# Patient Record
Sex: FEMALE | Race: WHITE | NOT HISPANIC OR LATINO | Employment: UNEMPLOYED | URBAN - NONMETROPOLITAN AREA
[De-identification: names, ages, dates, MRNs, and addresses within clinical notes are randomized per-mention and may not be internally consistent; named-entity substitution may affect disease eponyms.]

---

## 2017-07-20 ENCOUNTER — TRANSCRIBE ORDERS (OUTPATIENT)
Dept: ADMINISTRATIVE | Facility: HOSPITAL | Age: 47
End: 2017-07-20

## 2017-07-20 DIAGNOSIS — Z12.31 VISIT FOR SCREENING MAMMOGRAM: Primary | ICD-10-CM

## 2017-12-21 ENCOUNTER — TRANSCRIBE ORDERS (OUTPATIENT)
Dept: ADMINISTRATIVE | Facility: HOSPITAL | Age: 47
End: 2017-12-21

## 2017-12-21 DIAGNOSIS — S04.51XA INJURY OF RIGHT FACIAL NERVE, INITIAL ENCOUNTER: Primary | ICD-10-CM

## 2017-12-27 ENCOUNTER — APPOINTMENT (OUTPATIENT)
Dept: CT IMAGING | Facility: HOSPITAL | Age: 47
End: 2017-12-27

## 2017-12-29 ENCOUNTER — HOSPITAL ENCOUNTER (OUTPATIENT)
Dept: CT IMAGING | Facility: HOSPITAL | Age: 47
Discharge: HOME | End: 2017-12-29
Admitting: NURSE PRACTITIONER

## 2017-12-29 DIAGNOSIS — S04.51XA INJURY OF RIGHT FACIAL NERVE, INITIAL ENCOUNTER: ICD-10-CM

## 2017-12-29 PROCEDURE — 70450 CT HEAD/BRAIN W/O DYE: CPT

## 2017-12-29 PROCEDURE — 70450 CT HEAD/BRAIN W/O DYE: CPT | Performed by: RADIOLOGY

## 2018-02-13 ENCOUNTER — TRANSCRIBE ORDERS (OUTPATIENT)
Dept: ADMINISTRATIVE | Facility: HOSPITAL | Age: 48
End: 2018-02-13

## 2018-02-13 DIAGNOSIS — G44.319 ACUTE POST-TRAUMATIC HEADACHE, NOT INTRACTABLE: Primary | ICD-10-CM

## 2018-02-26 ENCOUNTER — APPOINTMENT (OUTPATIENT)
Dept: MRI IMAGING | Facility: HOSPITAL | Age: 48
End: 2018-02-26
Attending: PSYCHIATRY & NEUROLOGY

## 2018-02-28 ENCOUNTER — HOSPITAL ENCOUNTER (OUTPATIENT)
Dept: MRI IMAGING | Facility: HOSPITAL | Age: 48
Discharge: HOME | End: 2018-02-28
Attending: PSYCHIATRY & NEUROLOGY | Admitting: PSYCHIATRY & NEUROLOGY

## 2018-02-28 DIAGNOSIS — G44.319 ACUTE POST-TRAUMATIC HEADACHE, NOT INTRACTABLE: ICD-10-CM

## 2018-02-28 PROCEDURE — 70551 MRI BRAIN STEM W/O DYE: CPT | Performed by: RADIOLOGY

## 2018-02-28 PROCEDURE — 70551 MRI BRAIN STEM W/O DYE: CPT

## 2019-03-21 ENCOUNTER — APPOINTMENT (OUTPATIENT)
Dept: CT IMAGING | Facility: HOSPITAL | Age: 49
End: 2019-03-21

## 2019-03-21 ENCOUNTER — APPOINTMENT (OUTPATIENT)
Dept: ULTRASOUND IMAGING | Facility: HOSPITAL | Age: 49
End: 2019-03-21

## 2019-03-21 ENCOUNTER — HOSPITAL ENCOUNTER (EMERGENCY)
Facility: HOSPITAL | Age: 49
Discharge: ANOTHER HOSPITAL | End: 2019-03-21
Attending: EMERGENCY MEDICINE | Admitting: EMERGENCY MEDICINE

## 2019-03-21 VITALS
BODY MASS INDEX: 27.38 KG/M2 | OXYGEN SATURATION: 98 % | WEIGHT: 145 LBS | RESPIRATION RATE: 18 BRPM | HEIGHT: 61 IN | SYSTOLIC BLOOD PRESSURE: 122 MMHG | DIASTOLIC BLOOD PRESSURE: 62 MMHG | TEMPERATURE: 98.7 F | HEART RATE: 110 BPM

## 2019-03-21 DIAGNOSIS — K80.43 CALCULUS OF BILE DUCT WITH ACUTE CHOLECYSTITIS AND OBSTRUCTION: ICD-10-CM

## 2019-03-21 DIAGNOSIS — B16.9 ACUTE HEPATITIS B: ICD-10-CM

## 2019-03-21 DIAGNOSIS — K85.10 ACUTE BILIARY PANCREATITIS, UNSPECIFIED COMPLICATION STATUS: Primary | ICD-10-CM

## 2019-03-21 LAB
6-ACETYL MORPHINE: NEGATIVE
ALBUMIN SERPL-MCNC: 3.95 G/DL (ref 3.5–5.2)
ALBUMIN/GLOB SERPL: 0.9 G/DL
ALP SERPL-CCNC: 347 U/L (ref 39–117)
ALT SERPL W P-5'-P-CCNC: 89 U/L (ref 1–33)
AMPHET+METHAMPHET UR QL: POSITIVE
ANION GAP SERPL CALCULATED.3IONS-SCNC: 14.6 MMOL/L
AST SERPL-CCNC: 45 U/L (ref 1–32)
BACTERIA UR QL AUTO: ABNORMAL /HPF
BARBITURATES UR QL SCN: NEGATIVE
BASOPHILS # BLD AUTO: 0.02 10*3/MM3 (ref 0–0.2)
BASOPHILS NFR BLD AUTO: 0.1 % (ref 0–1.5)
BENZODIAZ UR QL SCN: NEGATIVE
BILIRUB SERPL-MCNC: 4.8 MG/DL (ref 0.2–1.2)
BILIRUB UR QL STRIP: ABNORMAL
BUN BLD-MCNC: 8 MG/DL (ref 6–20)
BUN/CREAT SERPL: 11.6 (ref 7–25)
BUPRENORPHINE SERPL-MCNC: POSITIVE NG/ML
CALCIUM SPEC-SCNC: 9.4 MG/DL (ref 8.6–10.5)
CANNABINOIDS SERPL QL: POSITIVE
CHLORIDE SERPL-SCNC: 94 MMOL/L (ref 98–107)
CLARITY UR: ABNORMAL
CO2 SERPL-SCNC: 23.4 MMOL/L (ref 22–29)
COCAINE UR QL: NEGATIVE
COLOR UR: ABNORMAL
CREAT BLD-MCNC: 0.69 MG/DL (ref 0.57–1)
D-LACTATE SERPL-SCNC: 1 MMOL/L (ref 0.5–2)
DEPRECATED RDW RBC AUTO: 43.8 FL (ref 37–54)
EOSINOPHIL # BLD AUTO: 0.2 10*3/MM3 (ref 0–0.4)
EOSINOPHIL NFR BLD AUTO: 1.4 % (ref 0.3–6.2)
ERYTHROCYTE [DISTWIDTH] IN BLOOD BY AUTOMATED COUNT: 14 % (ref 12.3–15.4)
GFR SERPL CREATININE-BSD FRML MDRD: 90 ML/MIN/1.73
GLOBULIN UR ELPH-MCNC: 4.4 GM/DL
GLUCOSE BLD-MCNC: 124 MG/DL (ref 65–99)
GLUCOSE UR STRIP-MCNC: NEGATIVE MG/DL
HAV IGM SERPL QL IA: ABNORMAL
HBV CORE IGM SERPL QL IA: REACTIVE
HBV SURFACE AG SERPL QL IA: ABNORMAL
HCG SERPL QL: NEGATIVE
HCT VFR BLD AUTO: 41 % (ref 34–46.6)
HCV AB SER DONR QL: ABNORMAL
HGB BLD-MCNC: 13.2 G/DL (ref 12–15.9)
HGB UR QL STRIP.AUTO: ABNORMAL
HOLD SPECIMEN: NORMAL
HOLD SPECIMEN: NORMAL
HYALINE CASTS UR QL AUTO: ABNORMAL /LPF
IMM GRANULOCYTES # BLD AUTO: 0.04 10*3/MM3 (ref 0–0.05)
IMM GRANULOCYTES NFR BLD AUTO: 0.3 % (ref 0–0.5)
KETONES UR QL STRIP: ABNORMAL
LEUKOCYTE ESTERASE UR QL STRIP.AUTO: ABNORMAL
LIPASE SERPL-CCNC: >600 U/L (ref 13–60)
LYMPHOCYTES # BLD AUTO: 1.38 10*3/MM3 (ref 0.7–3.1)
LYMPHOCYTES NFR BLD AUTO: 9.9 % (ref 19.6–45.3)
MCH RBC QN AUTO: 27.8 PG (ref 26.6–33)
MCHC RBC AUTO-ENTMCNC: 32.2 G/DL (ref 31.5–35.7)
MCV RBC AUTO: 86.3 FL (ref 79–97)
METHADONE UR QL SCN: NEGATIVE
MONOCYTES # BLD AUTO: 1.37 10*3/MM3 (ref 0.1–0.9)
MONOCYTES NFR BLD AUTO: 9.8 % (ref 5–12)
MUCOUS THREADS URNS QL MICRO: ABNORMAL /HPF
NEUTROPHILS # BLD AUTO: 10.95 10*3/MM3 (ref 1.4–7)
NEUTROPHILS NFR BLD AUTO: 78.5 % (ref 42.7–76)
NITRITE UR QL STRIP: POSITIVE
OPIATES UR QL: NEGATIVE
OXYCODONE UR QL SCN: NEGATIVE
PCP UR QL SCN: NEGATIVE
PH UR STRIP.AUTO: 5.5 [PH] (ref 5–8)
PLATELET # BLD AUTO: 308 10*3/MM3 (ref 140–450)
PMV BLD AUTO: 10.3 FL (ref 6–12)
POTASSIUM BLD-SCNC: 3.5 MMOL/L (ref 3.5–5.2)
PROT SERPL-MCNC: 8.3 G/DL (ref 6–8.5)
PROT UR QL STRIP: ABNORMAL
RBC # BLD AUTO: 4.75 10*6/MM3 (ref 3.77–5.28)
RBC # UR: ABNORMAL /HPF
REF LAB TEST METHOD: ABNORMAL
SODIUM BLD-SCNC: 132 MMOL/L (ref 136–145)
SP GR UR STRIP: >=1.03 (ref 1–1.03)
SQUAMOUS #/AREA URNS HPF: ABNORMAL /HPF
UROBILINOGEN UR QL STRIP: ABNORMAL
WBC NRBC COR # BLD: 13.96 10*3/MM3 (ref 3.4–10.8)
WBC UR QL AUTO: ABNORMAL /HPF
WHOLE BLOOD HOLD SPECIMEN: NORMAL
WHOLE BLOOD HOLD SPECIMEN: NORMAL

## 2019-03-21 PROCEDURE — 80307 DRUG TEST PRSMV CHEM ANLYZR: CPT | Performed by: EMERGENCY MEDICINE

## 2019-03-21 PROCEDURE — 80053 COMPREHEN METABOLIC PANEL: CPT | Performed by: EMERGENCY MEDICINE

## 2019-03-21 PROCEDURE — 84703 CHORIONIC GONADOTROPIN ASSAY: CPT | Performed by: EMERGENCY MEDICINE

## 2019-03-21 PROCEDURE — 76705 ECHO EXAM OF ABDOMEN: CPT | Performed by: RADIOLOGY

## 2019-03-21 PROCEDURE — 25010000002 PIPERACILLIN-TAZOBACTAM: Performed by: EMERGENCY MEDICINE

## 2019-03-21 PROCEDURE — 96375 TX/PRO/DX INJ NEW DRUG ADDON: CPT

## 2019-03-21 PROCEDURE — 99284 EMERGENCY DEPT VISIT MOD MDM: CPT

## 2019-03-21 PROCEDURE — 87040 BLOOD CULTURE FOR BACTERIA: CPT | Performed by: EMERGENCY MEDICINE

## 2019-03-21 PROCEDURE — 25010000002 MORPHINE PER 10 MG: Performed by: EMERGENCY MEDICINE

## 2019-03-21 PROCEDURE — 80074 ACUTE HEPATITIS PANEL: CPT | Performed by: PHYSICIAN ASSISTANT

## 2019-03-21 PROCEDURE — 74176 CT ABD & PELVIS W/O CONTRAST: CPT

## 2019-03-21 PROCEDURE — 83690 ASSAY OF LIPASE: CPT | Performed by: EMERGENCY MEDICINE

## 2019-03-21 PROCEDURE — 74176 CT ABD & PELVIS W/O CONTRAST: CPT | Performed by: RADIOLOGY

## 2019-03-21 PROCEDURE — 96361 HYDRATE IV INFUSION ADD-ON: CPT

## 2019-03-21 PROCEDURE — 83605 ASSAY OF LACTIC ACID: CPT | Performed by: EMERGENCY MEDICINE

## 2019-03-21 PROCEDURE — 96365 THER/PROPH/DIAG IV INF INIT: CPT

## 2019-03-21 PROCEDURE — 85025 COMPLETE CBC W/AUTO DIFF WBC: CPT | Performed by: EMERGENCY MEDICINE

## 2019-03-21 PROCEDURE — 81001 URINALYSIS AUTO W/SCOPE: CPT | Performed by: EMERGENCY MEDICINE

## 2019-03-21 PROCEDURE — 25010000002 ONDANSETRON PER 1 MG: Performed by: EMERGENCY MEDICINE

## 2019-03-21 PROCEDURE — 76705 ECHO EXAM OF ABDOMEN: CPT

## 2019-03-21 RX ORDER — IBUPROFEN 600 MG/1
600 TABLET ORAL ONCE
Status: COMPLETED | OUTPATIENT
Start: 2019-03-21 | End: 2019-03-21

## 2019-03-21 RX ORDER — ONDANSETRON 2 MG/ML
4 INJECTION INTRAMUSCULAR; INTRAVENOUS ONCE
Status: COMPLETED | OUTPATIENT
Start: 2019-03-21 | End: 2019-03-21

## 2019-03-21 RX ORDER — SODIUM CHLORIDE 9 MG/ML
125 INJECTION, SOLUTION INTRAVENOUS CONTINUOUS
Status: DISCONTINUED | OUTPATIENT
Start: 2019-03-21 | End: 2019-03-21 | Stop reason: HOSPADM

## 2019-03-21 RX ORDER — SODIUM CHLORIDE 0.9 % (FLUSH) 0.9 %
10 SYRINGE (ML) INJECTION AS NEEDED
Status: DISCONTINUED | OUTPATIENT
Start: 2019-03-21 | End: 2019-03-21 | Stop reason: HOSPADM

## 2019-03-21 RX ADMIN — MORPHINE SULFATE 4 MG: 4 INJECTION INTRAVENOUS at 17:39

## 2019-03-21 RX ADMIN — SODIUM CHLORIDE 125 ML/HR: 9 INJECTION, SOLUTION INTRAVENOUS at 17:36

## 2019-03-21 RX ADMIN — IBUPROFEN 600 MG: 600 TABLET ORAL at 15:57

## 2019-03-21 RX ADMIN — ONDANSETRON 4 MG: 2 INJECTION, SOLUTION INTRAMUSCULAR; INTRAVENOUS at 17:36

## 2019-03-21 RX ADMIN — PIPERACILLIN SODIUM,TAZOBACTAM SODIUM 3.38 G: 3; .375 INJECTION, POWDER, FOR SOLUTION INTRAVENOUS at 19:28

## 2019-03-21 RX ADMIN — SODIUM CHLORIDE 1000 ML: 9 INJECTION, SOLUTION INTRAVENOUS at 17:39

## 2019-03-21 NOTE — ED NOTES
Called [facility] [place] and spoke to Kacey. She is paging on call GI for Dr Aguillon and will call back.     Ofelia Robertson  03/21/19 1941

## 2019-03-21 NOTE — ED NOTES
Pt alert and oriented, skin pwd, no respiratory distress. Pt reports abd pain is 6/10, no vomiting at this time. md made aware of pt temp, will await new orders. Pt sent back to lobby to wait on available room.     Lupis Vizcaino, VIDYA  03/21/19 [id number]

## 2019-03-21 NOTE — ED PROVIDER NOTES
Subjective   48-year-old white female complains of abdominal pain and vomiting.  Patient states that for the past several days she has had abdominal pain worse when she eats and drinks.  She had some vomiting with this.  She denied any diarrhea or constipation.  She also had subjective fever at home and noticed that her eyes were yellow.  She has a history of Suboxone and methamphetamine use, but denies any IV drug use and says that she only takes this orally or intranasally.  She denies any alcohol use.  No previous abdominal surgeries.  She went to [facility] who did a urinalysis and referred her to the ER.            Review of Systems   All other systems reviewed and are negative.      No past medical history on file.    No Known Allergies    No past surgical history on file.    No family history on file.    Social History     Socioeconomic History   • Marital status: Single     Spouse name: Not on file   • Number of children: Not on file   • Years of education: Not on file   • Highest education level: Not on file           Objective   Physical Exam   Constitutional: She is oriented to person, place, and time. She appears well-developed and well-nourished.   HENT:   Head: Normocephalic and atraumatic.   Mouth/Throat: Oropharynx is clear and moist.   Eyes: Scleral icterus is present.   Cardiovascular: Normal rate, regular rhythm and normal heart sounds. Exam reveals no gallop and no friction rub.   No murmur heard.  Pulmonary/Chest: Effort normal and breath sounds normal. She has no wheezes. She has no rhonchi. She has no rales.   Abdominal: Normal appearance and bowel sounds are normal. There is tenderness in the right upper quadrant. There is no rigidity, no rebound and no guarding.   Neurological: She is alert and oriented to person, place, and time.   Skin: Skin is warm and dry.   Psychiatric: She has a normal mood and affect.   Nursing note and vitals reviewed.      Procedures  Results for orders  placed or performed during the hospital encounter of 03/21/19   Comprehensive Metabolic Panel   Result Value Ref Range    Glucose 124 (H) 65 - 99 mg/dL    BUN 8 6 - 20 mg/dL    Creatinine 0.69 0.57 - 1.00 mg/dL    Sodium 132 (L) 136 - 145 mmol/L    Potassium 3.5 3.5 - 5.2 mmol/L    Chloride 94 (L) 98 - 107 mmol/L    CO2 23.4 22.0 - 29.0 mmol/L    Calcium 9.4 8.6 - 10.5 mg/dL    Total Protein 8.3 6.0 - 8.5 g/dL    Albumin 3.95 3.50 - 5.20 g/dL    ALT (SGPT) 89 (H) 1 - 33 U/L    AST (SGOT) 45 (H) 1 - 32 U/L    Alkaline Phosphatase 347 (H) 39 - 117 U/L    Total Bilirubin 4.8 (H) 0.2 - 1.2 mg/dL    eGFR Non African Amer 90 >60 mL/min/1.73    Globulin 4.4 gm/dL    A/G Ratio 0.9 g/dL    BUN/Creatinine Ratio 11.6 7.0 - 25.0    Anion Gap 14.6 mmol/L   Lipase   Result Value Ref Range    Lipase >600 (H) 13 - 60 U/L   Urinalysis With Microscopic If Indicated (No Culture) - Urine, Clean Catch   Result Value Ref Range    Color, UA Orange (A) Yellow, Straw    Appearance, UA Turbid (A) Clear    pH, UA 5.5 5.0 - 8.0    Specific Gravity, UA >=1.030 1.005 - 1.030    Glucose, UA Negative Negative    Ketones, UA Trace (A) Negative    Bilirubin, UA Large (3+) (A) Negative    Blood, UA Moderate (2+) (A) Negative    Protein, UA >=300 mg/dL (3+) (A) Negative    Leuk Esterase, UA Small (1+) (A) Negative    Nitrite, UA Positive (A) Negative    Urobilinogen, UA 1.0 E.U./dL 0.2 - 1.0 E.U./dL   hCG, Serum, Qualitative   Result Value Ref Range    HCG Qualitative Negative Negative   CBC Auto Differential   Result Value Ref Range    WBC 13.96 (H) 3.40 - 10.80 10*3/mm3    RBC 4.75 3.77 - 5.28 10*6/mm3    Hemoglobin 13.2 12.0 - 15.9 g/dL    Hematocrit 41.0 34.0 - 46.6 %    MCV 86.3 79.0 - 97.0 fL    MCH 27.8 26.6 - 33.0 pg    MCHC 32.2 31.5 - 35.7 g/dL    RDW 14.0 12.3 - 15.4 %    RDW-SD 43.8 37.0 - 54.0 fl    MPV 10.3 6.0 - 12.0 fL    Platelets 308 140 - 450 10*3/mm3    Neutrophil % 78.5 (H) 42.7 - 76.0 %    Lymphocyte % 9.9 (L) 19.6 - 45.3 %     Monocyte % 9.8 5.0 - 12.0 %    Eosinophil % 1.4 0.3 - 6.2 %    Basophil % 0.1 0.0 - 1.5 %    Immature Grans % 0.3 0.0 - 0.5 %    Neutrophils, Absolute 10.95 (H) 1.40 - 7.00 10*3/mm3    Lymphocytes, Absolute 1.38 0.70 - 3.10 10*3/mm3    Monocytes, Absolute 1.37 (H) 0.10 - 0.90 10*3/mm3    Eosinophils, Absolute 0.20 0.00 - 0.40 10*3/mm3    Basophils, Absolute 0.02 0.00 - 0.20 10*3/mm3    Immature Grans, Absolute 0.04 0.00 - 0.05 10*3/mm3   Hepatitis Panel, Acute   Result Value Ref Range    Hepatitis B Surface Ag Non-Reactive Non-Reactive    Hep A IgM Non-Reactive Non-Reactive    Hep B C IgM Reactive (A) Non-Reactive    Hepatitis C Ab Non-Reactive Non-Reactive   Lactic Acid, Plasma   Result Value Ref Range    Lactate 1.0 0.5 - 2.0 mmol/L   Urine Drug Screen - Urine, Clean Catch   Result Value Ref Range    Amphetamine Screen, Urine Positive (A) Negative    Barbiturates Screen, Urine Negative Negative    Benzodiazepine Screen, Urine Negative Negative    Cocaine Screen, Urine Negative Negative    Methadone Screen, Urine Negative Negative    Opiate Screen Negative Negative    Phencyclidine (PCP), Urine Negative Negative    THC, Screen, Urine Positive (A) Negative    6-ACETYL MORPHINE Negative Negative    Buprenorphine, Screen, Urine Positive (A) Negative    Oxycodone Screen, Urine Negative Negative   Urinalysis, Microscopic Only - Urine, Clean Catch   Result Value Ref Range    RBC, UA 0-2 None Seen, 0-2 /HPF    WBC, UA 3-5 (A) None Seen, 0-2 /HPF    Bacteria, UA 1+ (A) None Seen /HPF    Squamous Epithelial Cells, UA 0-2 None Seen, 0-2 /HPF    Hyaline Casts, UA None Seen None Seen /LPF    Mucus, UA Small/1+ (A) None Seen, Trace /HPF    Methodology Manual Light Microscopy    Light Blue Top   Result Value Ref Range    Extra Tube hold for add-on    Green Top (Gel)   Result Value Ref Range    Extra Tube Hold for add-ons.    Lavender Top   Result Value Ref Range    Extra Tube hold for add-on    Gold Top - SST   Result Value Ref  Range    Extra Tube Hold for add-ons.              ED Course  ED Course as of Mar 21 1956   Thu Mar 21, 2019   1947 Have spoken to Dr. Pressley and Dr. Harris - they recommend transfer to facility with available ERCP.  Currently awaiting return call from [facility].  [BC]      ED Course User Index  [BC] Renato Aguillon MD                  MDM  Number of Diagnoses or Management Options  Acute biliary pancreatitis, unspecified complication status:   Acute hepatitis B:   Calculus of bile duct with acute cholecystitis and obstruction:      Amount and/or Complexity of Data Reviewed  Clinical lab tests: reviewed  Tests in the radiology section of CPT®: reviewed    Risk of Complications, Morbidity, and/or Mortality  Presenting problems: high  Diagnostic procedures: high  Management options: high          Final diagnoses:   Acute biliary pancreatitis, unspecified complication status   Calculus of bile duct with acute cholecystitis and obstruction   Acute hepatitis B            Renato Aguillon MD  03/21/19 1956

## 2019-03-22 NOTE — ED NOTES
Called radiology and spoke to Yoni and requested disc for transfer.     Ofelia Robertson  03/21/19 2022

## 2019-03-22 NOTE — ED NOTES
Called [facility] and spoke to [place]. He accepted BLS transfer to [facility]. No ETA given.     Ofelia Robertson  03/21/19 2029

## 2019-03-26 LAB
BACTERIA SPEC AEROBE CULT: NORMAL
BACTERIA SPEC AEROBE CULT: NORMAL

## 2021-05-27 ENCOUNTER — LAB (OUTPATIENT)
Dept: LAB | Facility: HOSPITAL | Age: 51
End: 2021-05-27

## 2021-05-27 ENCOUNTER — TRANSCRIBE ORDERS (OUTPATIENT)
Dept: ADMINISTRATIVE | Facility: HOSPITAL | Age: 51
End: 2021-05-27

## 2021-05-27 DIAGNOSIS — F19.10 DRUG ABUSE (HCC): ICD-10-CM

## 2021-05-27 DIAGNOSIS — F19.10 DRUG ABUSE (HCC): Primary | ICD-10-CM

## 2021-05-27 LAB
6-ACETYL MORPHINE: NEGATIVE
AMPHET+METHAMPHET UR QL: POSITIVE
BARBITURATES UR QL SCN: NEGATIVE
BENZODIAZ UR QL SCN: NEGATIVE
BUPRENORPHINE SERPL-MCNC: POSITIVE NG/ML
CANNABINOIDS SERPL QL: POSITIVE
COCAINE UR QL: NEGATIVE
METHADONE UR QL SCN: NEGATIVE
OPIATES UR QL: NEGATIVE
OXYCODONE UR QL SCN: NEGATIVE
PCP UR QL SCN: NEGATIVE

## 2021-05-27 PROCEDURE — 80307 DRUG TEST PRSMV CHEM ANLYZR: CPT

## 2021-06-09 ENCOUNTER — TRANSCRIBE ORDERS (OUTPATIENT)
Dept: ADMINISTRATIVE | Facility: HOSPITAL | Age: 51
End: 2021-06-09

## 2021-06-09 ENCOUNTER — LAB (OUTPATIENT)
Dept: LAB | Facility: HOSPITAL | Age: 51
End: 2021-06-09

## 2021-06-09 DIAGNOSIS — F19.10 SUBSTANCE ABUSE (HCC): Primary | ICD-10-CM

## 2021-06-09 DIAGNOSIS — F19.10 SUBSTANCE ABUSE (HCC): ICD-10-CM

## 2021-06-09 PROCEDURE — 80307 DRUG TEST PRSMV CHEM ANLYZR: CPT

## 2021-07-09 ENCOUNTER — LAB (OUTPATIENT)
Dept: LAB | Facility: HOSPITAL | Age: 51
End: 2021-07-09

## 2021-07-09 ENCOUNTER — TRANSCRIBE ORDERS (OUTPATIENT)
Dept: ADMINISTRATIVE | Facility: HOSPITAL | Age: 51
End: 2021-07-09

## 2021-07-09 DIAGNOSIS — F11.20 OPIOID TYPE DEPENDENCE, CONTINUOUS (HCC): Primary | ICD-10-CM

## 2021-07-09 DIAGNOSIS — F11.20 OPIOID TYPE DEPENDENCE, CONTINUOUS (HCC): ICD-10-CM

## 2021-07-09 LAB
6-ACETYL MORPHINE: NEGATIVE
AMPHET+METHAMPHET UR QL: NEGATIVE
BARBITURATES UR QL SCN: NEGATIVE
BENZODIAZ UR QL SCN: NEGATIVE
BUPRENORPHINE SERPL-MCNC: POSITIVE NG/ML
CANNABINOIDS SERPL QL: NEGATIVE
COCAINE UR QL: NEGATIVE
METHADONE UR QL SCN: NEGATIVE
OPIATES UR QL: NEGATIVE
OXYCODONE UR QL SCN: NEGATIVE
PCP UR QL SCN: NEGATIVE

## 2021-07-09 PROCEDURE — 80307 DRUG TEST PRSMV CHEM ANLYZR: CPT

## 2021-07-19 ENCOUNTER — LAB (OUTPATIENT)
Dept: LAB | Facility: HOSPITAL | Age: 51
End: 2021-07-19

## 2021-07-19 DIAGNOSIS — F11.20 OPIOID TYPE DEPENDENCE, CONTINUOUS (HCC): ICD-10-CM

## 2021-07-21 ENCOUNTER — LAB (OUTPATIENT)
Dept: LAB | Facility: HOSPITAL | Age: 51
End: 2021-07-21

## 2021-07-21 PROCEDURE — 80307 DRUG TEST PRSMV CHEM ANLYZR: CPT

## 2021-08-03 ENCOUNTER — LAB (OUTPATIENT)
Dept: LAB | Facility: HOSPITAL | Age: 51
End: 2021-08-03

## 2021-08-03 DIAGNOSIS — F11.20 OPIOID TYPE DEPENDENCE, CONTINUOUS (HCC): ICD-10-CM

## 2021-08-03 PROCEDURE — 80307 DRUG TEST PRSMV CHEM ANLYZR: CPT

## 2021-09-07 ENCOUNTER — LAB (OUTPATIENT)
Dept: LAB | Facility: HOSPITAL | Age: 51
End: 2021-09-07

## 2021-09-07 DIAGNOSIS — F11.20 OPIOID TYPE DEPENDENCE, CONTINUOUS (HCC): ICD-10-CM

## 2021-09-07 LAB
AMPHET+METHAMPHET UR QL: NEGATIVE
AMPHETAMINES UR QL: NEGATIVE
BARBITURATES UR QL SCN: NEGATIVE
BENZODIAZ UR QL SCN: POSITIVE
BUPRENORPHINE SERPL-MCNC: POSITIVE NG/ML
CANNABINOIDS SERPL QL: NEGATIVE
COCAINE UR QL: NEGATIVE
METHADONE UR QL SCN: NEGATIVE
OPIATES UR QL: NEGATIVE
OXYCODONE UR QL SCN: NEGATIVE
PCP UR QL SCN: NEGATIVE
PROPOXYPH UR QL: NEGATIVE
TRICYCLICS UR QL SCN: NEGATIVE

## 2021-09-07 PROCEDURE — 80306 DRUG TEST PRSMV INSTRMNT: CPT

## 2021-09-20 ENCOUNTER — LAB (OUTPATIENT)
Dept: LAB | Facility: HOSPITAL | Age: 51
End: 2021-09-20

## 2021-09-20 DIAGNOSIS — F11.20 OPIOID TYPE DEPENDENCE, CONTINUOUS (HCC): ICD-10-CM

## 2021-09-20 LAB
AMPHET+METHAMPHET UR QL: NEGATIVE
AMPHETAMINES UR QL: NEGATIVE
BARBITURATES UR QL SCN: NEGATIVE
BENZODIAZ UR QL SCN: NEGATIVE
BUPRENORPHINE SERPL-MCNC: NEGATIVE NG/ML
CANNABINOIDS SERPL QL: NEGATIVE
COCAINE UR QL: NEGATIVE
METHADONE UR QL SCN: NEGATIVE
OPIATES UR QL: NEGATIVE
OXYCODONE UR QL SCN: NEGATIVE
PCP UR QL SCN: NEGATIVE
PROPOXYPH UR QL: NEGATIVE
TRICYCLICS UR QL SCN: NEGATIVE

## 2021-09-20 PROCEDURE — 80306 DRUG TEST PRSMV INSTRMNT: CPT

## 2021-10-05 ENCOUNTER — LAB (OUTPATIENT)
Dept: LAB | Facility: HOSPITAL | Age: 51
End: 2021-10-05

## 2021-10-05 DIAGNOSIS — F11.20 OPIOID TYPE DEPENDENCE, CONTINUOUS (HCC): ICD-10-CM

## 2021-10-05 PROCEDURE — 80306 DRUG TEST PRSMV INSTRMNT: CPT

## 2021-10-22 ENCOUNTER — LAB (OUTPATIENT)
Dept: LAB | Facility: HOSPITAL | Age: 51
End: 2021-10-22

## 2021-10-22 DIAGNOSIS — F11.20 OPIOID TYPE DEPENDENCE, CONTINUOUS (HCC): ICD-10-CM

## 2021-10-22 PROCEDURE — 80306 DRUG TEST PRSMV INSTRMNT: CPT

## 2021-11-01 ENCOUNTER — LAB (OUTPATIENT)
Dept: LAB | Facility: HOSPITAL | Age: 51
End: 2021-11-01

## 2021-11-01 DIAGNOSIS — F11.20 OPIOID TYPE DEPENDENCE, CONTINUOUS (HCC): ICD-10-CM

## 2021-11-01 PROCEDURE — 80306 DRUG TEST PRSMV INSTRMNT: CPT

## 2021-11-16 ENCOUNTER — LAB (OUTPATIENT)
Dept: LAB | Facility: HOSPITAL | Age: 51
End: 2021-11-16

## 2021-11-16 DIAGNOSIS — F11.20 OPIOID TYPE DEPENDENCE, CONTINUOUS (HCC): ICD-10-CM

## 2021-11-16 LAB
AMPHET+METHAMPHET UR QL: NEGATIVE
AMPHETAMINES UR QL: NEGATIVE
BARBITURATES UR QL SCN: NEGATIVE
BENZODIAZ UR QL SCN: NEGATIVE
BUPRENORPHINE SERPL-MCNC: POSITIVE NG/ML
CANNABINOIDS SERPL QL: NEGATIVE
COCAINE UR QL: NEGATIVE
METHADONE UR QL SCN: NEGATIVE
OPIATES UR QL: NEGATIVE
OXYCODONE UR QL SCN: NEGATIVE
PCP UR QL SCN: NEGATIVE
PROPOXYPH UR QL: NEGATIVE
TRICYCLICS UR QL SCN: NEGATIVE

## 2021-11-16 PROCEDURE — 80306 DRUG TEST PRSMV INSTRMNT: CPT

## 2021-11-30 ENCOUNTER — LAB (OUTPATIENT)
Dept: LAB | Facility: HOSPITAL | Age: 51
End: 2021-11-30

## 2021-11-30 DIAGNOSIS — F11.20 OPIOID TYPE DEPENDENCE, CONTINUOUS (HCC): ICD-10-CM

## 2021-12-13 ENCOUNTER — LAB (OUTPATIENT)
Dept: LAB | Facility: HOSPITAL | Age: 51
End: 2021-12-13

## 2021-12-13 DIAGNOSIS — F11.20 OPIOID TYPE DEPENDENCE, CONTINUOUS (HCC): ICD-10-CM

## 2021-12-13 PROCEDURE — 80306 DRUG TEST PRSMV INSTRMNT: CPT

## 2021-12-31 ENCOUNTER — LAB (OUTPATIENT)
Dept: LAB | Facility: HOSPITAL | Age: 51
End: 2021-12-31

## 2021-12-31 DIAGNOSIS — F11.20 OPIOID TYPE DEPENDENCE, CONTINUOUS: ICD-10-CM

## 2021-12-31 PROCEDURE — 80306 DRUG TEST PRSMV INSTRMNT: CPT

## 2022-01-25 ENCOUNTER — LAB (OUTPATIENT)
Dept: LAB | Facility: HOSPITAL | Age: 52
End: 2022-01-25

## 2022-01-25 DIAGNOSIS — F11.20 OPIOID TYPE DEPENDENCE, CONTINUOUS: ICD-10-CM

## 2022-01-25 PROCEDURE — 80306 DRUG TEST PRSMV INSTRMNT: CPT

## 2022-02-08 ENCOUNTER — LAB (OUTPATIENT)
Dept: LAB | Facility: HOSPITAL | Age: 52
End: 2022-02-08

## 2022-02-08 DIAGNOSIS — F11.20 OPIOID TYPE DEPENDENCE, CONTINUOUS: ICD-10-CM

## 2022-02-08 PROCEDURE — 80306 DRUG TEST PRSMV INSTRMNT: CPT

## 2022-02-22 ENCOUNTER — LAB (OUTPATIENT)
Dept: LAB | Facility: HOSPITAL | Age: 52
End: 2022-02-22

## 2022-02-22 DIAGNOSIS — F11.20 OPIOID TYPE DEPENDENCE, CONTINUOUS: ICD-10-CM

## 2022-02-22 PROCEDURE — 80306 DRUG TEST PRSMV INSTRMNT: CPT

## 2022-03-08 ENCOUNTER — LAB (OUTPATIENT)
Dept: LAB | Facility: HOSPITAL | Age: 52
End: 2022-03-08

## 2022-03-08 DIAGNOSIS — F11.20 OPIOID TYPE DEPENDENCE, CONTINUOUS: ICD-10-CM

## 2022-03-08 PROCEDURE — 80306 DRUG TEST PRSMV INSTRMNT: CPT

## 2022-03-19 ENCOUNTER — LAB (OUTPATIENT)
Dept: LAB | Facility: HOSPITAL | Age: 52
End: 2022-03-19

## 2022-03-19 ENCOUNTER — TRANSCRIBE ORDERS (OUTPATIENT)
Dept: LAB | Facility: HOSPITAL | Age: 52
End: 2022-03-19

## 2022-03-19 DIAGNOSIS — K21.9 GASTRIC REFLUX: ICD-10-CM

## 2022-03-19 DIAGNOSIS — F11.20 OPIOID TYPE DEPENDENCE, CONTINUOUS: Primary | ICD-10-CM

## 2022-03-19 DIAGNOSIS — F19.10 DRUG ABUSE: ICD-10-CM

## 2022-03-19 DIAGNOSIS — F12.20 CANNABIS DEPENDENCE, CONTINUOUS: ICD-10-CM

## 2022-03-19 DIAGNOSIS — F11.20 OPIOID TYPE DEPENDENCE, CONTINUOUS: ICD-10-CM

## 2022-03-19 DIAGNOSIS — F51.5 NIGHTMARE DISORDER: ICD-10-CM

## 2022-03-19 DIAGNOSIS — F19.10 SUBSTANCE ABUSE: ICD-10-CM

## 2022-03-19 DIAGNOSIS — F51.5 NIGHTMARE DISORDER: Primary | ICD-10-CM

## 2022-03-19 LAB
ALBUMIN SERPL-MCNC: 4.1 G/DL (ref 3.5–5.2)
ALBUMIN/GLOB SERPL: 1.5 G/DL
ALP SERPL-CCNC: 84 U/L (ref 39–117)
ALT SERPL W P-5'-P-CCNC: 17 U/L (ref 1–33)
AMPHET+METHAMPHET UR QL: POSITIVE
AMPHETAMINES UR QL: POSITIVE
ANION GAP SERPL CALCULATED.3IONS-SCNC: 10.3 MMOL/L (ref 5–15)
AST SERPL-CCNC: 16 U/L (ref 1–32)
BARBITURATES UR QL SCN: NEGATIVE
BASOPHILS # BLD AUTO: 0.05 10*3/MM3 (ref 0–0.2)
BASOPHILS NFR BLD AUTO: 0.7 % (ref 0–1.5)
BENZODIAZ UR QL SCN: NEGATIVE
BILIRUB SERPL-MCNC: 0.3 MG/DL (ref 0–1.2)
BUN SERPL-MCNC: 10 MG/DL (ref 6–20)
BUN/CREAT SERPL: 11.4 (ref 7–25)
BUPRENORPHINE SERPL-MCNC: POSITIVE NG/ML
C TRACH RRNA CVX QL NAA+PROBE: NOT DETECTED
CALCIUM SPEC-SCNC: 9.3 MG/DL (ref 8.6–10.5)
CANNABINOIDS SERPL QL: POSITIVE
CHLORIDE SERPL-SCNC: 107 MMOL/L (ref 98–107)
CO2 SERPL-SCNC: 23.7 MMOL/L (ref 22–29)
COCAINE UR QL: NEGATIVE
CREAT SERPL-MCNC: 0.88 MG/DL (ref 0.57–1)
DEPRECATED RDW RBC AUTO: 39.8 FL (ref 37–54)
EGFRCR SERPLBLD CKD-EPI 2021: 78.7 ML/MIN/1.73
EOSINOPHIL # BLD AUTO: 0.24 10*3/MM3 (ref 0–0.4)
EOSINOPHIL NFR BLD AUTO: 3.3 % (ref 0.3–6.2)
ERYTHROCYTE [DISTWIDTH] IN BLOOD BY AUTOMATED COUNT: 12.5 % (ref 12.3–15.4)
GLOBULIN UR ELPH-MCNC: 2.7 GM/DL
GLUCOSE SERPL-MCNC: 116 MG/DL (ref 65–99)
HBV SURFACE AG SERPL QL IA: NORMAL
HCT VFR BLD AUTO: 39.4 % (ref 34–46.6)
HCV AB SER DONR QL: NORMAL
HGB BLD-MCNC: 12.5 G/DL (ref 12–15.9)
HIV1+2 AB SER QL: NORMAL
IMM GRANULOCYTES # BLD AUTO: 0.02 10*3/MM3 (ref 0–0.05)
IMM GRANULOCYTES NFR BLD AUTO: 0.3 % (ref 0–0.5)
LYMPHOCYTES # BLD AUTO: 3.27 10*3/MM3 (ref 0.7–3.1)
LYMPHOCYTES NFR BLD AUTO: 44.9 % (ref 19.6–45.3)
MCH RBC QN AUTO: 27.8 PG (ref 26.6–33)
MCHC RBC AUTO-ENTMCNC: 31.7 G/DL (ref 31.5–35.7)
MCV RBC AUTO: 87.6 FL (ref 79–97)
METHADONE UR QL SCN: NEGATIVE
MONOCYTES # BLD AUTO: 0.57 10*3/MM3 (ref 0.1–0.9)
MONOCYTES NFR BLD AUTO: 7.8 % (ref 5–12)
N GONORRHOEA RRNA SPEC QL NAA+PROBE: NOT DETECTED
NEUTROPHILS NFR BLD AUTO: 3.13 10*3/MM3 (ref 1.7–7)
NEUTROPHILS NFR BLD AUTO: 43 % (ref 42.7–76)
NRBC BLD AUTO-RTO: 0 /100 WBC (ref 0–0.2)
OPIATES UR QL: NEGATIVE
OXYCODONE UR QL SCN: NEGATIVE
PCP UR QL SCN: NEGATIVE
PLATELET # BLD AUTO: 243 10*3/MM3 (ref 140–450)
PMV BLD AUTO: 10.6 FL (ref 6–12)
POTASSIUM SERPL-SCNC: 4 MMOL/L (ref 3.5–5.2)
PROPOXYPH UR QL: NEGATIVE
PROT SERPL-MCNC: 6.8 G/DL (ref 6–8.5)
RBC # BLD AUTO: 4.5 10*6/MM3 (ref 3.77–5.28)
SODIUM SERPL-SCNC: 141 MMOL/L (ref 136–145)
TRICHOMONAS VAGINALIS PCR: NOT DETECTED
TRICYCLICS UR QL SCN: NEGATIVE
TSH SERPL DL<=0.05 MIU/L-ACNC: 2.05 UIU/ML (ref 0.27–4.2)
WBC NRBC COR # BLD: 7.28 10*3/MM3 (ref 3.4–10.8)

## 2022-03-19 PROCEDURE — G0432 EIA HIV-1/HIV-2 SCREEN: HCPCS

## 2022-03-19 PROCEDURE — 87591 N.GONORRHOEAE DNA AMP PROB: CPT

## 2022-03-19 PROCEDURE — 80306 DRUG TEST PRSMV INSTRMNT: CPT

## 2022-03-19 PROCEDURE — 85025 COMPLETE CBC W/AUTO DIFF WBC: CPT

## 2022-03-19 PROCEDURE — 86592 SYPHILIS TEST NON-TREP QUAL: CPT

## 2022-03-19 PROCEDURE — 80053 COMPREHEN METABOLIC PANEL: CPT

## 2022-03-19 PROCEDURE — 87491 CHLMYD TRACH DNA AMP PROBE: CPT

## 2022-03-19 PROCEDURE — 84443 ASSAY THYROID STIM HORMONE: CPT

## 2022-03-19 PROCEDURE — 36415 COLL VENOUS BLD VENIPUNCTURE: CPT

## 2022-03-19 PROCEDURE — 87661 TRICHOMONAS VAGINALIS AMPLIF: CPT

## 2022-03-19 PROCEDURE — 80074 ACUTE HEPATITIS PANEL: CPT

## 2022-03-20 LAB
HBV CORE IGM SERPL QL IA: NORMAL
RPR SER QL: NORMAL

## 2022-03-28 ENCOUNTER — LAB (OUTPATIENT)
Dept: LAB | Facility: HOSPITAL | Age: 52
End: 2022-03-28

## 2022-03-28 ENCOUNTER — TRANSCRIBE ORDERS (OUTPATIENT)
Dept: ADMINISTRATIVE | Facility: HOSPITAL | Age: 52
End: 2022-03-28

## 2022-03-28 DIAGNOSIS — F11.20 OPIOID TYPE DEPENDENCE, CONTINUOUS: ICD-10-CM

## 2022-03-28 DIAGNOSIS — F11.20 OPIOID TYPE DEPENDENCE, CONTINUOUS: Primary | ICD-10-CM

## 2022-03-28 PROCEDURE — 80306 DRUG TEST PRSMV INSTRMNT: CPT

## 2022-04-04 ENCOUNTER — LAB (OUTPATIENT)
Dept: LAB | Facility: HOSPITAL | Age: 52
End: 2022-04-04

## 2022-04-04 DIAGNOSIS — F11.20 OPIOID TYPE DEPENDENCE, CONTINUOUS: ICD-10-CM

## 2022-04-04 PROCEDURE — 80306 DRUG TEST PRSMV INSTRMNT: CPT

## 2022-04-12 ENCOUNTER — LAB (OUTPATIENT)
Dept: LAB | Facility: HOSPITAL | Age: 52
End: 2022-04-12

## 2022-04-12 DIAGNOSIS — F11.20 OPIOID TYPE DEPENDENCE, CONTINUOUS: ICD-10-CM

## 2022-04-12 PROCEDURE — 80306 DRUG TEST PRSMV INSTRMNT: CPT

## 2022-04-18 ENCOUNTER — LAB (OUTPATIENT)
Dept: LAB | Facility: HOSPITAL | Age: 52
End: 2022-04-18

## 2022-04-18 DIAGNOSIS — F11.20 OPIOID TYPE DEPENDENCE, CONTINUOUS: ICD-10-CM

## 2022-04-18 PROCEDURE — 80306 DRUG TEST PRSMV INSTRMNT: CPT

## 2022-05-02 ENCOUNTER — LAB (OUTPATIENT)
Dept: LAB | Facility: HOSPITAL | Age: 52
End: 2022-05-02

## 2022-05-02 DIAGNOSIS — F11.20 OPIOID TYPE DEPENDENCE, CONTINUOUS: ICD-10-CM

## 2022-05-02 PROCEDURE — 80306 DRUG TEST PRSMV INSTRMNT: CPT

## 2022-05-16 ENCOUNTER — LAB (OUTPATIENT)
Dept: LAB | Facility: HOSPITAL | Age: 52
End: 2022-05-16

## 2022-05-16 DIAGNOSIS — F11.20 OPIOID TYPE DEPENDENCE, CONTINUOUS: ICD-10-CM

## 2022-05-16 PROCEDURE — 80306 DRUG TEST PRSMV INSTRMNT: CPT

## 2022-06-13 ENCOUNTER — LAB (OUTPATIENT)
Dept: LAB | Facility: HOSPITAL | Age: 52
End: 2022-06-13

## 2022-06-13 DIAGNOSIS — F11.20 OPIOID TYPE DEPENDENCE, CONTINUOUS: ICD-10-CM

## 2022-06-13 PROCEDURE — 80306 DRUG TEST PRSMV INSTRMNT: CPT

## 2022-12-29 ENCOUNTER — TRANSCRIBE ORDERS (OUTPATIENT)
Dept: ADMINISTRATIVE | Facility: HOSPITAL | Age: 52
End: 2022-12-29

## 2022-12-29 ENCOUNTER — LAB (OUTPATIENT)
Dept: LAB | Facility: HOSPITAL | Age: 52
End: 2022-12-29
Payer: MEDICAID

## 2022-12-29 DIAGNOSIS — F11.90 OPIOID USE DISORDER: ICD-10-CM

## 2022-12-29 DIAGNOSIS — F11.90 OPIOID USE DISORDER: Primary | ICD-10-CM

## 2022-12-29 LAB
AMPHET+METHAMPHET UR QL: POSITIVE
AMPHETAMINES UR QL: POSITIVE
BARBITURATES UR QL SCN: NEGATIVE
BENZODIAZ UR QL SCN: NEGATIVE
BUPRENORPHINE SERPL-MCNC: POSITIVE NG/ML
CANNABINOIDS SERPL QL: POSITIVE
COCAINE UR QL: NEGATIVE
METHADONE UR QL SCN: NEGATIVE
OPIATES UR QL: NEGATIVE
OXYCODONE UR QL SCN: NEGATIVE
PCP UR QL SCN: NEGATIVE
PROPOXYPH UR QL: NEGATIVE
TRICYCLICS UR QL SCN: NEGATIVE

## 2022-12-29 PROCEDURE — 80306 DRUG TEST PRSMV INSTRMNT: CPT

## 2023-01-12 ENCOUNTER — LAB (OUTPATIENT)
Dept: LAB | Facility: HOSPITAL | Age: 53
End: 2023-01-12
Payer: MEDICAID

## 2023-01-12 DIAGNOSIS — F11.90 OPIOID USE DISORDER: ICD-10-CM

## 2023-01-12 PROCEDURE — 80306 DRUG TEST PRSMV INSTRMNT: CPT

## 2023-01-18 ENCOUNTER — LAB (OUTPATIENT)
Dept: LAB | Facility: HOSPITAL | Age: 53
End: 2023-01-18
Payer: MEDICAID

## 2023-01-18 DIAGNOSIS — F11.90 OPIOID USE DISORDER: ICD-10-CM

## 2023-04-11 ENCOUNTER — LAB (OUTPATIENT)
Dept: LAB | Facility: HOSPITAL | Age: 53
End: 2023-04-11
Payer: MEDICAID

## 2023-04-11 ENCOUNTER — TELEMEDICINE (OUTPATIENT)
Dept: PSYCHIATRY | Facility: CLINIC | Age: 53
End: 2023-04-11
Payer: MEDICAID

## 2023-04-11 VITALS
BODY MASS INDEX: 32.24 KG/M2 | SYSTOLIC BLOOD PRESSURE: 129 MMHG | HEART RATE: 86 BPM | WEIGHT: 175.2 LBS | HEIGHT: 62 IN | DIASTOLIC BLOOD PRESSURE: 74 MMHG

## 2023-04-11 DIAGNOSIS — F15.10 METHAMPHETAMINE ABUSE: ICD-10-CM

## 2023-04-11 DIAGNOSIS — Z79.899 MEDICATION MANAGEMENT: ICD-10-CM

## 2023-04-11 DIAGNOSIS — F11.20 OPIOID TYPE DEPENDENCE, CONTINUOUS: Primary | ICD-10-CM

## 2023-04-11 LAB
EXTERNAL AMPHETAMINE SCREEN URINE: POSITIVE
EXTERNAL BENZODIAZEPINE SCREEN URINE: NEGATIVE
EXTERNAL BUPRENORPHINE SCREEN URINE: POSITIVE
EXTERNAL COCAINE SCREEN URINE: NEGATIVE
EXTERNAL MDMA: POSITIVE
EXTERNAL METHADONE SCREEN URINE: NEGATIVE
EXTERNAL METHAMPHETAMINE SCREEN URINE: POSITIVE
EXTERNAL OPIATES SCREEN URINE: NEGATIVE
EXTERNAL OXYCODONE SCREEN URINE: NEGATIVE
EXTERNAL THC SCREEN URINE: POSITIVE

## 2023-04-11 PROCEDURE — G0432 EIA HIV-1/HIV-2 SCREEN: HCPCS | Performed by: NURSE PRACTITIONER

## 2023-04-11 PROCEDURE — 1159F MED LIST DOCD IN RCRD: CPT | Performed by: NURSE PRACTITIONER

## 2023-04-11 PROCEDURE — 90792 PSYCH DIAG EVAL W/MED SRVCS: CPT | Performed by: NURSE PRACTITIONER

## 2023-04-11 PROCEDURE — 1160F RVW MEDS BY RX/DR IN RCRD: CPT | Performed by: NURSE PRACTITIONER

## 2023-04-11 PROCEDURE — 36415 COLL VENOUS BLD VENIPUNCTURE: CPT

## 2023-04-11 PROCEDURE — 85025 COMPLETE CBC W/AUTO DIFF WBC: CPT

## 2023-04-11 PROCEDURE — 87522 HEPATITIS C REVRS TRNSCRPJ: CPT

## 2023-04-11 PROCEDURE — 80053 COMPREHEN METABOLIC PANEL: CPT

## 2023-04-11 RX ORDER — BUPROPION HYDROCHLORIDE 150 MG/1
150 TABLET ORAL EVERY MORNING
Qty: 30 TABLET | Refills: 0 | Status: SHIPPED | OUTPATIENT
Start: 2023-04-11 | End: 2024-04-10

## 2023-04-11 RX ORDER — BUPRENORPHINE HYDROCHLORIDE AND NALOXONE HYDROCHLORIDE DIHYDRATE 8; 2 MG/1; MG/1
2 TABLET SUBLINGUAL DAILY
Qty: 16 TABLET | Refills: 0 | Status: SHIPPED | OUTPATIENT
Start: 2023-04-11 | End: 2023-04-18 | Stop reason: SDUPTHER

## 2023-04-11 RX ORDER — ACETAMINOPHEN 500 MG
TABLET ORAL
COMMUNITY
Start: 2022-12-13

## 2023-04-11 RX ORDER — FAMOTIDINE 20 MG/1
TABLET, FILM COATED ORAL
COMMUNITY
Start: 2022-12-13

## 2023-04-11 RX ORDER — NALOXONE HYDROCHLORIDE 4 MG/.1ML
1 SPRAY NASAL AS NEEDED
Qty: 2 EACH | Refills: 2 | Status: SHIPPED | OUTPATIENT
Start: 2023-04-11

## 2023-04-11 NOTE — PROGRESS NOTES
This provider is located at [facility]. The Patient is seen remotely located at the [facility] ([facility]) using Video. Patient is being seen via telehealth and confirm that they are in a secure environment for this session. The patient's condition being diagnosed/treated is appropriate for telemedicine. Provider identified as Williams Brody as well as credentials APRN MSN FNP-C SRINIVASAN-YOUNG.   The client/patient gave consent to be seen remotely, and when consent is given they understand that the consent allows for patient identifiable information to be sent to a third party as needed.   They may refuse to be seen remotely at any time. The electronic data is encrypted and password protected, and the patient has been advised of the potential risks to privacy not withstanding such measures.    Initial Evaluation: Chief Complaint/History of Present Illness: Patient presents for initial evaluation requesting buprenorphine therapy for opiate use relapse prevention  -Opiate use initiated while in high school as prescribed from a provider, opiate use continued and escalated and developed into a chronic dependency.  Buprenorphine therapy initiated approximately 3 years ago and patient reports when she consistently doses is able to maintain abstinence from illicit opiates also mitigating opiate cravings and opiate withdrawal symptoms, patient has received buprenorphine from other clinics as substantiated by Kalen report and patient desires to transition care to the [facility], last dose of buprenorphine 2 days ago  -As patient can substantiate tolerance to buprenorphine via today's presumptive urinalysis and also Kalen review of patient's history will continue for patient at 16 mg daily.  This is an increase from last noted maintenance dose as patient reports she has self escalated the dose as she feels an extra quarter tablet as needed as the day progresses  -Patient continues THC and advised this is  acceptable as long as she is acquiring from approximately source and discussed the risk of cross-contamination with other substances  -Patient reports struggle with methamphetamine which is used daily, patient interested in trying Wellbutrin to mitigate cravings for stimulants, discussed with patient need for increased support as it relates to methamphetamine use and patient agrees to consider my recommendation for chemical dependency intensive outpatient this week and will revisit this next evaluation  -Patient denies concerns with mood no concern for anxiety or depression and no suicidal or homicidal thoughts    Urine Drug Screen (today's visit) discussed: Positive amphetamine, methamphetamine THC and buprenorphine    ROSY (PDMP) Reviewed for Current/Active Medications: Buprenorphine/naloxone as reviewed today    Past Surgical History:  History reviewed. No pertinent surgical history.    Problem List:  There is no problem list on file for this patient.      Allergy:   No Known Allergies     Current Medications:   Current Outpatient Medications   Medication Sig Dispense Refill   • famotidine (PEPCID) 20 MG tablet TAKE ONE TABLET BY MOUTH EVERY MORNING FOR 30 DAYS     • GNP PAIN RELIEF EX-STRENGTH 500 MG tablet TAKE ONE TABLET BY MOUTH EVERY 6 HOURS AS NEEDED FOR 30 DAYS **DO NOT EXCEED 6 TABLETS IN 24 HOURS**     • buprenorphine-naloxone (SUBOXONE) 8-2 MG per SL tablet Place 2 tablets under the tongue Daily. 16 tablet 0   • buPROPion XL (Wellbutrin XL) 150 MG 24 hr tablet Take 1 tablet by mouth Every Morning. 30 tablet 0   • naloxone (NARCAN) 4 MG/0.1ML nasal spray 1 spray into the nostril(s) as directed by provider As Needed (opiate over sedation). 1 spray in 1 nostril every 2 to 3 minutes, call 911 2 each 2     No current facility-administered medications for this visit.       Past Medical History:  Past Medical History:   Diagnosis Date   • History of hepatitis B    • Pancreatitis          Social History      Socioeconomic History   • Marital status: Single   Tobacco Use   • Smoking status: Every Day     Packs/day: 1.00     Years: 15.00     Pack years: 15.00     Types: Cigarettes   • Smokeless tobacco: Never   Vaping Use   • Vaping Use: Never used   Substance and Sexual Activity   • Alcohol use: Not Currently   • Drug use: Yes     Types: Marijuana, Hydrocodone, Oxycodone, Methamphetamines   • Sexual activity: Defer       Family History:   Family History of Substance/Alcohol use:      Family History   Problem Relation Age of Onset   • No Known Problems Mother    • No Known Problems Father    • No Known Problems Sister    • No Known Problems Brother    • No Known Problems Maternal Aunt    • No Known Problems Paternal Aunt    • No Known Problems Maternal Uncle    • No Known Problems Paternal Uncle    • No Known Problems Maternal Grandfather    • No Known Problems Maternal Grandmother    • No Known Problems Paternal Grandfather    • No Known Problems Paternal Grandmother    • No Known Problems Cousin    • No Known Problems Other    • ADD / ADHD Neg Hx    • Alcohol abuse Neg Hx    • Anxiety disorder Neg Hx    • Bipolar disorder Neg Hx    • Dementia Neg Hx    • Depression Neg Hx    • Drug abuse Neg Hx    • OCD Neg Hx    • Paranoid behavior Neg Hx    • Schizophrenia Neg Hx    • Seizures Neg Hx    • Self-Injurious Behavior  Neg Hx    • Suicide Attempts Neg Hx          Mental Status Exam:   Hygiene:   good  Cooperation:  Cooperative  Eye Contact:  Good  Psychomotor Behavior:  Appropriate  Affect:  Appropriate  Mood: anxious  Hopelessness: Optimistic  Speech:  Normal  Thought Process:  Goal directed  Thought Content:  Normal  Suicidal:  None  Homicidal:  None  Hallucinations:  None  Delusion:  None  Memory:  Intact  Orientation:  Grossly intact  Reliability:  good  Insight:  Good  Judgement:  Fair  Impulse Control:  Poor  Physical/Medical Issues:  No      Clinical Opiate Withdrawal Scale (COWS)    = Reflects Patient's Score      Heart Rate  80 or <  0   +1  101-120 +2  > 120  +4   =    Sweating  No chills or sweating +0  Subjective Report of chills/flushing +1  Observable flushed/moist face +2  Beads of sweat on brow/face +3  Sweat streaming off face +4  =    Restlessness  Able to sit still +0  Reports difficulty sitting still but is able to do so +1  Frequent shifting/extraneous movements of arms legs +3  Unable to sit still +5  =    Pupil Size  Pupils pinned or normal +0  Larger than normal +1  Moderately dilated +2  Only rim of iris visible +5  =    Bone/joint aches  Not presents +0  Mild diffuse discomfort +1  Severe diffuse aching +2  Pt rubbing joints/muscles and unable to sit still +4  =    Runny nose/tearing   Not present +0  Nasal stuffiness/moist eyes +1  Nose running or tearing +2  Nose constantly running or tears streaming down cheeks +4  =    GI Upset  No GI symptoms +0  Stomach Cramps +1  Nausea or loose stool +2  Vomiting or diarrhea +3  Multiple episodes of vomiting or diarrhea +5  =    Tremors  No tremors +0  Tremor felt (not observed) +1  Slight observable tremor +2  Gross tremor or muscle twitching +4  =    Yawning (Observed)  No yawning +0  Yawning once or twice +1  Yawning 3 or more times +2  Yawning several times/minute +4  =    Anxiety  None +0  Reports anxiety/irritability +1  Pt obviously irritable/anxious +2  Difficult assessment due to anxiety/irritability   =    Gooseflesh Skin  Skin smooth +0  Piloerection can be felt +3  Prominent piloerection +5  =    COWS Score  <5 No withdrawal   5-12 Mild Withdrawal  13-24 Moderate Withdrawal  25-36 Moderate/Severe Withdrawal  >36 Severe Withdrawal     Patient Score   =    Diagnostic Criteria for Substance Use Disorder (KIP)    Impaired Control over Substance Use  -Use of substance in increasing amounts and/or increasing periods of time  -Desire to cut down or quite but unable  -Significant amount of time procuring/using/recovering from use of  substance  -Cravings, particularly around triggers    Social Impairment  -Obligations at home/school/work failed/neglected  -Social/occupational/recreational activities abandoned  -Continues use despite adverse interpersonal/social consequences    Risky Use of Substance  -Use of substance in situations that are dangerous (ex IV use, driving intoxicated)  -Continued use despite knowledge of a psychological and/or physical problem which will develop or worsen with use    Pharmacological Criteria  -Tolerance  -Withdrawal (dependence)     Level of KIP  Mild KIP: 2-3 Criteria  Moderate KIP: 4-5 Criteria  Severe KIP: 6 or more Criteria            Review of Systems:  Review of Systems   Constitutional: Negative for activity change, chills, diaphoresis and fatigue.   Respiratory: Negative for apnea, cough and shortness of breath.    Cardiovascular: Negative for chest pain, palpitations and leg swelling.   Gastrointestinal: Negative for abdominal pain, constipation, diarrhea, nausea and vomiting.   Genitourinary: Negative for difficulty urinating.   Musculoskeletal: Negative for arthralgias.   Skin: Negative for rash.   Neurological: Negative for dizziness, weakness and headaches.   Psychiatric/Behavioral: Negative for agitation, self-injury, sleep disturbance and suicidal ideas. The patient is not nervous/anxious.          Physical Exam:  Physical Exam  Vitals reviewed.   Constitutional:       General: She is not in acute distress.     Appearance: Normal appearance. She is not ill-appearing or toxic-appearing.   Pulmonary:      Effort: Pulmonary effort is normal.   Musculoskeletal:         General: Normal range of motion.   Neurological:      General: No focal deficit present.      Mental Status: She is alert and oriented to person, place, and time.   Psychiatric:         Attention and Perception: Attention and perception normal.         Mood and Affect: Mood normal. Mood is not anxious or depressed.         Speech: Speech  "normal.         Behavior: Behavior normal. Behavior is cooperative.         Thought Content: Thought content normal.         Cognition and Memory: Cognition and memory normal.         Judgment: Judgment normal.         Vital Signs:   /74   Pulse 86   Ht 156.2 cm (61.5\")   Wt 79.5 kg (175 lb 3.2 oz)   BMI 32.57 kg/m²      Lab Results:   Telemedicine on 04/11/2023   Component Date Value Ref Range Status   • External Amphetamine Screen Urine 04/11/2023 Positive (A)   Final   • External Benzodiazepine Screen Uri* 04/11/2023 Negative   Final   • External Cocaine Screen Urine 04/11/2023 Negative   Final   • External THC Screen Urine 04/11/2023 Positive (A)   Final   • External Methadone Screen Urine 04/11/2023 Negative   Final   • External Methamphetamine Screen Ur* 04/11/2023 Positive (A)   Final   • External Oxycodone Screen Urine 04/11/2023 Negative   Final   • External Buprenorphine Screen Urine 04/11/2023 Positive (A)   Final   • External MDMA 04/11/2023 Positive (A)   Final   • External Opiates Screen Urine 04/11/2023 Negative   Final   Lab on 01/12/2023   Component Date Value Ref Range Status   • THC, Screen, Urine 01/12/2023 Negative  Negative Final   • Phencyclidine (PCP), Urine 01/12/2023 Negative  Negative Final   • Cocaine Screen, Urine 01/12/2023 Negative  Negative Final   • Methamphetamine, Ur 01/12/2023 Negative  Negative Final   • Opiate Screen 01/12/2023 Negative  Negative Final   • Amphetamine Screen, Urine 01/12/2023 Negative  Negative Final   • Benzodiazepine Screen, Urine 01/12/2023 Negative  Negative Final   • Tricyclic Antidepressants Screen 01/12/2023 Negative  Negative Final   • Methadone Screen, Urine 01/12/2023 Negative  Negative Final   • Barbiturates Screen, Urine 01/12/2023 Negative  Negative Final   • Oxycodone Screen, Urine 01/12/2023 Negative  Negative Final   • Propoxyphene Screen 01/12/2023 Negative  Negative Final   • Buprenorphine, Screen, Urine 01/12/2023 Positive (A)  " Negative Final   Lab on 12/29/2022   Component Date Value Ref Range Status   • THC, Screen, Urine 12/29/2022 Positive (A)  Negative Final   • Phencyclidine (PCP), Urine 12/29/2022 Negative  Negative Final   • Cocaine Screen, Urine 12/29/2022 Negative  Negative Final   • Methamphetamine, Ur 12/29/2022 Positive (A)  Negative Final   • Opiate Screen 12/29/2022 Negative  Negative Final   • Amphetamine Screen, Urine 12/29/2022 Positive (A)  Negative Final   • Benzodiazepine Screen, Urine 12/29/2022 Negative  Negative Final   • Tricyclic Antidepressants Screen 12/29/2022 Negative  Negative Final   • Methadone Screen, Urine 12/29/2022 Negative  Negative Final   • Barbiturates Screen, Urine 12/29/2022 Negative  Negative Final   • Oxycodone Screen, Urine 12/29/2022 Negative  Negative Final   • Propoxyphene Screen 12/29/2022 Negative  Negative Final   • Buprenorphine, Screen, Urine 12/29/2022 Positive (A)  Negative Final           Assessment & Plan   Diagnoses and all orders for this visit:    1. Opioid type dependence, continuous (Primary)  -     buprenorphine-naloxone (SUBOXONE) 8-2 MG per SL tablet; Place 2 tablets under the tongue Daily.  Dispense: 16 tablet; Refill: 0  -     naloxone (NARCAN) 4 MG/0.1ML nasal spray; 1 spray into the nostril(s) as directed by provider As Needed (opiate over sedation). 1 spray in 1 nostril every 2 to 3 minutes, call 911  Dispense: 2 each; Refill: 2    2. Medication management  -     [facility] Drug Screen  -     CBC & Differential; Future  -     Comprehensive Metabolic Panel; Future  -     HIV-1 / O / 2 Ag / Antibody 4th Generation  -     HCV RNA By PCR, Qn Rfx [place]; Future    3. Methamphetamine abuse  -     buPROPion XL (Wellbutrin XL) 150 MG 24 hr tablet; Take 1 tablet by mouth Every Morning.  Dispense: 30 tablet; Refill: 0        Visit Diagnoses:    ICD-10-CM ICD-9-CM   1. Opioid type dependence, continuous  F11.20 304.01   2. Medication management  Z79.899 V58.69   3. Methamphetamine  abuse  F15.10 305.70       PLAN:  Review Expectations for care in Medicated Assisted Treatment     Treatment Plan:   Medication management is only one component of treatment. To maximize the potential for treatment success it is necessary to take part in 1:1 counselling and or 12 Step Facilitation in which you maintain an active relationship with a sponsor or . Verification/Proof of active involvement with 1:1 counselling or 12 Step Facilitation may be required as a component of the treatment plan.    Clinic expectations:     Visit Frequency: New client Progression- Weekly x 2 months (8 weeks/visits), Biweekly x 2 months (4 visits), monthly thereafter.  Visit frequency is dependent upon drug screen analysis and treatment plan compliance; Required interval between clinic visits may be shortened or increased.    Drug Screen: You will be required to provide a drug screen at each clinic evaluation; if unable/unwilling you may be asked to reschedule and will not see the provider. Supervised urine collections are required.     Appointments: You will be required to set an appointment for your clinic evaluations. These evaluation appointments must be maintained as scheduled; If you are more than 15 minutes late for a scheduled appointment you may be asked to reschedule and will not see a provider that day.     Random Evaluations: Random evaluations are essential for accountability in Medicated Assisted Treatment as well as a [place] law requirement. Thus, a working phone number must be maintained. If called for a Random evaluation, you have 24 hours to report to the clinic (during normal operating business hours) with your medication and pill bottle for assessment as well as a drug screen.  Clinic staff must have means to contact you.     Medication Management:    Medication will be prescribed to last until the next clinic follow up evaluation; no controlled substance refills (ex. Suboxone/buprenorphine)  will be prescribed without completing a clinic evaluation. If you need to reschedule an appointment every effort will be made to reschedule as soon as possible for evaluation and further medication management. Contact the clinic if an extenuating circumstance presents.      Lost/Stolen Medication: Controlled substances (ex. Suboxone/buprenorphine) will not automatically be refilled in the event of lost/stolen medication. If a prescribed controlled substance is stolen, you must notify law enforcement and proof of said notification may be required. Notify the clinic for further guidance.     Involuntary Discharge:  You will be involuntary discharged if you exhibit violent/threatening/abusive behavior or if compelling evidence shows diversion of medication.       TREATMENT PLAN/GOALS: Continue supportive psychotherapy efforts and medications as indicated. Treatment and medication options discussed during today's visit. Patient acknowledged and verbally consented to continue with current treatment plan and was educated on the importance of compliance with treatment and follow-up appointments.    MEDICATION ISSUES:  ROSY reviewed  Discussed medication options and treatment plan of prescribed medication as well as the risks, benefits, and side effects including potential falls, possible impaired driving and metabolic adversities among others. Patient is agreeable to call the office with any worsening of symptoms or onset of side effects. Patient is agreeable to call 911 or go to the nearest ER should he/she begin having SI/HI. No medication side effects or related complaints today.     MEDS ORDERED DURING VISIT:  New Medications Ordered This Visit   Medications   • buprenorphine-naloxone (SUBOXONE) 8-2 MG per SL tablet     Sig: Place 2 tablets under the tongue Daily.     Dispense:  16 tablet     Refill:  0     NADEAN:[id number]   • naloxone (NARCAN) 4 MG/0.1ML nasal spray     Si spray into the nostril(s) as directed  by provider As Needed (opiate over sedation). 1 spray in 1 nostril every 2 to 3 minutes, call 911     Dispense:  2 each     Refill:  2   • buPROPion XL (Wellbutrin XL) 150 MG 24 hr tablet     Sig: Take 1 tablet by mouth Every Morning.     Dispense:  30 tablet     Refill:  0       No follow-ups on file.           This document has been electronically signed by WALKER Romero  April 11, 2023 14:13 EDT      Part of this note may be an electronic transcription/translation of spoken language to printed text using the Dragon Dictation System.

## 2023-04-12 LAB
ALBUMIN SERPL-MCNC: 4.1 G/DL (ref 3.5–5.2)
ALBUMIN/GLOB SERPL: 1.5 G/DL
ALP SERPL-CCNC: 93 U/L (ref 39–117)
ALT SERPL W P-5'-P-CCNC: 20 U/L (ref 1–33)
ANION GAP SERPL CALCULATED.3IONS-SCNC: 7.9 MMOL/L (ref 5–15)
AST SERPL-CCNC: 18 U/L (ref 1–32)
BASOPHILS # BLD AUTO: 0.06 10*3/MM3 (ref 0–0.2)
BASOPHILS NFR BLD AUTO: 0.7 % (ref 0–1.5)
BILIRUB SERPL-MCNC: <0.2 MG/DL (ref 0–1.2)
BUN SERPL-MCNC: 12 MG/DL (ref 6–20)
BUN/CREAT SERPL: 15.4 (ref 7–25)
CALCIUM SPEC-SCNC: 9.3 MG/DL (ref 8.6–10.5)
CHLORIDE SERPL-SCNC: 109 MMOL/L (ref 98–107)
CO2 SERPL-SCNC: 23.1 MMOL/L (ref 22–29)
CREAT SERPL-MCNC: 0.78 MG/DL (ref 0.57–1)
DEPRECATED RDW RBC AUTO: 38.1 FL (ref 37–54)
EGFRCR SERPLBLD CKD-EPI 2021: 90.4 ML/MIN/1.73
EOSINOPHIL # BLD AUTO: 0.31 10*3/MM3 (ref 0–0.4)
EOSINOPHIL NFR BLD AUTO: 3.5 % (ref 0.3–6.2)
ERYTHROCYTE [DISTWIDTH] IN BLOOD BY AUTOMATED COUNT: 12.6 % (ref 12.3–15.4)
GLOBULIN UR ELPH-MCNC: 2.7 GM/DL
GLUCOSE SERPL-MCNC: 91 MG/DL (ref 65–99)
HCT VFR BLD AUTO: 37 % (ref 34–46.6)
HCV GENTYP SERPL NAA+PROBE: NORMAL
HCV RNA SERPL NAA+PROBE-ACNC: NORMAL IU/ML
HCV RNA SERPL NAA+PROBE-LOG IU: NORMAL LOG10 IU/ML
HGB BLD-MCNC: 12.3 G/DL (ref 12–15.9)
HIV1+2 AB SER QL: NORMAL
IMM GRANULOCYTES # BLD AUTO: 0.02 10*3/MM3 (ref 0–0.05)
IMM GRANULOCYTES NFR BLD AUTO: 0.2 % (ref 0–0.5)
LABORATORY COMMENT REPORT: NORMAL
LYMPHOCYTES # BLD AUTO: 3.7 10*3/MM3 (ref 0.7–3.1)
LYMPHOCYTES NFR BLD AUTO: 41.6 % (ref 19.6–45.3)
MCH RBC QN AUTO: 27.6 PG (ref 26.6–33)
MCHC RBC AUTO-ENTMCNC: 33.2 G/DL (ref 31.5–35.7)
MCV RBC AUTO: 83.1 FL (ref 79–97)
MONOCYTES # BLD AUTO: 0.64 10*3/MM3 (ref 0.1–0.9)
MONOCYTES NFR BLD AUTO: 7.2 % (ref 5–12)
NEUTROPHILS NFR BLD AUTO: 4.17 10*3/MM3 (ref 1.7–7)
NEUTROPHILS NFR BLD AUTO: 46.8 % (ref 42.7–76)
NRBC BLD AUTO-RTO: 0 /100 WBC (ref 0–0.2)
PLATELET # BLD AUTO: 290 10*3/MM3 (ref 140–450)
PMV BLD AUTO: 10.9 FL (ref 6–12)
POTASSIUM SERPL-SCNC: 4.3 MMOL/L (ref 3.5–5.2)
PROT SERPL-MCNC: 6.8 G/DL (ref 6–8.5)
RBC # BLD AUTO: 4.45 10*6/MM3 (ref 3.77–5.28)
SODIUM SERPL-SCNC: 140 MMOL/L (ref 136–145)
WBC NRBC COR # BLD: 8.9 10*3/MM3 (ref 3.4–10.8)

## 2023-04-18 ENCOUNTER — TELEMEDICINE (OUTPATIENT)
Dept: PSYCHIATRY | Facility: CLINIC | Age: 53
End: 2023-04-18
Payer: MEDICAID

## 2023-04-18 VITALS
BODY MASS INDEX: 33.53 KG/M2 | HEART RATE: 94 BPM | DIASTOLIC BLOOD PRESSURE: 73 MMHG | HEIGHT: 61 IN | WEIGHT: 177.6 LBS | SYSTOLIC BLOOD PRESSURE: 137 MMHG

## 2023-04-18 DIAGNOSIS — F15.10 METHAMPHETAMINE ABUSE: ICD-10-CM

## 2023-04-18 DIAGNOSIS — F11.20 OPIOID TYPE DEPENDENCE, CONTINUOUS: Primary | ICD-10-CM

## 2023-04-18 DIAGNOSIS — Z79.899 MEDICATION MANAGEMENT: ICD-10-CM

## 2023-04-18 LAB
EXTERNAL AMPHETAMINE SCREEN URINE: POSITIVE
EXTERNAL BENZODIAZEPINE SCREEN URINE: NEGATIVE
EXTERNAL BUPRENORPHINE SCREEN URINE: POSITIVE
EXTERNAL COCAINE SCREEN URINE: NEGATIVE
EXTERNAL MDMA: NEGATIVE
EXTERNAL METHADONE SCREEN URINE: NEGATIVE
EXTERNAL METHAMPHETAMINE SCREEN URINE: POSITIVE
EXTERNAL OPIATES SCREEN URINE: NEGATIVE
EXTERNAL OXYCODONE SCREEN URINE: NEGATIVE
EXTERNAL THC SCREEN URINE: POSITIVE

## 2023-04-18 PROCEDURE — 99213 OFFICE O/P EST LOW 20 MIN: CPT | Performed by: NURSE PRACTITIONER

## 2023-04-18 PROCEDURE — 1160F RVW MEDS BY RX/DR IN RCRD: CPT | Performed by: NURSE PRACTITIONER

## 2023-04-18 PROCEDURE — 1159F MED LIST DOCD IN RCRD: CPT | Performed by: NURSE PRACTITIONER

## 2023-04-18 RX ORDER — BUPRENORPHINE HYDROCHLORIDE AND NALOXONE HYDROCHLORIDE DIHYDRATE 8; 2 MG/1; MG/1
2 TABLET SUBLINGUAL DAILY
Qty: 14 TABLET | Refills: 0 | Status: SHIPPED | OUTPATIENT
Start: 2023-04-18

## 2023-04-18 NOTE — PROGRESS NOTES
This provider is located at [facility]. The Patient is seen remotely located at the [facility] ([facility]) using Video. Patient is being seen via telehealth and confirm that they are in a secure environment for this session. The patient's condition being diagnosed/treated is appropriate for telemedicine. Provider identified as Williams Brody as well as credentials APRN MSN FNP-C SRINIVASAN-YOUNG.   The client/patient gave consent to be seen remotely, and when consent is given they understand that the consent allows for patient identifiable information to be sent to a third party as needed.   They may refuse to be seen remotely at any time. The electronic data is encrypted and password protected, and the patient has been advised of the potential risks to privacy not withstanding such measures.    Chief Complaint/History of Present Illness: Follow Up buprenorphine/naloxone Medicated Assisted Treatment for Opiate Use Disorder     Patient/Client Concerns/Updates: Continuing Wellbutrin for methamphetamine cravings  -Patient reports she is doing well and states she has had a very good week  -Reports initiation of Wellbutrin for methamphetamine cravings has been therapeutically beneficial and has decreased overall methamphetamine use by 50%, I celebrate patient's progress and success and encouraged to continue to work  -Continue to recommend chemical dependency IOP and patient continues to express interest however she reports she is caring for her elderly mother at this time and will be interested in the future  -Wellbutrin has had a positive effect on patient's mood, no suicidal or homicidal thoughts and feeling happy    Triggers (Persons/Places/Things/Events/Thought/Emotions): Life stress    Cravings: Reduce cravings for methamphetamine    Relapse Prevention: Counseling    Urine Drug Screen (today's visit) discussed: Positive buprenorphine positive amphetamines methamphetamine and THC    UDS Confirmation  (Most recent/Resulted): Pending from initial evaluation last week    Most recent pertinent laboratory studies reviewed: 4/11/23-hepatic function within normal limits, Cr WNL, HIV negative, hepatitis C not detected    ROSY (PDMP) Reviewed for Current/Active Medications: buprenorphine/naloxone as reviewed today    Past Surgical History:  No past surgical history on file.    Problem List:  There is no problem list on file for this patient.      Allergy:   No Known Allergies     Current Medications:   Current Outpatient Medications   Medication Sig Dispense Refill   • buprenorphine-naloxone (SUBOXONE) 8-2 MG per SL tablet Place 2 tablets under the tongue Daily. 14 tablet 0   • buPROPion XL (Wellbutrin XL) 150 MG 24 hr tablet Take 1 tablet by mouth Every Morning. 30 tablet 0   • famotidine (PEPCID) 20 MG tablet TAKE ONE TABLET BY MOUTH EVERY MORNING FOR 30 DAYS     • GNP PAIN RELIEF EX-STRENGTH 500 MG tablet TAKE ONE TABLET BY MOUTH EVERY 6 HOURS AS NEEDED FOR 30 DAYS **DO NOT EXCEED 6 TABLETS IN 24 HOURS**     • naloxone (NARCAN) 4 MG/0.1ML nasal spray 1 spray into the nostril(s) as directed by provider As Needed (opiate over sedation). 1 spray in 1 nostril every 2 to 3 minutes, call 911 2 each 2     No current facility-administered medications for this visit.       Past Medical History:  Past Medical History:   Diagnosis Date   • History of hepatitis B    • Pancreatitis          Social History     Socioeconomic History   • Marital status: Single   Tobacco Use   • Smoking status: Every Day     Packs/day: 1.00     Years: 15.00     Pack years: 15.00     Types: Cigarettes   • Smokeless tobacco: Never   Vaping Use   • Vaping Use: Never used   Substance and Sexual Activity   • Alcohol use: Not Currently   • Drug use: Yes     Types: Marijuana, Hydrocodone, Oxycodone, Methamphetamines   • Sexual activity: Defer         Family History   Problem Relation Age of Onset   • No Known Problems Mother    • No Known Problems Father     • No Known Problems Sister    • No Known Problems Brother    • No Known Problems Maternal Aunt    • No Known Problems Paternal Aunt    • No Known Problems Maternal Uncle    • No Known Problems Paternal Uncle    • No Known Problems Maternal Grandfather    • No Known Problems Maternal Grandmother    • No Known Problems Paternal Grandfather    • No Known Problems Paternal Grandmother    • No Known Problems Cousin    • No Known Problems Other    • ADD / ADHD Neg Hx    • Alcohol abuse Neg Hx    • Anxiety disorder Neg Hx    • Bipolar disorder Neg Hx    • Dementia Neg Hx    • Depression Neg Hx    • Drug abuse Neg Hx    • OCD Neg Hx    • Paranoid behavior Neg Hx    • Schizophrenia Neg Hx    • Seizures Neg Hx    • Self-Injurious Behavior  Neg Hx    • Suicide Attempts Neg Hx          Mental Status Exam:   Hygiene:   good  Cooperation:  Cooperative  Eye Contact:  Good  Psychomotor Behavior:  Appropriate  Affect:  Appropriate  Mood: normal  Speech:  Normal  Thought Process:  Goal directed  Thought Content:  Normal  Suicidal:  None  Homicidal:  None  Hallucinations:  None  Delusion:  None  Memory:  Intact  Orientation:  Grossly intact  Reliability:  good  Insight:  Good  Judgement:  Good  Impulse Control:  Good         Review of Systems:  Review of Systems   Constitutional: Negative for activity change, chills, diaphoresis and fatigue.   Respiratory: Negative for apnea, cough and shortness of breath.    Cardiovascular: Negative for chest pain, palpitations and leg swelling.   Gastrointestinal: Negative for abdominal pain, constipation, diarrhea, nausea and vomiting.   Genitourinary: Negative for difficulty urinating.   Musculoskeletal: Negative for arthralgias.   Skin: Negative for rash.   Neurological: Negative for dizziness, weakness and headaches.   Psychiatric/Behavioral: Negative for agitation, self-injury, sleep disturbance and suicidal ideas. The patient is not nervous/anxious.          Physical Exam:  Physical  "Exam  Vitals reviewed.   Constitutional:       General: She is not in acute distress.     Appearance: Normal appearance. She is not ill-appearing or toxic-appearing.   Pulmonary:      Effort: Pulmonary effort is normal.   Musculoskeletal:         General: Normal range of motion.   Neurological:      General: No focal deficit present.      Mental Status: She is alert and oriented to person, place, and time.   Psychiatric:         Attention and Perception: Attention and perception normal.         Mood and Affect: Mood normal. Mood is not anxious or depressed.         Speech: Speech normal.         Behavior: Behavior normal. Behavior is cooperative.         Thought Content: Thought content normal.         Cognition and Memory: Cognition and memory normal.         Judgment: Judgment normal.         Vital Signs:   /73   Pulse 94   Ht 156.2 cm (61.5\")   Wt 80.6 kg (177 lb 9.6 oz)   BMI 33.02 kg/m²      Lab Results:   Telemedicine on 04/18/2023   Component Date Value Ref Range Status   • External Amphetamine Screen Urine 04/18/2023 Positive (A)   Final   • External Benzodiazepine Screen Uri* 04/18/2023 Negative   Final   • External Cocaine Screen Urine 04/18/2023 Negative   Final   • External THC Screen Urine 04/18/2023 Positive (A)   Final   • External Methadone Screen Urine 04/18/2023 Negative   Final   • External Methamphetamine Screen Ur* 04/18/2023 Positive (A)   Final   • External Oxycodone Screen Urine 04/18/2023 Negative   Final   • External Buprenorphine Screen Urine 04/18/2023 Positive (A)   Final   • External MDMA 04/18/2023 Negative   Final   • External Opiates Screen Urine 04/18/2023 Negative   Final   Lab on 04/11/2023   Component Date Value Ref Range Status   • Glucose 04/11/2023 91  65 - 99 mg/dL Final   • BUN 04/11/2023 12  6 - 20 mg/dL Final   • Creatinine 04/11/2023 0.78  0.57 - 1.00 mg/dL Final   • Sodium 04/11/2023 140  136 - 145 mmol/L Final   • Potassium 04/11/2023 4.3  3.5 - 5.2 mmol/L " Final   • Chloride 04/11/2023 109 (H)  98 - 107 mmol/L Final   • CO2 04/11/2023 23.1  22.0 - 29.0 mmol/L Final   • Calcium 04/11/2023 9.3  8.6 - 10.5 mg/dL Final   • Total Protein 04/11/2023 6.8  6.0 - 8.5 g/dL Final   • Albumin 04/11/2023 4.1  3.5 - 5.2 g/dL Final   • ALT (SGPT) 04/11/2023 20  1 - 33 U/L Final   • AST (SGOT) 04/11/2023 18  1 - 32 U/L Final   • Alkaline Phosphatase 04/11/2023 93  39 - 117 U/L Final   • Total Bilirubin 04/11/2023 <0.2  0.0 - 1.2 mg/dL Final   • Globulin 04/11/2023 2.7  gm/dL Final   • A/G Ratio 04/11/2023 1.5  g/dL Final   • BUN/Creatinine Ratio 04/11/2023 15.4  7.0 - 25.0 Final   • Anion Gap 04/11/2023 7.9  5.0 - 15.0 mmol/L Final   • eGFR 04/11/2023 90.4  >60.0 mL/min/1.73 Final   • Hepatitis C Quantitation 04/11/2023 HCV Not Detected  IU/mL Final   • HCV log10 04/11/2023 TNP  log10 IU/mL Final    Unable to calculate result since non-numeric result obtained for  component test.   • HCV Test Information 04/11/2023 Comment   Final    The quantitative range of this assay is 15 IU/mL to 100 million IU/mL.   • HCV Genotype 04/11/2023 TNP   Final    Not indicated   • WBC 04/11/2023 8.90  3.40 - 10.80 10*3/mm3 Final   • RBC 04/11/2023 4.45  3.77 - 5.28 10*6/mm3 Final   • Hemoglobin 04/11/2023 12.3  12.0 - 15.9 g/dL Final   • Hematocrit 04/11/2023 37.0  34.0 - 46.6 % Final   • MCV 04/11/2023 83.1  79.0 - 97.0 fL Final   • MCH 04/11/2023 27.6  26.6 - 33.0 pg Final   • MCHC 04/11/2023 33.2  31.5 - 35.7 g/dL Final   • RDW 04/11/2023 12.6  12.3 - 15.4 % Final   • RDW-SD 04/11/2023 38.1  37.0 - 54.0 fl Final   • MPV 04/11/2023 10.9  6.0 - 12.0 fL Final   • Platelets 04/11/2023 290  140 - 450 10*3/mm3 Final   • Neutrophil % 04/11/2023 46.8  42.7 - 76.0 % Final   • Lymphocyte % 04/11/2023 41.6  19.6 - 45.3 % Final   • Monocyte % 04/11/2023 7.2  5.0 - 12.0 % Final   • Eosinophil % 04/11/2023 3.5  0.3 - 6.2 % Final   • Basophil % 04/11/2023 0.7  0.0 - 1.5 % Final   • Immature Grans % 04/11/2023 0.2   0.0 - 0.5 % Final   • Neutrophils, Absolute 04/11/2023 4.17  1.70 - 7.00 10*3/mm3 Final   • Lymphocytes, Absolute 04/11/2023 3.70 (H)  0.70 - 3.10 10*3/mm3 Final   • Monocytes, Absolute 04/11/2023 0.64  0.10 - 0.90 10*3/mm3 Final   • Eosinophils, Absolute 04/11/2023 0.31  0.00 - 0.40 10*3/mm3 Final   • Basophils, Absolute 04/11/2023 0.06  0.00 - 0.20 10*3/mm3 Final   • Immature Grans, Absolute 04/11/2023 0.02  0.00 - 0.05 10*3/mm3 Final   • nRBC 04/11/2023 0.0  0.0 - 0.2 /100 WBC Final   Telemedicine on 04/11/2023   Component Date Value Ref Range Status   • External Amphetamine Screen Urine 04/11/2023 Positive (A)   Final   • External Benzodiazepine Screen Uri* 04/11/2023 Negative   Final   • External Cocaine Screen Urine 04/11/2023 Negative   Final   • External THC Screen Urine 04/11/2023 Positive (A)   Final   • External Methadone Screen Urine 04/11/2023 Negative   Final   • External Methamphetamine Screen Ur* 04/11/2023 Positive (A)   Final   • External Oxycodone Screen Urine 04/11/2023 Negative   Final   • External Buprenorphine Screen Urine 04/11/2023 Positive (A)   Final   • External MDMA 04/11/2023 Positive (A)   Final   • External Opiates Screen Urine 04/11/2023 Negative   Final   • HIV-1/ HIV-2 04/11/2023 Non-Reactive  Non-Reactive Final    A non-reactive test result does not preclude the possibility of exposure to HIV or infection with HIV. An antibody response to recent exposure may take several months to reach detectable levels.   Lab on 01/12/2023   Component Date Value Ref Range Status   • THC, Screen, Urine 01/12/2023 Negative  Negative Final   • Phencyclidine (PCP), Urine 01/12/2023 Negative  Negative Final   • Cocaine Screen, Urine 01/12/2023 Negative  Negative Final   • Methamphetamine, Ur 01/12/2023 Negative  Negative Final   • Opiate Screen 01/12/2023 Negative  Negative Final   • Amphetamine Screen, Urine 01/12/2023 Negative  Negative Final   • Benzodiazepine Screen, Urine 01/12/2023 Negative   Negative Final   • Tricyclic Antidepressants Screen 01/12/2023 Negative  Negative Final   • Methadone Screen, Urine 01/12/2023 Negative  Negative Final   • Barbiturates Screen, Urine 01/12/2023 Negative  Negative Final   • Oxycodone Screen, Urine 01/12/2023 Negative  Negative Final   • Propoxyphene Screen 01/12/2023 Negative  Negative Final   • Buprenorphine, Screen, Urine 01/12/2023 Positive (A)  Negative Final   Lab on 12/29/2022   Component Date Value Ref Range Status   • THC, Screen, Urine 12/29/2022 Positive (A)  Negative Final   • Phencyclidine (PCP), Urine 12/29/2022 Negative  Negative Final   • Cocaine Screen, Urine 12/29/2022 Negative  Negative Final   • Methamphetamine, Ur 12/29/2022 Positive (A)  Negative Final   • Opiate Screen 12/29/2022 Negative  Negative Final   • Amphetamine Screen, Urine 12/29/2022 Positive (A)  Negative Final   • Benzodiazepine Screen, Urine 12/29/2022 Negative  Negative Final   • Tricyclic Antidepressants Screen 12/29/2022 Negative  Negative Final   • Methadone Screen, Urine 12/29/2022 Negative  Negative Final   • Barbiturates Screen, Urine 12/29/2022 Negative  Negative Final   • Oxycodone Screen, Urine 12/29/2022 Negative  Negative Final   • Propoxyphene Screen 12/29/2022 Negative  Negative Final   • Buprenorphine, Screen, Urine 12/29/2022 Positive (A)  Negative Final         Assessment & Plan   Diagnoses and all orders for this visit:    1. Opioid type dependence, continuous (Primary)  -     buprenorphine-naloxone (SUBOXONE) 8-2 MG per SL tablet; Place 2 tablets under the tongue Daily.  Dispense: 14 tablet; Refill: 0    2. Medication management  -     [facility] Screen    3. Methamphetamine abuse        Visit Diagnoses:    ICD-10-CM ICD-9-CM   1. Opioid type dependence, continuous  F11.20 304.01   2. Medication management  Z79.899 V58.69   3. Methamphetamine abuse  F15.10 305.70       PLAN:  1. Safety: No acute safety concerns  2. Risk Assessment: Risk of self-harm acutely is  low. Risk of self-harm chronically is also low, but could be further elevated in the event of treatment noncompliance and/or AODA.    TREATMENT PLAN/GOALS: Continue supportive psychotherapy efforts and medications as indicated. Treatment and medication options discussed during today's visit. Patient acknowledged and verbally consented to continue with current treatment plan and was educated on the importance of compliance with treatment and follow-up appointments.    MEDICATION ISSUES:  ROSY reviewed as expected.  Discussed medication options and treatment plan of prescribed medication as well as the risks, benefits, and side effects including potential falls, possible impaired driving and metabolic adversities among others. Patient is agreeable to call the office with any worsening of symptoms or onset of side effects. Patient is agreeable to call 911 or go to the nearest ER should he/she begin having SI/HI. No medication side effects or related complaints today.     MEDS ORDERED DURING VISIT:  New Medications Ordered This Visit   Medications   • buprenorphine-naloxone (SUBOXONE) 8-2 MG per SL tablet     Sig: Place 2 tablets under the tongue Daily.     Dispense:  14 tablet     Refill:  0     NADEAN:[id number]       No follow-ups on file.           This document has been electronically signed by WALKER Romero  April 18, 2023 12:36 EDT      Part of this note may be an electronic transcription/translation of spoken language to printed text using the Dragon Dictation System.

## 2023-04-25 ENCOUNTER — TELEMEDICINE (OUTPATIENT)
Dept: PSYCHIATRY | Facility: CLINIC | Age: 53
End: 2023-04-25
Payer: MEDICAID

## 2023-04-25 VITALS
DIASTOLIC BLOOD PRESSURE: 82 MMHG | HEART RATE: 75 BPM | BODY MASS INDEX: 33.61 KG/M2 | WEIGHT: 178 LBS | SYSTOLIC BLOOD PRESSURE: 128 MMHG | HEIGHT: 61 IN

## 2023-04-25 DIAGNOSIS — F11.20 OPIOID TYPE DEPENDENCE, CONTINUOUS: Primary | ICD-10-CM

## 2023-04-25 DIAGNOSIS — Z79.899 MEDICATION MANAGEMENT: ICD-10-CM

## 2023-04-25 DIAGNOSIS — R11.14 BILIOUS VOMITING WITH NAUSEA: ICD-10-CM

## 2023-04-25 DIAGNOSIS — F15.10 METHAMPHETAMINE ABUSE: ICD-10-CM

## 2023-04-25 RX ORDER — BUPROPION HYDROCHLORIDE 150 MG/1
150 TABLET ORAL 2 TIMES DAILY
Qty: 60 TABLET | Refills: 0 | Status: SHIPPED | OUTPATIENT
Start: 2023-04-25 | End: 2024-04-24

## 2023-04-25 RX ORDER — BUPRENORPHINE HYDROCHLORIDE AND NALOXONE HYDROCHLORIDE DIHYDRATE 8; 2 MG/1; MG/1
2 TABLET SUBLINGUAL DAILY
Qty: 14 TABLET | Refills: 0 | Status: SHIPPED | OUTPATIENT
Start: 2023-04-25

## 2023-04-25 RX ORDER — ONDANSETRON 8 MG/1
8 TABLET, ORALLY DISINTEGRATING ORAL EVERY 8 HOURS PRN
Qty: 45 TABLET | Refills: 0 | Status: SHIPPED | OUTPATIENT
Start: 2023-04-25

## 2023-04-25 NOTE — PROGRESS NOTES
This provider is located at [facility]. The Patient is seen remotely located at the [facility] ([facility]) using Video. Patient is being seen via telehealth and confirm that they are in a secure environment for this session. The patient's condition being diagnosed/treated is appropriate for telemedicine. Provider identified as Williams Brody as well as credentials APRN MSN FNP-C SRINIVASAN-YOUNG.   The client/patient gave consent to be seen remotely, and when consent is given they understand that the consent allows for patient identifiable information to be sent to a third party as needed.   They may refuse to be seen remotely at any time. The electronic data is encrypted and password protected, and the patient has been advised of the potential risks to privacy not withstanding such measures.    Chief Complaint/History of Present Illness: Follow Up buprenorphine/naloxone Medicated Assisted Treatment for Opiate Use Disorder     Patient/Client Concerns/Updates: Continuing Wellbutrin for methamphetamine cravings  -Patient reports she is doing well and is feeling increasing optimism as it relates to decreased methamphetamine use, will increase Wellbutrin to twice daily dosing to coincide with when stimulant cravings present throughout the day  -Patient reports she is overcoming a stomach virus, will prescribe Zofran to help with nausea  -Overall mood is stable and patient reports she is feeling well and happy    Triggers (Persons/Places/Things/Events/Thought/Emotions): Life stress    Cravings: Decreasing cravings for methamphetamine    Relapse Prevention: Counseling    Urine Drug Screen (today's visit) discussed: Positive buprenorphine positive amphetamines and methamphetamine positive THC    UDS Confirmation (Most recent/Resulted): Positive buprenorphine/nor-buprenorphine, no concerns for urine tampering otherwise positive THC and methamphetamine    Most recent pertinent laboratory studies reviewed:  4/11/23-hepatic function within normal limits, Cr WNL, HIV negative, hepatitis C not detected    ROSY (PDMP) Reviewed for Current/Active Medications: buprenorphine/naloxone as reviewed today    Past Surgical History:  No past surgical history on file.    Problem List:  There is no problem list on file for this patient.      Allergy:   No Known Allergies     Current Medications:   Current Outpatient Medications   Medication Sig Dispense Refill   • famotidine (PEPCID) 20 MG tablet TAKE ONE TABLET BY MOUTH EVERY MORNING FOR 30 DAYS     • buprenorphine-naloxone (SUBOXONE) 8-2 MG per SL tablet Place 2 tablets under the tongue Daily. 14 tablet 0   • buPROPion XL (Wellbutrin XL) 150 MG 24 hr tablet Take 1 tablet by mouth 2 (Two) Times a Day. 60 tablet 0   • GNP PAIN RELIEF EX-STRENGTH 500 MG tablet TAKE ONE TABLET BY MOUTH EVERY 6 HOURS AS NEEDED FOR 30 DAYS **DO NOT EXCEED 6 TABLETS IN 24 HOURS**     • naloxone (NARCAN) 4 MG/0.1ML nasal spray 1 spray into the nostril(s) as directed by provider As Needed (opiate over sedation). 1 spray in 1 nostril every 2 to 3 minutes, call 911 (Patient not taking: Reported on 4/25/2023) 2 each 2   • ondansetron ODT (ZOFRAN-ODT) 8 MG disintegrating tablet Place 1 tablet on the tongue Every 8 (Eight) Hours As Needed for Nausea or Vomiting. 45 tablet 0     No current facility-administered medications for this visit.       Past Medical History:  Past Medical History:   Diagnosis Date   • History of hepatitis B    • Pancreatitis          Social History     Socioeconomic History   • Marital status: Single   Tobacco Use   • Smoking status: Every Day     Packs/day: 1.00     Years: 15.00     Pack years: 15.00     Types: Cigarettes   • Smokeless tobacco: Never   Vaping Use   • Vaping Use: Never used   Substance and Sexual Activity   • Alcohol use: Not Currently   • Drug use: Yes     Types: Marijuana, Hydrocodone, Oxycodone, Methamphetamines   • Sexual activity: Defer         Family History    Problem Relation Age of Onset   • No Known Problems Mother    • No Known Problems Father    • No Known Problems Sister    • No Known Problems Brother    • No Known Problems Maternal Aunt    • No Known Problems Paternal Aunt    • No Known Problems Maternal Uncle    • No Known Problems Paternal Uncle    • No Known Problems Maternal Grandfather    • No Known Problems Maternal Grandmother    • No Known Problems Paternal Grandfather    • No Known Problems Paternal Grandmother    • No Known Problems Cousin    • No Known Problems Other    • ADD / ADHD Neg Hx    • Alcohol abuse Neg Hx    • Anxiety disorder Neg Hx    • Bipolar disorder Neg Hx    • Dementia Neg Hx    • Depression Neg Hx    • Drug abuse Neg Hx    • OCD Neg Hx    • Paranoid behavior Neg Hx    • Schizophrenia Neg Hx    • Seizures Neg Hx    • Self-Injurious Behavior  Neg Hx    • Suicide Attempts Neg Hx          Mental Status Exam:   Hygiene:   good  Cooperation:  Cooperative  Eye Contact:  Good  Psychomotor Behavior:  Appropriate  Affect:  Appropriate  Mood: normal  Speech:  Normal  Thought Process:  Goal directed  Thought Content:  Normal  Suicidal:  None  Homicidal:  None  Hallucinations:  None  Delusion:  None  Memory:  Intact  Orientation:  Grossly intact  Reliability:  good  Insight:  Good  Judgement:  Good  Impulse Control:  Good         Review of Systems:  Review of Systems   Constitutional: Negative for activity change, chills, diaphoresis and fatigue.   Respiratory: Negative for apnea, cough and shortness of breath.    Cardiovascular: Negative for chest pain, palpitations and leg swelling.   Gastrointestinal: Negative for abdominal pain, constipation, diarrhea, nausea and vomiting.   Genitourinary: Negative for difficulty urinating.   Musculoskeletal: Negative for arthralgias.   Skin: Negative for rash.   Neurological: Negative for dizziness, weakness and headaches.   Psychiatric/Behavioral: Negative for agitation, self-injury, sleep disturbance and  "suicidal ideas. The patient is not nervous/anxious.          Physical Exam:  Physical Exam  Vitals reviewed.   Constitutional:       General: She is not in acute distress.     Appearance: Normal appearance. She is not ill-appearing or toxic-appearing.   Pulmonary:      Effort: Pulmonary effort is normal.   Musculoskeletal:         General: Normal range of motion.   Neurological:      General: No focal deficit present.      Mental Status: She is alert and oriented to person, place, and time.   Psychiatric:         Attention and Perception: Attention and perception normal.         Mood and Affect: Mood normal. Mood is not anxious or depressed.         Speech: Speech normal.         Behavior: Behavior normal. Behavior is cooperative.         Thought Content: Thought content normal.         Cognition and Memory: Cognition and memory normal.         Judgment: Judgment normal.         Vital Signs:   /82   Pulse 75   Ht 156.2 cm (61.5\")   Wt 80.7 kg (178 lb)   BMI 33.09 kg/m²      Lab Results:   Telemedicine on 04/25/2023   Component Date Value Ref Range Status   • External Amphetamine Screen Urine 04/25/2023 Positive (A)   Final   • External Benzodiazepine Screen Uri* 04/25/2023 Negative   Final   • External Cocaine Screen Urine 04/25/2023 Negative   Final   • External THC Screen Urine 04/25/2023 Positive (A)   Final   • External Methadone Screen Urine 04/25/2023 Negative   Final   • External Methamphetamine Screen Ur* 04/25/2023 Positive (A)   Final   • External Oxycodone Screen Urine 04/25/2023 Negative   Final   • External Buprenorphine Screen Urine 04/25/2023 Positive (A)   Final   • External MDMA 04/25/2023 Negative   Final   • External Opiates Screen Urine 04/25/2023 Negative   Final   Telemedicine on 04/18/2023   Component Date Value Ref Range Status   • External Amphetamine Screen Urine 04/18/2023 Positive (A)   Final   • External Benzodiazepine Screen Uri* 04/18/2023 Negative   Final   • External " Cocaine Screen Urine 04/18/2023 Negative   Final   • External THC Screen Urine 04/18/2023 Positive (A)   Final   • External Methadone Screen Urine 04/18/2023 Negative   Final   • External Methamphetamine Screen Ur* 04/18/2023 Positive (A)   Final   • External Oxycodone Screen Urine 04/18/2023 Negative   Final   • External Buprenorphine Screen Urine 04/18/2023 Positive (A)   Final   • External MDMA 04/18/2023 Negative   Final   • External Opiates Screen Urine 04/18/2023 Negative   Final   Lab on 04/11/2023   Component Date Value Ref Range Status   • Glucose 04/11/2023 91  65 - 99 mg/dL Final   • BUN 04/11/2023 12  6 - 20 mg/dL Final   • Creatinine 04/11/2023 0.78  0.57 - 1.00 mg/dL Final   • Sodium 04/11/2023 140  136 - 145 mmol/L Final   • Potassium 04/11/2023 4.3  3.5 - 5.2 mmol/L Final   • Chloride 04/11/2023 109 (H)  98 - 107 mmol/L Final   • CO2 04/11/2023 23.1  22.0 - 29.0 mmol/L Final   • Calcium 04/11/2023 9.3  8.6 - 10.5 mg/dL Final   • Total Protein 04/11/2023 6.8  6.0 - 8.5 g/dL Final   • Albumin 04/11/2023 4.1  3.5 - 5.2 g/dL Final   • ALT (SGPT) 04/11/2023 20  1 - 33 U/L Final   • AST (SGOT) 04/11/2023 18  1 - 32 U/L Final   • Alkaline Phosphatase 04/11/2023 93  39 - 117 U/L Final   • Total Bilirubin 04/11/2023 <0.2  0.0 - 1.2 mg/dL Final   • Globulin 04/11/2023 2.7  gm/dL Final   • A/G Ratio 04/11/2023 1.5  g/dL Final   • BUN/Creatinine Ratio 04/11/2023 15.4  7.0 - 25.0 Final   • Anion Gap 04/11/2023 7.9  5.0 - 15.0 mmol/L Final   • eGFR 04/11/2023 90.4  >60.0 mL/min/1.73 Final   • Hepatitis C Quantitation 04/11/2023 HCV Not Detected  IU/mL Final   • HCV log10 04/11/2023 TNP  log10 IU/mL Final    Unable to calculate result since non-numeric result obtained for  component test.   • HCV Test Information 04/11/2023 Comment   Final    The quantitative range of this assay is 15 IU/mL to 100 million IU/mL.   • HCV Genotype 04/11/2023 TNP   Final    Not indicated   • WBC 04/11/2023 8.90  3.40 - 10.80 10*3/mm3  Final   • RBC 04/11/2023 4.45  3.77 - 5.28 10*6/mm3 Final   • Hemoglobin 04/11/2023 12.3  12.0 - 15.9 g/dL Final   • Hematocrit 04/11/2023 37.0  34.0 - 46.6 % Final   • MCV 04/11/2023 83.1  79.0 - 97.0 fL Final   • MCH 04/11/2023 27.6  26.6 - 33.0 pg Final   • MCHC 04/11/2023 33.2  31.5 - 35.7 g/dL Final   • RDW 04/11/2023 12.6  12.3 - 15.4 % Final   • RDW-SD 04/11/2023 38.1  37.0 - 54.0 fl Final   • MPV 04/11/2023 10.9  6.0 - 12.0 fL Final   • Platelets 04/11/2023 290  140 - 450 10*3/mm3 Final   • Neutrophil % 04/11/2023 46.8  42.7 - 76.0 % Final   • Lymphocyte % 04/11/2023 41.6  19.6 - 45.3 % Final   • Monocyte % 04/11/2023 7.2  5.0 - 12.0 % Final   • Eosinophil % 04/11/2023 3.5  0.3 - 6.2 % Final   • Basophil % 04/11/2023 0.7  0.0 - 1.5 % Final   • Immature Grans % 04/11/2023 0.2  0.0 - 0.5 % Final   • Neutrophils, Absolute 04/11/2023 4.17  1.70 - 7.00 10*3/mm3 Final   • Lymphocytes, Absolute 04/11/2023 3.70 (H)  0.70 - 3.10 10*3/mm3 Final   • Monocytes, Absolute 04/11/2023 0.64  0.10 - 0.90 10*3/mm3 Final   • Eosinophils, Absolute 04/11/2023 0.31  0.00 - 0.40 10*3/mm3 Final   • Basophils, Absolute 04/11/2023 0.06  0.00 - 0.20 10*3/mm3 Final   • Immature Grans, Absolute 04/11/2023 0.02  0.00 - 0.05 10*3/mm3 Final   • nRBC 04/11/2023 0.0  0.0 - 0.2 /100 WBC Final   Telemedicine on 04/11/2023   Component Date Value Ref Range Status   • External Amphetamine Screen Urine 04/11/2023 Positive (A)   Final   • External Benzodiazepine Screen Uri* 04/11/2023 Negative   Final   • External Cocaine Screen Urine 04/11/2023 Negative   Final   • External THC Screen Urine 04/11/2023 Positive (A)   Final   • External Methadone Screen Urine 04/11/2023 Negative   Final   • External Methamphetamine Screen Ur* 04/11/2023 Positive (A)   Final   • External Oxycodone Screen Urine 04/11/2023 Negative   Final   • External Buprenorphine Screen Urine 04/11/2023 Positive (A)   Final   • External MDMA 04/11/2023 Positive (A)   Final   •  External Opiates Screen Urine 04/11/2023 Negative   Final   • HIV-1/ HIV-2 04/11/2023 Non-Reactive  Non-Reactive Final    A non-reactive test result does not preclude the possibility of exposure to HIV or infection with HIV. An antibody response to recent exposure may take several months to reach detectable levels.   Lab on 01/12/2023   Component Date Value Ref Range Status   • THC, Screen, Urine 01/12/2023 Negative  Negative Final   • Phencyclidine (PCP), Urine 01/12/2023 Negative  Negative Final   • Cocaine Screen, Urine 01/12/2023 Negative  Negative Final   • Methamphetamine, Ur 01/12/2023 Negative  Negative Final   • Opiate Screen 01/12/2023 Negative  Negative Final   • Amphetamine Screen, Urine 01/12/2023 Negative  Negative Final   • Benzodiazepine Screen, Urine 01/12/2023 Negative  Negative Final   • Tricyclic Antidepressants Screen 01/12/2023 Negative  Negative Final   • Methadone Screen, Urine 01/12/2023 Negative  Negative Final   • Barbiturates Screen, Urine 01/12/2023 Negative  Negative Final   • Oxycodone Screen, Urine 01/12/2023 Negative  Negative Final   • Propoxyphene Screen 01/12/2023 Negative  Negative Final   • Buprenorphine, Screen, Urine 01/12/2023 Positive (A)  Negative Final   Lab on 12/29/2022   Component Date Value Ref Range Status   • THC, Screen, Urine 12/29/2022 Positive (A)  Negative Final   • Phencyclidine (PCP), Urine 12/29/2022 Negative  Negative Final   • Cocaine Screen, Urine 12/29/2022 Negative  Negative Final   • Methamphetamine, Ur 12/29/2022 Positive (A)  Negative Final   • Opiate Screen 12/29/2022 Negative  Negative Final   • Amphetamine Screen, Urine 12/29/2022 Positive (A)  Negative Final   • Benzodiazepine Screen, Urine 12/29/2022 Negative  Negative Final   • Tricyclic Antidepressants Screen 12/29/2022 Negative  Negative Final   • Methadone Screen, Urine 12/29/2022 Negative  Negative Final   • Barbiturates Screen, Urine 12/29/2022 Negative  Negative Final   • Oxycodone Screen,  Urine 12/29/2022 Negative  Negative Final   • Propoxyphene Screen 12/29/2022 Negative  Negative Final   • Buprenorphine, Screen, Urine 12/29/2022 Positive (A)  Negative Final         Assessment & Plan   Diagnoses and all orders for this visit:    1. Opioid type dependence, continuous (Primary)  -     buprenorphine-naloxone (SUBOXONE) 8-2 MG per SL tablet; Place 2 tablets under the tongue Daily.  Dispense: 14 tablet; Refill: 0    2. Medication management  -     [facility] Drug Screen    3. Methamphetamine abuse  -     buPROPion XL (Wellbutrin XL) 150 MG 24 hr tablet; Take 1 tablet by mouth 2 (Two) Times a Day.  Dispense: 60 tablet; Refill: 0    4. Bilious vomiting with nausea  -     ondansetron ODT (ZOFRAN-ODT) 8 MG disintegrating tablet; Place 1 tablet on the tongue Every 8 (Eight) Hours As Needed for Nausea or Vomiting.  Dispense: 45 tablet; Refill: 0        Visit Diagnoses:    ICD-10-CM ICD-9-CM   1. Opioid type dependence, continuous  F11.20 304.01   2. Medication management  Z79.899 V58.69   3. Methamphetamine abuse  F15.10 305.70   4. Bilious vomiting with nausea  R11.14 787.04       PLAN:  1. Safety: No acute safety concerns  2. Risk Assessment: Risk of self-harm acutely is low. Risk of self-harm chronically is also low, but could be further elevated in the event of treatment noncompliance and/or AODA.    TREATMENT PLAN/GOALS: Continue supportive psychotherapy efforts and medications as indicated. Treatment and medication options discussed during today's visit. Patient acknowledged and verbally consented to continue with current treatment plan and was educated on the importance of compliance with treatment and follow-up appointments.    MEDICATION ISSUES:  ROSY reviewed as expected.  Discussed medication options and treatment plan of prescribed medication as well as the risks, benefits, and side effects including potential falls, possible impaired driving and metabolic adversities among others. Patient is  agreeable to call the office with any worsening of symptoms or onset of side effects. Patient is agreeable to call 911 or go to the nearest ER should he/she begin having SI/HI. No medication side effects or related complaints today.     MEDS ORDERED DURING VISIT:  New Medications Ordered This Visit   Medications   • buprenorphine-naloxone (SUBOXONE) 8-2 MG per SL tablet     Sig: Place 2 tablets under the tongue Daily.     Dispense:  14 tablet     Refill:  0     NADEAN:[id number]   • buPROPion XL (Wellbutrin XL) 150 MG 24 hr tablet     Sig: Take 1 tablet by mouth 2 (Two) Times a Day.     Dispense:  60 tablet     Refill:  0   • ondansetron ODT (ZOFRAN-ODT) 8 MG disintegrating tablet     Sig: Place 1 tablet on the tongue Every 8 (Eight) Hours As Needed for Nausea or Vomiting.     Dispense:  45 tablet     Refill:  0       No follow-ups on file.           This document has been electronically signed by WALKER Romero  April 25, 2023 10:58 EDT      Part of this note may be an electronic transcription/translation of spoken language to printed text using the Dragon Dictation System.

## 2023-05-02 ENCOUNTER — TELEMEDICINE (OUTPATIENT)
Dept: PSYCHIATRY | Facility: CLINIC | Age: 53
End: 2023-05-02
Payer: MEDICAID

## 2023-05-02 VITALS
BODY MASS INDEX: 33.49 KG/M2 | HEIGHT: 61 IN | WEIGHT: 177.4 LBS | DIASTOLIC BLOOD PRESSURE: 70 MMHG | SYSTOLIC BLOOD PRESSURE: 131 MMHG | HEART RATE: 87 BPM

## 2023-05-02 DIAGNOSIS — Z79.899 MEDICATION MANAGEMENT: ICD-10-CM

## 2023-05-02 DIAGNOSIS — F11.20 OPIOID TYPE DEPENDENCE, CONTINUOUS: Primary | ICD-10-CM

## 2023-05-02 DIAGNOSIS — F15.10 METHAMPHETAMINE ABUSE: ICD-10-CM

## 2023-05-02 RX ORDER — BUPRENORPHINE HYDROCHLORIDE AND NALOXONE HYDROCHLORIDE DIHYDRATE 8; 2 MG/1; MG/1
2 TABLET SUBLINGUAL DAILY
Qty: 28 TABLET | Refills: 0 | Status: SHIPPED | OUTPATIENT
Start: 2023-05-02

## 2023-05-02 NOTE — PROGRESS NOTES
This provider is located at [facility]. The Patient is seen remotely located at the [facility] ([facility]) using Video. Patient is being seen via telehealth and confirm that they are in a secure environment for this session. The patient's condition being diagnosed/treated is appropriate for telemedicine. Provider identified as Williams Brody as well as credentials APRN MSN FNP-C SRINIVASAN-YOUNG.   The client/patient gave consent to be seen remotely, and when consent is given they understand that the consent allows for patient identifiable information to be sent to a third party as needed.   They may refuse to be seen remotely at any time. The electronic data is encrypted and password protected, and the patient has been advised of the potential risks to privacy not withstanding such measures.    Chief Complaint/History of Present Illness: Follow Up buprenorphine/naloxone Medicated Assisted Treatment for Opiate Use Disorder     Patient/Client Concerns/Updates: Continuing Wellbutrin for methamphetamine cravings  -Patient reports she is doing well and has continued decreasing overall use of methamphetamine and use 1 time last week  -Patient is attending celebrate recovery meetings and is surrounding herself with positive support  -Patient reports she is feeling well and her mood has significantly improved and is now taking part in activities that she once found enjoyable such as gardening  -No depressive episodes, no suicidal or homicidal thoughts    Triggers (Persons/Places/Things/Events/Thought/Emotions): Life stress    Cravings: Decreased cravings for methamphetamine    Relapse Prevention: Counseling    Urine Drug Screen (today's visit) discussed: Positive buprenorphine positive amphetamines THC and methamphetamine    UDS Confirmation (Most recent/Resulted): Positive buprenorphine/nor-buprenorphine, no concerns for urine tampering otherwise positive THC and methamphetamine    Most recent pertinent  laboratory studies reviewed:  4/11/23-hepatic function within normal limits, Cr WNL, HIV negative, hepatitis C not detected    ROSY (PDMP) Reviewed for Current/Active Medications: buprenorphine/naloxone as reviewed today    Past Surgical History:  No past surgical history on file.    Problem List:  There is no problem list on file for this patient.      Allergy:   No Known Allergies     Current Medications:   Current Outpatient Medications   Medication Sig Dispense Refill   • buprenorphine-naloxone (SUBOXONE) 8-2 MG per SL tablet Place 2 tablets under the tongue Daily. 28 tablet 0   • buPROPion XL (Wellbutrin XL) 150 MG 24 hr tablet Take 1 tablet by mouth 2 (Two) Times a Day. 60 tablet 0   • famotidine (PEPCID) 20 MG tablet TAKE ONE TABLET BY MOUTH EVERY MORNING FOR 30 DAYS     • GNP PAIN RELIEF EX-STRENGTH 500 MG tablet TAKE ONE TABLET BY MOUTH EVERY 6 HOURS AS NEEDED FOR 30 DAYS **DO NOT EXCEED 6 TABLETS IN 24 HOURS**     • naloxone (NARCAN) 4 MG/0.1ML nasal spray 1 spray into the nostril(s) as directed by provider As Needed (opiate over sedation). 1 spray in 1 nostril every 2 to 3 minutes, call 911 2 each 2   • ondansetron ODT (ZOFRAN-ODT) 8 MG disintegrating tablet Place 1 tablet on the tongue Every 8 (Eight) Hours As Needed for Nausea or Vomiting. 45 tablet 0     No current facility-administered medications for this visit.       Past Medical History:  Past Medical History:   Diagnosis Date   • History of hepatitis B    • Pancreatitis          Social History     Socioeconomic History   • Marital status: Single   Tobacco Use   • Smoking status: Every Day     Packs/day: 1.00     Years: 15.00     Pack years: 15.00     Types: Cigarettes   • Smokeless tobacco: Never   Vaping Use   • Vaping Use: Never used   Substance and Sexual Activity   • Alcohol use: Not Currently   • Drug use: Yes     Types: Marijuana, Hydrocodone, Oxycodone, Methamphetamines   • Sexual activity: Defer         Family History   Problem Relation  Age of Onset   • No Known Problems Mother    • No Known Problems Father    • No Known Problems Sister    • No Known Problems Brother    • No Known Problems Maternal Aunt    • No Known Problems Paternal Aunt    • No Known Problems Maternal Uncle    • No Known Problems Paternal Uncle    • No Known Problems Maternal Grandfather    • No Known Problems Maternal Grandmother    • No Known Problems Paternal Grandfather    • No Known Problems Paternal Grandmother    • No Known Problems Cousin    • No Known Problems Other    • ADD / ADHD Neg Hx    • Alcohol abuse Neg Hx    • Anxiety disorder Neg Hx    • Bipolar disorder Neg Hx    • Dementia Neg Hx    • Depression Neg Hx    • Drug abuse Neg Hx    • OCD Neg Hx    • Paranoid behavior Neg Hx    • Schizophrenia Neg Hx    • Seizures Neg Hx    • Self-Injurious Behavior  Neg Hx    • Suicide Attempts Neg Hx          Mental Status Exam:   Hygiene:   good  Cooperation:  Cooperative  Eye Contact:  Good  Psychomotor Behavior:  Appropriate  Affect:  Appropriate  Mood: normal  Speech:  Normal  Thought Process:  Goal directed  Thought Content:  Normal  Suicidal:  None  Homicidal:  None  Hallucinations:  None  Delusion:  None  Memory:  Intact  Orientation:  Grossly intact  Reliability:  good  Insight:  Good  Judgement:  Good  Impulse Control:  Good         Review of Systems:  Review of Systems   Constitutional: Negative for activity change, chills, diaphoresis and fatigue.   Respiratory: Negative for apnea, cough and shortness of breath.    Cardiovascular: Negative for chest pain, palpitations and leg swelling.   Gastrointestinal: Negative for abdominal pain, constipation, diarrhea, nausea and vomiting.   Genitourinary: Negative for difficulty urinating.   Musculoskeletal: Negative for arthralgias.   Skin: Negative for rash.   Neurological: Negative for dizziness, weakness and headaches.   Psychiatric/Behavioral: Negative for agitation, self-injury, sleep disturbance and suicidal ideas. The  "patient is not nervous/anxious.          Physical Exam:  Physical Exam  Vitals reviewed.   Constitutional:       General: She is not in acute distress.     Appearance: Normal appearance. She is not ill-appearing or toxic-appearing.   Pulmonary:      Effort: Pulmonary effort is normal.   Musculoskeletal:         General: Normal range of motion.   Neurological:      General: No focal deficit present.      Mental Status: She is alert and oriented to person, place, and time.   Psychiatric:         Attention and Perception: Attention and perception normal.         Mood and Affect: Mood normal. Mood is not anxious or depressed.         Speech: Speech normal.         Behavior: Behavior normal. Behavior is cooperative.         Thought Content: Thought content normal.         Cognition and Memory: Cognition and memory normal.         Judgment: Judgment normal.         Vital Signs:   /70   Pulse 87   Ht 156.2 cm (61.5\")   Wt 80.5 kg (177 lb 6.4 oz)   BMI 32.98 kg/m²      Lab Results:   Telemedicine on 05/02/2023   Component Date Value Ref Range Status   • External Amphetamine Screen Urine 05/02/2023 Positive (A)   Final   • External Benzodiazepine Screen Uri* 05/02/2023 Negative   Final   • External Cocaine Screen Urine 05/02/2023 Negative   Final   • External THC Screen Urine 05/02/2023 Positive (A)   Final   • External Methadone Screen Urine 05/02/2023 Negative   Final   • External Methamphetamine Screen Ur* 05/02/2023 Positive (A)   Final   • External Oxycodone Screen Urine 05/02/2023 Negative   Final   • External Buprenorphine Screen Urine 05/02/2023 Positive (A)   Final   • External MDMA 05/02/2023 Negative   Final   • External Opiates Screen Urine 05/02/2023 Negative   Final   Telemedicine on 04/25/2023   Component Date Value Ref Range Status   • External Amphetamine Screen Urine 04/25/2023 Positive (A)   Final   • External Benzodiazepine Screen Uri* 04/25/2023 Negative   Final   • External Cocaine Screen " Urine 04/25/2023 Negative   Final   • External THC Screen Urine 04/25/2023 Positive (A)   Final   • External Methadone Screen Urine 04/25/2023 Negative   Final   • External Methamphetamine Screen Ur* 04/25/2023 Positive (A)   Final   • External Oxycodone Screen Urine 04/25/2023 Negative   Final   • External Buprenorphine Screen Urine 04/25/2023 Positive (A)   Final   • External MDMA 04/25/2023 Negative   Final   • External Opiates Screen Urine 04/25/2023 Negative   Final   Telemedicine on 04/18/2023   Component Date Value Ref Range Status   • External Amphetamine Screen Urine 04/18/2023 Positive (A)   Final   • External Benzodiazepine Screen Uri* 04/18/2023 Negative   Final   • External Cocaine Screen Urine 04/18/2023 Negative   Final   • External THC Screen Urine 04/18/2023 Positive (A)   Final   • External Methadone Screen Urine 04/18/2023 Negative   Final   • External Methamphetamine Screen Ur* 04/18/2023 Positive (A)   Final   • External Oxycodone Screen Urine 04/18/2023 Negative   Final   • External Buprenorphine Screen Urine 04/18/2023 Positive (A)   Final   • External MDMA 04/18/2023 Negative   Final   • External Opiates Screen Urine 04/18/2023 Negative   Final   Lab on 04/11/2023   Component Date Value Ref Range Status   • Glucose 04/11/2023 91  65 - 99 mg/dL Final   • BUN 04/11/2023 12  6 - 20 mg/dL Final   • Creatinine 04/11/2023 0.78  0.57 - 1.00 mg/dL Final   • Sodium 04/11/2023 140  136 - 145 mmol/L Final   • Potassium 04/11/2023 4.3  3.5 - 5.2 mmol/L Final   • Chloride 04/11/2023 109 (H)  98 - 107 mmol/L Final   • CO2 04/11/2023 23.1  22.0 - 29.0 mmol/L Final   • Calcium 04/11/2023 9.3  8.6 - 10.5 mg/dL Final   • Total Protein 04/11/2023 6.8  6.0 - 8.5 g/dL Final   • Albumin 04/11/2023 4.1  3.5 - 5.2 g/dL Final   • ALT (SGPT) 04/11/2023 20  1 - 33 U/L Final   • AST (SGOT) 04/11/2023 18  1 - 32 U/L Final   • Alkaline Phosphatase 04/11/2023 93  39 - 117 U/L Final   • Total Bilirubin 04/11/2023 <0.2  0.0  - 1.2 mg/dL Final   • Globulin 04/11/2023 2.7  gm/dL Final   • A/G Ratio 04/11/2023 1.5  g/dL Final   • BUN/Creatinine Ratio 04/11/2023 15.4  7.0 - 25.0 Final   • Anion Gap 04/11/2023 7.9  5.0 - 15.0 mmol/L Final   • eGFR 04/11/2023 90.4  >60.0 mL/min/1.73 Final   • Hepatitis C Quantitation 04/11/2023 HCV Not Detected  IU/mL Final   • HCV log10 04/11/2023 TNP  log10 IU/mL Final    Unable to calculate result since non-numeric result obtained for  component test.   • HCV Test Information 04/11/2023 Comment   Final    The quantitative range of this assay is 15 IU/mL to 100 million IU/mL.   • HCV Genotype 04/11/2023 TNP   Final    Not indicated   • WBC 04/11/2023 8.90  3.40 - 10.80 10*3/mm3 Final   • RBC 04/11/2023 4.45  3.77 - 5.28 10*6/mm3 Final   • Hemoglobin 04/11/2023 12.3  12.0 - 15.9 g/dL Final   • Hematocrit 04/11/2023 37.0  34.0 - 46.6 % Final   • MCV 04/11/2023 83.1  79.0 - 97.0 fL Final   • MCH 04/11/2023 27.6  26.6 - 33.0 pg Final   • MCHC 04/11/2023 33.2  31.5 - 35.7 g/dL Final   • RDW 04/11/2023 12.6  12.3 - 15.4 % Final   • RDW-SD 04/11/2023 38.1  37.0 - 54.0 fl Final   • MPV 04/11/2023 10.9  6.0 - 12.0 fL Final   • Platelets 04/11/2023 290  140 - 450 10*3/mm3 Final   • Neutrophil % 04/11/2023 46.8  42.7 - 76.0 % Final   • Lymphocyte % 04/11/2023 41.6  19.6 - 45.3 % Final   • Monocyte % 04/11/2023 7.2  5.0 - 12.0 % Final   • Eosinophil % 04/11/2023 3.5  0.3 - 6.2 % Final   • Basophil % 04/11/2023 0.7  0.0 - 1.5 % Final   • Immature Grans % 04/11/2023 0.2  0.0 - 0.5 % Final   • Neutrophils, Absolute 04/11/2023 4.17  1.70 - 7.00 10*3/mm3 Final   • Lymphocytes, Absolute 04/11/2023 3.70 (H)  0.70 - 3.10 10*3/mm3 Final   • Monocytes, Absolute 04/11/2023 0.64  0.10 - 0.90 10*3/mm3 Final   • Eosinophils, Absolute 04/11/2023 0.31  0.00 - 0.40 10*3/mm3 Final   • Basophils, Absolute 04/11/2023 0.06  0.00 - 0.20 10*3/mm3 Final   • Immature Grans, Absolute 04/11/2023 0.02  0.00 - 0.05 10*3/mm3 Final   • nRBC  04/11/2023 0.0  0.0 - 0.2 /100 WBC Final   Telemedicine on 04/11/2023   Component Date Value Ref Range Status   • External Amphetamine Screen Urine 04/11/2023 Positive (A)   Final   • External Benzodiazepine Screen Uri* 04/11/2023 Negative   Final   • External Cocaine Screen Urine 04/11/2023 Negative   Final   • External THC Screen Urine 04/11/2023 Positive (A)   Final   • External Methadone Screen Urine 04/11/2023 Negative   Final   • External Methamphetamine Screen Ur* 04/11/2023 Positive (A)   Final   • External Oxycodone Screen Urine 04/11/2023 Negative   Final   • External Buprenorphine Screen Urine 04/11/2023 Positive (A)   Final   • External MDMA 04/11/2023 Positive (A)   Final   • External Opiates Screen Urine 04/11/2023 Negative   Final   • HIV-1/ HIV-2 04/11/2023 Non-Reactive  Non-Reactive Final    A non-reactive test result does not preclude the possibility of exposure to HIV or infection with HIV. An antibody response to recent exposure may take several months to reach detectable levels.   Lab on 01/12/2023   Component Date Value Ref Range Status   • THC, Screen, Urine 01/12/2023 Negative  Negative Final   • Phencyclidine (PCP), Urine 01/12/2023 Negative  Negative Final   • Cocaine Screen, Urine 01/12/2023 Negative  Negative Final   • Methamphetamine, Ur 01/12/2023 Negative  Negative Final   • Opiate Screen 01/12/2023 Negative  Negative Final   • Amphetamine Screen, Urine 01/12/2023 Negative  Negative Final   • Benzodiazepine Screen, Urine 01/12/2023 Negative  Negative Final   • Tricyclic Antidepressants Screen 01/12/2023 Negative  Negative Final   • Methadone Screen, Urine 01/12/2023 Negative  Negative Final   • Barbiturates Screen, Urine 01/12/2023 Negative  Negative Final   • Oxycodone Screen, Urine 01/12/2023 Negative  Negative Final   • Propoxyphene Screen 01/12/2023 Negative  Negative Final   • Buprenorphine, Screen, Urine 01/12/2023 Positive (A)  Negative Final   Lab on 12/29/2022   Component Date  Value Ref Range Status   • THC, Screen, Urine 12/29/2022 Positive (A)  Negative Final   • Phencyclidine (PCP), Urine 12/29/2022 Negative  Negative Final   • Cocaine Screen, Urine 12/29/2022 Negative  Negative Final   • Methamphetamine, Ur 12/29/2022 Positive (A)  Negative Final   • Opiate Screen 12/29/2022 Negative  Negative Final   • Amphetamine Screen, Urine 12/29/2022 Positive (A)  Negative Final   • Benzodiazepine Screen, Urine 12/29/2022 Negative  Negative Final   • Tricyclic Antidepressants Screen 12/29/2022 Negative  Negative Final   • Methadone Screen, Urine 12/29/2022 Negative  Negative Final   • Barbiturates Screen, Urine 12/29/2022 Negative  Negative Final   • Oxycodone Screen, Urine 12/29/2022 Negative  Negative Final   • Propoxyphene Screen 12/29/2022 Negative  Negative Final   • Buprenorphine, Screen, Urine 12/29/2022 Positive (A)  Negative Final         Assessment & Plan   Diagnoses and all orders for this visit:    1. Opioid type dependence, continuous (Primary)  -     buprenorphine-naloxone (SUBOXONE) 8-2 MG per SL tablet; Place 2 tablets under the tongue Daily.  Dispense: 28 tablet; Refill: 0    2. Medication management  -     [facility] Drug Screen    3. Methamphetamine abuse        Visit Diagnoses:    ICD-10-CM ICD-9-CM   1. Opioid type dependence, continuous  F11.20 304.01   2. Medication management  Z79.899 V58.69   3. Methamphetamine abuse  F15.10 305.70       PLAN:  1. Safety: No acute safety concerns  2. Risk Assessment: Risk of self-harm acutely is low. Risk of self-harm chronically is also low, but could be further elevated in the event of treatment noncompliance and/or AODA.    TREATMENT PLAN/GOALS: Continue supportive psychotherapy efforts and medications as indicated. Treatment and medication options discussed during today's visit. Patient acknowledged and verbally consented to continue with current treatment plan and was educated on the importance of compliance with treatment and  follow-up appointments.    MEDICATION ISSUES:  ROSY reviewed as expected.  Discussed medication options and treatment plan of prescribed medication as well as the risks, benefits, and side effects including potential falls, possible impaired driving and metabolic adversities among others. Patient is agreeable to call the office with any worsening of symptoms or onset of side effects. Patient is agreeable to call 911 or go to the nearest ER should he/she begin having SI/HI. No medication side effects or related complaints today.     MEDS ORDERED DURING VISIT:  New Medications Ordered This Visit   Medications   • buprenorphine-naloxone (SUBOXONE) 8-2 MG per SL tablet     Sig: Place 2 tablets under the tongue Daily.     Dispense:  28 tablet     Refill:  0     NADEAN:[id number]       No follow-ups on file.           This document has been electronically signed by WALKER Romero  May 2, 2023 13:24 EDT      Part of this note may be an electronic transcription/translation of spoken language to printed text using the Dragon Dictation System.

## 2023-05-18 ENCOUNTER — TELEMEDICINE (OUTPATIENT)
Dept: PSYCHIATRY | Facility: CLINIC | Age: 53
End: 2023-05-18
Payer: MEDICAID

## 2023-05-18 VITALS
HEART RATE: 89 BPM | DIASTOLIC BLOOD PRESSURE: 79 MMHG | HEIGHT: 61 IN | SYSTOLIC BLOOD PRESSURE: 135 MMHG | WEIGHT: 175 LBS | BODY MASS INDEX: 33.04 KG/M2

## 2023-05-18 DIAGNOSIS — Z79.899 MEDICATION MANAGEMENT: ICD-10-CM

## 2023-05-18 DIAGNOSIS — F15.10 METHAMPHETAMINE ABUSE: ICD-10-CM

## 2023-05-18 DIAGNOSIS — F11.20 OPIOID TYPE DEPENDENCE, CONTINUOUS: Primary | ICD-10-CM

## 2023-05-18 RX ORDER — BUPROPION HYDROCHLORIDE 150 MG/1
150 TABLET ORAL 2 TIMES DAILY
Qty: 60 TABLET | Refills: 0 | Status: SHIPPED | OUTPATIENT
Start: 2023-05-18 | End: 2024-05-17

## 2023-05-18 RX ORDER — BUPRENORPHINE HYDROCHLORIDE AND NALOXONE HYDROCHLORIDE DIHYDRATE 8; 2 MG/1; MG/1
2 TABLET SUBLINGUAL DAILY
Qty: 28 TABLET | Refills: 0 | Status: SHIPPED | OUTPATIENT
Start: 2023-05-18

## 2023-05-18 NOTE — PROGRESS NOTES
This provider is located at [facility]. The Patient is seen remotely located at the [facility] ([facility]) using Video. Patient is being seen via telehealth and confirm that they are in a secure environment for this session. The patient's condition being diagnosed/treated is appropriate for telemedicine. Provider identified as Williams Brody as well as credentials APRN MSN FNP-C SRINIVASAN-YOUNG.   The client/patient gave consent to be seen remotely, and when consent is given they understand that the consent allows for patient identifiable information to be sent to a third party as needed.   They may refuse to be seen remotely at any time. The electronic data is encrypted and password protected, and the patient has been advised of the potential risks to privacy not withstanding such measures.    Chief Complaint/History of Present Illness: Follow Up buprenorphine/naloxone Medicated Assisted Treatment for Opiate Use Disorder     Patient/Client Concerns/Updates: Continuing Wellbutrin for methamphetamine cravings  -Patient continues to struggle with methamphetamine use  -Triggered by overall life stress and exacerbated most recently by mother whose health is failing  -Discussed with patient need for increased support and patient agrees therapy would be beneficial, I highly encouraged patient to take part in chemical dependency intensive outpatient, patient states chemical dependency IOP would be difficult at this time given the amount of care her mother needs  -We will continue to support patient through this time as she cares for her mother and continue to offer increased higher level of care services    Triggers (Persons/Places/Things/Events/Thought/Emotions): Mother helping    Cravings: Cravings for methamphetamine    Relapse Prevention: Counseling and higher levels of care offered    Urine Drug Screen (today's visit) discussed: Positive buprenorphine positive amphetamine methamphetamine and THC    UDS  Confirmation (Most recent/Resulted): Positive buprenorphine/nor-buprenorphine, no concerns for urine tampering otherwise positive methamphetamine and THC    Most recent pertinent laboratory studies reviewed: 4/11/23-hepatic function within normal limits, Cr WNL, HIV negative, hepatitis C not detected    ROSY (PDMP) Reviewed for Current/Active Medications: buprenorphine/naloxone as reviewed today    Past Surgical History:  No past surgical history on file.    Problem List:  There is no problem list on file for this patient.      Allergy:   No Known Allergies     Current Medications:   Current Outpatient Medications   Medication Sig Dispense Refill   • buprenorphine-naloxone (SUBOXONE) 8-2 MG per SL tablet Place 2 tablets under the tongue Daily. 28 tablet 0   • buPROPion XL (Wellbutrin XL) 150 MG 24 hr tablet Take 1 tablet by mouth 2 (Two) Times a Day. 60 tablet 0   • famotidine (PEPCID) 20 MG tablet TAKE ONE TABLET BY MOUTH EVERY MORNING FOR 30 DAYS     • GNP PAIN RELIEF EX-STRENGTH 500 MG tablet TAKE ONE TABLET BY MOUTH EVERY 6 HOURS AS NEEDED FOR 30 DAYS **DO NOT EXCEED 6 TABLETS IN 24 HOURS**     • naloxone (NARCAN) 4 MG/0.1ML nasal spray 1 spray into the nostril(s) as directed by provider As Needed (opiate over sedation). 1 spray in 1 nostril every 2 to 3 minutes, call 911 2 each 2   • ondansetron ODT (ZOFRAN-ODT) 8 MG disintegrating tablet Place 1 tablet on the tongue Every 8 (Eight) Hours As Needed for Nausea or Vomiting. 45 tablet 0     No current facility-administered medications for this visit.       Past Medical History:  Past Medical History:   Diagnosis Date   • History of hepatitis B    • Pancreatitis          Social History     Socioeconomic History   • Marital status: Single   Tobacco Use   • Smoking status: Every Day     Packs/day: 1.00     Years: 15.00     Pack years: 15.00     Types: Cigarettes   • Smokeless tobacco: Never   Vaping Use   • Vaping Use: Never used   Substance and Sexual Activity   •  Alcohol use: Not Currently   • Drug use: Yes     Types: Marijuana, Hydrocodone, Oxycodone, Methamphetamines   • Sexual activity: Defer         Family History   Problem Relation Age of Onset   • No Known Problems Mother    • No Known Problems Father    • No Known Problems Sister    • No Known Problems Brother    • No Known Problems Maternal Aunt    • No Known Problems Paternal Aunt    • No Known Problems Maternal Uncle    • No Known Problems Paternal Uncle    • No Known Problems Maternal Grandfather    • No Known Problems Maternal Grandmother    • No Known Problems Paternal Grandfather    • No Known Problems Paternal Grandmother    • No Known Problems Cousin    • No Known Problems Other    • ADD / ADHD Neg Hx    • Alcohol abuse Neg Hx    • Anxiety disorder Neg Hx    • Bipolar disorder Neg Hx    • Dementia Neg Hx    • Depression Neg Hx    • Drug abuse Neg Hx    • OCD Neg Hx    • Paranoid behavior Neg Hx    • Schizophrenia Neg Hx    • Seizures Neg Hx    • Self-Injurious Behavior  Neg Hx    • Suicide Attempts Neg Hx          Mental Status Exam:   Hygiene:   good  Cooperation:  Cooperative  Eye Contact:  Good  Psychomotor Behavior:  Appropriate  Affect:  Appropriate  Mood: sad  Speech:  Normal  Thought Process:  Goal directed  Thought Content:  Normal  Suicidal:  None  Homicidal:  None  Hallucinations:  None  Delusion:  None  Memory:  Intact  Orientation:  Grossly intact  Reliability:  good  Insight:  Fair  Judgement:  Poor  Impulse Control:  Poor         Review of Systems:  Review of Systems   Constitutional: Negative for activity change, chills, diaphoresis and fatigue.   Respiratory: Negative for apnea, cough and shortness of breath.    Cardiovascular: Negative for chest pain, palpitations and leg swelling.   Gastrointestinal: Negative for abdominal pain, constipation, diarrhea, nausea and vomiting.   Genitourinary: Negative for difficulty urinating.   Musculoskeletal: Negative for arthralgias.   Skin: Negative for  "rash.   Neurological: Negative for dizziness, weakness and headaches.   Psychiatric/Behavioral: Positive for dysphoric mood. Negative for agitation, self-injury, sleep disturbance and suicidal ideas. The patient is nervous/anxious.          Physical Exam:  Physical Exam  Vitals reviewed.   Constitutional:       General: She is not in acute distress.     Appearance: Normal appearance. She is not ill-appearing or toxic-appearing.   Pulmonary:      Effort: Pulmonary effort is normal.   Musculoskeletal:         General: Normal range of motion.   Neurological:      General: No focal deficit present.      Mental Status: She is alert and oriented to person, place, and time.   Psychiatric:         Attention and Perception: Attention and perception normal.         Mood and Affect: Mood is anxious and depressed. Affect is tearful.         Speech: Speech normal.         Behavior: Behavior normal. Behavior is cooperative.         Thought Content: Thought content normal.         Cognition and Memory: Cognition and memory normal.         Judgment: Judgment normal.         Vital Signs:   /79   Pulse 89   Ht 156.2 cm (61.5\")   Wt 79.4 kg (175 lb)   BMI 32.53 kg/m²      Lab Results:   Telemedicine on 05/18/2023   Component Date Value Ref Range Status   • External Amphetamine Screen Urine 05/18/2023 Positive (A)   Final   • External Benzodiazepine Screen Uri* 05/18/2023 Negative   Final   • External Cocaine Screen Urine 05/18/2023 Negative   Final   • External THC Screen Urine 05/18/2023 Positive (A)   Final   • External Methadone Screen Urine 05/18/2023 Negative   Final   • External Methamphetamine Screen Ur* 05/18/2023 Positive (A)   Final   • External Oxycodone Screen Urine 05/18/2023 Negative   Final   • External Buprenorphine Screen Urine 05/18/2023 Positive (A)   Final   • External MDMA 05/18/2023 Positive (A)   Final   • External Opiates Screen Urine 05/18/2023 Negative   Final   Telemedicine on 05/02/2023 "   Component Date Value Ref Range Status   • External Amphetamine Screen Urine 05/02/2023 Positive (A)   Final   • External Benzodiazepine Screen Uri* 05/02/2023 Negative   Final   • External Cocaine Screen Urine 05/02/2023 Negative   Final   • External THC Screen Urine 05/02/2023 Positive (A)   Final   • External Methadone Screen Urine 05/02/2023 Negative   Final   • External Methamphetamine Screen Ur* 05/02/2023 Positive (A)   Final   • External Oxycodone Screen Urine 05/02/2023 Negative   Final   • External Buprenorphine Screen Urine 05/02/2023 Positive (A)   Final   • External MDMA 05/02/2023 Negative   Final   • External Opiates Screen Urine 05/02/2023 Negative   Final   Telemedicine on 04/25/2023   Component Date Value Ref Range Status   • External Amphetamine Screen Urine 04/25/2023 Positive (A)   Final   • External Benzodiazepine Screen Uri* 04/25/2023 Negative   Final   • External Cocaine Screen Urine 04/25/2023 Negative   Final   • External THC Screen Urine 04/25/2023 Positive (A)   Final   • External Methadone Screen Urine 04/25/2023 Negative   Final   • External Methamphetamine Screen Ur* 04/25/2023 Positive (A)   Final   • External Oxycodone Screen Urine 04/25/2023 Negative   Final   • External Buprenorphine Screen Urine 04/25/2023 Positive (A)   Final   • External MDMA 04/25/2023 Negative   Final   • External Opiates Screen Urine 04/25/2023 Negative   Final   Telemedicine on 04/18/2023   Component Date Value Ref Range Status   • External Amphetamine Screen Urine 04/18/2023 Positive (A)   Final   • External Benzodiazepine Screen Uri* 04/18/2023 Negative   Final   • External Cocaine Screen Urine 04/18/2023 Negative   Final   • External THC Screen Urine 04/18/2023 Positive (A)   Final   • External Methadone Screen Urine 04/18/2023 Negative   Final   • External Methamphetamine Screen Ur* 04/18/2023 Positive (A)   Final   • External Oxycodone Screen Urine 04/18/2023 Negative   Final   • External  Buprenorphine Screen Urine 04/18/2023 Positive (A)   Final   • External MDMA 04/18/2023 Negative   Final   • External Opiates Screen Urine 04/18/2023 Negative   Final   Lab on 04/11/2023   Component Date Value Ref Range Status   • Glucose 04/11/2023 91  65 - 99 mg/dL Final   • BUN 04/11/2023 12  6 - 20 mg/dL Final   • Creatinine 04/11/2023 0.78  0.57 - 1.00 mg/dL Final   • Sodium 04/11/2023 140  136 - 145 mmol/L Final   • Potassium 04/11/2023 4.3  3.5 - 5.2 mmol/L Final   • Chloride 04/11/2023 109 (H)  98 - 107 mmol/L Final   • CO2 04/11/2023 23.1  22.0 - 29.0 mmol/L Final   • Calcium 04/11/2023 9.3  8.6 - 10.5 mg/dL Final   • Total Protein 04/11/2023 6.8  6.0 - 8.5 g/dL Final   • Albumin 04/11/2023 4.1  3.5 - 5.2 g/dL Final   • ALT (SGPT) 04/11/2023 20  1 - 33 U/L Final   • AST (SGOT) 04/11/2023 18  1 - 32 U/L Final   • Alkaline Phosphatase 04/11/2023 93  39 - 117 U/L Final   • Total Bilirubin 04/11/2023 <0.2  0.0 - 1.2 mg/dL Final   • Globulin 04/11/2023 2.7  gm/dL Final   • A/G Ratio 04/11/2023 1.5  g/dL Final   • BUN/Creatinine Ratio 04/11/2023 15.4  7.0 - 25.0 Final   • Anion Gap 04/11/2023 7.9  5.0 - 15.0 mmol/L Final   • eGFR 04/11/2023 90.4  >60.0 mL/min/1.73 Final   • Hepatitis C Quantitation 04/11/2023 HCV Not Detected  IU/mL Final   • HCV log10 04/11/2023 TNP  log10 IU/mL Final    Unable to calculate result since non-numeric result obtained for  component test.   • HCV Test Information 04/11/2023 Comment   Final    The quantitative range of this assay is 15 IU/mL to 100 million IU/mL.   • HCV Genotype 04/11/2023 TNP   Final    Not indicated   • WBC 04/11/2023 8.90  3.40 - 10.80 10*3/mm3 Final   • RBC 04/11/2023 4.45  3.77 - 5.28 10*6/mm3 Final   • Hemoglobin 04/11/2023 12.3  12.0 - 15.9 g/dL Final   • Hematocrit 04/11/2023 37.0  34.0 - 46.6 % Final   • MCV 04/11/2023 83.1  79.0 - 97.0 fL Final   • MCH 04/11/2023 27.6  26.6 - 33.0 pg Final   • MCHC 04/11/2023 33.2  31.5 - 35.7 g/dL Final   • RDW 04/11/2023  12.6  12.3 - 15.4 % Final   • RDW-SD 04/11/2023 38.1  37.0 - 54.0 fl Final   • MPV 04/11/2023 10.9  6.0 - 12.0 fL Final   • Platelets 04/11/2023 290  140 - 450 10*3/mm3 Final   • Neutrophil % 04/11/2023 46.8  42.7 - 76.0 % Final   • Lymphocyte % 04/11/2023 41.6  19.6 - 45.3 % Final   • Monocyte % 04/11/2023 7.2  5.0 - 12.0 % Final   • Eosinophil % 04/11/2023 3.5  0.3 - 6.2 % Final   • Basophil % 04/11/2023 0.7  0.0 - 1.5 % Final   • Immature Grans % 04/11/2023 0.2  0.0 - 0.5 % Final   • Neutrophils, Absolute 04/11/2023 4.17  1.70 - 7.00 10*3/mm3 Final   • Lymphocytes, Absolute 04/11/2023 3.70 (H)  0.70 - 3.10 10*3/mm3 Final   • Monocytes, Absolute 04/11/2023 0.64  0.10 - 0.90 10*3/mm3 Final   • Eosinophils, Absolute 04/11/2023 0.31  0.00 - 0.40 10*3/mm3 Final   • Basophils, Absolute 04/11/2023 0.06  0.00 - 0.20 10*3/mm3 Final   • Immature Grans, Absolute 04/11/2023 0.02  0.00 - 0.05 10*3/mm3 Final   • nRBC 04/11/2023 0.0  0.0 - 0.2 /100 WBC Final   Telemedicine on 04/11/2023   Component Date Value Ref Range Status   • External Amphetamine Screen Urine 04/11/2023 Positive (A)   Final   • External Benzodiazepine Screen Uri* 04/11/2023 Negative   Final   • External Cocaine Screen Urine 04/11/2023 Negative   Final   • External THC Screen Urine 04/11/2023 Positive (A)   Final   • External Methadone Screen Urine 04/11/2023 Negative   Final   • External Methamphetamine Screen Ur* 04/11/2023 Positive (A)   Final   • External Oxycodone Screen Urine 04/11/2023 Negative   Final   • External Buprenorphine Screen Urine 04/11/2023 Positive (A)   Final   • External MDMA 04/11/2023 Positive (A)   Final   • External Opiates Screen Urine 04/11/2023 Negative   Final   • HIV-1/ HIV-2 04/11/2023 Non-Reactive  Non-Reactive Final    A non-reactive test result does not preclude the possibility of exposure to HIV or infection with HIV. An antibody response to recent exposure may take several months to reach detectable levels.   Lab on  01/12/2023   Component Date Value Ref Range Status   • THC, Screen, Urine 01/12/2023 Negative  Negative Final   • Phencyclidine (PCP), Urine 01/12/2023 Negative  Negative Final   • Cocaine Screen, Urine 01/12/2023 Negative  Negative Final   • Methamphetamine, Ur 01/12/2023 Negative  Negative Final   • Opiate Screen 01/12/2023 Negative  Negative Final   • Amphetamine Screen, Urine 01/12/2023 Negative  Negative Final   • Benzodiazepine Screen, Urine 01/12/2023 Negative  Negative Final   • Tricyclic Antidepressants Screen 01/12/2023 Negative  Negative Final   • Methadone Screen, Urine 01/12/2023 Negative  Negative Final   • Barbiturates Screen, Urine 01/12/2023 Negative  Negative Final   • Oxycodone Screen, Urine 01/12/2023 Negative  Negative Final   • Propoxyphene Screen 01/12/2023 Negative  Negative Final   • Buprenorphine, Screen, Urine 01/12/2023 Positive (A)  Negative Final   Lab on 12/29/2022   Component Date Value Ref Range Status   • THC, Screen, Urine 12/29/2022 Positive (A)  Negative Final   • Phencyclidine (PCP), Urine 12/29/2022 Negative  Negative Final   • Cocaine Screen, Urine 12/29/2022 Negative  Negative Final   • Methamphetamine, Ur 12/29/2022 Positive (A)  Negative Final   • Opiate Screen 12/29/2022 Negative  Negative Final   • Amphetamine Screen, Urine 12/29/2022 Positive (A)  Negative Final   • Benzodiazepine Screen, Urine 12/29/2022 Negative  Negative Final   • Tricyclic Antidepressants Screen 12/29/2022 Negative  Negative Final   • Methadone Screen, Urine 12/29/2022 Negative  Negative Final   • Barbiturates Screen, Urine 12/29/2022 Negative  Negative Final   • Oxycodone Screen, Urine 12/29/2022 Negative  Negative Final   • Propoxyphene Screen 12/29/2022 Negative  Negative Final   • Buprenorphine, Screen, Urine 12/29/2022 Positive (A)  Negative Final         Assessment & Plan   Diagnoses and all orders for this visit:    1. Opioid type dependence, continuous (Primary)  -     buprenorphine-naloxone  (SUBOXONE) 8-2 MG per SL tablet; Place 2 tablets under the tongue Daily.  Dispense: 28 tablet; Refill: 0    2. Medication management  -     [facility] Drug Screen    3. Methamphetamine abuse  -     buPROPion XL (Wellbutrin XL) 150 MG 24 hr tablet; Take 1 tablet by mouth 2 (Two) Times a Day.  Dispense: 60 tablet; Refill: 0        Visit Diagnoses:    ICD-10-CM ICD-9-CM   1. Opioid type dependence, continuous  F11.20 304.01   2. Medication management  Z79.899 V58.69   3. Methamphetamine abuse  F15.10 305.70       PLAN:  1. Safety: No acute safety concerns  2. Risk Assessment: Risk of self-harm acutely is low. Risk of self-harm chronically is also low, but could be further elevated in the event of treatment noncompliance and/or AODA.    TREATMENT PLAN/GOALS: Continue supportive psychotherapy efforts and medications as indicated. Treatment and medication options discussed during today's visit. Patient acknowledged and verbally consented to continue with current treatment plan and was educated on the importance of compliance with treatment and follow-up appointments.    MEDICATION ISSUES:  ROSY reviewed as expected.  Discussed medication options and treatment plan of prescribed medication as well as the risks, benefits, and side effects including potential falls, possible impaired driving and metabolic adversities among others. Patient is agreeable to call the office with any worsening of symptoms or onset of side effects. Patient is agreeable to call 911 or go to the nearest ER should he/she begin having SI/HI. No medication side effects or related complaints today.     MEDS ORDERED DURING VISIT:  New Medications Ordered This Visit   Medications   • buprenorphine-naloxone (SUBOXONE) 8-2 MG per SL tablet     Sig: Place 2 tablets under the tongue Daily.     Dispense:  28 tablet     Refill:  0     NADEAN:[id number]   • buPROPion XL (Wellbutrin XL) 150 MG 24 hr tablet     Sig: Take 1 tablet by mouth 2 (Two) Times a Day.      Dispense:  60 tablet     Refill:  0       No follow-ups on file.           This document has been electronically signed by WALKER Romero  May 18, 2023 16:17 EDT      Part of this note may be an electronic transcription/translation of spoken language to printed text using the Dragon Dictation System.

## 2023-06-08 ENCOUNTER — TELEMEDICINE (OUTPATIENT)
Dept: PSYCHIATRY | Facility: CLINIC | Age: 53
End: 2023-06-08
Payer: MEDICAID

## 2023-06-08 VITALS
DIASTOLIC BLOOD PRESSURE: 75 MMHG | HEART RATE: 83 BPM | SYSTOLIC BLOOD PRESSURE: 119 MMHG | BODY MASS INDEX: 32.89 KG/M2 | WEIGHT: 174.2 LBS | HEIGHT: 61 IN

## 2023-06-08 DIAGNOSIS — F11.20 OPIOID TYPE DEPENDENCE, CONTINUOUS: Primary | ICD-10-CM

## 2023-06-08 DIAGNOSIS — F15.10 METHAMPHETAMINE ABUSE: ICD-10-CM

## 2023-06-08 DIAGNOSIS — Z79.899 MEDICATION MANAGEMENT: ICD-10-CM

## 2023-06-08 LAB
EXTERNAL AMPHETAMINE SCREEN URINE: NEGATIVE
EXTERNAL BENZODIAZEPINE SCREEN URINE: NEGATIVE
EXTERNAL BUPRENORPHINE SCREEN URINE: POSITIVE
EXTERNAL COCAINE SCREEN URINE: NEGATIVE
EXTERNAL MDMA: NEGATIVE
EXTERNAL METHADONE SCREEN URINE: NEGATIVE
EXTERNAL METHAMPHETAMINE SCREEN URINE: POSITIVE
EXTERNAL OPIATES SCREEN URINE: NEGATIVE
EXTERNAL OXYCODONE SCREEN URINE: NEGATIVE
EXTERNAL THC SCREEN URINE: POSITIVE

## 2023-06-08 RX ORDER — BUPRENORPHINE HYDROCHLORIDE AND NALOXONE HYDROCHLORIDE DIHYDRATE 8; 2 MG/1; MG/1
2 TABLET SUBLINGUAL DAILY
Qty: 28 TABLET | Refills: 0 | Status: SHIPPED | OUTPATIENT
Start: 2023-06-08

## 2023-06-08 RX ORDER — BUPROPION HYDROCHLORIDE 150 MG/1
150 TABLET ORAL 2 TIMES DAILY
Qty: 60 TABLET | Refills: 0 | Status: SHIPPED | OUTPATIENT
Start: 2023-06-08 | End: 2024-06-07

## 2023-06-08 NOTE — PROGRESS NOTES
This provider is located at [facility]. The Patient is seen remotely located at the [facility] ([facility]) using Video. Patient is being seen via telehealth and confirm that they are in a secure environment for this session. The patient's condition being diagnosed/treated is appropriate for telemedicine. Provider identified as Williams Brody as well as credentials APRN MSN FNP-SHAHEEN MATTSON-YOUNG.   The client/patient gave consent to be seen remotely, and when consent is given they understand that the consent allows for patient identifiable information to be sent to a third party as needed.   They may refuse to be seen remotely at any time. The electronic data is encrypted and password protected, and the patient has been advised of the potential risks to privacy not withstanding such measures.    Chief Complaint/History of Present Illness: Follow Up buprenorphine/naloxone Medicated Assisted Treatment for Opiate Use Disorder     Patient/Client Concerns/Updates: Continuing Wellbutrin for methamphetamine cravings  -Patient reports approaching 14 days abstinence from methamphetamine and expresses a strong desire and optimism about using no further, patient expresses awareness as to the potential side effects of continued methamphetamine use including sudden death  -Patient missed last week's appointment due to being with her mother in a hospital in [place] who has failing health; I advised patient to notify the clinic if appointments cannot be made and I would have been happy to bridge patient until today's appointment as long as said requests are not routinely abused    Triggers (Persons/Places/Things/Events/Thought/Emotions): Mother with failing health    Cravings: Cravings for opiates due to lack of buprenorphine dosing over the past 7 days    Relapse Prevention: Counseling    Urine Drug Screen (today's visit) discussed: Positive buprenorphine positive THC and methamphetamine    UDS Confirmation (Most  recent/Resulted): Positive buprenorphine/nor-buprenorphine, no concerns for urine tampering otherwise positive methamphetamine and THC    Most recent pertinent laboratory studies reviewed: 4/11/23-hepatic function within normal limits, Cr WNL, HIV negative, hepatitis C not detected     ROSY (PDMP) Reviewed for Current/Active Medications: buprenorphine/naloxone as reviewed today    Past Surgical History:  No past surgical history on file.    Problem List:  There is no problem list on file for this patient.      Allergy:   No Known Allergies     Current Medications:   Current Outpatient Medications   Medication Sig Dispense Refill   • buprenorphine-naloxone (SUBOXONE) 8-2 MG per SL tablet Place 2 tablets under the tongue Daily. 28 tablet 0   • buPROPion XL (Wellbutrin XL) 150 MG 24 hr tablet Take 1 tablet by mouth 2 (Two) Times a Day. 60 tablet 0   • famotidine (PEPCID) 20 MG tablet TAKE ONE TABLET BY MOUTH EVERY MORNING FOR 30 DAYS     • GNP PAIN RELIEF EX-STRENGTH 500 MG tablet TAKE ONE TABLET BY MOUTH EVERY 6 HOURS AS NEEDED FOR 30 DAYS **DO NOT EXCEED 6 TABLETS IN 24 HOURS**     • ondansetron ODT (ZOFRAN-ODT) 8 MG disintegrating tablet Place 1 tablet on the tongue Every 8 (Eight) Hours As Needed for Nausea or Vomiting. 45 tablet 0   • naloxone (NARCAN) 4 MG/0.1ML nasal spray 1 spray into the nostril(s) as directed by provider As Needed (opiate over sedation). 1 spray in 1 nostril every 2 to 3 minutes, call 911 (Patient not taking: Reported on 6/8/2023) 2 each 2     No current facility-administered medications for this visit.       Past Medical History:  Past Medical History:   Diagnosis Date   • History of hepatitis B    • Pancreatitis          Social History     Socioeconomic History   • Marital status: Single   Tobacco Use   • Smoking status: Every Day     Packs/day: 1.00     Years: 15.00     Pack years: 15.00     Types: Cigarettes   • Smokeless tobacco: Never   Vaping Use   • Vaping Use: Never used    Substance and Sexual Activity   • Alcohol use: Not Currently   • Drug use: Yes     Types: Marijuana, Hydrocodone, Oxycodone, Methamphetamines   • Sexual activity: Defer         Family History   Problem Relation Age of Onset   • No Known Problems Mother    • No Known Problems Father    • No Known Problems Sister    • No Known Problems Brother    • No Known Problems Maternal Aunt    • No Known Problems Paternal Aunt    • No Known Problems Maternal Uncle    • No Known Problems Paternal Uncle    • No Known Problems Maternal Grandfather    • No Known Problems Maternal Grandmother    • No Known Problems Paternal Grandfather    • No Known Problems Paternal Grandmother    • No Known Problems Cousin    • No Known Problems Other    • ADD / ADHD Neg Hx    • Alcohol abuse Neg Hx    • Anxiety disorder Neg Hx    • Bipolar disorder Neg Hx    • Dementia Neg Hx    • Depression Neg Hx    • Drug abuse Neg Hx    • OCD Neg Hx    • Paranoid behavior Neg Hx    • Schizophrenia Neg Hx    • Seizures Neg Hx    • Self-Injurious Behavior  Neg Hx    • Suicide Attempts Neg Hx          Mental Status Exam:   Hygiene:   good  Cooperation:  Cooperative  Eye Contact:  Good  Psychomotor Behavior:  Appropriate  Affect:  Appropriate  Mood: anxious  Speech:  Normal  Thought Process:  Goal directed  Thought Content:  Normal  Suicidal:  None  Homicidal:  None  Hallucinations:  None  Delusion:  None  Memory:  Intact  Orientation:  Grossly intact  Reliability:  good  Insight:  Good  Judgement:  Good  Impulse Control:  Good         Review of Systems:  Review of Systems   Constitutional:  Negative for activity change, chills, diaphoresis and fatigue.   Respiratory:  Negative for apnea, cough and shortness of breath.    Cardiovascular:  Negative for chest pain, palpitations and leg swelling.   Gastrointestinal:  Negative for abdominal pain, constipation, diarrhea, nausea and vomiting.   Genitourinary:  Negative for difficulty urinating.   Musculoskeletal:   "Negative for arthralgias.   Skin:  Negative for rash.   Neurological:  Negative for dizziness, weakness and headaches.   Psychiatric/Behavioral:  Positive for dysphoric mood. Negative for agitation, self-injury, sleep disturbance and suicidal ideas. The patient is not nervous/anxious.        Physical Exam:  Physical Exam  Vitals reviewed.   Constitutional:       General: She is not in acute distress.     Appearance: Normal appearance. She is not ill-appearing or toxic-appearing.   Pulmonary:      Effort: Pulmonary effort is normal.   Musculoskeletal:         General: Normal range of motion.   Neurological:      General: No focal deficit present.      Mental Status: She is alert and oriented to person, place, and time.   Psychiatric:         Attention and Perception: Attention and perception normal.         Mood and Affect: Mood normal. Mood is not anxious or depressed. Affect is tearful.         Speech: Speech normal.         Behavior: Behavior normal. Behavior is cooperative.         Thought Content: Thought content normal.         Cognition and Memory: Cognition and memory normal.         Judgment: Judgment normal.     Vital Signs:   /75   Pulse 83   Ht 156.2 cm (61.5\")   Wt 79 kg (174 lb 3.2 oz)   BMI 32.39 kg/m²      Lab Results:   Telemedicine on 06/08/2023   Component Date Value Ref Range Status   • External Amphetamine Screen Urine 06/08/2023 Negative   Final   • External Benzodiazepine Screen Uri* 06/08/2023 Negative   Final   • External Cocaine Screen Urine 06/08/2023 Negative   Final   • External THC Screen Urine 06/08/2023 Positive (A)   Final   • External Methadone Screen Urine 06/08/2023 Negative   Final   • External Methamphetamine Screen Ur* 06/08/2023 Positive (A)   Final   • External Oxycodone Screen Urine 06/08/2023 Negative   Final   • External Buprenorphine Screen Urine 06/08/2023 Positive (A)   Final   • External MDMA 06/08/2023 Negative   Final   • External Opiates Screen Urine " 06/08/2023 Negative   Final   Telemedicine on 05/18/2023   Component Date Value Ref Range Status   • External Amphetamine Screen Urine 05/18/2023 Positive (A)   Final   • External Benzodiazepine Screen Uri* 05/18/2023 Negative   Final   • External Cocaine Screen Urine 05/18/2023 Negative   Final   • External THC Screen Urine 05/18/2023 Positive (A)   Final   • External Methadone Screen Urine 05/18/2023 Negative   Final   • External Methamphetamine Screen Ur* 05/18/2023 Positive (A)   Final   • External Oxycodone Screen Urine 05/18/2023 Negative   Final   • External Buprenorphine Screen Urine 05/18/2023 Positive (A)   Final   • External MDMA 05/18/2023 Positive (A)   Final   • External Opiates Screen Urine 05/18/2023 Negative   Final   Telemedicine on 05/02/2023   Component Date Value Ref Range Status   • External Amphetamine Screen Urine 05/02/2023 Positive (A)   Final   • External Benzodiazepine Screen Uri* 05/02/2023 Negative   Final   • External Cocaine Screen Urine 05/02/2023 Negative   Final   • External THC Screen Urine 05/02/2023 Positive (A)   Final   • External Methadone Screen Urine 05/02/2023 Negative   Final   • External Methamphetamine Screen Ur* 05/02/2023 Positive (A)   Final   • External Oxycodone Screen Urine 05/02/2023 Negative   Final   • External Buprenorphine Screen Urine 05/02/2023 Positive (A)   Final   • External MDMA 05/02/2023 Negative   Final   • External Opiates Screen Urine 05/02/2023 Negative   Final   Telemedicine on 04/25/2023   Component Date Value Ref Range Status   • External Amphetamine Screen Urine 04/25/2023 Positive (A)   Final   • External Benzodiazepine Screen Uri* 04/25/2023 Negative   Final   • External Cocaine Screen Urine 04/25/2023 Negative   Final   • External THC Screen Urine 04/25/2023 Positive (A)   Final   • External Methadone Screen Urine 04/25/2023 Negative   Final   • External Methamphetamine Screen Ur* 04/25/2023 Positive (A)   Final   • External Oxycodone  Screen Urine 04/25/2023 Negative   Final   • External Buprenorphine Screen Urine 04/25/2023 Positive (A)   Final   • External MDMA 04/25/2023 Negative   Final   • External Opiates Screen Urine 04/25/2023 Negative   Final   Telemedicine on 04/18/2023   Component Date Value Ref Range Status   • External Amphetamine Screen Urine 04/18/2023 Positive (A)   Final   • External Benzodiazepine Screen Uri* 04/18/2023 Negative   Final   • External Cocaine Screen Urine 04/18/2023 Negative   Final   • External THC Screen Urine 04/18/2023 Positive (A)   Final   • External Methadone Screen Urine 04/18/2023 Negative   Final   • External Methamphetamine Screen Ur* 04/18/2023 Positive (A)   Final   • External Oxycodone Screen Urine 04/18/2023 Negative   Final   • External Buprenorphine Screen Urine 04/18/2023 Positive (A)   Final   • External MDMA 04/18/2023 Negative   Final   • External Opiates Screen Urine 04/18/2023 Negative   Final   Lab on 04/11/2023   Component Date Value Ref Range Status   • Glucose 04/11/2023 91  65 - 99 mg/dL Final   • BUN 04/11/2023 12  6 - 20 mg/dL Final   • Creatinine 04/11/2023 0.78  0.57 - 1.00 mg/dL Final   • Sodium 04/11/2023 140  136 - 145 mmol/L Final   • Potassium 04/11/2023 4.3  3.5 - 5.2 mmol/L Final   • Chloride 04/11/2023 109 (H)  98 - 107 mmol/L Final   • CO2 04/11/2023 23.1  22.0 - 29.0 mmol/L Final   • Calcium 04/11/2023 9.3  8.6 - 10.5 mg/dL Final   • Total Protein 04/11/2023 6.8  6.0 - 8.5 g/dL Final   • Albumin 04/11/2023 4.1  3.5 - 5.2 g/dL Final   • ALT (SGPT) 04/11/2023 20  1 - 33 U/L Final   • AST (SGOT) 04/11/2023 18  1 - 32 U/L Final   • Alkaline Phosphatase 04/11/2023 93  39 - 117 U/L Final   • Total Bilirubin 04/11/2023 <0.2  0.0 - 1.2 mg/dL Final   • Globulin 04/11/2023 2.7  gm/dL Final   • A/G Ratio 04/11/2023 1.5  g/dL Final   • BUN/Creatinine Ratio 04/11/2023 15.4  7.0 - 25.0 Final   • Anion Gap 04/11/2023 7.9  5.0 - 15.0 mmol/L Final   • eGFR 04/11/2023 90.4  >60.0  mL/min/1.73 Final   • Hepatitis C Quantitation 04/11/2023 HCV Not Detected  IU/mL Final   • HCV log10 04/11/2023 TNP  log10 IU/mL Final    Unable to calculate result since non-numeric result obtained for  component test.   • HCV Test Information 04/11/2023 Comment   Final    The quantitative range of this assay is 15 IU/mL to 100 million IU/mL.   • HCV Genotype 04/11/2023 TNP   Final    Not indicated   • WBC 04/11/2023 8.90  3.40 - 10.80 10*3/mm3 Final   • RBC 04/11/2023 4.45  3.77 - 5.28 10*6/mm3 Final   • Hemoglobin 04/11/2023 12.3  12.0 - 15.9 g/dL Final   • Hematocrit 04/11/2023 37.0  34.0 - 46.6 % Final   • MCV 04/11/2023 83.1  79.0 - 97.0 fL Final   • MCH 04/11/2023 27.6  26.6 - 33.0 pg Final   • MCHC 04/11/2023 33.2  31.5 - 35.7 g/dL Final   • RDW 04/11/2023 12.6  12.3 - 15.4 % Final   • RDW-SD 04/11/2023 38.1  37.0 - 54.0 fl Final   • MPV 04/11/2023 10.9  6.0 - 12.0 fL Final   • Platelets 04/11/2023 290  140 - 450 10*3/mm3 Final   • Neutrophil % 04/11/2023 46.8  42.7 - 76.0 % Final   • Lymphocyte % 04/11/2023 41.6  19.6 - 45.3 % Final   • Monocyte % 04/11/2023 7.2  5.0 - 12.0 % Final   • Eosinophil % 04/11/2023 3.5  0.3 - 6.2 % Final   • Basophil % 04/11/2023 0.7  0.0 - 1.5 % Final   • Immature Grans % 04/11/2023 0.2  0.0 - 0.5 % Final   • Neutrophils, Absolute 04/11/2023 4.17  1.70 - 7.00 10*3/mm3 Final   • Lymphocytes, Absolute 04/11/2023 3.70 (H)  0.70 - 3.10 10*3/mm3 Final   • Monocytes, Absolute 04/11/2023 0.64  0.10 - 0.90 10*3/mm3 Final   • Eosinophils, Absolute 04/11/2023 0.31  0.00 - 0.40 10*3/mm3 Final   • Basophils, Absolute 04/11/2023 0.06  0.00 - 0.20 10*3/mm3 Final   • Immature Grans, Absolute 04/11/2023 0.02  0.00 - 0.05 10*3/mm3 Final   • nRBC 04/11/2023 0.0  0.0 - 0.2 /100 WBC Final   Telemedicine on 04/11/2023   Component Date Value Ref Range Status   • External Amphetamine Screen Urine 04/11/2023 Positive (A)   Final   • External Benzodiazepine Screen Uri* 04/11/2023 Negative   Final   •  External Cocaine Screen Urine 04/11/2023 Negative   Final   • External THC Screen Urine 04/11/2023 Positive (A)   Final   • External Methadone Screen Urine 04/11/2023 Negative   Final   • External Methamphetamine Screen Ur* 04/11/2023 Positive (A)   Final   • External Oxycodone Screen Urine 04/11/2023 Negative   Final   • External Buprenorphine Screen Urine 04/11/2023 Positive (A)   Final   • External MDMA 04/11/2023 Positive (A)   Final   • External Opiates Screen Urine 04/11/2023 Negative   Final   • HIV-1/ HIV-2 04/11/2023 Non-Reactive  Non-Reactive Final    A non-reactive test result does not preclude the possibility of exposure to HIV or infection with HIV. An antibody response to recent exposure may take several months to reach detectable levels.   Lab on 01/12/2023   Component Date Value Ref Range Status   • THC, Screen, Urine 01/12/2023 Negative  Negative Final   • Phencyclidine (PCP), Urine 01/12/2023 Negative  Negative Final   • Cocaine Screen, Urine 01/12/2023 Negative  Negative Final   • Methamphetamine, Ur 01/12/2023 Negative  Negative Final   • Opiate Screen 01/12/2023 Negative  Negative Final   • Amphetamine Screen, Urine 01/12/2023 Negative  Negative Final   • Benzodiazepine Screen, Urine 01/12/2023 Negative  Negative Final   • Tricyclic Antidepressants Screen 01/12/2023 Negative  Negative Final   • Methadone Screen, Urine 01/12/2023 Negative  Negative Final   • Barbiturates Screen, Urine 01/12/2023 Negative  Negative Final   • Oxycodone Screen, Urine 01/12/2023 Negative  Negative Final   • Propoxyphene Screen 01/12/2023 Negative  Negative Final   • Buprenorphine, Screen, Urine 01/12/2023 Positive (A)  Negative Final   Lab on 12/29/2022   Component Date Value Ref Range Status   • THC, Screen, Urine 12/29/2022 Positive (A)  Negative Final   • Phencyclidine (PCP), Urine 12/29/2022 Negative  Negative Final   • Cocaine Screen, Urine 12/29/2022 Negative  Negative Final   • Methamphetamine, Ur 12/29/2022  Positive (A)  Negative Final   • Opiate Screen 12/29/2022 Negative  Negative Final   • Amphetamine Screen, Urine 12/29/2022 Positive (A)  Negative Final   • Benzodiazepine Screen, Urine 12/29/2022 Negative  Negative Final   • Tricyclic Antidepressants Screen 12/29/2022 Negative  Negative Final   • Methadone Screen, Urine 12/29/2022 Negative  Negative Final   • Barbiturates Screen, Urine 12/29/2022 Negative  Negative Final   • Oxycodone Screen, Urine 12/29/2022 Negative  Negative Final   • Propoxyphene Screen 12/29/2022 Negative  Negative Final   • Buprenorphine, Screen, Urine 12/29/2022 Positive (A)  Negative Final         Assessment & Plan   Diagnoses and all orders for this visit:    1. Opioid type dependence, continuous (Primary)  -     buprenorphine-naloxone (SUBOXONE) 8-2 MG per SL tablet; Place 2 tablets under the tongue Daily.  Dispense: 28 tablet; Refill: 0    2. Medication management  -     [facility] Drug Screen    3. Methamphetamine abuse  -     buPROPion XL (Wellbutrin XL) 150 MG 24 hr tablet; Take 1 tablet by mouth 2 (Two) Times a Day.  Dispense: 60 tablet; Refill: 0        Visit Diagnoses:    ICD-10-CM ICD-9-CM   1. Opioid type dependence, continuous  F11.20 304.01   2. Medication management  Z79.899 V58.69   3. Methamphetamine abuse  F15.10 305.70       PLAN:  Safety: No acute safety concerns  Risk Assessment: Risk of self-harm acutely is low. Risk of self-harm chronically is also low, but could be further elevated in the event of treatment noncompliance and/or AODA.    TREATMENT PLAN/GOALS: Continue supportive psychotherapy efforts and medications as indicated. Treatment and medication options discussed during today's visit. Patient acknowledged and verbally consented to continue with current treatment plan and was educated on the importance of compliance with treatment and follow-up appointments.    MEDICATION ISSUES:  ROSY reviewed as expected.  Discussed medication options and treatment plan of  prescribed medication as well as the risks, benefits, and side effects including potential falls, possible impaired driving and metabolic adversities among others. Patient is agreeable to call the office with any worsening of symptoms or onset of side effects. Patient is agreeable to call 911 or go to the nearest ER should he/she begin having SI/HI. No medication side effects or related complaints today.     MEDS ORDERED DURING VISIT:  New Medications Ordered This Visit   Medications   • buprenorphine-naloxone (SUBOXONE) 8-2 MG per SL tablet     Sig: Place 2 tablets under the tongue Daily.     Dispense:  28 tablet     Refill:  0     NADEAN:[id number]   • buPROPion XL (Wellbutrin XL) 150 MG 24 hr tablet     Sig: Take 1 tablet by mouth 2 (Two) Times a Day.     Dispense:  60 tablet     Refill:  0       No follow-ups on file.           This document has been electronically signed by WALKER Romero  June 8, 2023 15:28 EDT      Part of this note may be an electronic transcription/translation of spoken language to printed text using the Dragon Dictation System.

## 2023-09-06 ENCOUNTER — TELEMEDICINE (OUTPATIENT)
Dept: PSYCHIATRY | Facility: CLINIC | Age: 53
End: 2023-09-06
Payer: MEDICAID

## 2023-09-06 VITALS
DIASTOLIC BLOOD PRESSURE: 78 MMHG | SYSTOLIC BLOOD PRESSURE: 133 MMHG | HEIGHT: 61 IN | BODY MASS INDEX: 32.59 KG/M2 | HEART RATE: 88 BPM | WEIGHT: 172.6 LBS

## 2023-09-06 DIAGNOSIS — F41.1 GENERALIZED ANXIETY DISORDER: ICD-10-CM

## 2023-09-06 DIAGNOSIS — F11.20 OPIOID TYPE DEPENDENCE, CONTINUOUS: Primary | ICD-10-CM

## 2023-09-06 DIAGNOSIS — F15.10 METHAMPHETAMINE ABUSE: ICD-10-CM

## 2023-09-06 DIAGNOSIS — Z79.899 MEDICATION MANAGEMENT: ICD-10-CM

## 2023-09-06 RX ORDER — BUPROPION HYDROCHLORIDE 150 MG/1
150 TABLET ORAL EVERY MORNING
Qty: 30 TABLET | Refills: 0 | Status: SHIPPED | OUTPATIENT
Start: 2023-09-06 | End: 2024-09-05

## 2023-09-06 RX ORDER — BUPRENORPHINE HYDROCHLORIDE AND NALOXONE HYDROCHLORIDE DIHYDRATE 8; 2 MG/1; MG/1
2 TABLET SUBLINGUAL DAILY
Qty: 14 TABLET | Refills: 0 | Status: SHIPPED | OUTPATIENT
Start: 2023-09-06

## 2023-09-06 RX ORDER — HYDROXYZINE PAMOATE 25 MG/1
25 CAPSULE ORAL 4 TIMES DAILY PRN
Qty: 120 CAPSULE | Refills: 0 | Status: SHIPPED | OUTPATIENT
Start: 2023-09-06

## 2023-09-13 ENCOUNTER — TELEMEDICINE (OUTPATIENT)
Dept: PSYCHIATRY | Facility: CLINIC | Age: 53
End: 2023-09-13
Payer: MEDICAID

## 2023-09-13 VITALS
HEART RATE: 79 BPM | DIASTOLIC BLOOD PRESSURE: 83 MMHG | SYSTOLIC BLOOD PRESSURE: 119 MMHG | HEIGHT: 61 IN | WEIGHT: 169.4 LBS | BODY MASS INDEX: 31.98 KG/M2

## 2023-09-13 DIAGNOSIS — Z79.899 MEDICATION MANAGEMENT: ICD-10-CM

## 2023-09-13 DIAGNOSIS — K21.9 GASTROESOPHAGEAL REFLUX DISEASE WITHOUT ESOPHAGITIS: ICD-10-CM

## 2023-09-13 DIAGNOSIS — F11.20 OPIOID TYPE DEPENDENCE, CONTINUOUS: Primary | ICD-10-CM

## 2023-09-13 DIAGNOSIS — R11.14 BILIOUS VOMITING WITH NAUSEA: ICD-10-CM

## 2023-09-13 RX ORDER — BUPRENORPHINE HYDROCHLORIDE AND NALOXONE HYDROCHLORIDE DIHYDRATE 8; 2 MG/1; MG/1
2 TABLET SUBLINGUAL DAILY
Qty: 14 TABLET | Refills: 0 | Status: SHIPPED | OUTPATIENT
Start: 2023-09-13

## 2023-09-13 RX ORDER — ONDANSETRON 8 MG/1
8 TABLET, ORALLY DISINTEGRATING ORAL EVERY 8 HOURS PRN
Qty: 45 TABLET | Refills: 0 | Status: SHIPPED | OUTPATIENT
Start: 2023-09-13

## 2023-09-13 RX ORDER — FAMOTIDINE 20 MG/1
20 TABLET, FILM COATED ORAL 2 TIMES DAILY
Qty: 60 TABLET | Refills: 0 | Status: SHIPPED | OUTPATIENT
Start: 2023-09-13

## 2023-09-13 NOTE — PROGRESS NOTES
This provider is located at [facility]. The Patient is seen remotely located at the [facility] ([facility]) using Video. Patient is being seen via telehealth and confirm that they are in a secure environment for this session. The patient's condition being diagnosed/treated is appropriate for telemedicine. Provider identified as Williams Brody as well as credentials APRN MSN FNP-C SRINIVASAN-YOUNG.   The client/patient gave consent to be seen remotely, and when consent is given they understand that the consent allows for patient identifiable information to be sent to a third party as needed.   They may refuse to be seen remotely at any time. The electronic data is encrypted and password protected, and the patient has been advised of the potential risks to privacy not withstanding such measures.    Chief Complaint/History of Present Illness: Follow Up buprenorphine/naloxone Medicated Assisted Treatment for Opiate Use Disorder     Patient/Client Concerns/Updates: Continue Wellbutrin for stimulant cravings, Vistaril for anxiety  -Patient reports continued work to decrease overall methamphetamine use; patient reports she used methamphetamine twice over the course of 1 day only this past week; commend patient for reduced use and encouraged continued progress  -Patient reports significant trigger for use his distress as it relates to her ex- of 31 years; patient reports continued physical intimacy with her ex- and patient has just found out her ex- has been having other sexual partners; will order tests or an STD panel as patient is concerned about sexual transmitted infections patient reports no symptoms as it relates to STDs at this time  -Discussed the difficult emotions that are associated with her ex- and the benefit of self care as it relates to not resisting or judging the emotion she is experiencing  -Denies suicidal or homicidal thoughts    Triggers  (Persons/Places/Things/Events/Thought/Emotions): Ex-    Cravings/Substance Use: Cravings for methamphetamine and used twice over 1 day this past week    Relapse Prevention: Counseling and continue weekly medication assisted treatment evaluations    Urine Drug Screen (today's visit) discussed: Positive buprenorphine positive amphetamine THC and methamphetamine    UDS Confirmation (Most recent/Resulted): Positive buprenorphine/nor-buprenorphine, no concerns for urine tampering otherwise positive methamphetamine and THC; low buprenorphine and norbuprenorphine levels are concerning for suboptimal dosing techniques    Most recent pertinent laboratory studies reviewed: 4/11/23-hepatic function within normal limits, Cr WNL, HIV negative, hepatitis C not detected       ROSY (PDMP) Reviewed for Current/Active Medications: buprenorphine/naloxone as reviewed today    Past Surgical History:  No past surgical history on file.    Problem List:  There is no problem list on file for this patient.      Allergy:   No Known Allergies     Current Medications:   Current Outpatient Medications   Medication Sig Dispense Refill    buprenorphine-naloxone (SUBOXONE) 8-2 MG per SL tablet Place 2 tablets under the tongue Daily. 14 tablet 0    buPROPion XL (Wellbutrin XL) 150 MG 24 hr tablet Take 1 tablet by mouth Every Morning. 30 tablet 0    famotidine (PEPCID) 20 MG tablet Take 1 tablet by mouth 2 (Two) Times a Day. 60 tablet 0    GNP PAIN RELIEF EX-STRENGTH 500 MG tablet TAKE ONE TABLET BY MOUTH EVERY 6 HOURS AS NEEDED FOR 30 DAYS **DO NOT EXCEED 6 TABLETS IN 24 HOURS**      hydrOXYzine pamoate (Vistaril) 25 MG capsule Take 1 capsule by mouth 4 (Four) Times a Day As Needed for Anxiety. 120 capsule 0    ondansetron ODT (ZOFRAN-ODT) 8 MG disintegrating tablet Place 1 tablet on the tongue Every 8 (Eight) Hours As Needed for Nausea or Vomiting. 45 tablet 0    naloxone (NARCAN) 4 MG/0.1ML nasal spray 1 spray into the nostril(s) as  directed by provider As Needed (opiate over sedation). 1 spray in 1 nostril every 2 to 3 minutes, call 911 (Patient not taking: Reported on 9/6/2023) 2 each 2     No current facility-administered medications for this visit.       Past Medical History:  Past Medical History:   Diagnosis Date    History of hepatitis B     Pancreatitis          Social History     Socioeconomic History    Marital status: Single   Tobacco Use    Smoking status: Every Day     Packs/day: 1.00     Years: 15.00     Pack years: 15.00     Types: Cigarettes    Smokeless tobacco: Never   Vaping Use    Vaping Use: Never used   Substance and Sexual Activity    Alcohol use: Not Currently    Drug use: Yes     Types: Marijuana, Hydrocodone, Oxycodone, Methamphetamines    Sexual activity: Defer         Family History   Problem Relation Age of Onset    No Known Problems Mother     No Known Problems Father     No Known Problems Sister     No Known Problems Brother     No Known Problems Maternal Aunt     No Known Problems Paternal Aunt     No Known Problems Maternal Uncle     No Known Problems Paternal Uncle     No Known Problems Maternal Grandfather     No Known Problems Maternal Grandmother     No Known Problems Paternal Grandfather     No Known Problems Paternal Grandmother     No Known Problems Cousin     No Known Problems Other     ADD / ADHD Neg Hx     Alcohol abuse Neg Hx     Anxiety disorder Neg Hx     Bipolar disorder Neg Hx     Dementia Neg Hx     Depression Neg Hx     Drug abuse Neg Hx     OCD Neg Hx     Paranoid behavior Neg Hx     Schizophrenia Neg Hx     Seizures Neg Hx     Self-Injurious Behavior  Neg Hx     Suicide Attempts Neg Hx          Mental Status Exam:   Hygiene:   good  Cooperation:  Cooperative  Eye Contact:  Good  Psychomotor Behavior:  Appropriate  Affect:  Appropriate  Mood: sad and depressed  Speech:  Normal  Thought Process:  Goal directed  Thought Content:  Normal  Suicidal:  None  Homicidal:  None  Hallucinations:   "None  Delusion:  None  Memory:  Intact  Orientation:  Grossly intact  Reliability:  good  Insight:  Fair  Judgement:  Fair  Impulse Control:  Fair         Review of Systems:  Review of Systems   Constitutional:  Negative for activity change, chills, diaphoresis and fatigue.   Respiratory:  Negative for apnea, cough and shortness of breath.    Cardiovascular:  Negative for chest pain, palpitations and leg swelling.   Gastrointestinal:  Negative for abdominal pain, constipation, diarrhea, nausea and vomiting.   Genitourinary:  Negative for difficulty urinating.   Musculoskeletal:  Negative for arthralgias.   Skin:  Negative for rash.   Neurological:  Negative for dizziness, weakness and headaches.   Psychiatric/Behavioral:  Positive for dysphoric mood. Negative for agitation, self-injury, sleep disturbance and suicidal ideas. The patient is nervous/anxious.        Physical Exam:  Physical Exam  Vitals reviewed.   Constitutional:       General: She is not in acute distress.     Appearance: Normal appearance. She is not ill-appearing or toxic-appearing.   Pulmonary:      Effort: Pulmonary effort is normal.   Musculoskeletal:         General: Normal range of motion.   Neurological:      General: No focal deficit present.      Mental Status: She is alert and oriented to person, place, and time.   Psychiatric:         Attention and Perception: Attention and perception normal.         Mood and Affect: Mood normal. Mood is not anxious or depressed.         Speech: Speech normal.         Behavior: Behavior normal. Behavior is cooperative.         Thought Content: Thought content normal.         Cognition and Memory: Cognition and memory normal.         Judgment: Judgment normal.     Vital Signs:   /83   Pulse 79   Ht 156.2 cm (61.5\")   Wt 76.8 kg (169 lb 6.4 oz)   BMI 31.49 kg/m²      Lab Results:   Telemedicine on 09/13/2023   Component Date Value Ref Range Status    External Amphetamine Screen Urine 09/13/2023 " Positive (A)   Final    External Benzodiazepine Screen Uri* 09/13/2023 Negative   Final    External Cocaine Screen Urine 09/13/2023 Negative   Final    External THC Screen Urine 09/13/2023 Positive (A)   Final    External Methadone Screen Urine 09/13/2023 Negative   Final    External Methamphetamine Screen Ur* 09/13/2023 Positive (A)   Final    External Oxycodone Screen Urine 09/13/2023 Negative   Final    External Buprenorphine Screen Urine 09/13/2023 Positive (A)   Final    External MDMA 09/13/2023 Positive (A)   Final    External Opiates Screen Urine 09/13/2023 Negative   Final   Telemedicine on 09/06/2023   Component Date Value Ref Range Status    External Amphetamine Screen Urine 09/06/2023 Positive (A)   Final    External Benzodiazepine Screen Uri* 09/06/2023 Negative   Final    External Cocaine Screen Urine 09/06/2023 Negative   Final    External THC Screen Urine 09/06/2023 Positive (A)   Final    External Methadone Screen Urine 09/06/2023 Negative   Final    External Methamphetamine Screen Ur* 09/06/2023 Positive (A)   Final    External Oxycodone Screen Urine 09/06/2023 Negative   Final    External Buprenorphine Screen Urine 09/06/2023 Positive (A)   Final    External MDMA 09/06/2023 Negative   Final    External Opiates Screen Urine 09/06/2023 Negative   Final   Telemedicine on 06/22/2023   Component Date Value Ref Range Status    External Amphetamine Screen Urine 06/22/2023 Positive (A)   Final    External Benzodiazepine Screen Uri* 06/22/2023 Negative   Final    External Cocaine Screen Urine 06/22/2023 Negative   Final    External THC Screen Urine 06/22/2023 Positive (A)   Final    External Methadone Screen Urine 06/22/2023 Negative   Final    External Methamphetamine Screen Ur* 06/22/2023 Positive (A)   Final    External Oxycodone Screen Urine 06/22/2023 Negative   Final    External Buprenorphine Screen Urine 06/22/2023 Positive (A)   Final    External MDMA 06/22/2023 Negative   Final    External Opiates  Screen Urine 06/22/2023 Negative   Final   Telemedicine on 06/08/2023   Component Date Value Ref Range Status    External Amphetamine Screen Urine 06/08/2023 Negative   Final    External Benzodiazepine Screen Uri* 06/08/2023 Negative   Final    External Cocaine Screen Urine 06/08/2023 Negative   Final    External THC Screen Urine 06/08/2023 Positive (A)   Final    External Methadone Screen Urine 06/08/2023 Negative   Final    External Methamphetamine Screen Ur* 06/08/2023 Positive (A)   Final    External Oxycodone Screen Urine 06/08/2023 Negative   Final    External Buprenorphine Screen Urine 06/08/2023 Positive (A)   Final    External MDMA 06/08/2023 Negative   Final    External Opiates Screen Urine 06/08/2023 Negative   Final   Telemedicine on 05/18/2023   Component Date Value Ref Range Status    External Amphetamine Screen Urine 05/18/2023 Positive (A)   Final    External Benzodiazepine Screen Uri* 05/18/2023 Negative   Final    External Cocaine Screen Urine 05/18/2023 Negative   Final    External THC Screen Urine 05/18/2023 Positive (A)   Final    External Methadone Screen Urine 05/18/2023 Negative   Final    External Methamphetamine Screen Ur* 05/18/2023 Positive (A)   Final    External Oxycodone Screen Urine 05/18/2023 Negative   Final    External Buprenorphine Screen Urine 05/18/2023 Positive (A)   Final    External MDMA 05/18/2023 Positive (A)   Final    External Opiates Screen Urine 05/18/2023 Negative   Final   Telemedicine on 05/02/2023   Component Date Value Ref Range Status    External Amphetamine Screen Urine 05/02/2023 Positive (A)   Final    External Benzodiazepine Screen Uri* 05/02/2023 Negative   Final    External Cocaine Screen Urine 05/02/2023 Negative   Final    External THC Screen Urine 05/02/2023 Positive (A)   Final    External Methadone Screen Urine 05/02/2023 Negative   Final    External Methamphetamine Screen Ur* 05/02/2023 Positive (A)   Final    External Oxycodone Screen Urine 05/02/2023  Negative   Final    External Buprenorphine Screen Urine 05/02/2023 Positive (A)   Final    External MDMA 05/02/2023 Negative   Final    External Opiates Screen Urine 05/02/2023 Negative   Final   Telemedicine on 04/25/2023   Component Date Value Ref Range Status    External Amphetamine Screen Urine 04/25/2023 Positive (A)   Final    External Benzodiazepine Screen Uri* 04/25/2023 Negative   Final    External Cocaine Screen Urine 04/25/2023 Negative   Final    External THC Screen Urine 04/25/2023 Positive (A)   Final    External Methadone Screen Urine 04/25/2023 Negative   Final    External Methamphetamine Screen Ur* 04/25/2023 Positive (A)   Final    External Oxycodone Screen Urine 04/25/2023 Negative   Final    External Buprenorphine Screen Urine 04/25/2023 Positive (A)   Final    External MDMA 04/25/2023 Negative   Final    External Opiates Screen Urine 04/25/2023 Negative   Final   Telemedicine on 04/18/2023   Component Date Value Ref Range Status    External Amphetamine Screen Urine 04/18/2023 Positive (A)   Final    External Benzodiazepine Screen Uri* 04/18/2023 Negative   Final    External Cocaine Screen Urine 04/18/2023 Negative   Final    External THC Screen Urine 04/18/2023 Positive (A)   Final    External Methadone Screen Urine 04/18/2023 Negative   Final    External Methamphetamine Screen Ur* 04/18/2023 Positive (A)   Final    External Oxycodone Screen Urine 04/18/2023 Negative   Final    External Buprenorphine Screen Urine 04/18/2023 Positive (A)   Final    External MDMA 04/18/2023 Negative   Final    External Opiates Screen Urine 04/18/2023 Negative   Final   Lab on 04/11/2023   Component Date Value Ref Range Status    Glucose 04/11/2023 91  65 - 99 mg/dL Final    BUN 04/11/2023 12  6 - 20 mg/dL Final    Creatinine 04/11/2023 0.78  0.57 - 1.00 mg/dL Final    Sodium 04/11/2023 140  136 - 145 mmol/L Final    Potassium 04/11/2023 4.3  3.5 - 5.2 mmol/L Final    Chloride 04/11/2023 109 (H)  98 - 107 mmol/L  Final    CO2 04/11/2023 23.1  22.0 - 29.0 mmol/L Final    Calcium 04/11/2023 9.3  8.6 - 10.5 mg/dL Final    Total Protein 04/11/2023 6.8  6.0 - 8.5 g/dL Final    Albumin 04/11/2023 4.1  3.5 - 5.2 g/dL Final    ALT (SGPT) 04/11/2023 20  1 - 33 U/L Final    AST (SGOT) 04/11/2023 18  1 - 32 U/L Final    Alkaline Phosphatase 04/11/2023 93  39 - 117 U/L Final    Total Bilirubin 04/11/2023 <0.2  0.0 - 1.2 mg/dL Final    Globulin 04/11/2023 2.7  gm/dL Final    A/G Ratio 04/11/2023 1.5  g/dL Final    BUN/Creatinine Ratio 04/11/2023 15.4  7.0 - 25.0 Final    Anion Gap 04/11/2023 7.9  5.0 - 15.0 mmol/L Final    eGFR 04/11/2023 90.4  >60.0 mL/min/1.73 Final    Hepatitis C Quantitation 04/11/2023 HCV Not Detected  IU/mL Final    HCV log10 04/11/2023 TNP  log10 IU/mL Final    Unable to calculate result since non-numeric result obtained for  component test.    HCV Test Information 04/11/2023 Comment   Final    The quantitative range of this assay is 15 IU/mL to 100 million IU/mL.    HCV Genotype 04/11/2023 TNP   Final    Not indicated    WBC 04/11/2023 8.90  3.40 - 10.80 10*3/mm3 Final    RBC 04/11/2023 4.45  3.77 - 5.28 10*6/mm3 Final    Hemoglobin 04/11/2023 12.3  12.0 - 15.9 g/dL Final    Hematocrit 04/11/2023 37.0  34.0 - 46.6 % Final    MCV 04/11/2023 83.1  79.0 - 97.0 fL Final    MCH 04/11/2023 27.6  26.6 - 33.0 pg Final    MCHC 04/11/2023 33.2  31.5 - 35.7 g/dL Final    RDW 04/11/2023 12.6  12.3 - 15.4 % Final    RDW-SD 04/11/2023 38.1  37.0 - 54.0 fl Final    MPV 04/11/2023 10.9  6.0 - 12.0 fL Final    Platelets 04/11/2023 290  140 - 450 10*3/mm3 Final    Neutrophil % 04/11/2023 46.8  42.7 - 76.0 % Final    Lymphocyte % 04/11/2023 41.6  19.6 - 45.3 % Final    Monocyte % 04/11/2023 7.2  5.0 - 12.0 % Final    Eosinophil % 04/11/2023 3.5  0.3 - 6.2 % Final    Basophil % 04/11/2023 0.7  0.0 - 1.5 % Final    Immature Grans % 04/11/2023 0.2  0.0 - 0.5 % Final    Neutrophils, Absolute 04/11/2023 4.17  1.70 - 7.00 10*3/mm3 Final     Lymphocytes, Absolute 04/11/2023 3.70 (H)  0.70 - 3.10 10*3/mm3 Final    Monocytes, Absolute 04/11/2023 0.64  0.10 - 0.90 10*3/mm3 Final    Eosinophils, Absolute 04/11/2023 0.31  0.00 - 0.40 10*3/mm3 Final    Basophils, Absolute 04/11/2023 0.06  0.00 - 0.20 10*3/mm3 Final    Immature Grans, Absolute 04/11/2023 0.02  0.00 - 0.05 10*3/mm3 Final    nRBC 04/11/2023 0.0  0.0 - 0.2 /100 WBC Final   Telemedicine on 04/11/2023   Component Date Value Ref Range Status    External Amphetamine Screen Urine 04/11/2023 Positive (A)   Final    External Benzodiazepine Screen Uri* 04/11/2023 Negative   Final    External Cocaine Screen Urine 04/11/2023 Negative   Final    External THC Screen Urine 04/11/2023 Positive (A)   Final    External Methadone Screen Urine 04/11/2023 Negative   Final    External Methamphetamine Screen Ur* 04/11/2023 Positive (A)   Final    External Oxycodone Screen Urine 04/11/2023 Negative   Final    External Buprenorphine Screen Urine 04/11/2023 Positive (A)   Final    External MDMA 04/11/2023 Positive (A)   Final    External Opiates Screen Urine 04/11/2023 Negative   Final    HIV-1/ HIV-2 04/11/2023 Non-Reactive  Non-Reactive Final    A non-reactive test result does not preclude the possibility of exposure to HIV or infection with HIV. An antibody response to recent exposure may take several months to reach detectable levels.   There may be more visits with results that are not included.         Assessment & Plan   Diagnoses and all orders for this visit:    1. Opioid type dependence, continuous (Primary)  -     buprenorphine-naloxone (SUBOXONE) 8-2 MG per SL tablet; Place 2 tablets under the tongue Daily.  Dispense: 14 tablet; Refill: 0    2. Medication management  -     [facility] Drug Screen  -     Hepatic Function Panel  -     HIV-1 / O / 2 Ag / Antibody  -     STI/STD PANEL SWAB ([facility]AB[facility]) - Swab, Vagina; Future  -     STI/STD PANEL URINE ([facility]ABS) - Urine, Urine, Clean Catch; Future  -     RPR;  Future    3. Bilious vomiting with nausea  -     ondansetron ODT (ZOFRAN-ODT) 8 MG disintegrating tablet; Place 1 tablet on the tongue Every 8 (Eight) Hours As Needed for Nausea or Vomiting.  Dispense: 45 tablet; Refill: 0    4. Gastroesophageal reflux disease without esophagitis  -     famotidine (PEPCID) 20 MG tablet; Take 1 tablet by mouth 2 (Two) Times a Day.  Dispense: 60 tablet; Refill: 0        Visit Diagnoses:    ICD-10-CM ICD-9-CM   1. Opioid type dependence, continuous  F11.20 304.01   2. Medication management  Z79.899 V58.69   3. Bilious vomiting with nausea  R11.14 787.04   4. Gastroesophageal reflux disease without esophagitis  K21.9 530.81       PLAN:  Safety: No acute safety concerns  Risk Assessment: Risk of self-harm acutely is low. Risk of self-harm chronically is also low, but could be further elevated in the event of treatment noncompliance and/or AODA.    TREATMENT PLAN/GOALS: Continue supportive psychotherapy efforts and medications as indicated. Treatment and medication options discussed during today's visit. Patient acknowledged and verbally consented to continue with current treatment plan and was educated on the importance of compliance with treatment and follow-up appointments.    MEDICATION ISSUES:  ROSY reviewed as expected.  Discussed medication options and treatment plan of prescribed medication as well as the risks, benefits, and side effects including potential falls, possible impaired driving and metabolic adversities among others. Patient is agreeable to call the office with any worsening of symptoms or onset of side effects. Patient is agreeable to call 911 or go to the nearest ER should he/she begin having SI/HI. No medication side effects or related complaints today.     MEDS ORDERED DURING VISIT:  New Medications Ordered This Visit   Medications    buprenorphine-naloxone (SUBOXONE) 8-2 MG per SL tablet     Sig: Place 2 tablets under the tongue Daily.     Dispense:  14 tablet      Refill:  0     NADEAN:[id number]    ondansetron ODT (ZOFRAN-ODT) 8 MG disintegrating tablet     Sig: Place 1 tablet on the tongue Every 8 (Eight) Hours As Needed for Nausea or Vomiting.     Dispense:  45 tablet     Refill:  0    famotidine (PEPCID) 20 MG tablet     Sig: Take 1 tablet by mouth 2 (Two) Times a Day.     Dispense:  60 tablet     Refill:  0       No follow-ups on file.           This document has been electronically signed by WALKER Romero  September 13, 2023 10:49 EDT      Part of this note may be an electronic transcription/translation of spoken language to printed text using the Dragon Dictation System.

## 2023-09-20 ENCOUNTER — TELEMEDICINE (OUTPATIENT)
Dept: PSYCHIATRY | Facility: CLINIC | Age: 53
End: 2023-09-20
Payer: MEDICAID

## 2023-09-20 VITALS
WEIGHT: 171 LBS | HEART RATE: 83 BPM | HEIGHT: 61 IN | DIASTOLIC BLOOD PRESSURE: 72 MMHG | SYSTOLIC BLOOD PRESSURE: 118 MMHG | BODY MASS INDEX: 32.28 KG/M2

## 2023-09-20 DIAGNOSIS — F11.20 OPIOID TYPE DEPENDENCE, CONTINUOUS: Primary | ICD-10-CM

## 2023-09-20 DIAGNOSIS — Z79.899 MEDICATION MANAGEMENT: ICD-10-CM

## 2023-09-20 RX ORDER — BUPRENORPHINE HYDROCHLORIDE AND NALOXONE HYDROCHLORIDE DIHYDRATE 8; 2 MG/1; MG/1
2 TABLET SUBLINGUAL DAILY
Qty: 28 TABLET | Refills: 0 | Status: SHIPPED | OUTPATIENT
Start: 2023-09-20

## 2023-09-20 NOTE — PROGRESS NOTES
This provider is located at [facility]. The Patient is seen remotely located at the [facility] ([facility]) using Video. Patient is being seen via telehealth and confirm that they are in a secure environment for this session. The patient's condition being diagnosed/treated is appropriate for telemedicine. Provider identified as Williams Brody as well as credentials APRN MSN FNP-C SRINIVASAN-YOUNG.   The client/patient gave consent to be seen remotely, and when consent is given they understand that the consent allows for patient identifiable information to be sent to a third party as needed.   They may refuse to be seen remotely at any time. The electronic data is encrypted and password protected, and the patient has been advised of the potential risks to privacy not withstanding such measures.    Chief Complaint/History of Present Illness: Follow Up buprenorphine/naloxone Medicated Assisted Treatment for Opiate Use Disorder     Patient/Client Concerns/Updates: Continue Wellbutrin for stimulant cravings, Vistaril for anxiety   -Patient continues to make progress decreasing overall methamphetamine use throughout the week; 2 weeks ago patient used 1 day however twice during that day and this past week patient used only once over 1 day  -Patient reports she has a very positive support group most notably her mother and also has reestablished active participation in S[employer] and is working to rekindle her Faith boris  -We will accommodate 2-week prescription today as patient has acquired a job as a caregiver  -Patient reports her mood is overall stable and denies depressive or anxious exacerbations, no suicidal or homicidal thoughts    Triggers (Persons/Places/Things/Events/Thought/Emotions): Life stress and grief    Cravings/Substance Use: Reduce cravings for methamphetamine, used once this week    Relapse Prevention: Counseling    Urine Drug Screen (today's visit) discussed: Positive buprenorphine positive  amphetamine methamphetamine and THC    UDS Confirmation (Most recent/Resulted): Positive buprenorphine/nor-buprenorphine, no concerns for urine tampering otherwise positive methamphetamine and THC    Most recent pertinent laboratory studies reviewed: 4/11/23-hepatic function within normal limits, Cr WNL, HIV negative, hepatitis C not detected        ROSY (PDMP) Reviewed for Current/Active Medications: buprenorphine/naloxone as reviewed today    Past Surgical History:  No past surgical history on file.    Problem List:  There is no problem list on file for this patient.      Allergy:   No Known Allergies     Current Medications:   Current Outpatient Medications   Medication Sig Dispense Refill   • buPROPion XL (Wellbutrin XL) 150 MG 24 hr tablet Take 1 tablet by mouth Every Morning. 30 tablet 0   • famotidine (PEPCID) 20 MG tablet Take 1 tablet by mouth 2 (Two) Times a Day. 60 tablet 0   • GNP PAIN RELIEF EX-STRENGTH 500 MG tablet TAKE ONE TABLET BY MOUTH EVERY 6 HOURS AS NEEDED FOR 30 DAYS **DO NOT EXCEED 6 TABLETS IN 24 HOURS**     • hydrOXYzine pamoate (Vistaril) 25 MG capsule Take 1 capsule by mouth 4 (Four) Times a Day As Needed for Anxiety. 120 capsule 0   • ondansetron ODT (ZOFRAN-ODT) 8 MG disintegrating tablet Place 1 tablet on the tongue Every 8 (Eight) Hours As Needed for Nausea or Vomiting. 45 tablet 0   • buprenorphine-naloxone (SUBOXONE) 8-2 MG per SL tablet Place 2 tablets under the tongue Daily. 28 tablet 0   • naloxone (NARCAN) 4 MG/0.1ML nasal spray 1 spray into the nostril(s) as directed by provider As Needed (opiate over sedation). 1 spray in 1 nostril every 2 to 3 minutes, call 911 (Patient not taking: Reported on 9/20/2023) 2 each 2     No current facility-administered medications for this visit.       Past Medical History:  Past Medical History:   Diagnosis Date   • History of hepatitis B    • Pancreatitis          Social History     Socioeconomic History   • Marital status: Single   Tobacco  Use   • Smoking status: Every Day     Packs/day: 1.00     Years: 15.00     Pack years: 15.00     Types: Cigarettes   • Smokeless tobacco: Never   Vaping Use   • Vaping Use: Never used   Substance and Sexual Activity   • Alcohol use: Not Currently   • Drug use: Yes     Types: Marijuana, Hydrocodone, Oxycodone, Methamphetamines   • Sexual activity: Defer         Family History   Problem Relation Age of Onset   • No Known Problems Mother    • No Known Problems Father    • No Known Problems Sister    • No Known Problems Brother    • No Known Problems Maternal Aunt    • No Known Problems Paternal Aunt    • No Known Problems Maternal Uncle    • No Known Problems Paternal Uncle    • No Known Problems Maternal Grandfather    • No Known Problems Maternal Grandmother    • No Known Problems Paternal Grandfather    • No Known Problems Paternal Grandmother    • No Known Problems Cousin    • No Known Problems Other    • ADD / ADHD Neg Hx    • Alcohol abuse Neg Hx    • Anxiety disorder Neg Hx    • Bipolar disorder Neg Hx    • Dementia Neg Hx    • Depression Neg Hx    • Drug abuse Neg Hx    • OCD Neg Hx    • Paranoid behavior Neg Hx    • Schizophrenia Neg Hx    • Seizures Neg Hx    • Self-Injurious Behavior  Neg Hx    • Suicide Attempts Neg Hx          Mental Status Exam:   Hygiene:   good  Cooperation:  Cooperative  Eye Contact:  Good  Psychomotor Behavior:  Appropriate  Affect:  Appropriate  Mood: normal  Speech:  Normal  Thought Process:  Goal directed  Thought Content:  Normal  Suicidal:  None  Homicidal:  None  Hallucinations:  None  Delusion:  None  Memory:  Intact  Orientation:  Grossly intact  Reliability:  good  Insight:  Good  Judgement:  Good  Impulse Control:  Good         Review of Systems:  Review of Systems   Constitutional:  Negative for activity change, chills, diaphoresis and fatigue.   Respiratory:  Negative for apnea, cough and shortness of breath.    Cardiovascular:  Negative for chest pain, palpitations and  "leg swelling.   Gastrointestinal:  Negative for abdominal pain, constipation, diarrhea, nausea and vomiting.   Genitourinary:  Negative for difficulty urinating.   Musculoskeletal:  Negative for arthralgias.   Skin:  Negative for rash.   Neurological:  Negative for dizziness, weakness and headaches.   Psychiatric/Behavioral:  Negative for agitation, self-injury, sleep disturbance and suicidal ideas. The patient is not nervous/anxious.        Physical Exam:  Physical Exam  Vitals reviewed.   Constitutional:       General: She is not in acute distress.     Appearance: Normal appearance. She is not ill-appearing or toxic-appearing.   Pulmonary:      Effort: Pulmonary effort is normal.   Musculoskeletal:         General: Normal range of motion.   Neurological:      General: No focal deficit present.      Mental Status: She is alert and oriented to person, place, and time.   Psychiatric:         Attention and Perception: Attention and perception normal.         Mood and Affect: Mood normal. Mood is not anxious or depressed.         Speech: Speech normal.         Behavior: Behavior normal. Behavior is cooperative.         Thought Content: Thought content normal.         Cognition and Memory: Cognition and memory normal.         Judgment: Judgment normal.     Vital Signs:   /72   Pulse 83   Ht 156.2 cm (61.5\")   Wt 77.6 kg (171 lb)   BMI 31.79 kg/m²      Lab Results:   Telemedicine on 09/20/2023   Component Date Value Ref Range Status   • External Amphetamine Screen Urine 09/20/2023 Positive (A)   Final   • External Benzodiazepine Screen Uri* 09/20/2023 Negative   Final   • External Cocaine Screen Urine 09/20/2023 Negative   Final   • External THC Screen Urine 09/20/2023 Positive (A)   Final   • External Methadone Screen Urine 09/20/2023 Negative   Final   • External Methamphetamine Screen Ur* 09/20/2023 Positive (A)   Final   • External Oxycodone Screen Urine 09/20/2023 Negative   Final   • External " Buprenorphine Screen Urine 09/20/2023 Positive (A)   Final   • External MDMA 09/20/2023 Positive (A)   Final   • External Opiates Screen Urine 09/20/2023 Negative   Final   Telemedicine on 09/13/2023   Component Date Value Ref Range Status   • External Amphetamine Screen Urine 09/13/2023 Positive (A)   Final   • External Benzodiazepine Screen Uri* 09/13/2023 Negative   Final   • External Cocaine Screen Urine 09/13/2023 Negative   Final   • External THC Screen Urine 09/13/2023 Positive (A)   Final   • External Methadone Screen Urine 09/13/2023 Negative   Final   • External Methamphetamine Screen Ur* 09/13/2023 Positive (A)   Final   • External Oxycodone Screen Urine 09/13/2023 Negative   Final   • External Buprenorphine Screen Urine 09/13/2023 Positive (A)   Final   • External MDMA 09/13/2023 Positive (A)   Final   • External Opiates Screen Urine 09/13/2023 Negative   Final   Telemedicine on 09/06/2023   Component Date Value Ref Range Status   • External Amphetamine Screen Urine 09/06/2023 Positive (A)   Final   • External Benzodiazepine Screen Uri* 09/06/2023 Negative   Final   • External Cocaine Screen Urine 09/06/2023 Negative   Final   • External THC Screen Urine 09/06/2023 Positive (A)   Final   • External Methadone Screen Urine 09/06/2023 Negative   Final   • External Methamphetamine Screen Ur* 09/06/2023 Positive (A)   Final   • External Oxycodone Screen Urine 09/06/2023 Negative   Final   • External Buprenorphine Screen Urine 09/06/2023 Positive (A)   Final   • External MDMA 09/06/2023 Negative   Final   • External Opiates Screen Urine 09/06/2023 Negative   Final   Telemedicine on 06/22/2023   Component Date Value Ref Range Status   • External Amphetamine Screen Urine 06/22/2023 Positive (A)   Final   • External Benzodiazepine Screen Uri* 06/22/2023 Negative   Final   • External Cocaine Screen Urine 06/22/2023 Negative   Final   • External THC Screen Urine 06/22/2023 Positive (A)   Final   • External  Methadone Screen Urine 06/22/2023 Negative   Final   • External Methamphetamine Screen Ur* 06/22/2023 Positive (A)   Final   • External Oxycodone Screen Urine 06/22/2023 Negative   Final   • External Buprenorphine Screen Urine 06/22/2023 Positive (A)   Final   • External MDMA 06/22/2023 Negative   Final   • External Opiates Screen Urine 06/22/2023 Negative   Final   Telemedicine on 06/08/2023   Component Date Value Ref Range Status   • External Amphetamine Screen Urine 06/08/2023 Negative   Final   • External Benzodiazepine Screen Uri* 06/08/2023 Negative   Final   • External Cocaine Screen Urine 06/08/2023 Negative   Final   • External THC Screen Urine 06/08/2023 Positive (A)   Final   • External Methadone Screen Urine 06/08/2023 Negative   Final   • External Methamphetamine Screen Ur* 06/08/2023 Positive (A)   Final   • External Oxycodone Screen Urine 06/08/2023 Negative   Final   • External Buprenorphine Screen Urine 06/08/2023 Positive (A)   Final   • External MDMA 06/08/2023 Negative   Final   • External Opiates Screen Urine 06/08/2023 Negative   Final   Telemedicine on 05/18/2023   Component Date Value Ref Range Status   • External Amphetamine Screen Urine 05/18/2023 Positive (A)   Final   • External Benzodiazepine Screen Uri* 05/18/2023 Negative   Final   • External Cocaine Screen Urine 05/18/2023 Negative   Final   • External THC Screen Urine 05/18/2023 Positive (A)   Final   • External Methadone Screen Urine 05/18/2023 Negative   Final   • External Methamphetamine Screen Ur* 05/18/2023 Positive (A)   Final   • External Oxycodone Screen Urine 05/18/2023 Negative   Final   • External Buprenorphine Screen Urine 05/18/2023 Positive (A)   Final   • External MDMA 05/18/2023 Positive (A)   Final   • External Opiates Screen Urine 05/18/2023 Negative   Final   Telemedicine on 05/02/2023   Component Date Value Ref Range Status   • External Amphetamine Screen Urine 05/02/2023 Positive (A)   Final   • External  Benzodiazepine Screen Uri* 05/02/2023 Negative   Final   • External Cocaine Screen Urine 05/02/2023 Negative   Final   • External THC Screen Urine 05/02/2023 Positive (A)   Final   • External Methadone Screen Urine 05/02/2023 Negative   Final   • External Methamphetamine Screen Ur* 05/02/2023 Positive (A)   Final   • External Oxycodone Screen Urine 05/02/2023 Negative   Final   • External Buprenorphine Screen Urine 05/02/2023 Positive (A)   Final   • External MDMA 05/02/2023 Negative   Final   • External Opiates Screen Urine 05/02/2023 Negative   Final   Telemedicine on 04/25/2023   Component Date Value Ref Range Status   • External Amphetamine Screen Urine 04/25/2023 Positive (A)   Final   • External Benzodiazepine Screen Uri* 04/25/2023 Negative   Final   • External Cocaine Screen Urine 04/25/2023 Negative   Final   • External THC Screen Urine 04/25/2023 Positive (A)   Final   • External Methadone Screen Urine 04/25/2023 Negative   Final   • External Methamphetamine Screen Ur* 04/25/2023 Positive (A)   Final   • External Oxycodone Screen Urine 04/25/2023 Negative   Final   • External Buprenorphine Screen Urine 04/25/2023 Positive (A)   Final   • External MDMA 04/25/2023 Negative   Final   • External Opiates Screen Urine 04/25/2023 Negative   Final   Telemedicine on 04/18/2023   Component Date Value Ref Range Status   • External Amphetamine Screen Urine 04/18/2023 Positive (A)   Final   • External Benzodiazepine Screen Uri* 04/18/2023 Negative   Final   • External Cocaine Screen Urine 04/18/2023 Negative   Final   • External THC Screen Urine 04/18/2023 Positive (A)   Final   • External Methadone Screen Urine 04/18/2023 Negative   Final   • External Methamphetamine Screen Ur* 04/18/2023 Positive (A)   Final   • External Oxycodone Screen Urine 04/18/2023 Negative   Final   • External Buprenorphine Screen Urine 04/18/2023 Positive (A)   Final   • External MDMA 04/18/2023 Negative   Final   • External Opiates Screen  Urine 04/18/2023 Negative   Final   Lab on 04/11/2023   Component Date Value Ref Range Status   • Glucose 04/11/2023 91  65 - 99 mg/dL Final   • BUN 04/11/2023 12  6 - 20 mg/dL Final   • Creatinine 04/11/2023 0.78  0.57 - 1.00 mg/dL Final   • Sodium 04/11/2023 140  136 - 145 mmol/L Final   • Potassium 04/11/2023 4.3  3.5 - 5.2 mmol/L Final   • Chloride 04/11/2023 109 (H)  98 - 107 mmol/L Final   • CO2 04/11/2023 23.1  22.0 - 29.0 mmol/L Final   • Calcium 04/11/2023 9.3  8.6 - 10.5 mg/dL Final   • Total Protein 04/11/2023 6.8  6.0 - 8.5 g/dL Final   • Albumin 04/11/2023 4.1  3.5 - 5.2 g/dL Final   • ALT (SGPT) 04/11/2023 20  1 - 33 U/L Final   • AST (SGOT) 04/11/2023 18  1 - 32 U/L Final   • Alkaline Phosphatase 04/11/2023 93  39 - 117 U/L Final   • Total Bilirubin 04/11/2023 <0.2  0.0 - 1.2 mg/dL Final   • Globulin 04/11/2023 2.7  gm/dL Final   • A/G Ratio 04/11/2023 1.5  g/dL Final   • BUN/Creatinine Ratio 04/11/2023 15.4  7.0 - 25.0 Final   • Anion Gap 04/11/2023 7.9  5.0 - 15.0 mmol/L Final   • eGFR 04/11/2023 90.4  >60.0 mL/min/1.73 Final   • Hepatitis C Quantitation 04/11/2023 HCV Not Detected  IU/mL Final   • HCV log10 04/11/2023 TNP  log10 IU/mL Final    Unable to calculate result since non-numeric result obtained for  component test.   • HCV Test Information 04/11/2023 Comment   Final    The quantitative range of this assay is 15 IU/mL to 100 million IU/mL.   • HCV Genotype 04/11/2023 TNP   Final    Not indicated   • WBC 04/11/2023 8.90  3.40 - 10.80 10*3/mm3 Final   • RBC 04/11/2023 4.45  3.77 - 5.28 10*6/mm3 Final   • Hemoglobin 04/11/2023 12.3  12.0 - 15.9 g/dL Final   • Hematocrit 04/11/2023 37.0  34.0 - 46.6 % Final   • MCV 04/11/2023 83.1  79.0 - 97.0 fL Final   • MCH 04/11/2023 27.6  26.6 - 33.0 pg Final   • MCHC 04/11/2023 33.2  31.5 - 35.7 g/dL Final   • RDW 04/11/2023 12.6  12.3 - 15.4 % Final   • RDW-SD 04/11/2023 38.1  37.0 - 54.0 fl Final   • MPV 04/11/2023 10.9  6.0 - 12.0 fL Final   •  Platelets 04/11/2023 290  140 - 450 10*3/mm3 Final   • Neutrophil % 04/11/2023 46.8  42.7 - 76.0 % Final   • Lymphocyte % 04/11/2023 41.6  19.6 - 45.3 % Final   • Monocyte % 04/11/2023 7.2  5.0 - 12.0 % Final   • Eosinophil % 04/11/2023 3.5  0.3 - 6.2 % Final   • Basophil % 04/11/2023 0.7  0.0 - 1.5 % Final   • Immature Grans % 04/11/2023 0.2  0.0 - 0.5 % Final   • Neutrophils, Absolute 04/11/2023 4.17  1.70 - 7.00 10*3/mm3 Final   • Lymphocytes, Absolute 04/11/2023 3.70 (H)  0.70 - 3.10 10*3/mm3 Final   • Monocytes, Absolute 04/11/2023 0.64  0.10 - 0.90 10*3/mm3 Final   • Eosinophils, Absolute 04/11/2023 0.31  0.00 - 0.40 10*3/mm3 Final   • Basophils, Absolute 04/11/2023 0.06  0.00 - 0.20 10*3/mm3 Final   • Immature Grans, Absolute 04/11/2023 0.02  0.00 - 0.05 10*3/mm3 Final   • nRBC 04/11/2023 0.0  0.0 - 0.2 /100 WBC Final   There may be more visits with results that are not included.         Assessment & Plan   Diagnoses and all orders for this visit:    1. Opioid type dependence, continuous (Primary)  -     buprenorphine-naloxone (SUBOXONE) 8-2 MG per SL tablet; Place 2 tablets under the tongue Daily.  Dispense: 28 tablet; Refill: 0    2. Medication management  -     [facility] Drug Screen        Visit Diagnoses:    ICD-10-CM ICD-9-CM   1. Opioid type dependence, continuous  F11.20 304.01   2. Medication management  Z79.899 V58.69       PLAN:  Safety: No acute safety concerns  Risk Assessment: Risk of self-harm acutely is low. Risk of self-harm chronically is also low, but could be further elevated in the event of treatment noncompliance and/or AODA.    TREATMENT PLAN/GOALS: Continue supportive psychotherapy efforts and medications as indicated. Treatment and medication options discussed during today's visit. Patient acknowledged and verbally consented to continue with current treatment plan and was educated on the importance of compliance with treatment and follow-up appointments.    MEDICATION ISSUES:  ROSY  reviewed as expected.  Discussed medication options and treatment plan of prescribed medication as well as the risks, benefits, and side effects including potential falls, possible impaired driving and metabolic adversities among others. Patient is agreeable to call the office with any worsening of symptoms or onset of side effects. Patient is agreeable to call 911 or go to the nearest ER should he/she begin having SI/HI. No medication side effects or related complaints today.     MEDS ORDERED DURING VISIT:  New Medications Ordered This Visit   Medications   • buprenorphine-naloxone (SUBOXONE) 8-2 MG per SL tablet     Sig: Place 2 tablets under the tongue Daily.     Dispense:  28 tablet     Refill:  0     NADEAN:[id number]       No follow-ups on file.           This document has been electronically signed by WALKER Romero  September 20, 2023 11:28 EDT      Part of this note may be an electronic transcription/translation of spoken language to printed text using the Dragon Dictation System.

## 2023-10-05 ENCOUNTER — TELEMEDICINE (OUTPATIENT)
Dept: PSYCHIATRY | Facility: CLINIC | Age: 53
End: 2023-10-05
Payer: MEDICAID

## 2023-10-05 VITALS
SYSTOLIC BLOOD PRESSURE: 125 MMHG | BODY MASS INDEX: 32.28 KG/M2 | HEART RATE: 75 BPM | WEIGHT: 171 LBS | HEIGHT: 61 IN | DIASTOLIC BLOOD PRESSURE: 74 MMHG

## 2023-10-05 DIAGNOSIS — Z79.899 MEDICATION MANAGEMENT: ICD-10-CM

## 2023-10-05 DIAGNOSIS — F15.10 METHAMPHETAMINE ABUSE: ICD-10-CM

## 2023-10-05 DIAGNOSIS — F11.20 OPIOID TYPE DEPENDENCE, CONTINUOUS: Primary | ICD-10-CM

## 2023-10-05 RX ORDER — BUPRENORPHINE HYDROCHLORIDE AND NALOXONE HYDROCHLORIDE DIHYDRATE 8; 2 MG/1; MG/1
2 TABLET SUBLINGUAL DAILY
Qty: 14 TABLET | Refills: 0 | Status: SHIPPED | OUTPATIENT
Start: 2023-10-05

## 2023-10-05 RX ORDER — BUPROPION HYDROCHLORIDE 150 MG/1
150 TABLET ORAL 2 TIMES DAILY
Qty: 60 TABLET | Refills: 0 | Status: SHIPPED | OUTPATIENT
Start: 2023-10-05 | End: 2024-10-04

## 2023-10-05 NOTE — PROGRESS NOTES
This provider is located at [facility]. The Patient is seen remotely located at the [facility] ([facility]) using Video. Patient is being seen via telehealth and confirm that they are in a secure environment for this session. The patient's condition being diagnosed/treated is appropriate for telemedicine. Provider identified as Williams Brody as well as credentials APRN MSN FNP-C SRINIVASAN-YOUNG.   The client/patient gave consent to be seen remotely, and when consent is given they understand that the consent allows for patient identifiable information to be sent to a third party as needed.   They may refuse to be seen remotely at any time. The electronic data is encrypted and password protected, and the patient has been advised of the potential risks to privacy not withstanding such measures.    Chief Complaint/History of Present Illness: Follow Up buprenorphine/naloxone Medicated Assisted Treatment for Opiate Use Disorder     Patient/Client Concerns/Updates: Continue Wellbutrin for stimulant cravings, Vistaril for anxiety  -Patient reports she has been indited by grand jury secondary to being pulled over by  and methamphetamine was found in the vehicle  -Patient reports she now must provide urinalysis weekly to the  in charge of her case  -Increase Wellbutrin to twice daily dosing to help with stimulant cravings and hopes to abstain from methamphetamine  -Multiple discussions in the past recommending chemical dependency IOP; I advised patient to take her recovery very seriously due to any further methamphetamine use as it relates to the 's urinalysis will result in incarceration  -Depressive and anxious symptoms as it relates to pending incarceration, denies suicidal or homicidal thoughts    Triggers (Persons/Places/Things/Events/Thought/Emotions): Pending incarceration    Cravings/Substance Use: Cravings for methamphetamine and continued struggle with use    Relapse Prevention:  Counseling and continued recommendation to take part in higher level of care including chemical dependency IOP    Urine Drug Screen (today's visit) discussed: Positive buprenorphine positive amphetamine methamphetamine and THC    UDS Confirmation (Most recent/Resulted): Positive buprenorphine/nor-buprenorphine, no concerns for urine tampering otherwise positive methamphetamine and THC    Most recent pertinent laboratory studies reviewed: 4/11/23-hepatic function within normal limits, Cr WNL, HIV negative, hepatitis C not detected         ROSY (PDMP) Reviewed for Current/Active Medications: buprenorphine/naloxone as reviewed today    Past Surgical History:  No past surgical history on file.    Problem List:  There is no problem list on file for this patient.      Allergy:   No Known Allergies     Current Medications:   Current Outpatient Medications   Medication Sig Dispense Refill   • famotidine (PEPCID) 20 MG tablet Take 1 tablet by mouth 2 (Two) Times a Day. 60 tablet 0   • GNP PAIN RELIEF EX-STRENGTH 500 MG tablet TAKE ONE TABLET BY MOUTH EVERY 6 HOURS AS NEEDED FOR 30 DAYS **DO NOT EXCEED 6 TABLETS IN 24 HOURS**     • hydrOXYzine pamoate (Vistaril) 25 MG capsule Take 1 capsule by mouth 4 (Four) Times a Day As Needed for Anxiety. 120 capsule 0   • ondansetron ODT (ZOFRAN-ODT) 8 MG disintegrating tablet Place 1 tablet on the tongue Every 8 (Eight) Hours As Needed for Nausea or Vomiting. 45 tablet 0   • buprenorphine-naloxone (SUBOXONE) 8-2 MG per SL tablet Place 2 tablets under the tongue Daily. 14 tablet 0   • buPROPion XL (Wellbutrin XL) 150 MG 24 hr tablet Take 1 tablet by mouth 2 (Two) Times a Day. 60 tablet 0   • naloxone (NARCAN) 4 MG/0.1ML nasal spray 1 spray into the nostril(s) as directed by provider As Needed (opiate over sedation). 1 spray in 1 nostril every 2 to 3 minutes, call 911 (Patient not taking: Reported on 10/5/2023) 2 each 2     No current facility-administered medications for this visit.        Past Medical History:  Past Medical History:   Diagnosis Date   • History of hepatitis B    • Pancreatitis          Social History     Socioeconomic History   • Marital status: Single   Tobacco Use   • Smoking status: Every Day     Packs/day: 1.00     Years: 15.00     Pack years: 15.00     Types: Cigarettes   • Smokeless tobacco: Never   Vaping Use   • Vaping Use: Never used   Substance and Sexual Activity   • Alcohol use: Not Currently   • Drug use: Yes     Types: Marijuana, Hydrocodone, Oxycodone, Methamphetamines   • Sexual activity: Defer         Family History   Problem Relation Age of Onset   • No Known Problems Mother    • No Known Problems Father    • No Known Problems Sister    • No Known Problems Brother    • No Known Problems Maternal Aunt    • No Known Problems Paternal Aunt    • No Known Problems Maternal Uncle    • No Known Problems Paternal Uncle    • No Known Problems Maternal Grandfather    • No Known Problems Maternal Grandmother    • No Known Problems Paternal Grandfather    • No Known Problems Paternal Grandmother    • No Known Problems Cousin    • No Known Problems Other    • ADD / ADHD Neg Hx    • Alcohol abuse Neg Hx    • Anxiety disorder Neg Hx    • Bipolar disorder Neg Hx    • Dementia Neg Hx    • Depression Neg Hx    • Drug abuse Neg Hx    • OCD Neg Hx    • Paranoid behavior Neg Hx    • Schizophrenia Neg Hx    • Seizures Neg Hx    • Self-Injurious Behavior  Neg Hx    • Suicide Attempts Neg Hx          Mental Status Exam:   Hygiene:   good  Cooperation:  Cooperative  Eye Contact:  Good  Psychomotor Behavior:  Appropriate  Affect:  Appropriate  Mood: depressed and anxious  Speech:  Normal  Thought Process:  Goal directed  Thought Content:  Normal  Suicidal:  None  Homicidal:  None  Hallucinations:  None  Delusion:  None  Memory:  Intact  Orientation:  Grossly intact  Reliability:  good  Insight:  Good  Judgement:  Good  Impulse Control:  Good         Review of Systems:  Review of  "Systems   Constitutional:  Negative for activity change, chills, diaphoresis and fatigue.   Respiratory:  Negative for apnea, cough and shortness of breath.    Cardiovascular:  Negative for chest pain, palpitations and leg swelling.   Gastrointestinal:  Negative for abdominal pain, constipation, diarrhea, nausea and vomiting.   Genitourinary:  Negative for difficulty urinating.   Musculoskeletal:  Negative for arthralgias.   Skin:  Negative for rash.   Neurological:  Negative for dizziness, weakness and headaches.   Psychiatric/Behavioral:  Positive for dysphoric mood and sleep disturbance. Negative for agitation, self-injury and suicidal ideas. The patient is nervous/anxious.        Physical Exam:  Physical Exam  Vitals reviewed.   Constitutional:       General: She is not in acute distress.     Appearance: Normal appearance. She is not ill-appearing or toxic-appearing.   Pulmonary:      Effort: Pulmonary effort is normal.   Musculoskeletal:         General: Normal range of motion.   Neurological:      General: No focal deficit present.      Mental Status: She is alert and oriented to person, place, and time.   Psychiatric:         Attention and Perception: Attention and perception normal.         Mood and Affect: Mood normal. Mood is anxious and depressed. Affect is tearful.         Speech: Speech normal.         Behavior: Behavior normal. Behavior is cooperative.         Thought Content: Thought content normal.         Cognition and Memory: Cognition and memory normal.         Judgment: Judgment normal.     Vital Signs:   /74   Pulse 75   Ht 156.2 cm (61.5\")   Wt 77.6 kg (171 lb)   BMI 31.79 kg/m²      Lab Results:   Telemedicine on 10/05/2023   Component Date Value Ref Range Status   • External Amphetamine Screen Urine 10/05/2023 Positive (A)   Final   • External Benzodiazepine Screen Uri* 10/05/2023 Negative   Final   • External Cocaine Screen Urine 10/05/2023 Negative   Final   • External THC Screen " Urine 10/05/2023 Positive (A)   Final   • External Methadone Screen Urine 10/05/2023 Negative   Final   • External Methamphetamine Screen Ur* 10/05/2023 Positive (A)   Final   • External Oxycodone Screen Urine 10/05/2023 Negative   Final   • External Buprenorphine Screen Urine 10/05/2023 Positive (A)   Final   • External MDMA 10/05/2023 Negative   Final   • External Opiates Screen Urine 10/05/2023 Negative   Final   Telemedicine on 09/20/2023   Component Date Value Ref Range Status   • External Amphetamine Screen Urine 09/20/2023 Positive (A)   Final   • External Benzodiazepine Screen Uri* 09/20/2023 Negative   Final   • External Cocaine Screen Urine 09/20/2023 Negative   Final   • External THC Screen Urine 09/20/2023 Positive (A)   Final   • External Methadone Screen Urine 09/20/2023 Negative   Final   • External Methamphetamine Screen Ur* 09/20/2023 Positive (A)   Final   • External Oxycodone Screen Urine 09/20/2023 Negative   Final   • External Buprenorphine Screen Urine 09/20/2023 Positive (A)   Final   • External MDMA 09/20/2023 Positive (A)   Final   • External Opiates Screen Urine 09/20/2023 Negative   Final   Telemedicine on 09/13/2023   Component Date Value Ref Range Status   • External Amphetamine Screen Urine 09/13/2023 Positive (A)   Final   • External Benzodiazepine Screen Uri* 09/13/2023 Negative   Final   • External Cocaine Screen Urine 09/13/2023 Negative   Final   • External THC Screen Urine 09/13/2023 Positive (A)   Final   • External Methadone Screen Urine 09/13/2023 Negative   Final   • External Methamphetamine Screen Ur* 09/13/2023 Positive (A)   Final   • External Oxycodone Screen Urine 09/13/2023 Negative   Final   • External Buprenorphine Screen Urine 09/13/2023 Positive (A)   Final   • External MDMA 09/13/2023 Positive (A)   Final   • External Opiates Screen Urine 09/13/2023 Negative   Final   Telemedicine on 09/06/2023   Component Date Value Ref Range Status   • External Amphetamine Screen  Urine 09/06/2023 Positive (A)   Final   • External Benzodiazepine Screen Uri* 09/06/2023 Negative   Final   • External Cocaine Screen Urine 09/06/2023 Negative   Final   • External THC Screen Urine 09/06/2023 Positive (A)   Final   • External Methadone Screen Urine 09/06/2023 Negative   Final   • External Methamphetamine Screen Ur* 09/06/2023 Positive (A)   Final   • External Oxycodone Screen Urine 09/06/2023 Negative   Final   • External Buprenorphine Screen Urine 09/06/2023 Positive (A)   Final   • External MDMA 09/06/2023 Negative   Final   • External Opiates Screen Urine 09/06/2023 Negative   Final   Telemedicine on 06/22/2023   Component Date Value Ref Range Status   • External Amphetamine Screen Urine 06/22/2023 Positive (A)   Final   • External Benzodiazepine Screen Uri* 06/22/2023 Negative   Final   • External Cocaine Screen Urine 06/22/2023 Negative   Final   • External THC Screen Urine 06/22/2023 Positive (A)   Final   • External Methadone Screen Urine 06/22/2023 Negative   Final   • External Methamphetamine Screen Ur* 06/22/2023 Positive (A)   Final   • External Oxycodone Screen Urine 06/22/2023 Negative   Final   • External Buprenorphine Screen Urine 06/22/2023 Positive (A)   Final   • External MDMA 06/22/2023 Negative   Final   • External Opiates Screen Urine 06/22/2023 Negative   Final   Telemedicine on 06/08/2023   Component Date Value Ref Range Status   • External Amphetamine Screen Urine 06/08/2023 Negative   Final   • External Benzodiazepine Screen Uri* 06/08/2023 Negative   Final   • External Cocaine Screen Urine 06/08/2023 Negative   Final   • External THC Screen Urine 06/08/2023 Positive (A)   Final   • External Methadone Screen Urine 06/08/2023 Negative   Final   • External Methamphetamine Screen Ur* 06/08/2023 Positive (A)   Final   • External Oxycodone Screen Urine 06/08/2023 Negative   Final   • External Buprenorphine Screen Urine 06/08/2023 Positive (A)   Final   • External MDMA 06/08/2023  Negative   Final   • External Opiates Screen Urine 06/08/2023 Negative   Final   Telemedicine on 05/18/2023   Component Date Value Ref Range Status   • External Amphetamine Screen Urine 05/18/2023 Positive (A)   Final   • External Benzodiazepine Screen Uri* 05/18/2023 Negative   Final   • External Cocaine Screen Urine 05/18/2023 Negative   Final   • External THC Screen Urine 05/18/2023 Positive (A)   Final   • External Methadone Screen Urine 05/18/2023 Negative   Final   • External Methamphetamine Screen Ur* 05/18/2023 Positive (A)   Final   • External Oxycodone Screen Urine 05/18/2023 Negative   Final   • External Buprenorphine Screen Urine 05/18/2023 Positive (A)   Final   • External MDMA 05/18/2023 Positive (A)   Final   • External Opiates Screen Urine 05/18/2023 Negative   Final   Telemedicine on 05/02/2023   Component Date Value Ref Range Status   • External Amphetamine Screen Urine 05/02/2023 Positive (A)   Final   • External Benzodiazepine Screen Uri* 05/02/2023 Negative   Final   • External Cocaine Screen Urine 05/02/2023 Negative   Final   • External THC Screen Urine 05/02/2023 Positive (A)   Final   • External Methadone Screen Urine 05/02/2023 Negative   Final   • External Methamphetamine Screen Ur* 05/02/2023 Positive (A)   Final   • External Oxycodone Screen Urine 05/02/2023 Negative   Final   • External Buprenorphine Screen Urine 05/02/2023 Positive (A)   Final   • External MDMA 05/02/2023 Negative   Final   • External Opiates Screen Urine 05/02/2023 Negative   Final   Telemedicine on 04/25/2023   Component Date Value Ref Range Status   • External Amphetamine Screen Urine 04/25/2023 Positive (A)   Final   • External Benzodiazepine Screen Uri* 04/25/2023 Negative   Final   • External Cocaine Screen Urine 04/25/2023 Negative   Final   • External THC Screen Urine 04/25/2023 Positive (A)   Final   • External Methadone Screen Urine 04/25/2023 Negative   Final   • External Methamphetamine Screen Ur*  04/25/2023 Positive (A)   Final   • External Oxycodone Screen Urine 04/25/2023 Negative   Final   • External Buprenorphine Screen Urine 04/25/2023 Positive (A)   Final   • External MDMA 04/25/2023 Negative   Final   • External Opiates Screen Urine 04/25/2023 Negative   Final   Telemedicine on 04/18/2023   Component Date Value Ref Range Status   • External Amphetamine Screen Urine 04/18/2023 Positive (A)   Final   • External Benzodiazepine Screen Uri* 04/18/2023 Negative   Final   • External Cocaine Screen Urine 04/18/2023 Negative   Final   • External THC Screen Urine 04/18/2023 Positive (A)   Final   • External Methadone Screen Urine 04/18/2023 Negative   Final   • External Methamphetamine Screen Ur* 04/18/2023 Positive (A)   Final   • External Oxycodone Screen Urine 04/18/2023 Negative   Final   • External Buprenorphine Screen Urine 04/18/2023 Positive (A)   Final   • External MDMA 04/18/2023 Negative   Final   • External Opiates Screen Urine 04/18/2023 Negative   Final   There may be more visits with results that are not included.         Assessment & Plan   Diagnoses and all orders for this visit:    1. Opioid type dependence, continuous (Primary)  -     buprenorphine-naloxone (SUBOXONE) 8-2 MG per SL tablet; Place 2 tablets under the tongue Daily.  Dispense: 14 tablet; Refill: 0    2. Medication management  -     [facility] Drug Screen    3. Methamphetamine abuse  -     buPROPion XL (Wellbutrin XL) 150 MG 24 hr tablet; Take 1 tablet by mouth 2 (Two) Times a Day.  Dispense: 60 tablet; Refill: 0        Visit Diagnoses:    ICD-10-CM ICD-9-CM   1. Opioid type dependence, continuous  F11.20 304.01   2. Medication management  Z79.899 V58.69   3. Methamphetamine abuse  F15.10 305.70       PLAN:  Safety: No acute safety concerns  Risk Assessment: Risk of self-harm acutely is low. Risk of self-harm chronically is also low, but could be further elevated in the event of treatment noncompliance and/or AODA.    TREATMENT  PLAN/GOALS: Continue supportive psychotherapy efforts and medications as indicated. Treatment and medication options discussed during today's visit. Patient acknowledged and verbally consented to continue with current treatment plan and was educated on the importance of compliance with treatment and follow-up appointments.    MEDICATION ISSUES:  ROSY reviewed as expected.  Discussed medication options and treatment plan of prescribed medication as well as the risks, benefits, and side effects including potential falls, possible impaired driving and metabolic adversities among others. Patient is agreeable to call the office with any worsening of symptoms or onset of side effects. Patient is agreeable to call 911 or go to the nearest ER should he/she begin having SI/HI. No medication side effects or related complaints today.     MEDS ORDERED DURING VISIT:  New Medications Ordered This Visit   Medications   • buprenorphine-naloxone (SUBOXONE) 8-2 MG per SL tablet     Sig: Place 2 tablets under the tongue Daily.     Dispense:  14 tablet     Refill:  0     NADEAN:[id number]   • buPROPion XL (Wellbutrin XL) 150 MG 24 hr tablet     Sig: Take 1 tablet by mouth 2 (Two) Times a Day.     Dispense:  60 tablet     Refill:  0       No follow-ups on file.           This document has been electronically signed by WALKER Romero  October 5, 2023 11:33 EDT      Part of this note may be an electronic transcription/translation of spoken language to printed text using the Dragon Dictation System.

## 2023-10-12 ENCOUNTER — TELEMEDICINE (OUTPATIENT)
Dept: PSYCHIATRY | Facility: CLINIC | Age: 53
End: 2023-10-12
Payer: MEDICAID

## 2023-10-12 VITALS
BODY MASS INDEX: 32.1 KG/M2 | WEIGHT: 170 LBS | DIASTOLIC BLOOD PRESSURE: 78 MMHG | SYSTOLIC BLOOD PRESSURE: 120 MMHG | HEIGHT: 61 IN | HEART RATE: 80 BPM

## 2023-10-12 DIAGNOSIS — F11.20 OPIOID TYPE DEPENDENCE, CONTINUOUS: Primary | ICD-10-CM

## 2023-10-12 DIAGNOSIS — Z79.899 MEDICATION MANAGEMENT: ICD-10-CM

## 2023-10-12 RX ORDER — BUPRENORPHINE HYDROCHLORIDE AND NALOXONE HYDROCHLORIDE DIHYDRATE 8; 2 MG/1; MG/1
2 TABLET SUBLINGUAL DAILY
Qty: 14 TABLET | Refills: 0 | Status: SHIPPED | OUTPATIENT
Start: 2023-10-12

## 2023-10-12 NOTE — PROGRESS NOTES
This provider is located at [facility]. The Patient is seen remotely located at the [facility] ([facility]) using Video. Patient is being seen via telehealth and confirm that they are in a secure environment for this session. The patient's condition being diagnosed/treated is appropriate for telemedicine. Provider identified as Williams Brody as well as credentials APRN FLAVIA FNP-C SRINIVASAN-YOUNG.   The client/patient gave consent to be seen remotely, and when consent is given they understand that the consent allows for patient identifiable information to be sent to a third party as needed.   They may refuse to be seen remotely at any time. The electronic data is encrypted and password protected, and the patient has been advised of the potential risks to privacy not withstanding such measures.    Chief Complaint/History of Present Illness: Follow Up buprenorphine/naloxone Medicated Assisted Treatment for Opiate Use Disorder     Patient/Client Concerns/Updates:  Continue Wellbutrin for stimulant cravings, Vistaril for anxiety   -Patient reports she has not used methamphetamine in 10 days; patient reports this is the longest period of time without methamphetamine in several years; I commend patient for progress and encouraged her to maintain her current goals  -Patient reports that she is detoxifying from methamphetamine she has experienced exacerbation and fatigue; educated patient under the withdrawal syndrome of methamphetamine.  Discussed with patient most notably withdrawal from stimulants may include worsening depression and possible suicidal thoughts; patient agrees to be vigilant for any worsening depression or suicidal ideations however denies depressive concerns at this time  -Positive motivating factor for recovery as potential legal problems    Triggers (Persons/Places/Things/Events/Thought/Emotions): Methamphetamine withdrawal symptoms    Cravings/Substance Use: Reports controlled cravings for  methamphetamine and denies use in 10 days    Relapse Prevention: Counseling    Urine Drug Screen (today's visit) discussed: Positive buprenorphine positive amphetamine methamphetamine and THC    UDS Confirmation (Most recent/Resulted): Positive buprenorphine/nor-buprenorphine, no concerns for urine tampering otherwise positive methamphetamine and THC    Most recent pertinent laboratory studies reviewed: 4/11/23-hepatic function within normal limits, Cr WNL, HIV negative, hepatitis C not detected          ROSY (PDMP) Reviewed for Current/Active Medications: buprenorphine/naloxone as reviewed today    Past Surgical History:  No past surgical history on file.    Problem List:  There is no problem list on file for this patient.      Allergy:   No Known Allergies     Current Medications:   Current Outpatient Medications   Medication Sig Dispense Refill    buprenorphine-naloxone (SUBOXONE) 8-2 MG per SL tablet Place 2 tablets under the tongue Daily. 14 tablet 0    buPROPion XL (Wellbutrin XL) 150 MG 24 hr tablet Take 1 tablet by mouth 2 (Two) Times a Day. 60 tablet 0    famotidine (PEPCID) 20 MG tablet Take 1 tablet by mouth 2 (Two) Times a Day. 60 tablet 0    GNP PAIN RELIEF EX-STRENGTH 500 MG tablet TAKE ONE TABLET BY MOUTH EVERY 6 HOURS AS NEEDED FOR 30 DAYS **DO NOT EXCEED 6 TABLETS IN 24 HOURS**      hydrOXYzine pamoate (Vistaril) 25 MG capsule Take 1 capsule by mouth 4 (Four) Times a Day As Needed for Anxiety. 120 capsule 0    ondansetron ODT (ZOFRAN-ODT) 8 MG disintegrating tablet Place 1 tablet on the tongue Every 8 (Eight) Hours As Needed for Nausea or Vomiting. 45 tablet 0    naloxone (NARCAN) 4 MG/0.1ML nasal spray 1 spray into the nostril(s) as directed by provider As Needed (opiate over sedation). 1 spray in 1 nostril every 2 to 3 minutes, call 911 (Patient not taking: Reported on 10/12/2023) 2 each 2     No current facility-administered medications for this visit.       Past Medical History:  Past  Medical History:   Diagnosis Date    History of hepatitis B     Pancreatitis          Social History     Socioeconomic History    Marital status: Single   Tobacco Use    Smoking status: Every Day     Packs/day: 1.00     Years: 15.00     Additional pack years: 0.00     Total pack years: 15.00     Types: Cigarettes    Smokeless tobacco: Never   Vaping Use    Vaping Use: Never used   Substance and Sexual Activity    Alcohol use: Not Currently    Drug use: Yes     Types: Marijuana, Hydrocodone, Oxycodone, Methamphetamines    Sexual activity: Defer         Family History   Problem Relation Age of Onset    No Known Problems Mother     No Known Problems Father     No Known Problems Sister     No Known Problems Brother     No Known Problems Maternal Aunt     No Known Problems Paternal Aunt     No Known Problems Maternal Uncle     No Known Problems Paternal Uncle     No Known Problems Maternal Grandfather     No Known Problems Maternal Grandmother     No Known Problems Paternal Grandfather     No Known Problems Paternal Grandmother     No Known Problems Cousin     No Known Problems Other     ADD / ADHD Neg Hx     Alcohol abuse Neg Hx     Anxiety disorder Neg Hx     Bipolar disorder Neg Hx     Dementia Neg Hx     Depression Neg Hx     Drug abuse Neg Hx     OCD Neg Hx     Paranoid behavior Neg Hx     Schizophrenia Neg Hx     Seizures Neg Hx     Self-Injurious Behavior  Neg Hx     Suicide Attempts Neg Hx          Mental Status Exam:   Hygiene:   good  Cooperation:  Cooperative  Eye Contact:  Good  Psychomotor Behavior:  Appropriate  Affect:  Appropriate  Mood: anxious  Speech:  Normal  Thought Process:  Goal directed  Thought Content:  Normal  Suicidal:  None  Homicidal:  None  Hallucinations:  None  Delusion:  None  Memory:  Intact  Orientation:  Grossly intact  Reliability:  good  Insight:  Good  Judgement:  Good  Impulse Control:  Good         Review of Systems:  Review of Systems  "  Constitutional:  Positive for fatigue. Negative for activity change, chills and diaphoresis.   Respiratory:  Negative for apnea, cough and shortness of breath.    Cardiovascular:  Negative for chest pain, palpitations and leg swelling.   Gastrointestinal:  Positive for diarrhea. Negative for abdominal pain, constipation, nausea and vomiting.   Genitourinary:  Negative for difficulty urinating.   Musculoskeletal:  Negative for arthralgias.   Skin:  Negative for rash.   Neurological:  Negative for dizziness, weakness and headaches.   Psychiatric/Behavioral:  Negative for agitation, self-injury, sleep disturbance and suicidal ideas. The patient is nervous/anxious.        Physical Exam:  Physical Exam  Vitals reviewed.   Constitutional:       General: She is not in acute distress.     Appearance: Normal appearance. She is not ill-appearing or toxic-appearing.   Pulmonary:      Effort: Pulmonary effort is normal.   Musculoskeletal:         General: Normal range of motion.   Neurological:      General: No focal deficit present.      Mental Status: She is alert and oriented to person, place, and time.   Psychiatric:         Attention and Perception: Attention and perception normal.         Mood and Affect: Mood normal. Mood is anxious. Mood is not depressed.         Speech: Speech normal.         Behavior: Behavior normal. Behavior is cooperative.         Thought Content: Thought content normal.         Cognition and Memory: Cognition and memory normal.         Judgment: Judgment normal.     Vital Signs:   /78   Pulse 80   Ht 156.2 cm (61.5\")   Wt 77.1 kg (170 lb)   BMI 31.61 kg/mý      Lab Results:   Telemedicine on 10/12/2023   Component Date Value Ref Range Status    External Amphetamine Screen Urine 10/12/2023 Positive (A)   Final    External Benzodiazepine Screen Uri* 10/12/2023 Negative   Final    External Cocaine Screen Urine 10/12/2023 Negative   Final    External THC Screen Urine 10/12/2023 " Positive (A)   Final    External Methadone Screen Urine 10/12/2023 Negative   Final    External Methamphetamine Screen Ur* 10/12/2023 Positive (A)   Final    External Oxycodone Screen Urine 10/12/2023 Negative   Final    External Buprenorphine Screen Urine 10/12/2023 Positive (A)   Final    External MDMA 10/12/2023 Negative   Final    External Opiates Screen Urine 10/12/2023 Negative   Final   Telemedicine on 10/05/2023   Component Date Value Ref Range Status    External Amphetamine Screen Urine 10/05/2023 Positive (A)   Final    External Benzodiazepine Screen Uri* 10/05/2023 Negative   Final    External Cocaine Screen Urine 10/05/2023 Negative   Final    External THC Screen Urine 10/05/2023 Positive (A)   Final    External Methadone Screen Urine 10/05/2023 Negative   Final    External Methamphetamine Screen Ur* 10/05/2023 Positive (A)   Final    External Oxycodone Screen Urine 10/05/2023 Negative   Final    External Buprenorphine Screen Urine 10/05/2023 Positive (A)   Final    External MDMA 10/05/2023 Negative   Final    External Opiates Screen Urine 10/05/2023 Negative   Final   Telemedicine on 09/20/2023   Component Date Value Ref Range Status    External Amphetamine Screen Urine 09/20/2023 Positive (A)   Final    External Benzodiazepine Screen Uri* 09/20/2023 Negative   Final    External Cocaine Screen Urine 09/20/2023 Negative   Final    External THC Screen Urine 09/20/2023 Positive (A)   Final    External Methadone Screen Urine 09/20/2023 Negative   Final    External Methamphetamine Screen Ur* 09/20/2023 Positive (A)   Final    External Oxycodone Screen Urine 09/20/2023 Negative   Final    External Buprenorphine Screen Urine 09/20/2023 Positive (A)   Final    External MDMA 09/20/2023 Positive (A)   Final    External Opiates Screen Urine 09/20/2023 Negative   Final   Telemedicine on 09/13/2023   Component Date Value Ref Range Status    External Amphetamine Screen Urine 09/13/2023  Positive (A)   Final    External Benzodiazepine Screen Uri* 09/13/2023 Negative   Final    External Cocaine Screen Urine 09/13/2023 Negative   Final    External THC Screen Urine 09/13/2023 Positive (A)   Final    External Methadone Screen Urine 09/13/2023 Negative   Final    External Methamphetamine Screen Ur* 09/13/2023 Positive (A)   Final    External Oxycodone Screen Urine 09/13/2023 Negative   Final    External Buprenorphine Screen Urine 09/13/2023 Positive (A)   Final    External MDMA 09/13/2023 Positive (A)   Final    External Opiates Screen Urine 09/13/2023 Negative   Final   Telemedicine on 09/06/2023   Component Date Value Ref Range Status    External Amphetamine Screen Urine 09/06/2023 Positive (A)   Final    External Benzodiazepine Screen Uri* 09/06/2023 Negative   Final    External Cocaine Screen Urine 09/06/2023 Negative   Final    External THC Screen Urine 09/06/2023 Positive (A)   Final    External Methadone Screen Urine 09/06/2023 Negative   Final    External Methamphetamine Screen Ur* 09/06/2023 Positive (A)   Final    External Oxycodone Screen Urine 09/06/2023 Negative   Final    External Buprenorphine Screen Urine 09/06/2023 Positive (A)   Final    External MDMA 09/06/2023 Negative   Final    External Opiates Screen Urine 09/06/2023 Negative   Final   Telemedicine on 06/22/2023   Component Date Value Ref Range Status    External Amphetamine Screen Urine 06/22/2023 Positive (A)   Final    External Benzodiazepine Screen Uri* 06/22/2023 Negative   Final    External Cocaine Screen Urine 06/22/2023 Negative   Final    External THC Screen Urine 06/22/2023 Positive (A)   Final    External Methadone Screen Urine 06/22/2023 Negative   Final    External Methamphetamine Screen Ur* 06/22/2023 Positive (A)   Final    External Oxycodone Screen Urine 06/22/2023 Negative   Final    External Buprenorphine Screen Urine 06/22/2023 Positive (A)   Final    External MDMA 06/22/2023 Negative    Final    External Opiates Screen Urine 06/22/2023 Negative   Final   Telemedicine on 06/08/2023   Component Date Value Ref Range Status    External Amphetamine Screen Urine 06/08/2023 Negative   Final    External Benzodiazepine Screen Uri* 06/08/2023 Negative   Final    External Cocaine Screen Urine 06/08/2023 Negative   Final    External THC Screen Urine 06/08/2023 Positive (A)   Final    External Methadone Screen Urine 06/08/2023 Negative   Final    External Methamphetamine Screen Ur* 06/08/2023 Positive (A)   Final    External Oxycodone Screen Urine 06/08/2023 Negative   Final    External Buprenorphine Screen Urine 06/08/2023 Positive (A)   Final    External MDMA 06/08/2023 Negative   Final    External Opiates Screen Urine 06/08/2023 Negative   Final   Telemedicine on 05/18/2023   Component Date Value Ref Range Status    External Amphetamine Screen Urine 05/18/2023 Positive (A)   Final    External Benzodiazepine Screen Uri* 05/18/2023 Negative   Final    External Cocaine Screen Urine 05/18/2023 Negative   Final    External THC Screen Urine 05/18/2023 Positive (A)   Final    External Methadone Screen Urine 05/18/2023 Negative   Final    External Methamphetamine Screen Ur* 05/18/2023 Positive (A)   Final    External Oxycodone Screen Urine 05/18/2023 Negative   Final    External Buprenorphine Screen Urine 05/18/2023 Positive (A)   Final    External MDMA 05/18/2023 Positive (A)   Final    External Opiates Screen Urine 05/18/2023 Negative   Final   Telemedicine on 05/02/2023   Component Date Value Ref Range Status    External Amphetamine Screen Urine 05/02/2023 Positive (A)   Final    External Benzodiazepine Screen Uri* 05/02/2023 Negative   Final    External Cocaine Screen Urine 05/02/2023 Negative   Final    External THC Screen Urine 05/02/2023 Positive (A)   Final    External Methadone Screen Urine 05/02/2023 Negative   Final    External Methamphetamine Screen Ur* 05/02/2023 Positive (A)    Final    External Oxycodone Screen Urine 05/02/2023 Negative   Final    External Buprenorphine Screen Urine 05/02/2023 Positive (A)   Final    External MDMA 05/02/2023 Negative   Final    External Opiates Screen Urine 05/02/2023 Negative   Final   Telemedicine on 04/25/2023   Component Date Value Ref Range Status    External Amphetamine Screen Urine 04/25/2023 Positive (A)   Final    External Benzodiazepine Screen Uri* 04/25/2023 Negative   Final    External Cocaine Screen Urine 04/25/2023 Negative   Final    External THC Screen Urine 04/25/2023 Positive (A)   Final    External Methadone Screen Urine 04/25/2023 Negative   Final    External Methamphetamine Screen Ur* 04/25/2023 Positive (A)   Final    External Oxycodone Screen Urine 04/25/2023 Negative   Final    External Buprenorphine Screen Urine 04/25/2023 Positive (A)   Final    External MDMA 04/25/2023 Negative   Final    External Opiates Screen Urine 04/25/2023 Negative   Final   There may be more visits with results that are not included.         Assessment & Plan   Diagnoses and all orders for this visit:    1. Opioid type dependence, continuous (Primary)  -     buprenorphine-naloxone (SUBOXONE) 8-2 MG per SL tablet; Place 2 tablets under the tongue Daily.  Dispense: 14 tablet; Refill: 0    2. Medication management  -     [facility] Drug Screen        Visit Diagnoses:    ICD-10-CM ICD-9-CM   1. Opioid type dependence, continuous  F11.20 304.01   2. Medication management  Z79.899 V58.69       PLAN:  Safety: No acute safety concerns  Risk Assessment: Risk of self-harm acutely is low. Risk of self-harm chronically is also low, but could be further elevated in the event of treatment noncompliance and/or AODA.    TREATMENT PLAN/GOALS: Continue supportive psychotherapy efforts and medications as indicated. Treatment and medication options discussed during today's visit. Patient acknowledged and verbally consented to continue with current treatment plan  and was educated on the importance of compliance with treatment and follow-up appointments.    MEDICATION ISSUES:  ROSY reviewed as expected.  Discussed medication options and treatment plan of prescribed medication as well as the risks, benefits, and side effects including potential falls, possible impaired driving and metabolic adversities among others. Patient is agreeable to call the office with any worsening of symptoms or onset of side effects. Patient is agreeable to call 911 or go to the nearest ER should he/she begin having SI/HI. No medication side effects or related complaints today.     MEDS ORDERED DURING VISIT:  New Medications Ordered This Visit   Medications    buprenorphine-naloxone (SUBOXONE) 8-2 MG per SL tablet     Sig: Place 2 tablets under the tongue Daily.     Dispense:  14 tablet     Refill:  0     NADEAN:[id number]       No follow-ups on file.           This document has been electronically signed by WALKER Romero  October 12, 2023 16:46 EDT      Part of this note may be an electronic transcription/translation of spoken language to printed text using the Dragon Dictation System.

## 2023-10-19 DIAGNOSIS — F11.20 OPIOID TYPE DEPENDENCE, CONTINUOUS: ICD-10-CM

## 2023-10-19 RX ORDER — BUPRENORPHINE HYDROCHLORIDE AND NALOXONE HYDROCHLORIDE DIHYDRATE 8; 2 MG/1; MG/1
2 TABLET SUBLINGUAL DAILY
Qty: 14 TABLET | Refills: 0 | Status: SHIPPED | OUTPATIENT
Start: 2023-10-19

## 2023-10-19 NOTE — TELEPHONE ENCOUNTER
Patient had an appointment for this afternoon but rescheduled until next Tuesday. Patient is at [facility] with her mother, she had a stroke early this morning. Patient wants to know if you will send in enough medication to last her until her appointment on 10/24/23 @ 3:30? Thank you.      Patient will have her aunt  her medication and bring it to her at [facility].

## 2023-10-26 DIAGNOSIS — F11.20 OPIOID TYPE DEPENDENCE, CONTINUOUS: ICD-10-CM

## 2023-10-26 DIAGNOSIS — F41.1 GENERALIZED ANXIETY DISORDER: ICD-10-CM

## 2023-10-26 DIAGNOSIS — K21.9 GASTROESOPHAGEAL REFLUX DISEASE WITHOUT ESOPHAGITIS: ICD-10-CM

## 2023-10-26 DIAGNOSIS — R11.14 BILIOUS VOMITING WITH NAUSEA: ICD-10-CM

## 2023-10-26 RX ORDER — FAMOTIDINE 20 MG/1
20 TABLET, FILM COATED ORAL 2 TIMES DAILY
Qty: 60 TABLET | Refills: 0 | Status: SHIPPED | OUTPATIENT
Start: 2023-10-26

## 2023-10-26 RX ORDER — HYDROXYZINE PAMOATE 25 MG/1
25 CAPSULE ORAL 4 TIMES DAILY PRN
Qty: 120 CAPSULE | Refills: 0 | Status: CANCELLED | OUTPATIENT
Start: 2023-10-26

## 2023-10-26 RX ORDER — ONDANSETRON 8 MG/1
8 TABLET, ORALLY DISINTEGRATING ORAL EVERY 8 HOURS PRN
Qty: 45 TABLET | Refills: 0 | Status: SHIPPED | OUTPATIENT
Start: 2023-10-26

## 2023-10-26 RX ORDER — HYDROXYZINE PAMOATE 25 MG/1
25 CAPSULE ORAL 4 TIMES DAILY PRN
Qty: 120 CAPSULE | Refills: 0 | Status: SHIPPED | OUTPATIENT
Start: 2023-10-26

## 2023-10-26 RX ORDER — BUPRENORPHINE HYDROCHLORIDE AND NALOXONE HYDROCHLORIDE DIHYDRATE 8; 2 MG/1; MG/1
2 TABLET SUBLINGUAL DAILY
Qty: 14 TABLET | Refills: 0 | Status: SHIPPED | OUTPATIENT
Start: 2023-10-26

## 2023-11-02 ENCOUNTER — TELEMEDICINE (OUTPATIENT)
Dept: PSYCHIATRY | Facility: CLINIC | Age: 53
End: 2023-11-02
Payer: MEDICAID

## 2023-11-02 VITALS
HEART RATE: 84 BPM | BODY MASS INDEX: 32.28 KG/M2 | HEIGHT: 61 IN | WEIGHT: 171 LBS | SYSTOLIC BLOOD PRESSURE: 146 MMHG | DIASTOLIC BLOOD PRESSURE: 80 MMHG

## 2023-11-02 DIAGNOSIS — F11.20 OPIOID TYPE DEPENDENCE, CONTINUOUS: Primary | ICD-10-CM

## 2023-11-02 DIAGNOSIS — Z79.899 MEDICATION MANAGEMENT: ICD-10-CM

## 2023-11-02 DIAGNOSIS — F15.10 METHAMPHETAMINE ABUSE: ICD-10-CM

## 2023-11-02 LAB
EXTERNAL AMPHETAMINE SCREEN URINE: NEGATIVE
EXTERNAL BENZODIAZEPINE SCREEN URINE: NEGATIVE
EXTERNAL BUPRENORPHINE SCREEN URINE: POSITIVE
EXTERNAL COCAINE SCREEN URINE: NEGATIVE
EXTERNAL MDMA: NEGATIVE
EXTERNAL METHADONE SCREEN URINE: NEGATIVE
EXTERNAL METHAMPHETAMINE SCREEN URINE: NEGATIVE
EXTERNAL OPIATES SCREEN URINE: NEGATIVE
EXTERNAL OXYCODONE SCREEN URINE: NEGATIVE
EXTERNAL THC SCREEN URINE: NEGATIVE

## 2023-11-02 RX ORDER — BUPRENORPHINE HYDROCHLORIDE AND NALOXONE HYDROCHLORIDE DIHYDRATE 8; 2 MG/1; MG/1
2 TABLET SUBLINGUAL DAILY
Qty: 14 TABLET | Refills: 0 | Status: SHIPPED | OUTPATIENT
Start: 2023-11-02

## 2023-11-02 RX ORDER — BUPROPION HYDROCHLORIDE 150 MG/1
150 TABLET ORAL 2 TIMES DAILY
Qty: 60 TABLET | Refills: 0 | Status: SHIPPED | OUTPATIENT
Start: 2023-11-02 | End: 2024-11-01

## 2023-11-02 NOTE — PROGRESS NOTES
This provider is located at [facility]. The Patient is seen remotely located at the [facility] ([facility]) using Video. Patient is being seen via telehealth and confirm that they are in a secure environment for this session. The patient's condition being diagnosed/treated is appropriate for telemedicine. Provider identified as Williams Brody as well as credentials APRN MSN FNP-C SRINIVASAN-YOUNG.   The client/patient gave consent to be seen remotely, and when consent is given they understand that the consent allows for patient identifiable information to be sent to a third party as needed.   They may refuse to be seen remotely at any time. The electronic data is encrypted and password protected, and the patient has been advised of the potential risks to privacy not withstanding such measures.    Chief Complaint/History of Present Illness: Follow Up buprenorphine/naloxone Medicated Assisted Treatment for Opiate Use Disorder     Patient/Client Concerns/Updates: Continue Wellbutrin for stimulant cravings, Vistaril for anxiety    -Patient reports relapse on methamphetamine twice last week triggered by overwhelming stress related to her mother's health  -Patient anticipates a court date this coming Monday and hopes to avoid incarceration  -Discussed with patient need to maintain vigilance and awareness of her triggers and vulnerabilities particular this week leading up to next week's court date; advised patient to remain with her mother at all times as a protective factor as she awaits the next court date  -Depressive symptoms fluctuate in intensity however patient reports she is coping, denies suicidal or homicidal thoughts    Triggers (Persons/Places/Things/Events/Thought/Emotions): Court date next week    Cravings/Substance Use: Cravings and use of methamphetamine twice since last evaluation    Relapse Prevention: Counseling    Urine Drug Screen (today's visit) discussed: Positive buprenorphine, otherwise  negative for substances tested    UDS Confirmation (Most recent/Resulted): Most recent urine confirmation results are unavailable however history reveals positive buprenorphine and norbuprenorphine with no concern for tampering or substitution    Most recent pertinent laboratory studies reviewed: 4/11/23-hepatic function within normal limits, Cr WNL, HIV negative, hepatitis C not detected           ROSY (PDMP) Reviewed for Current/Active Medications: buprenorphine/naloxone as reviewed today    Past Surgical History:  No past surgical history on file.    Problem List:  There is no problem list on file for this patient.      Allergy:   No Known Allergies     Current Medications:   Current Outpatient Medications   Medication Sig Dispense Refill   • buprenorphine-naloxone (SUBOXONE) 8-2 MG per SL tablet Place 2 tablets under the tongue Daily. 14 tablet 0   • buPROPion XL (Wellbutrin XL) 150 MG 24 hr tablet Take 1 tablet by mouth 2 (Two) Times a Day. 60 tablet 0   • famotidine (PEPCID) 20 MG tablet Take 1 tablet by mouth 2 (Two) Times a Day. 60 tablet 0   • GNP PAIN RELIEF EX-STRENGTH 500 MG tablet TAKE ONE TABLET BY MOUTH EVERY 6 HOURS AS NEEDED FOR 30 DAYS **DO NOT EXCEED 6 TABLETS IN 24 HOURS**     • hydrOXYzine pamoate (Vistaril) 25 MG capsule Take 1 capsule by mouth 4 (Four) Times a Day As Needed for Anxiety. 120 capsule 0   • ondansetron ODT (ZOFRAN-ODT) 8 MG disintegrating tablet Place 1 tablet on the tongue Every 8 (Eight) Hours As Needed for Nausea or Vomiting. 45 tablet 0   • naloxone (NARCAN) 4 MG/0.1ML nasal spray 1 spray into the nostril(s) as directed by provider As Needed (opiate over sedation). 1 spray in 1 nostril every 2 to 3 minutes, call 911 (Patient not taking: Reported on 11/2/2023) 2 each 2     No current facility-administered medications for this visit.       Past Medical History:  Past Medical History:   Diagnosis Date   • History of hepatitis B    • Pancreatitis          Social History      Socioeconomic History   • Marital status: Single   Tobacco Use   • Smoking status: Every Day     Packs/day: 1.00     Years: 15.00     Additional pack years: 0.00     Total pack years: 15.00     Types: Cigarettes   • Smokeless tobacco: Never   Vaping Use   • Vaping Use: Never used   Substance and Sexual Activity   • Alcohol use: Not Currently   • Drug use: Yes     Types: Marijuana, Hydrocodone, Oxycodone, Methamphetamines   • Sexual activity: Defer         Family History   Problem Relation Age of Onset   • No Known Problems Mother    • No Known Problems Father    • No Known Problems Sister    • No Known Problems Brother    • No Known Problems Maternal Aunt    • No Known Problems Paternal Aunt    • No Known Problems Maternal Uncle    • No Known Problems Paternal Uncle    • No Known Problems Maternal Grandfather    • No Known Problems Maternal Grandmother    • No Known Problems Paternal Grandfather    • No Known Problems Paternal Grandmother    • No Known Problems Cousin    • No Known Problems Other    • ADD / ADHD Neg Hx    • Alcohol abuse Neg Hx    • Anxiety disorder Neg Hx    • Bipolar disorder Neg Hx    • Dementia Neg Hx    • Depression Neg Hx    • Drug abuse Neg Hx    • OCD Neg Hx    • Paranoid behavior Neg Hx    • Schizophrenia Neg Hx    • Seizures Neg Hx    • Self-Injurious Behavior  Neg Hx    • Suicide Attempts Neg Hx          Mental Status Exam:   Hygiene:   good  Cooperation:  Cooperative  Eye Contact:  Good  Psychomotor Behavior:  Appropriate  Affect:  Appropriate  Mood: anxious  Speech:  Normal  Thought Process:  Goal directed  Thought Content:  Normal  Suicidal:  None  Homicidal:  None  Hallucinations:  None  Delusion:  None  Memory:  Intact  Orientation:  Grossly intact  Reliability:  good  Insight:  Good  Judgement:  Fair  Impulse Control:  Fair         Review of Systems:  Review of Systems   Constitutional:  Negative for activity change, chills, diaphoresis and fatigue.   Respiratory:  Negative for  "apnea, cough and shortness of breath.    Cardiovascular:  Negative for chest pain, palpitations and leg swelling.   Gastrointestinal:  Negative for abdominal pain, constipation, diarrhea, nausea and vomiting.   Genitourinary:  Negative for difficulty urinating.   Musculoskeletal:  Negative for arthralgias.   Skin:  Negative for rash.   Neurological:  Negative for dizziness, weakness and headaches.   Psychiatric/Behavioral:  Negative for agitation, self-injury, sleep disturbance and suicidal ideas. The patient is nervous/anxious.        Physical Exam:  Physical Exam  Vitals reviewed.   Constitutional:       General: She is not in acute distress.     Appearance: Normal appearance. She is not ill-appearing or toxic-appearing.   Pulmonary:      Effort: Pulmonary effort is normal.   Musculoskeletal:         General: Normal range of motion.   Neurological:      General: No focal deficit present.      Mental Status: She is alert and oriented to person, place, and time.   Psychiatric:         Attention and Perception: Attention and perception normal.         Mood and Affect: Mood is anxious. Mood is not depressed. Affect is tearful.         Speech: Speech normal.         Behavior: Behavior normal. Behavior is cooperative.         Thought Content: Thought content normal.         Cognition and Memory: Cognition and memory normal.         Judgment: Judgment normal.     Vital Signs:   /80   Pulse 84   Ht 156.2 cm (61.5\")   Wt 77.6 kg (171 lb)   BMI 31.79 kg/m²      Lab Results:   Telemedicine on 11/02/2023   Component Date Value Ref Range Status   • External Amphetamine Screen Urine 11/02/2023 Negative   Final   • External Benzodiazepine Screen Uri* 11/02/2023 Negative   Final   • External Cocaine Screen Urine 11/02/2023 Negative   Final   • External THC Screen Urine 11/02/2023 Negative   Final   • External Methadone Screen Urine 11/02/2023 Negative   Final   • External Methamphetamine Screen Ur* 11/02/2023 Negative   " Final   • External Oxycodone Screen Urine 11/02/2023 Negative   Final   • External Buprenorphine Screen Urine 11/02/2023 Positive (A)   Final   • External MDMA 11/02/2023 Negative   Final   • External Opiates Screen Urine 11/02/2023 Negative   Final   Appointment on 10/26/2023   Component Date Value Ref Range Status   • External Amphetamine Screen Urine 10/26/2023 Positive (A)   Final   • External Benzodiazepine Screen Uri* 10/26/2023 Negative   Final   • External Cocaine Screen Urine 10/26/2023 Negative   Final   • External THC Screen Urine 10/26/2023 Positive (A)   Final   • External Methadone Screen Urine 10/26/2023 Negative   Final   • External Methamphetamine Screen Ur* 10/26/2023 Positive (A)   Final   • External Oxycodone Screen Urine 10/26/2023 Negative   Final   • External Buprenorphine Screen Urine 10/26/2023 Positive (A)   Final   • External MDMA 10/26/2023 Positive (A)   Final   • External Opiates Screen Urine 10/26/2023 Negative   Final   Telemedicine on 10/12/2023   Component Date Value Ref Range Status   • External Amphetamine Screen Urine 10/12/2023 Positive (A)   Final   • External Benzodiazepine Screen Uri* 10/12/2023 Negative   Final   • External Cocaine Screen Urine 10/12/2023 Negative   Final   • External THC Screen Urine 10/12/2023 Positive (A)   Final   • External Methadone Screen Urine 10/12/2023 Negative   Final   • External Methamphetamine Screen Ur* 10/12/2023 Positive (A)   Final   • External Oxycodone Screen Urine 10/12/2023 Negative   Final   • External Buprenorphine Screen Urine 10/12/2023 Positive (A)   Final   • External MDMA 10/12/2023 Negative   Final   • External Opiates Screen Urine 10/12/2023 Negative   Final   Telemedicine on 10/05/2023   Component Date Value Ref Range Status   • External Amphetamine Screen Urine 10/05/2023 Positive (A)   Final   • External Benzodiazepine Screen Uri* 10/05/2023 Negative   Final   • External Cocaine Screen Urine 10/05/2023 Negative   Final   •  External THC Screen Urine 10/05/2023 Positive (A)   Final   • External Methadone Screen Urine 10/05/2023 Negative   Final   • External Methamphetamine Screen Ur* 10/05/2023 Positive (A)   Final   • External Oxycodone Screen Urine 10/05/2023 Negative   Final   • External Buprenorphine Screen Urine 10/05/2023 Positive (A)   Final   • External MDMA 10/05/2023 Negative   Final   • External Opiates Screen Urine 10/05/2023 Negative   Final   Telemedicine on 09/20/2023   Component Date Value Ref Range Status   • External Amphetamine Screen Urine 09/20/2023 Positive (A)   Final   • External Benzodiazepine Screen Uri* 09/20/2023 Negative   Final   • External Cocaine Screen Urine 09/20/2023 Negative   Final   • External THC Screen Urine 09/20/2023 Positive (A)   Final   • External Methadone Screen Urine 09/20/2023 Negative   Final   • External Methamphetamine Screen Ur* 09/20/2023 Positive (A)   Final   • External Oxycodone Screen Urine 09/20/2023 Negative   Final   • External Buprenorphine Screen Urine 09/20/2023 Positive (A)   Final   • External MDMA 09/20/2023 Positive (A)   Final   • External Opiates Screen Urine 09/20/2023 Negative   Final   Telemedicine on 09/13/2023   Component Date Value Ref Range Status   • External Amphetamine Screen Urine 09/13/2023 Positive (A)   Final   • External Benzodiazepine Screen Uri* 09/13/2023 Negative   Final   • External Cocaine Screen Urine 09/13/2023 Negative   Final   • External THC Screen Urine 09/13/2023 Positive (A)   Final   • External Methadone Screen Urine 09/13/2023 Negative   Final   • External Methamphetamine Screen Ur* 09/13/2023 Positive (A)   Final   • External Oxycodone Screen Urine 09/13/2023 Negative   Final   • External Buprenorphine Screen Urine 09/13/2023 Positive (A)   Final   • External MDMA 09/13/2023 Positive (A)   Final   • External Opiates Screen Urine 09/13/2023 Negative   Final   Telemedicine on 09/06/2023   Component Date Value Ref Range Status   • External  Amphetamine Screen Urine 09/06/2023 Positive (A)   Final   • External Benzodiazepine Screen Uri* 09/06/2023 Negative   Final   • External Cocaine Screen Urine 09/06/2023 Negative   Final   • External THC Screen Urine 09/06/2023 Positive (A)   Final   • External Methadone Screen Urine 09/06/2023 Negative   Final   • External Methamphetamine Screen Ur* 09/06/2023 Positive (A)   Final   • External Oxycodone Screen Urine 09/06/2023 Negative   Final   • External Buprenorphine Screen Urine 09/06/2023 Positive (A)   Final   • External MDMA 09/06/2023 Negative   Final   • External Opiates Screen Urine 09/06/2023 Negative   Final   Telemedicine on 06/22/2023   Component Date Value Ref Range Status   • External Amphetamine Screen Urine 06/22/2023 Positive (A)   Final   • External Benzodiazepine Screen Uri* 06/22/2023 Negative   Final   • External Cocaine Screen Urine 06/22/2023 Negative   Final   • External THC Screen Urine 06/22/2023 Positive (A)   Final   • External Methadone Screen Urine 06/22/2023 Negative   Final   • External Methamphetamine Screen Ur* 06/22/2023 Positive (A)   Final   • External Oxycodone Screen Urine 06/22/2023 Negative   Final   • External Buprenorphine Screen Urine 06/22/2023 Positive (A)   Final   • External MDMA 06/22/2023 Negative   Final   • External Opiates Screen Urine 06/22/2023 Negative   Final   Telemedicine on 06/08/2023   Component Date Value Ref Range Status   • External Amphetamine Screen Urine 06/08/2023 Negative   Final   • External Benzodiazepine Screen Uri* 06/08/2023 Negative   Final   • External Cocaine Screen Urine 06/08/2023 Negative   Final   • External THC Screen Urine 06/08/2023 Positive (A)   Final   • External Methadone Screen Urine 06/08/2023 Negative   Final   • External Methamphetamine Screen Ur* 06/08/2023 Positive (A)   Final   • External Oxycodone Screen Urine 06/08/2023 Negative   Final   • External Buprenorphine Screen Urine 06/08/2023 Positive (A)   Final   •  External MDMA 06/08/2023 Negative   Final   • External Opiates Screen Urine 06/08/2023 Negative   Final   Telemedicine on 05/18/2023   Component Date Value Ref Range Status   • External Amphetamine Screen Urine 05/18/2023 Positive (A)   Final   • External Benzodiazepine Screen Uri* 05/18/2023 Negative   Final   • External Cocaine Screen Urine 05/18/2023 Negative   Final   • External THC Screen Urine 05/18/2023 Positive (A)   Final   • External Methadone Screen Urine 05/18/2023 Negative   Final   • External Methamphetamine Screen Ur* 05/18/2023 Positive (A)   Final   • External Oxycodone Screen Urine 05/18/2023 Negative   Final   • External Buprenorphine Screen Urine 05/18/2023 Positive (A)   Final   • External MDMA 05/18/2023 Positive (A)   Final   • External Opiates Screen Urine 05/18/2023 Negative   Final   There may be more visits with results that are not included.         Assessment & Plan   Diagnoses and all orders for this visit:    1. Opioid type dependence, continuous (Primary)  -     buprenorphine-naloxone (SUBOXONE) 8-2 MG per SL tablet; Place 2 tablets under the tongue Daily.  Dispense: 14 tablet; Refill: 0    2. Medication management  -     [facility] Drug Screen    3. Methamphetamine abuse  -     buPROPion XL (Wellbutrin XL) 150 MG 24 hr tablet; Take 1 tablet by mouth 2 (Two) Times a Day.  Dispense: 60 tablet; Refill: 0        Visit Diagnoses:    ICD-10-CM ICD-9-CM   1. Opioid type dependence, continuous  F11.20 304.01   2. Medication management  Z79.899 V58.69   3. Methamphetamine abuse  F15.10 305.70       PLAN:  Safety: No acute safety concerns  Risk Assessment: Risk of self-harm acutely is low. Risk of self-harm chronically is also low, but could be further elevated in the event of treatment noncompliance and/or AODA.    TREATMENT PLAN/GOALS: Continue supportive psychotherapy efforts and medications as indicated. Treatment and medication options discussed during today's visit. Patient acknowledged and  verbally consented to continue with current treatment plan and was educated on the importance of compliance with treatment and follow-up appointments.    MEDICATION ISSUES:  ROSY reviewed as expected.  Discussed medication options and treatment plan of prescribed medication as well as the risks, benefits, and side effects including potential falls, possible impaired driving and metabolic adversities among others. Patient is agreeable to call the office with any worsening of symptoms or onset of side effects. Patient is agreeable to call 911 or go to the nearest ER should he/she begin having SI/HI. No medication side effects or related complaints today.     MEDS ORDERED DURING VISIT:  New Medications Ordered This Visit   Medications   • buprenorphine-naloxone (SUBOXONE) 8-2 MG per SL tablet     Sig: Place 2 tablets under the tongue Daily.     Dispense:  14 tablet     Refill:  0     NADEAN:[id number]   • buPROPion XL (Wellbutrin XL) 150 MG 24 hr tablet     Sig: Take 1 tablet by mouth 2 (Two) Times a Day.     Dispense:  60 tablet     Refill:  0       No follow-ups on file.           This document has been electronically signed by WALKER Romero  November 2, 2023 15:29 EDT      Part of this note may be an electronic transcription/translation of spoken language to printed text using the Dragon Dictation System.

## 2023-11-09 ENCOUNTER — TELEMEDICINE (OUTPATIENT)
Dept: PSYCHIATRY | Facility: CLINIC | Age: 53
End: 2023-11-09
Payer: MEDICAID

## 2023-11-09 VITALS
DIASTOLIC BLOOD PRESSURE: 76 MMHG | HEART RATE: 88 BPM | SYSTOLIC BLOOD PRESSURE: 138 MMHG | BODY MASS INDEX: 32.81 KG/M2 | WEIGHT: 173.8 LBS | HEIGHT: 61 IN

## 2023-11-09 DIAGNOSIS — F11.20 OPIOID TYPE DEPENDENCE, CONTINUOUS: Primary | ICD-10-CM

## 2023-11-09 DIAGNOSIS — Z79.899 MEDICATION MANAGEMENT: ICD-10-CM

## 2023-11-09 LAB
EXTERNAL AMPHETAMINE SCREEN URINE: NEGATIVE
EXTERNAL BENZODIAZEPINE SCREEN URINE: NEGATIVE
EXTERNAL BUPRENORPHINE SCREEN URINE: POSITIVE
EXTERNAL COCAINE SCREEN URINE: NEGATIVE
EXTERNAL MDMA: NEGATIVE
EXTERNAL METHADONE SCREEN URINE: NEGATIVE
EXTERNAL METHAMPHETAMINE SCREEN URINE: NEGATIVE
EXTERNAL OPIATES SCREEN URINE: NEGATIVE
EXTERNAL OXYCODONE SCREEN URINE: NEGATIVE
EXTERNAL THC SCREEN URINE: POSITIVE

## 2023-11-09 RX ORDER — BUPRENORPHINE HYDROCHLORIDE AND NALOXONE HYDROCHLORIDE DIHYDRATE 8; 2 MG/1; MG/1
2 TABLET SUBLINGUAL DAILY
Qty: 14 TABLET | Refills: 0 | Status: SHIPPED | OUTPATIENT
Start: 2023-11-09

## 2023-11-09 NOTE — PROGRESS NOTES
This provider is located at [facility]. The Patient is seen remotely located at the [facility] ([facility]) using Video. Patient is being seen via telehealth and confirm that they are in a secure environment for this session. The patient's condition being diagnosed/treated is appropriate for telemedicine. Provider identified as Williams Brody as well as credentials APRN MSN FNP-C SRINIVASAN-YOUNG.   The client/patient gave consent to be seen remotely, and when consent is given they understand that the consent allows for patient identifiable information to be sent to a third party as needed.   They may refuse to be seen remotely at any time. The electronic data is encrypted and password protected, and the patient has been advised of the potential risks to privacy not withstanding such measures.    Chief Complaint/History of Present Illness: Follow Up buprenorphine/naloxone Medicated Assisted Treatment for Opiate Use Disorder     Patient/Client Concerns/Updates: Continue Wellbutrin for stimulant cravings, Vistaril for anxiety   -Patient reports court hearing this past Monday was successful; at this time no incarceration is pending and patient will report again in January  -Patient celebrating no methamphetamine used since last evaluation and reports she is doing well  -Anxious symptoms overall controlled, no depressive episodes, no suicidal or homicidal thoughts    Triggers (Persons/Places/Things/Events/Thought/Emotions): Recent court proceedings    Cravings/Substance Use: Denies cravings or use of illicit stimulants    Relapse Prevention: Counseling    Urine Drug Screen (today's visit) discussed: Positive buprenorphine and positive THC    UDS Confirmation (Most recent/Resulted): Positive buprenorphine/nor-buprenorphine, no concerns for urine tampering and otherwise negative for substances tested    Most recent pertinent laboratory studies reviewed: 4/11/23-hepatic function within normal limits, Cr WNL, HIV  negative, hepatitis C not detected          ROSY (PDMP) Reviewed for Current/Active Medications: buprenorphine/naloxone as reviewed today    Past Surgical History:  No past surgical history on file.    Problem List:  There is no problem list on file for this patient.      Allergy:   No Known Allergies     Current Medications:   Current Outpatient Medications   Medication Sig Dispense Refill   • buprenorphine-naloxone (SUBOXONE) 8-2 MG per SL tablet Place 2 tablets under the tongue Daily. 14 tablet 0   • buPROPion XL (Wellbutrin XL) 150 MG 24 hr tablet Take 1 tablet by mouth 2 (Two) Times a Day. 60 tablet 0   • famotidine (PEPCID) 20 MG tablet Take 1 tablet by mouth 2 (Two) Times a Day. 60 tablet 0   • GNP PAIN RELIEF EX-STRENGTH 500 MG tablet TAKE ONE TABLET BY MOUTH EVERY 6 HOURS AS NEEDED FOR 30 DAYS **DO NOT EXCEED 6 TABLETS IN 24 HOURS**     • hydrOXYzine pamoate (Vistaril) 25 MG capsule Take 1 capsule by mouth 4 (Four) Times a Day As Needed for Anxiety. 120 capsule 0   • ondansetron ODT (ZOFRAN-ODT) 8 MG disintegrating tablet Place 1 tablet on the tongue Every 8 (Eight) Hours As Needed for Nausea or Vomiting. 45 tablet 0   • naloxone (NARCAN) 4 MG/0.1ML nasal spray 1 spray into the nostril(s) as directed by provider As Needed (opiate over sedation). 1 spray in 1 nostril every 2 to 3 minutes, call 911 (Patient not taking: Reported on 11/9/2023) 2 each 2     No current facility-administered medications for this visit.       Past Medical History:  Past Medical History:   Diagnosis Date   • History of hepatitis B    • Pancreatitis          Social History     Socioeconomic History   • Marital status: Single   Tobacco Use   • Smoking status: Every Day     Packs/day: 1.00     Years: 15.00     Additional pack years: 0.00     Total pack years: 15.00     Types: Cigarettes   • Smokeless tobacco: Never   Vaping Use   • Vaping Use: Never used   Substance and Sexual Activity   • Alcohol use: Not Currently   • Drug use: Yes      Types: Marijuana, Hydrocodone, Oxycodone, Methamphetamines   • Sexual activity: Defer         Family History   Problem Relation Age of Onset   • No Known Problems Mother    • No Known Problems Father    • No Known Problems Sister    • No Known Problems Brother    • No Known Problems Maternal Aunt    • No Known Problems Paternal Aunt    • No Known Problems Maternal Uncle    • No Known Problems Paternal Uncle    • No Known Problems Maternal Grandfather    • No Known Problems Maternal Grandmother    • No Known Problems Paternal Grandfather    • No Known Problems Paternal Grandmother    • No Known Problems Cousin    • No Known Problems Other    • ADD / ADHD Neg Hx    • Alcohol abuse Neg Hx    • Anxiety disorder Neg Hx    • Bipolar disorder Neg Hx    • Dementia Neg Hx    • Depression Neg Hx    • Drug abuse Neg Hx    • OCD Neg Hx    • Paranoid behavior Neg Hx    • Schizophrenia Neg Hx    • Seizures Neg Hx    • Self-Injurious Behavior  Neg Hx    • Suicide Attempts Neg Hx          Mental Status Exam:   Hygiene:   good  Cooperation:  Cooperative  Eye Contact:  Good  Psychomotor Behavior:  Appropriate  Affect:  Appropriate  Mood: normal  Speech:  Normal  Thought Process:  Goal directed  Thought Content:  Normal  Suicidal:  None  Homicidal:  None  Hallucinations:  None  Delusion:  None  Memory:  Intact  Orientation:  Grossly intact  Reliability:  good  Insight:  Good  Judgement:  Good  Impulse Control:  Good         Review of Systems:  Review of Systems   Constitutional:  Negative for activity change, chills, diaphoresis and fatigue.   Respiratory:  Negative for apnea, cough and shortness of breath.    Cardiovascular:  Negative for chest pain, palpitations and leg swelling.   Gastrointestinal:  Negative for abdominal pain, constipation, diarrhea, nausea and vomiting.   Genitourinary:  Negative for difficulty urinating.   Musculoskeletal:  Negative for arthralgias.   Skin:  Negative for rash.   Neurological:  Negative for  "dizziness, weakness and headaches.   Psychiatric/Behavioral:  Negative for agitation, self-injury, sleep disturbance and suicidal ideas. The patient is not nervous/anxious.        Physical Exam:  Physical Exam  Vitals reviewed.   Constitutional:       General: She is not in acute distress.     Appearance: Normal appearance. She is not ill-appearing or toxic-appearing.   Pulmonary:      Effort: Pulmonary effort is normal.   Musculoskeletal:         General: Normal range of motion.   Neurological:      General: No focal deficit present.      Mental Status: She is alert and oriented to person, place, and time.   Psychiatric:         Attention and Perception: Attention and perception normal.         Mood and Affect: Mood normal. Mood is not anxious or depressed.         Speech: Speech normal.         Behavior: Behavior normal. Behavior is cooperative.         Thought Content: Thought content normal.         Cognition and Memory: Cognition and memory normal.         Judgment: Judgment normal.     Vital Signs:   /76   Pulse 88   Ht 156.2 cm (61.5\")   Wt 78.8 kg (173 lb 12.8 oz)   BMI 32.31 kg/m²      Lab Results:   Telemedicine on 11/09/2023   Component Date Value Ref Range Status   • External Amphetamine Screen Urine 11/09/2023 Negative   Final   • External Benzodiazepine Screen Uri* 11/09/2023 Negative   Final   • External Cocaine Screen Urine 11/09/2023 Negative   Final   • External THC Screen Urine 11/09/2023 Positive (A)   Final   • External Methadone Screen Urine 11/09/2023 Negative   Final   • External Methamphetamine Screen Ur* 11/09/2023 Negative   Final   • External Oxycodone Screen Urine 11/09/2023 Negative   Final   • External Buprenorphine Screen Urine 11/09/2023 Positive (A)   Final   • External MDMA 11/09/2023 Negative   Final   • External Opiates Screen Urine 11/09/2023 Negative   Final   Telemedicine on 11/02/2023   Component Date Value Ref Range Status   • External Amphetamine Screen Urine " 11/02/2023 Negative   Final   • External Benzodiazepine Screen Uri* 11/02/2023 Negative   Final   • External Cocaine Screen Urine 11/02/2023 Negative   Final   • External THC Screen Urine 11/02/2023 Negative   Final   • External Methadone Screen Urine 11/02/2023 Negative   Final   • External Methamphetamine Screen Ur* 11/02/2023 Negative   Final   • External Oxycodone Screen Urine 11/02/2023 Negative   Final   • External Buprenorphine Screen Urine 11/02/2023 Positive (A)   Final   • External MDMA 11/02/2023 Negative   Final   • External Opiates Screen Urine 11/02/2023 Negative   Final   Appointment on 10/26/2023   Component Date Value Ref Range Status   • External Amphetamine Screen Urine 10/26/2023 Positive (A)   Final   • External Benzodiazepine Screen Uri* 10/26/2023 Negative   Final   • External Cocaine Screen Urine 10/26/2023 Negative   Final   • External THC Screen Urine 10/26/2023 Positive (A)   Final   • External Methadone Screen Urine 10/26/2023 Negative   Final   • External Methamphetamine Screen Ur* 10/26/2023 Positive (A)   Final   • External Oxycodone Screen Urine 10/26/2023 Negative   Final   • External Buprenorphine Screen Urine 10/26/2023 Positive (A)   Final   • External MDMA 10/26/2023 Positive (A)   Final   • External Opiates Screen Urine 10/26/2023 Negative   Final   Telemedicine on 10/12/2023   Component Date Value Ref Range Status   • External Amphetamine Screen Urine 10/12/2023 Positive (A)   Final   • External Benzodiazepine Screen Uri* 10/12/2023 Negative   Final   • External Cocaine Screen Urine 10/12/2023 Negative   Final   • External THC Screen Urine 10/12/2023 Positive (A)   Final   • External Methadone Screen Urine 10/12/2023 Negative   Final   • External Methamphetamine Screen Ur* 10/12/2023 Positive (A)   Final   • External Oxycodone Screen Urine 10/12/2023 Negative   Final   • External Buprenorphine Screen Urine 10/12/2023 Positive (A)   Final   • External MDMA 10/12/2023 Negative    Final   • External Opiates Screen Urine 10/12/2023 Negative   Final   Telemedicine on 10/05/2023   Component Date Value Ref Range Status   • External Amphetamine Screen Urine 10/05/2023 Positive (A)   Final   • External Benzodiazepine Screen Uri* 10/05/2023 Negative   Final   • External Cocaine Screen Urine 10/05/2023 Negative   Final   • External THC Screen Urine 10/05/2023 Positive (A)   Final   • External Methadone Screen Urine 10/05/2023 Negative   Final   • External Methamphetamine Screen Ur* 10/05/2023 Positive (A)   Final   • External Oxycodone Screen Urine 10/05/2023 Negative   Final   • External Buprenorphine Screen Urine 10/05/2023 Positive (A)   Final   • External MDMA 10/05/2023 Negative   Final   • External Opiates Screen Urine 10/05/2023 Negative   Final   Telemedicine on 09/20/2023   Component Date Value Ref Range Status   • External Amphetamine Screen Urine 09/20/2023 Positive (A)   Final   • External Benzodiazepine Screen Uri* 09/20/2023 Negative   Final   • External Cocaine Screen Urine 09/20/2023 Negative   Final   • External THC Screen Urine 09/20/2023 Positive (A)   Final   • External Methadone Screen Urine 09/20/2023 Negative   Final   • External Methamphetamine Screen Ur* 09/20/2023 Positive (A)   Final   • External Oxycodone Screen Urine 09/20/2023 Negative   Final   • External Buprenorphine Screen Urine 09/20/2023 Positive (A)   Final   • External MDMA 09/20/2023 Positive (A)   Final   • External Opiates Screen Urine 09/20/2023 Negative   Final   Telemedicine on 09/13/2023   Component Date Value Ref Range Status   • External Amphetamine Screen Urine 09/13/2023 Positive (A)   Final   • External Benzodiazepine Screen Uri* 09/13/2023 Negative   Final   • External Cocaine Screen Urine 09/13/2023 Negative   Final   • External THC Screen Urine 09/13/2023 Positive (A)   Final   • External Methadone Screen Urine 09/13/2023 Negative   Final   • External Methamphetamine Screen Ur* 09/13/2023 Positive  (A)   Final   • External Oxycodone Screen Urine 09/13/2023 Negative   Final   • External Buprenorphine Screen Urine 09/13/2023 Positive (A)   Final   • External MDMA 09/13/2023 Positive (A)   Final   • External Opiates Screen Urine 09/13/2023 Negative   Final   Telemedicine on 09/06/2023   Component Date Value Ref Range Status   • External Amphetamine Screen Urine 09/06/2023 Positive (A)   Final   • External Benzodiazepine Screen Uri* 09/06/2023 Negative   Final   • External Cocaine Screen Urine 09/06/2023 Negative   Final   • External THC Screen Urine 09/06/2023 Positive (A)   Final   • External Methadone Screen Urine 09/06/2023 Negative   Final   • External Methamphetamine Screen Ur* 09/06/2023 Positive (A)   Final   • External Oxycodone Screen Urine 09/06/2023 Negative   Final   • External Buprenorphine Screen Urine 09/06/2023 Positive (A)   Final   • External MDMA 09/06/2023 Negative   Final   • External Opiates Screen Urine 09/06/2023 Negative   Final   Telemedicine on 06/22/2023   Component Date Value Ref Range Status   • External Amphetamine Screen Urine 06/22/2023 Positive (A)   Final   • External Benzodiazepine Screen Uri* 06/22/2023 Negative   Final   • External Cocaine Screen Urine 06/22/2023 Negative   Final   • External THC Screen Urine 06/22/2023 Positive (A)   Final   • External Methadone Screen Urine 06/22/2023 Negative   Final   • External Methamphetamine Screen Ur* 06/22/2023 Positive (A)   Final   • External Oxycodone Screen Urine 06/22/2023 Negative   Final   • External Buprenorphine Screen Urine 06/22/2023 Positive (A)   Final   • External MDMA 06/22/2023 Negative   Final   • External Opiates Screen Urine 06/22/2023 Negative   Final   Telemedicine on 06/08/2023   Component Date Value Ref Range Status   • External Amphetamine Screen Urine 06/08/2023 Negative   Final   • External Benzodiazepine Screen Uri* 06/08/2023 Negative   Final   • External Cocaine Screen Urine 06/08/2023 Negative   Final    • External THC Screen Urine 06/08/2023 Positive (A)   Final   • External Methadone Screen Urine 06/08/2023 Negative   Final   • External Methamphetamine Screen Ur* 06/08/2023 Positive (A)   Final   • External Oxycodone Screen Urine 06/08/2023 Negative   Final   • External Buprenorphine Screen Urine 06/08/2023 Positive (A)   Final   • External MDMA 06/08/2023 Negative   Final   • External Opiates Screen Urine 06/08/2023 Negative   Final   There may be more visits with results that are not included.         Assessment & Plan   Diagnoses and all orders for this visit:    1. Opioid type dependence, continuous (Primary)  -     buprenorphine-naloxone (SUBOXONE) 8-2 MG per SL tablet; Place 2 tablets under the tongue Daily.  Dispense: 14 tablet; Refill: 0    2. Medication management  -     [facility] Drug Screen        Visit Diagnoses:    ICD-10-CM ICD-9-CM   1. Opioid type dependence, continuous  F11.20 304.01   2. Medication management  Z79.899 V58.69       PLAN:  Safety: No acute safety concerns  Risk Assessment: Risk of self-harm acutely is low. Risk of self-harm chronically is also low, but could be further elevated in the event of treatment noncompliance and/or AODA.    TREATMENT PLAN/GOALS: Continue supportive psychotherapy efforts and medications as indicated. Treatment and medication options discussed during today's visit. Patient acknowledged and verbally consented to continue with current treatment plan and was educated on the importance of compliance with treatment and follow-up appointments.    MEDICATION ISSUES:  ROSY reviewed as expected.  Discussed medication options and treatment plan of prescribed medication as well as the risks, benefits, and side effects including potential falls, possible impaired driving and metabolic adversities among others. Patient is agreeable to call the office with any worsening of symptoms or onset of side effects. Patient is agreeable to call 911 or go to the nearest ER should  he/she begin having SI/HI. No medication side effects or related complaints today.     MEDS ORDERED DURING VISIT:  New Medications Ordered This Visit   Medications   • buprenorphine-naloxone (SUBOXONE) 8-2 MG per SL tablet     Sig: Place 2 tablets under the tongue Daily.     Dispense:  14 tablet     Refill:  0     NADEAN:[id number]       No follow-ups on file.           This document has been electronically signed by WALKER Romero  November 9, 2023 09:34 EST      Part of this note may be an electronic transcription/translation of spoken language to printed text using the Dragon Dictation System.

## 2023-11-16 ENCOUNTER — TELEMEDICINE (OUTPATIENT)
Dept: PSYCHIATRY | Facility: CLINIC | Age: 53
End: 2023-11-16
Payer: MEDICAID

## 2023-11-16 VITALS
BODY MASS INDEX: 33.42 KG/M2 | SYSTOLIC BLOOD PRESSURE: 140 MMHG | HEIGHT: 61 IN | DIASTOLIC BLOOD PRESSURE: 76 MMHG | WEIGHT: 177 LBS | HEART RATE: 82 BPM

## 2023-11-16 DIAGNOSIS — Z79.899 MEDICATION MANAGEMENT: ICD-10-CM

## 2023-11-16 DIAGNOSIS — F11.20 OPIOID TYPE DEPENDENCE, CONTINUOUS: Primary | ICD-10-CM

## 2023-11-16 RX ORDER — BUPRENORPHINE HYDROCHLORIDE AND NALOXONE HYDROCHLORIDE DIHYDRATE 8; 2 MG/1; MG/1
2 TABLET SUBLINGUAL DAILY
Qty: 28 TABLET | Refills: 0 | Status: SHIPPED | OUTPATIENT
Start: 2023-11-16

## 2023-11-16 NOTE — PROGRESS NOTES
This provider is located at [facility]. The Patient is seen remotely located at the [facility] ([facility]) using Video. Patient is being seen via telehealth and confirm that they are in a secure environment for this session. The patient's condition being diagnosed/treated is appropriate for telemedicine. Provider identified as Williams Brody as well as credentials APRN MSN FNP-SHAHEEN MATTSON-YOUNG.   The client/patient gave consent to be seen remotely, and when consent is given they understand that the consent allows for patient identifiable information to be sent to a third party as needed.   They may refuse to be seen remotely at any time. The electronic data is encrypted and password protected, and the patient has been advised of the potential risks to privacy not withstanding such measures.    Chief Complaint/History of Present Illness: Follow Up buprenorphine/naloxone Medicated Assisted Treatment for Opiate Use Disorder     Patient/Client Concerns/Updates: Continue Wellbutrin for stimulant cravings, Vistaril for anxiety    -Patient reports she is doing well maintaining abstinence from methamphetamine  -We will accommodate 2-week prescription of Suboxone for patient's progress in her goals and recovery  -Patient plans to not spend time with other individuals over Thanksgiving to avoid triggers to use methamphetamine  -Mood is stable with no depressive or anxious exacerbations, no mood fluctuations    Triggers (Persons/Places/Things/Events/Thought/Emotions): Life stress    Cravings/Substance Use: Denies cravings or use of illicit substances    Relapse Prevention: Counseling    Urine Drug Screen (today's visit) discussed: Positive buprenorphine, otherwise negative for substances tested    UDS Confirmation (Most recent/Resulted): Positive buprenorphine/nor-buprenorphine, no concerns for urine tampering otherwise positive THC    Most recent pertinent laboratory studies reviewed: 4/11/23-hepatic function  within normal limits, Cr WNL, HIV negative, hepatitis C not detected      ROSY (PDMP) Reviewed for Current/Active Medications: buprenorphine/naloxone as reviewed today    Past Surgical History:  No past surgical history on file.    Problem List:  There is no problem list on file for this patient.      Allergy:   No Known Allergies     Current Medications:   Current Outpatient Medications   Medication Sig Dispense Refill   • buprenorphine-naloxone (SUBOXONE) 8-2 MG per SL tablet Place 2 tablets under the tongue Daily. 28 tablet 0   • buPROPion XL (Wellbutrin XL) 150 MG 24 hr tablet Take 1 tablet by mouth 2 (Two) Times a Day. 60 tablet 0   • famotidine (PEPCID) 20 MG tablet Take 1 tablet by mouth 2 (Two) Times a Day. 60 tablet 0   • GNP PAIN RELIEF EX-STRENGTH 500 MG tablet TAKE ONE TABLET BY MOUTH EVERY 6 HOURS AS NEEDED FOR 30 DAYS **DO NOT EXCEED 6 TABLETS IN 24 HOURS**     • hydrOXYzine pamoate (Vistaril) 25 MG capsule Take 1 capsule by mouth 4 (Four) Times a Day As Needed for Anxiety. 120 capsule 0   • ondansetron ODT (ZOFRAN-ODT) 8 MG disintegrating tablet Place 1 tablet on the tongue Every 8 (Eight) Hours As Needed for Nausea or Vomiting. 45 tablet 0   • naloxone (NARCAN) 4 MG/0.1ML nasal spray 1 spray into the nostril(s) as directed by provider As Needed (opiate over sedation). 1 spray in 1 nostril every 2 to 3 minutes, call 911 (Patient not taking: Reported on 11/16/2023) 2 each 2     No current facility-administered medications for this visit.       Past Medical History:  Past Medical History:   Diagnosis Date   • History of hepatitis B    • Pancreatitis          Social History     Socioeconomic History   • Marital status: Single   Tobacco Use   • Smoking status: Every Day     Packs/day: 1.00     Years: 15.00     Additional pack years: 0.00     Total pack years: 15.00     Types: Cigarettes   • Smokeless tobacco: Never   Vaping Use   • Vaping Use: Never used   Substance and Sexual Activity   • Alcohol use:  Not Currently   • Drug use: Yes     Types: Marijuana, Hydrocodone, Oxycodone, Methamphetamines   • Sexual activity: Defer         Family History   Problem Relation Age of Onset   • No Known Problems Mother    • No Known Problems Father    • No Known Problems Sister    • No Known Problems Brother    • No Known Problems Maternal Aunt    • No Known Problems Paternal Aunt    • No Known Problems Maternal Uncle    • No Known Problems Paternal Uncle    • No Known Problems Maternal Grandfather    • No Known Problems Maternal Grandmother    • No Known Problems Paternal Grandfather    • No Known Problems Paternal Grandmother    • No Known Problems Cousin    • No Known Problems Other    • ADD / ADHD Neg Hx    • Alcohol abuse Neg Hx    • Anxiety disorder Neg Hx    • Bipolar disorder Neg Hx    • Dementia Neg Hx    • Depression Neg Hx    • Drug abuse Neg Hx    • OCD Neg Hx    • Paranoid behavior Neg Hx    • Schizophrenia Neg Hx    • Seizures Neg Hx    • Self-Injurious Behavior  Neg Hx    • Suicide Attempts Neg Hx          Mental Status Exam:   Hygiene:   good  Cooperation:  Cooperative  Eye Contact:  Good  Psychomotor Behavior:  Appropriate  Affect:  Appropriate  Mood: normal  Speech:  Normal  Thought Process:  Goal directed  Thought Content:  Normal  Suicidal:  None  Homicidal:  None  Hallucinations:  None  Delusion:  None  Memory:  Intact  Orientation:  Grossly intact  Reliability:  good  Insight:  Good  Judgement:  Good  Impulse Control:  Good         Review of Systems:  Review of Systems   Constitutional:  Negative for activity change, chills, diaphoresis and fatigue.   Respiratory:  Negative for apnea, cough and shortness of breath.    Cardiovascular:  Negative for chest pain, palpitations and leg swelling.   Gastrointestinal:  Negative for abdominal pain, constipation, diarrhea, nausea and vomiting.   Genitourinary:  Negative for difficulty urinating.   Musculoskeletal:  Negative for arthralgias.   Skin:  Negative for rash.  "  Neurological:  Negative for dizziness, weakness and headaches.   Psychiatric/Behavioral:  Negative for agitation, self-injury, sleep disturbance and suicidal ideas. The patient is not nervous/anxious.        Physical Exam:  Physical Exam  Vitals reviewed.   Constitutional:       General: She is not in acute distress.     Appearance: Normal appearance. She is not ill-appearing or toxic-appearing.   Pulmonary:      Effort: Pulmonary effort is normal.   Musculoskeletal:         General: Normal range of motion.   Neurological:      General: No focal deficit present.      Mental Status: She is alert and oriented to person, place, and time.   Psychiatric:         Attention and Perception: Attention and perception normal.         Mood and Affect: Mood normal. Mood is not anxious or depressed.         Speech: Speech normal.         Behavior: Behavior normal. Behavior is cooperative.         Thought Content: Thought content normal.         Cognition and Memory: Cognition and memory normal.         Judgment: Judgment normal.     Vital Signs:   /76   Pulse 82   Ht 156.2 cm (61.5\")   Wt 80.3 kg (177 lb)   BMI 32.91 kg/m²      Lab Results:   Telemedicine on 11/16/2023   Component Date Value Ref Range Status   • External Amphetamine Screen Urine 11/16/2023 Negative   Final   • External Benzodiazepine Screen Uri* 11/16/2023 Negative   Final   • External Cocaine Screen Urine 11/16/2023 Negative   Final   • External THC Screen Urine 11/16/2023 Negative   Final   • External Methadone Screen Urine 11/16/2023 Negative   Final   • External Methamphetamine Screen Ur* 11/16/2023 Negative   Final   • External Oxycodone Screen Urine 11/16/2023 Negative   Final   • External Buprenorphine Screen Urine 11/16/2023 Positive (A)   Final   • External MDMA 11/16/2023 Negative   Final   • External Opiates Screen Urine 11/16/2023 Negative   Final   Telemedicine on 11/09/2023   Component Date Value Ref Range Status   • External Amphetamine " Screen Urine 11/09/2023 Negative   Final   • External Benzodiazepine Screen Uri* 11/09/2023 Negative   Final   • External Cocaine Screen Urine 11/09/2023 Negative   Final   • External THC Screen Urine 11/09/2023 Positive (A)   Final   • External Methadone Screen Urine 11/09/2023 Negative   Final   • External Methamphetamine Screen Ur* 11/09/2023 Negative   Final   • External Oxycodone Screen Urine 11/09/2023 Negative   Final   • External Buprenorphine Screen Urine 11/09/2023 Positive (A)   Final   • External MDMA 11/09/2023 Negative   Final   • External Opiates Screen Urine 11/09/2023 Negative   Final   Telemedicine on 11/02/2023   Component Date Value Ref Range Status   • External Amphetamine Screen Urine 11/02/2023 Negative   Final   • External Benzodiazepine Screen Uri* 11/02/2023 Negative   Final   • External Cocaine Screen Urine 11/02/2023 Negative   Final   • External THC Screen Urine 11/02/2023 Negative   Final   • External Methadone Screen Urine 11/02/2023 Negative   Final   • External Methamphetamine Screen Ur* 11/02/2023 Negative   Final   • External Oxycodone Screen Urine 11/02/2023 Negative   Final   • External Buprenorphine Screen Urine 11/02/2023 Positive (A)   Final   • External MDMA 11/02/2023 Negative   Final   • External Opiates Screen Urine 11/02/2023 Negative   Final   Appointment on 10/26/2023   Component Date Value Ref Range Status   • External Amphetamine Screen Urine 10/26/2023 Positive (A)   Final   • External Benzodiazepine Screen Uri* 10/26/2023 Negative   Final   • External Cocaine Screen Urine 10/26/2023 Negative   Final   • External THC Screen Urine 10/26/2023 Positive (A)   Final   • External Methadone Screen Urine 10/26/2023 Negative   Final   • External Methamphetamine Screen Ur* 10/26/2023 Positive (A)   Final   • External Oxycodone Screen Urine 10/26/2023 Negative   Final   • External Buprenorphine Screen Urine 10/26/2023 Positive (A)   Final   • External MDMA 10/26/2023 Positive  (A)   Final   • External Opiates Screen Urine 10/26/2023 Negative   Final   Telemedicine on 10/12/2023   Component Date Value Ref Range Status   • External Amphetamine Screen Urine 10/12/2023 Positive (A)   Final   • External Benzodiazepine Screen Uri* 10/12/2023 Negative   Final   • External Cocaine Screen Urine 10/12/2023 Negative   Final   • External THC Screen Urine 10/12/2023 Positive (A)   Final   • External Methadone Screen Urine 10/12/2023 Negative   Final   • External Methamphetamine Screen Ur* 10/12/2023 Positive (A)   Final   • External Oxycodone Screen Urine 10/12/2023 Negative   Final   • External Buprenorphine Screen Urine 10/12/2023 Positive (A)   Final   • External MDMA 10/12/2023 Negative   Final   • External Opiates Screen Urine 10/12/2023 Negative   Final   Telemedicine on 10/05/2023   Component Date Value Ref Range Status   • External Amphetamine Screen Urine 10/05/2023 Positive (A)   Final   • External Benzodiazepine Screen Uri* 10/05/2023 Negative   Final   • External Cocaine Screen Urine 10/05/2023 Negative   Final   • External THC Screen Urine 10/05/2023 Positive (A)   Final   • External Methadone Screen Urine 10/05/2023 Negative   Final   • External Methamphetamine Screen Ur* 10/05/2023 Positive (A)   Final   • External Oxycodone Screen Urine 10/05/2023 Negative   Final   • External Buprenorphine Screen Urine 10/05/2023 Positive (A)   Final   • External MDMA 10/05/2023 Negative   Final   • External Opiates Screen Urine 10/05/2023 Negative   Final   Telemedicine on 09/20/2023   Component Date Value Ref Range Status   • External Amphetamine Screen Urine 09/20/2023 Positive (A)   Final   • External Benzodiazepine Screen Uri* 09/20/2023 Negative   Final   • External Cocaine Screen Urine 09/20/2023 Negative   Final   • External THC Screen Urine 09/20/2023 Positive (A)   Final   • External Methadone Screen Urine 09/20/2023 Negative   Final   • External Methamphetamine Screen Ur* 09/20/2023  Positive (A)   Final   • External Oxycodone Screen Urine 09/20/2023 Negative   Final   • External Buprenorphine Screen Urine 09/20/2023 Positive (A)   Final   • External MDMA 09/20/2023 Positive (A)   Final   • External Opiates Screen Urine 09/20/2023 Negative   Final   Telemedicine on 09/13/2023   Component Date Value Ref Range Status   • External Amphetamine Screen Urine 09/13/2023 Positive (A)   Final   • External Benzodiazepine Screen Uri* 09/13/2023 Negative   Final   • External Cocaine Screen Urine 09/13/2023 Negative   Final   • External THC Screen Urine 09/13/2023 Positive (A)   Final   • External Methadone Screen Urine 09/13/2023 Negative   Final   • External Methamphetamine Screen Ur* 09/13/2023 Positive (A)   Final   • External Oxycodone Screen Urine 09/13/2023 Negative   Final   • External Buprenorphine Screen Urine 09/13/2023 Positive (A)   Final   • External MDMA 09/13/2023 Positive (A)   Final   • External Opiates Screen Urine 09/13/2023 Negative   Final   Telemedicine on 09/06/2023   Component Date Value Ref Range Status   • External Amphetamine Screen Urine 09/06/2023 Positive (A)   Final   • External Benzodiazepine Screen Uri* 09/06/2023 Negative   Final   • External Cocaine Screen Urine 09/06/2023 Negative   Final   • External THC Screen Urine 09/06/2023 Positive (A)   Final   • External Methadone Screen Urine 09/06/2023 Negative   Final   • External Methamphetamine Screen Ur* 09/06/2023 Positive (A)   Final   • External Oxycodone Screen Urine 09/06/2023 Negative   Final   • External Buprenorphine Screen Urine 09/06/2023 Positive (A)   Final   • External MDMA 09/06/2023 Negative   Final   • External Opiates Screen Urine 09/06/2023 Negative   Final   Telemedicine on 06/22/2023   Component Date Value Ref Range Status   • External Amphetamine Screen Urine 06/22/2023 Positive (A)   Final   • External Benzodiazepine Screen Uri* 06/22/2023 Negative   Final   • External Cocaine Screen Urine 06/22/2023  Negative   Final   • External THC Screen Urine 06/22/2023 Positive (A)   Final   • External Methadone Screen Urine 06/22/2023 Negative   Final   • External Methamphetamine Screen Ur* 06/22/2023 Positive (A)   Final   • External Oxycodone Screen Urine 06/22/2023 Negative   Final   • External Buprenorphine Screen Urine 06/22/2023 Positive (A)   Final   • External MDMA 06/22/2023 Negative   Final   • External Opiates Screen Urine 06/22/2023 Negative   Final   There may be more visits with results that are not included.         Assessment & Plan   Diagnoses and all orders for this visit:    1. Opioid type dependence, continuous (Primary)  -     buprenorphine-naloxone (SUBOXONE) 8-2 MG per SL tablet; Place 2 tablets under the tongue Daily.  Dispense: 28 tablet; Refill: 0    2. Medication management  -     [facility] Drug Screen        Visit Diagnoses:    ICD-10-CM ICD-9-CM   1. Opioid type dependence, continuous  F11.20 304.01   2. Medication management  Z79.899 V58.69       PLAN:  Safety: No acute safety concerns  Risk Assessment: Risk of self-harm acutely is low. Risk of self-harm chronically is also low, but could be further elevated in the event of treatment noncompliance and/or AODA.    TREATMENT PLAN/GOALS: Continue supportive psychotherapy efforts and medications as indicated. Treatment and medication options discussed during today's visit. Patient acknowledged and verbally consented to continue with current treatment plan and was educated on the importance of compliance with treatment and follow-up appointments.    MEDICATION ISSUES:  ROSY reviewed as expected.  Discussed medication options and treatment plan of prescribed medication as well as the risks, benefits, and side effects including potential falls, possible impaired driving and metabolic adversities among others. Patient is agreeable to call the office with any worsening of symptoms or onset of side effects. Patient is agreeable to call 911 or go to the  nearest ER should he/she begin having SI/HI. No medication side effects or related complaints today.     MEDS ORDERED DURING VISIT:  New Medications Ordered This Visit   Medications   • buprenorphine-naloxone (SUBOXONE) 8-2 MG per SL tablet     Sig: Place 2 tablets under the tongue Daily.     Dispense:  28 tablet     Refill:  0     NADEAN:[id number]       No follow-ups on file.           This document has been electronically signed by WALKER Romero  November 16, 2023 14:57 EST      Part of this note may be an electronic transcription/translation of spoken language to printed text using the Dragon Dictation System.

## 2023-11-30 ENCOUNTER — TELEPHONE (OUTPATIENT)
Dept: PSYCHIATRY | Facility: CLINIC | Age: 53
End: 2023-11-30

## 2023-11-30 NOTE — TELEPHONE ENCOUNTER
Patient called to make you aware she is in is incarcerated.    Roula stated while getting ready for her appointment today that the police knocked on her door and arrested her for the indictment warrant she had already been going to court for. She stated the nurse at the snf looked at [facility] and seen that Roula is being prescribed Suboxone and that while Roula is in custody they can give her Suboxone as it is prescribed. Roula said she has 3 tablets at home so if she gets released over the weekend she can take those to hold her until she can get in to see you Monday.

## 2023-12-05 ENCOUNTER — TELEMEDICINE (OUTPATIENT)
Dept: PSYCHIATRY | Facility: CLINIC | Age: 53
End: 2023-12-05
Payer: MEDICAID

## 2023-12-05 VITALS
BODY MASS INDEX: 32.66 KG/M2 | DIASTOLIC BLOOD PRESSURE: 69 MMHG | HEIGHT: 61 IN | WEIGHT: 173 LBS | HEART RATE: 90 BPM | SYSTOLIC BLOOD PRESSURE: 128 MMHG

## 2023-12-05 DIAGNOSIS — F11.20 OPIOID TYPE DEPENDENCE, CONTINUOUS: Primary | ICD-10-CM

## 2023-12-05 DIAGNOSIS — Z79.899 MEDICATION MANAGEMENT: ICD-10-CM

## 2023-12-05 DIAGNOSIS — F15.10 METHAMPHETAMINE ABUSE: ICD-10-CM

## 2023-12-05 RX ORDER — BUPROPION HYDROCHLORIDE 150 MG/1
150 TABLET ORAL 2 TIMES DAILY
Qty: 60 TABLET | Refills: 1 | Status: SHIPPED | OUTPATIENT
Start: 2023-12-05 | End: 2024-12-04

## 2023-12-05 RX ORDER — BUPRENORPHINE HYDROCHLORIDE AND NALOXONE HYDROCHLORIDE DIHYDRATE 8; 2 MG/1; MG/1
2 TABLET SUBLINGUAL DAILY
Qty: 14 TABLET | Refills: 0 | Status: SHIPPED | OUTPATIENT
Start: 2023-12-05

## 2023-12-05 NOTE — PROGRESS NOTES
This provider is located at [facility]. The Patient is seen remotely located at the [facility] ([facility]) using Video. Patient is being seen via telehealth and confirm that they are in a secure environment for this session. The patient's condition being diagnosed/treated is appropriate for telemedicine. Provider identified as Williams Brody as well as credentials APRN MSN FNP-C SRINIVASAN-YOUNG.   The client/patient gave consent to be seen remotely, and when consent is given they understand that the consent allows for patient identifiable information to be sent to a third party as needed.   They may refuse to be seen remotely at any time. The electronic data is encrypted and password protected, and the patient has been advised of the potential risks to privacy not withstanding such measures.    Chief Complaint/History of Present Illness: Follow Up buprenorphine/naloxone Medicated Assisted Treatment for Opiate Use Disorder     Patient/Client Concerns/Updates: Continue Wellbutrin for stimulant cravings  Patient reports she spent a couple days in skilled nursing due to a warrant she was not aware of; patient reports will incarcerated she used methamphetamine.  Patient reports since release from incarceration she has not used any illicit substances and expresses optimism in resuming her goals of recovery  -Will increase visit frequency to weekly for further support and accountability and to keep encouraging patient on her journey  -Fluctuating anxious and depressive symptoms overall manageable and tolerable patient reports he is coping adequately as best he can  -Denies depressive or anxious exacerbations, no suicidal or homicidal thoughts    Triggers (Persons/Places/Things/Events/Thought/Emotions): Recent incarceration    Cravings/Substance Use: Denies cravings since leaving incarceration    Relapse Prevention: Counseling    Urine Drug Screen (today's visit) discussed: Positive buprenorphine positive amphetamine,  positive methamphetamine and THC    UDS Confirmation (Most recent/Resulted): Positive buprenorphine/nor-buprenorphine, no concerns for urine tampering otherwise positive for THC and methamphetamine    Most recent pertinent laboratory studies reviewed: 4/11/23-hepatic function within normal limits, Cr WNL, HIV negative, hepatitis C not detected       ROSY (PDMP) Reviewed for Current/Active Medications: buprenorphine/naloxone as reviewed today    Past Surgical History:  No past surgical history on file.    Problem List:  There is no problem list on file for this patient.      Allergy:   No Known Allergies     Current Medications:   Current Outpatient Medications   Medication Sig Dispense Refill   • buprenorphine-naloxone (SUBOXONE) 8-2 MG per SL tablet Place 2 tablets under the tongue Daily. 28 tablet 0   • buPROPion XL (Wellbutrin XL) 150 MG 24 hr tablet Take 1 tablet by mouth 2 (Two) Times a Day. 60 tablet 0   • famotidine (PEPCID) 20 MG tablet Take 1 tablet by mouth 2 (Two) Times a Day. 60 tablet 0   • GNP PAIN RELIEF EX-STRENGTH 500 MG tablet TAKE ONE TABLET BY MOUTH EVERY 6 HOURS AS NEEDED FOR 30 DAYS **DO NOT EXCEED 6 TABLETS IN 24 HOURS**     • hydrOXYzine pamoate (Vistaril) 25 MG capsule Take 1 capsule by mouth 4 (Four) Times a Day As Needed for Anxiety. 120 capsule 0   • ondansetron ODT (ZOFRAN-ODT) 8 MG disintegrating tablet Place 1 tablet on the tongue Every 8 (Eight) Hours As Needed for Nausea or Vomiting. 45 tablet 0   • naloxone (NARCAN) 4 MG/0.1ML nasal spray 1 spray into the nostril(s) as directed by provider As Needed (opiate over sedation). 1 spray in 1 nostril every 2 to 3 minutes, call 911 (Patient not taking: Reported on 12/5/2023) 2 each 2     No current facility-administered medications for this visit.       Past Medical History:  Past Medical History:   Diagnosis Date   • History of hepatitis B    • Pancreatitis          Social History     Socioeconomic History   • Marital status: Single    Tobacco Use   • Smoking status: Every Day     Packs/day: 1.00     Years: 15.00     Additional pack years: 0.00     Total pack years: 15.00     Types: Cigarettes   • Smokeless tobacco: Never   Vaping Use   • Vaping Use: Never used   Substance and Sexual Activity   • Alcohol use: Not Currently   • Drug use: Yes     Types: Marijuana, Hydrocodone, Oxycodone, Methamphetamines   • Sexual activity: Defer         Family History   Problem Relation Age of Onset   • No Known Problems Mother    • No Known Problems Father    • No Known Problems Sister    • No Known Problems Brother    • No Known Problems Maternal Aunt    • No Known Problems Paternal Aunt    • No Known Problems Maternal Uncle    • No Known Problems Paternal Uncle    • No Known Problems Maternal Grandfather    • No Known Problems Maternal Grandmother    • No Known Problems Paternal Grandfather    • No Known Problems Paternal Grandmother    • No Known Problems Cousin    • No Known Problems Other    • ADD / ADHD Neg Hx    • Alcohol abuse Neg Hx    • Anxiety disorder Neg Hx    • Bipolar disorder Neg Hx    • Dementia Neg Hx    • Depression Neg Hx    • Drug abuse Neg Hx    • OCD Neg Hx    • Paranoid behavior Neg Hx    • Schizophrenia Neg Hx    • Seizures Neg Hx    • Self-Injurious Behavior  Neg Hx    • Suicide Attempts Neg Hx          Mental Status Exam:   Hygiene:   good  Cooperation:  Cooperative  Eye Contact:  Good  Psychomotor Behavior:  Appropriate  Affect:  Appropriate  Mood: anxious  Speech:  Normal  Thought Process:  Goal directed  Thought Content:  Normal  Suicidal:  None  Homicidal:  None  Hallucinations:  None  Delusion:  None  Memory:  Intact  Orientation:  Grossly intact  Reliability:  good  Insight:  Fair  Judgement:  Fair  Impulse Control:  Poor         Review of Systems:  Review of Systems   Constitutional:  Negative for activity change, chills, diaphoresis and fatigue.   Respiratory:  Negative for apnea, cough and shortness of breath.   "  Cardiovascular:  Negative for chest pain, palpitations and leg swelling.   Gastrointestinal:  Negative for abdominal pain, constipation, diarrhea, nausea and vomiting.   Genitourinary:  Negative for difficulty urinating.   Musculoskeletal:  Negative for arthralgias.   Skin:  Negative for rash.   Neurological:  Negative for dizziness, weakness and headaches.   Psychiatric/Behavioral:  Negative for agitation, self-injury, sleep disturbance and suicidal ideas. The patient is nervous/anxious.        Physical Exam:  Physical Exam  Vitals reviewed.   Constitutional:       General: She is not in acute distress.     Appearance: Normal appearance. She is not ill-appearing or toxic-appearing.   Pulmonary:      Effort: Pulmonary effort is normal.   Musculoskeletal:         General: Normal range of motion.   Neurological:      General: No focal deficit present.      Mental Status: She is alert and oriented to person, place, and time.   Psychiatric:         Attention and Perception: Attention and perception normal.         Mood and Affect: Mood normal. Mood is anxious. Mood is not depressed.         Speech: Speech normal.         Behavior: Behavior normal. Behavior is cooperative.         Thought Content: Thought content normal.         Cognition and Memory: Cognition and memory normal.         Judgment: Judgment normal.     Vital Signs:   /69   Pulse 90   Ht 156.2 cm (61.5\")   Wt 78.5 kg (173 lb)   BMI 32.16 kg/m²      Lab Results:   Telemedicine on 11/16/2023   Component Date Value Ref Range Status   • External Amphetamine Screen Urine 11/16/2023 Negative   Final   • External Benzodiazepine Screen Uri* 11/16/2023 Negative   Final   • External Cocaine Screen Urine 11/16/2023 Negative   Final   • External THC Screen Urine 11/16/2023 Negative   Final   • External Methadone Screen Urine 11/16/2023 Negative   Final   • External Methamphetamine Screen Ur* 11/16/2023 Negative   Final   • External Oxycodone Screen Urine " 11/16/2023 Negative   Final   • External Buprenorphine Screen Urine 11/16/2023 Positive (A)   Final   • External MDMA 11/16/2023 Negative   Final   • External Opiates Screen Urine 11/16/2023 Negative   Final   Telemedicine on 11/09/2023   Component Date Value Ref Range Status   • External Amphetamine Screen Urine 11/09/2023 Negative   Final   • External Benzodiazepine Screen Uri* 11/09/2023 Negative   Final   • External Cocaine Screen Urine 11/09/2023 Negative   Final   • External THC Screen Urine 11/09/2023 Positive (A)   Final   • External Methadone Screen Urine 11/09/2023 Negative   Final   • External Methamphetamine Screen Ur* 11/09/2023 Negative   Final   • External Oxycodone Screen Urine 11/09/2023 Negative   Final   • External Buprenorphine Screen Urine 11/09/2023 Positive (A)   Final   • External MDMA 11/09/2023 Negative   Final   • External Opiates Screen Urine 11/09/2023 Negative   Final   Telemedicine on 11/02/2023   Component Date Value Ref Range Status   • External Amphetamine Screen Urine 11/02/2023 Negative   Final   • External Benzodiazepine Screen Uri* 11/02/2023 Negative   Final   • External Cocaine Screen Urine 11/02/2023 Negative   Final   • External THC Screen Urine 11/02/2023 Negative   Final   • External Methadone Screen Urine 11/02/2023 Negative   Final   • External Methamphetamine Screen Ur* 11/02/2023 Negative   Final   • External Oxycodone Screen Urine 11/02/2023 Negative   Final   • External Buprenorphine Screen Urine 11/02/2023 Positive (A)   Final   • External MDMA 11/02/2023 Negative   Final   • External Opiates Screen Urine 11/02/2023 Negative   Final   Appointment on 10/26/2023   Component Date Value Ref Range Status   • External Amphetamine Screen Urine 10/26/2023 Positive (A)   Final   • External Benzodiazepine Screen Uri* 10/26/2023 Negative   Final   • External Cocaine Screen Urine 10/26/2023 Negative   Final   • External THC Screen Urine 10/26/2023 Positive (A)   Final   •  External Methadone Screen Urine 10/26/2023 Negative   Final   • External Methamphetamine Screen Ur* 10/26/2023 Positive (A)   Final   • External Oxycodone Screen Urine 10/26/2023 Negative   Final   • External Buprenorphine Screen Urine 10/26/2023 Positive (A)   Final   • External MDMA 10/26/2023 Positive (A)   Final   • External Opiates Screen Urine 10/26/2023 Negative   Final   Telemedicine on 10/12/2023   Component Date Value Ref Range Status   • External Amphetamine Screen Urine 10/12/2023 Positive (A)   Final   • External Benzodiazepine Screen Uri* 10/12/2023 Negative   Final   • External Cocaine Screen Urine 10/12/2023 Negative   Final   • External THC Screen Urine 10/12/2023 Positive (A)   Final   • External Methadone Screen Urine 10/12/2023 Negative   Final   • External Methamphetamine Screen Ur* 10/12/2023 Positive (A)   Final   • External Oxycodone Screen Urine 10/12/2023 Negative   Final   • External Buprenorphine Screen Urine 10/12/2023 Positive (A)   Final   • External MDMA 10/12/2023 Negative   Final   • External Opiates Screen Urine 10/12/2023 Negative   Final   Telemedicine on 10/05/2023   Component Date Value Ref Range Status   • External Amphetamine Screen Urine 10/05/2023 Positive (A)   Final   • External Benzodiazepine Screen Uri* 10/05/2023 Negative   Final   • External Cocaine Screen Urine 10/05/2023 Negative   Final   • External THC Screen Urine 10/05/2023 Positive (A)   Final   • External Methadone Screen Urine 10/05/2023 Negative   Final   • External Methamphetamine Screen Ur* 10/05/2023 Positive (A)   Final   • External Oxycodone Screen Urine 10/05/2023 Negative   Final   • External Buprenorphine Screen Urine 10/05/2023 Positive (A)   Final   • External MDMA 10/05/2023 Negative   Final   • External Opiates Screen Urine 10/05/2023 Negative   Final   Telemedicine on 09/20/2023   Component Date Value Ref Range Status   • External Amphetamine Screen Urine 09/20/2023 Positive (A)   Final   •  External Benzodiazepine Screen Uri* 09/20/2023 Negative   Final   • External Cocaine Screen Urine 09/20/2023 Negative   Final   • External THC Screen Urine 09/20/2023 Positive (A)   Final   • External Methadone Screen Urine 09/20/2023 Negative   Final   • External Methamphetamine Screen Ur* 09/20/2023 Positive (A)   Final   • External Oxycodone Screen Urine 09/20/2023 Negative   Final   • External Buprenorphine Screen Urine 09/20/2023 Positive (A)   Final   • External MDMA 09/20/2023 Positive (A)   Final   • External Opiates Screen Urine 09/20/2023 Negative   Final   Telemedicine on 09/13/2023   Component Date Value Ref Range Status   • External Amphetamine Screen Urine 09/13/2023 Positive (A)   Final   • External Benzodiazepine Screen Uri* 09/13/2023 Negative   Final   • External Cocaine Screen Urine 09/13/2023 Negative   Final   • External THC Screen Urine 09/13/2023 Positive (A)   Final   • External Methadone Screen Urine 09/13/2023 Negative   Final   • External Methamphetamine Screen Ur* 09/13/2023 Positive (A)   Final   • External Oxycodone Screen Urine 09/13/2023 Negative   Final   • External Buprenorphine Screen Urine 09/13/2023 Positive (A)   Final   • External MDMA 09/13/2023 Positive (A)   Final   • External Opiates Screen Urine 09/13/2023 Negative   Final   Telemedicine on 09/06/2023   Component Date Value Ref Range Status   • External Amphetamine Screen Urine 09/06/2023 Positive (A)   Final   • External Benzodiazepine Screen Uri* 09/06/2023 Negative   Final   • External Cocaine Screen Urine 09/06/2023 Negative   Final   • External THC Screen Urine 09/06/2023 Positive (A)   Final   • External Methadone Screen Urine 09/06/2023 Negative   Final   • External Methamphetamine Screen Ur* 09/06/2023 Positive (A)   Final   • External Oxycodone Screen Urine 09/06/2023 Negative   Final   • External Buprenorphine Screen Urine 09/06/2023 Positive (A)   Final   • External MDMA 09/06/2023 Negative   Final   • External  Opiates Screen Urine 09/06/2023 Negative   Final   Telemedicine on 06/22/2023   Component Date Value Ref Range Status   • External Amphetamine Screen Urine 06/22/2023 Positive (A)   Final   • External Benzodiazepine Screen Uri* 06/22/2023 Negative   Final   • External Cocaine Screen Urine 06/22/2023 Negative   Final   • External THC Screen Urine 06/22/2023 Positive (A)   Final   • External Methadone Screen Urine 06/22/2023 Negative   Final   • External Methamphetamine Screen Ur* 06/22/2023 Positive (A)   Final   • External Oxycodone Screen Urine 06/22/2023 Negative   Final   • External Buprenorphine Screen Urine 06/22/2023 Positive (A)   Final   • External MDMA 06/22/2023 Negative   Final   • External Opiates Screen Urine 06/22/2023 Negative   Final   There may be more visits with results that are not included.         Assessment & Plan   Diagnoses and all orders for this visit:    1. Opioid type dependence, continuous (Primary)  -     buprenorphine-naloxone (SUBOXONE) 8-2 MG per SL tablet; Place 2 tablets under the tongue Daily.  Dispense: 14 tablet; Refill: 0    2. Medication management  -     [facility] Drug Screen    3. Methamphetamine abuse  -     buPROPion XL (Wellbutrin XL) 150 MG 24 hr tablet; Take 1 tablet by mouth 2 (Two) Times a Day.  Dispense: 60 tablet; Refill: 1        Visit Diagnoses:    ICD-10-CM ICD-9-CM   1. Medication management  Z79.899 V58.69       PLAN:  Safety: No acute safety concerns  Risk Assessment: Risk of self-harm acutely is low. Risk of self-harm chronically is also low, but could be further elevated in the event of treatment noncompliance and/or AODA.    TREATMENT PLAN/GOALS: Continue supportive psychotherapy efforts and medications as indicated. Treatment and medication options discussed during today's visit. Patient acknowledged and verbally consented to continue with current treatment plan and was educated on the importance of compliance with treatment and follow-up  appointments.    MEDICATION ISSUES:  ROSY reviewed as expected.  Discussed medication options and treatment plan of prescribed medication as well as the risks, benefits, and side effects including potential falls, possible impaired driving and metabolic adversities among others. Patient is agreeable to call the office with any worsening of symptoms or onset of side effects. Patient is agreeable to call 911 or go to the nearest ER should he/she begin having SI/HI. No medication side effects or related complaints today.     MEDS ORDERED DURING VISIT:  No orders of the defined types were placed in this encounter.      No follow-ups on file.           This document has been electronically signed by WALKER Romero  December 5, 2023 14:06 EST      Part of this note may be an electronic transcription/translation of spoken language to printed text using the Dragon Dictation System.

## 2023-12-12 ENCOUNTER — TELEMEDICINE (OUTPATIENT)
Dept: PSYCHIATRY | Facility: CLINIC | Age: 53
End: 2023-12-12
Payer: MEDICAID

## 2023-12-12 VITALS
DIASTOLIC BLOOD PRESSURE: 68 MMHG | HEART RATE: 97 BPM | WEIGHT: 171 LBS | BODY MASS INDEX: 32.28 KG/M2 | HEIGHT: 61 IN | SYSTOLIC BLOOD PRESSURE: 125 MMHG

## 2023-12-12 DIAGNOSIS — Z79.899 MEDICATION MANAGEMENT: ICD-10-CM

## 2023-12-12 DIAGNOSIS — F15.10 METHAMPHETAMINE ABUSE: ICD-10-CM

## 2023-12-12 DIAGNOSIS — F11.20 OPIOID TYPE DEPENDENCE, CONTINUOUS: Primary | ICD-10-CM

## 2023-12-12 LAB
EXTERNAL AMPHETAMINE SCREEN URINE: POSITIVE
EXTERNAL BENZODIAZEPINE SCREEN URINE: NEGATIVE
EXTERNAL BUPRENORPHINE SCREEN URINE: POSITIVE
EXTERNAL COCAINE SCREEN URINE: NEGATIVE
EXTERNAL MDMA: NEGATIVE
EXTERNAL METHADONE SCREEN URINE: NEGATIVE
EXTERNAL METHAMPHETAMINE SCREEN URINE: POSITIVE
EXTERNAL OPIATES SCREEN URINE: NEGATIVE
EXTERNAL OXYCODONE SCREEN URINE: NEGATIVE
EXTERNAL THC SCREEN URINE: NEGATIVE

## 2023-12-12 RX ORDER — BUPROPION HYDROCHLORIDE 150 MG/1
150 TABLET ORAL 2 TIMES DAILY
Qty: 60 TABLET | Refills: 1 | Status: SHIPPED | OUTPATIENT
Start: 2023-12-12 | End: 2024-12-11

## 2023-12-12 RX ORDER — BUPRENORPHINE HYDROCHLORIDE AND NALOXONE HYDROCHLORIDE DIHYDRATE 8; 2 MG/1; MG/1
2 TABLET SUBLINGUAL DAILY
Qty: 56 TABLET | Refills: 0 | Status: SHIPPED | OUTPATIENT
Start: 2023-12-12

## 2023-12-12 NOTE — PROGRESS NOTES
This provider is located at [facility]. The Patient is seen remotely located at the [facility] ([facility]) using Video. Patient is being seen via telehealth and confirm that they are in a secure environment for this session. The patient's condition being diagnosed/treated is appropriate for telemedicine. Provider identified as Williams Brody as well as credentials APRN MSN FNP-C SRINIVASAN-YOUNG.   The client/patient gave consent to be seen remotely, and when consent is given they understand that the consent allows for patient identifiable information to be sent to a third party as needed.   They may refuse to be seen remotely at any time. The electronic data is encrypted and password protected, and the patient has been advised of the potential risks to privacy not withstanding such measures.    Chief Complaint/History of Present Illness: Follow Up buprenorphine/naloxone Medicated Assisted Treatment for Opiate Use Disorder     Patient/Client Concerns/Updates: Continue Wellbutrin for stimulant cravings   -Patient reports she has not used methamphetamine this week  -Patient reports she is looking forward to spending the holiday season with family  -Discussed need for self-care and healthy boundaries around other individuals in general but most notably around the holiday season  -Fluctuating stressful and anxious symptoms overall manageable and tolerable  -Patient denies depressive episodes, no suicidal or homicidal thoughts    Triggers (Persons/Places/Things/Events/Thought/Emotions): Life stress and recent incarceration    Cravings/Substance Use: Denies cravings or use of methamphetamine this week    Relapse Prevention: Counseling    Urine Drug Screen (today's visit) discussed: Positive buprenorphine positive amphetamine and methamphetamine    UDS Confirmation (Most recent/Resulted): Positive buprenorphine/nor-buprenorphine, no concerns for urine tampering otherwise positive methamphetamine and THC    Most  recent pertinent laboratory studies reviewed: 4/11/23-hepatic function within normal limits, Cr WNL, HIV negative, hepatitis C not detected        ROSY (PDMP) Reviewed for Current/Active Medications: buprenorphine/naloxone as reviewed today    Past Surgical History:  No past surgical history on file.    Problem List:  There is no problem list on file for this patient.      Allergy:   No Known Allergies     Current Medications:   Current Outpatient Medications   Medication Sig Dispense Refill    buprenorphine-naloxone (SUBOXONE) 8-2 MG per SL tablet Place 2 tablets under the tongue Daily. 56 tablet 0    buPROPion XL (Wellbutrin XL) 150 MG 24 hr tablet Take 1 tablet by mouth 2 (Two) Times a Day. 60 tablet 1    famotidine (PEPCID) 20 MG tablet Take 1 tablet by mouth 2 (Two) Times a Day. 60 tablet 0    GNP PAIN RELIEF EX-STRENGTH 500 MG tablet TAKE ONE TABLET BY MOUTH EVERY 6 HOURS AS NEEDED FOR 30 DAYS **DO NOT EXCEED 6 TABLETS IN 24 HOURS**      hydrOXYzine pamoate (Vistaril) 25 MG capsule Take 1 capsule by mouth 4 (Four) Times a Day As Needed for Anxiety. 120 capsule 0    ondansetron ODT (ZOFRAN-ODT) 8 MG disintegrating tablet Place 1 tablet on the tongue Every 8 (Eight) Hours As Needed for Nausea or Vomiting. 45 tablet 0    naloxone (NARCAN) 4 MG/0.1ML nasal spray 1 spray into the nostril(s) as directed by provider As Needed (opiate over sedation). 1 spray in 1 nostril every 2 to 3 minutes, call 911 (Patient not taking: Reported on 12/12/2023) 2 each 2     No current facility-administered medications for this visit.       Past Medical History:  Past Medical History:   Diagnosis Date    History of hepatitis B     Pancreatitis          Social History     Socioeconomic History    Marital status: Single   Tobacco Use    Smoking status: Every Day     Packs/day: 1.00     Years: 15.00     Additional pack years: 0.00     Total pack years: 15.00     Types: Cigarettes    Smokeless tobacco: Never   Vaping Use    Vaping Use:  Never used   Substance and Sexual Activity    Alcohol use: Not Currently    Drug use: Yes     Types: Marijuana, Hydrocodone, Oxycodone, Methamphetamines    Sexual activity: Defer         Family History   Problem Relation Age of Onset    No Known Problems Mother     No Known Problems Father     No Known Problems Sister     No Known Problems Brother     No Known Problems Maternal Aunt     No Known Problems Paternal Aunt     No Known Problems Maternal Uncle     No Known Problems Paternal Uncle     No Known Problems Maternal Grandfather     No Known Problems Maternal Grandmother     No Known Problems Paternal Grandfather     No Known Problems Paternal Grandmother     No Known Problems Cousin     No Known Problems Other     ADD / ADHD Neg Hx     Alcohol abuse Neg Hx     Anxiety disorder Neg Hx     Bipolar disorder Neg Hx     Dementia Neg Hx     Depression Neg Hx     Drug abuse Neg Hx     OCD Neg Hx     Paranoid behavior Neg Hx     Schizophrenia Neg Hx     Seizures Neg Hx     Self-Injurious Behavior  Neg Hx     Suicide Attempts Neg Hx          Mental Status Exam:   Hygiene:   good  Cooperation:  Cooperative  Eye Contact:  Good  Psychomotor Behavior:  Appropriate  Affect:  Appropriate  Mood: anxious  Speech:  Normal  Thought Process:  Goal directed  Thought Content:  Normal  Suicidal:  None  Homicidal:  None  Hallucinations:  None  Delusion:  None  Memory:  Intact  Orientation:  Grossly intact  Reliability:  good  Insight:  Fair  Judgement:  Fair  Impulse Control:  Fair         Review of Systems:  Review of Systems   Constitutional:  Negative for activity change, chills, diaphoresis and fatigue.   Respiratory:  Negative for apnea, cough and shortness of breath.    Cardiovascular:  Negative for chest pain, palpitations and leg swelling.   Gastrointestinal:  Negative for abdominal pain, constipation, diarrhea, nausea and vomiting.   Genitourinary:  Negative for difficulty urinating.   Musculoskeletal:  Negative for  "arthralgias.   Skin:  Negative for rash.   Neurological:  Negative for dizziness, weakness and headaches.   Psychiatric/Behavioral:  Negative for agitation, self-injury, sleep disturbance and suicidal ideas. The patient is nervous/anxious.        Physical Exam:  Physical Exam  Vitals reviewed.   Constitutional:       General: She is not in acute distress.     Appearance: Normal appearance. She is not ill-appearing or toxic-appearing.   Pulmonary:      Effort: Pulmonary effort is normal.   Musculoskeletal:         General: Normal range of motion.   Neurological:      General: No focal deficit present.      Mental Status: She is alert and oriented to person, place, and time.   Psychiatric:         Attention and Perception: Attention and perception normal.         Mood and Affect: Mood normal. Mood is anxious. Mood is not depressed.         Speech: Speech normal.         Behavior: Behavior normal. Behavior is cooperative.         Thought Content: Thought content normal.         Cognition and Memory: Cognition and memory normal.         Judgment: Judgment normal.     Vital Signs:   /68   Pulse 97   Ht 156.2 cm (61.5\")   Wt 77.6 kg (171 lb)   BMI 31.79 kg/m²      Lab Results:   Telemedicine on 12/12/2023   Component Date Value Ref Range Status    External Amphetamine Screen Urine 12/12/2023 Positive (A)   Final    External Benzodiazepine Screen Uri* 12/12/2023 Negative   Final    External Cocaine Screen Urine 12/12/2023 Negative   Final    External THC Screen Urine 12/12/2023 Negative   Final    External Methadone Screen Urine 12/12/2023 Negative   Final    External Methamphetamine Screen Ur* 12/12/2023 Positive (A)   Final    External Oxycodone Screen Urine 12/12/2023 Negative   Final    External Buprenorphine Screen Urine 12/12/2023 Positive (A)   Final    External MDMA 12/12/2023 Negative   Final    External Opiates Screen Urine 12/12/2023 Negative   Final   Telemedicine on 12/05/2023   Component Date Value " Ref Range Status    External Amphetamine Screen Urine 12/05/2023 Positive (A)   Final    External Benzodiazepine Screen Uri* 12/05/2023 Negative   Final    External Cocaine Screen Urine 12/05/2023 Negative   Final    External THC Screen Urine 12/05/2023 Positive (A)   Final    External Methadone Screen Urine 12/05/2023 Negative   Final    External Methamphetamine Screen Ur* 12/05/2023 Positive (A)   Final    External Oxycodone Screen Urine 12/05/2023 Negative   Final    External Buprenorphine Screen Urine 12/05/2023 Positive (A)   Final    External MDMA 12/05/2023 Negative   Final    External Opiates Screen Urine 12/05/2023 Negative   Final   Telemedicine on 11/16/2023   Component Date Value Ref Range Status    External Amphetamine Screen Urine 11/16/2023 Negative   Final    External Benzodiazepine Screen Uri* 11/16/2023 Negative   Final    External Cocaine Screen Urine 11/16/2023 Negative   Final    External THC Screen Urine 11/16/2023 Negative   Final    External Methadone Screen Urine 11/16/2023 Negative   Final    External Methamphetamine Screen Ur* 11/16/2023 Negative   Final    External Oxycodone Screen Urine 11/16/2023 Negative   Final    External Buprenorphine Screen Urine 11/16/2023 Positive (A)   Final    External MDMA 11/16/2023 Negative   Final    External Opiates Screen Urine 11/16/2023 Negative   Final   Telemedicine on 11/09/2023   Component Date Value Ref Range Status    External Amphetamine Screen Urine 11/09/2023 Negative   Final    External Benzodiazepine Screen Uri* 11/09/2023 Negative   Final    External Cocaine Screen Urine 11/09/2023 Negative   Final    External THC Screen Urine 11/09/2023 Positive (A)   Final    External Methadone Screen Urine 11/09/2023 Negative   Final    External Methamphetamine Screen Ur* 11/09/2023 Negative   Final    External Oxycodone Screen Urine 11/09/2023 Negative   Final    External Buprenorphine Screen Urine 11/09/2023 Positive (A)   Final    External MDMA  11/09/2023 Negative   Final    External Opiates Screen Urine 11/09/2023 Negative   Final   Telemedicine on 11/02/2023   Component Date Value Ref Range Status    External Amphetamine Screen Urine 11/02/2023 Negative   Final    External Benzodiazepine Screen Uri* 11/02/2023 Negative   Final    External Cocaine Screen Urine 11/02/2023 Negative   Final    External THC Screen Urine 11/02/2023 Negative   Final    External Methadone Screen Urine 11/02/2023 Negative   Final    External Methamphetamine Screen Ur* 11/02/2023 Negative   Final    External Oxycodone Screen Urine 11/02/2023 Negative   Final    External Buprenorphine Screen Urine 11/02/2023 Positive (A)   Final    External MDMA 11/02/2023 Negative   Final    External Opiates Screen Urine 11/02/2023 Negative   Final   Appointment on 10/26/2023   Component Date Value Ref Range Status    External Amphetamine Screen Urine 10/26/2023 Positive (A)   Final    External Benzodiazepine Screen Uri* 10/26/2023 Negative   Final    External Cocaine Screen Urine 10/26/2023 Negative   Final    External THC Screen Urine 10/26/2023 Positive (A)   Final    External Methadone Screen Urine 10/26/2023 Negative   Final    External Methamphetamine Screen Ur* 10/26/2023 Positive (A)   Final    External Oxycodone Screen Urine 10/26/2023 Negative   Final    External Buprenorphine Screen Urine 10/26/2023 Positive (A)   Final    External MDMA 10/26/2023 Positive (A)   Final    External Opiates Screen Urine 10/26/2023 Negative   Final   Telemedicine on 10/12/2023   Component Date Value Ref Range Status    External Amphetamine Screen Urine 10/12/2023 Positive (A)   Final    External Benzodiazepine Screen Uri* 10/12/2023 Negative   Final    External Cocaine Screen Urine 10/12/2023 Negative   Final    External THC Screen Urine 10/12/2023 Positive (A)   Final    External Methadone Screen Urine 10/12/2023 Negative   Final    External Methamphetamine Screen Ur* 10/12/2023 Positive (A)   Final     External Oxycodone Screen Urine 10/12/2023 Negative   Final    External Buprenorphine Screen Urine 10/12/2023 Positive (A)   Final    External MDMA 10/12/2023 Negative   Final    External Opiates Screen Urine 10/12/2023 Negative   Final   Telemedicine on 10/05/2023   Component Date Value Ref Range Status    External Amphetamine Screen Urine 10/05/2023 Positive (A)   Final    External Benzodiazepine Screen Uri* 10/05/2023 Negative   Final    External Cocaine Screen Urine 10/05/2023 Negative   Final    External THC Screen Urine 10/05/2023 Positive (A)   Final    External Methadone Screen Urine 10/05/2023 Negative   Final    External Methamphetamine Screen Ur* 10/05/2023 Positive (A)   Final    External Oxycodone Screen Urine 10/05/2023 Negative   Final    External Buprenorphine Screen Urine 10/05/2023 Positive (A)   Final    External MDMA 10/05/2023 Negative   Final    External Opiates Screen Urine 10/05/2023 Negative   Final   Telemedicine on 09/20/2023   Component Date Value Ref Range Status    External Amphetamine Screen Urine 09/20/2023 Positive (A)   Final    External Benzodiazepine Screen Uri* 09/20/2023 Negative   Final    External Cocaine Screen Urine 09/20/2023 Negative   Final    External THC Screen Urine 09/20/2023 Positive (A)   Final    External Methadone Screen Urine 09/20/2023 Negative   Final    External Methamphetamine Screen Ur* 09/20/2023 Positive (A)   Final    External Oxycodone Screen Urine 09/20/2023 Negative   Final    External Buprenorphine Screen Urine 09/20/2023 Positive (A)   Final    External MDMA 09/20/2023 Positive (A)   Final    External Opiates Screen Urine 09/20/2023 Negative   Final   Telemedicine on 09/13/2023   Component Date Value Ref Range Status    External Amphetamine Screen Urine 09/13/2023 Positive (A)   Final    External Benzodiazepine Screen Uri* 09/13/2023 Negative   Final    External Cocaine Screen Urine 09/13/2023 Negative   Final    External THC Screen Urine 09/13/2023  Positive (A)   Final    External Methadone Screen Urine 09/13/2023 Negative   Final    External Methamphetamine Screen Ur* 09/13/2023 Positive (A)   Final    External Oxycodone Screen Urine 09/13/2023 Negative   Final    External Buprenorphine Screen Urine 09/13/2023 Positive (A)   Final    External MDMA 09/13/2023 Positive (A)   Final    External Opiates Screen Urine 09/13/2023 Negative   Final   There may be more visits with results that are not included.         Assessment & Plan   Diagnoses and all orders for this visit:    1. Opioid type dependence, continuous (Primary)  -     buprenorphine-naloxone (SUBOXONE) 8-2 MG per SL tablet; Place 2 tablets under the tongue Daily.  Dispense: 56 tablet; Refill: 0    2. Medication management  -     [facility] Drug Screen    3. Methamphetamine abuse  -     buPROPion XL (Wellbutrin XL) 150 MG 24 hr tablet; Take 1 tablet by mouth 2 (Two) Times a Day.  Dispense: 60 tablet; Refill: 1        Visit Diagnoses:    ICD-10-CM ICD-9-CM   1. Opioid type dependence, continuous  F11.20 304.01   2. Medication management  Z79.899 V58.69   3. Methamphetamine abuse  F15.10 305.70       PLAN:  Safety: No acute safety concerns  Risk Assessment: Risk of self-harm acutely is low. Risk of self-harm chronically is also low, but could be further elevated in the event of treatment noncompliance and/or AODA.    TREATMENT PLAN/GOALS: Continue supportive psychotherapy efforts and medications as indicated. Treatment and medication options discussed during today's visit. Patient acknowledged and verbally consented to continue with current treatment plan and was educated on the importance of compliance with treatment and follow-up appointments.    MEDICATION ISSUES:  ROSY reviewed as expected.  Discussed medication options and treatment plan of prescribed medication as well as the risks, benefits, and side effects including potential falls, possible impaired driving and metabolic adversities among others.  Patient is agreeable to call the office with any worsening of symptoms or onset of side effects. Patient is agreeable to call 911 or go to the nearest ER should he/she begin having SI/HI. No medication side effects or related complaints today.     MEDS ORDERED DURING VISIT:  New Medications Ordered This Visit   Medications    buprenorphine-naloxone (SUBOXONE) 8-2 MG per SL tablet     Sig: Place 2 tablets under the tongue Daily.     Dispense:  56 tablet     Refill:  0     NADEAN:[id number]    buPROPion XL (Wellbutrin XL) 150 MG 24 hr tablet     Sig: Take 1 tablet by mouth 2 (Two) Times a Day.     Dispense:  60 tablet     Refill:  1       No follow-ups on file.           This document has been electronically signed by WALKER Romero  December 12, 2023 16:43 EST      Part of this note may be an electronic transcription/translation of spoken language to printed text using the Dragon Dictation System.

## 2024-01-08 ENCOUNTER — TELEMEDICINE (OUTPATIENT)
Dept: PSYCHIATRY | Facility: CLINIC | Age: 54
End: 2024-01-08
Payer: MEDICAID

## 2024-01-08 VITALS
DIASTOLIC BLOOD PRESSURE: 72 MMHG | BODY MASS INDEX: 32.66 KG/M2 | WEIGHT: 173 LBS | HEART RATE: 91 BPM | SYSTOLIC BLOOD PRESSURE: 121 MMHG | HEIGHT: 61 IN

## 2024-01-08 DIAGNOSIS — Z79.899 MEDICATION MANAGEMENT: ICD-10-CM

## 2024-01-08 DIAGNOSIS — F15.10 METHAMPHETAMINE ABUSE: ICD-10-CM

## 2024-01-08 DIAGNOSIS — F11.20 OPIOID TYPE DEPENDENCE, CONTINUOUS: Primary | ICD-10-CM

## 2024-01-08 PROCEDURE — 99214 OFFICE O/P EST MOD 30 MIN: CPT | Performed by: NURSE PRACTITIONER

## 2024-01-08 PROCEDURE — 1160F RVW MEDS BY RX/DR IN RCRD: CPT | Performed by: NURSE PRACTITIONER

## 2024-01-08 PROCEDURE — 1159F MED LIST DOCD IN RCRD: CPT | Performed by: NURSE PRACTITIONER

## 2024-01-08 RX ORDER — BUPRENORPHINE HYDROCHLORIDE AND NALOXONE HYDROCHLORIDE DIHYDRATE 8; 2 MG/1; MG/1
2 TABLET SUBLINGUAL DAILY
Qty: 14 TABLET | Refills: 0 | Status: SHIPPED | OUTPATIENT
Start: 2024-01-08 | End: 2024-01-15 | Stop reason: SDUPTHER

## 2024-01-08 RX ORDER — BUPROPION HYDROCHLORIDE 150 MG/1
150 TABLET ORAL 2 TIMES DAILY
Qty: 60 TABLET | Refills: 0 | Status: SHIPPED | OUTPATIENT
Start: 2024-01-08 | End: 2025-01-07

## 2024-01-08 NOTE — PROGRESS NOTES
This provider is located at [facility]. The Patient is seen remotely located at the [facility] ([facility]) using Video. Patient is being seen via telehealth and confirm that they are in a secure environment for this session. The patient's condition being diagnosed/treated is appropriate for telemedicine. Provider identified as Williams Brody as well as credentials APRN MSN FNP-C SRINIVASAN-YOUNG.   The client/patient gave consent to be seen remotely, and when consent is given they understand that the consent allows for patient identifiable information to be sent to a third party as needed.   They may refuse to be seen remotely at any time. The electronic data is encrypted and password protected, and the patient has been advised of the potential risks to privacy not withstanding such measures.    Chief Complaint/History of Present Illness: Follow Up buprenorphine/naloxone Medicated Assisted Treatment for Opiate Use Disorder     Patient/Client Concerns/Updates: Continue Wellbutrin for stimulant cravings    -Discussed positive presumptive methamphetamine on today's point-of-care urinalysis; patient reports this is unintentional and unexpected, patient denies use of illicit substances or self.  Further history of the holiday season reveals patient was associated with individuals smoking THC from various sources and patient suspects cross-contamination with such and subsequent secondhand exposure  -Overall patient reports the holiday season was positive and enjoyable  -Denies depressive or anxious concerns, no suicidal or homicidal thoughts    Triggers (Persons/Places/Things/Events/Thought/Emotions): Life stress    Cravings/Substance Use: Denies cravings or use of illicit substances    Relapse Prevention: Increase visit frequency to weekly for further vigilance and support and accountability    Urine Drug Screen (today's visit) discussed: Positive buprenorphine positive amphetamine methamphetamine and  THC    UDS Confirmation (Most recent/Resulted): Positive buprenorphine/nor-buprenorphine, no concerns for urine tampering otherwise positive methamphetamine and THC    Most recent pertinent laboratory studies reviewed: 4/11/23-hepatic function within normal limits, Cr WNL, HIV negative, hepatitis C not detected         ROSY (PDMP) Reviewed for Current/Active Medications: buprenorphine/naloxone as reviewed today    Past Surgical History:  No past surgical history on file.    Problem List:  There is no problem list on file for this patient.      Allergy:   No Known Allergies     Current Medications:   Current Outpatient Medications   Medication Sig Dispense Refill   • famotidine (PEPCID) 20 MG tablet Take 1 tablet by mouth 2 (Two) Times a Day. 60 tablet 0   • GNP PAIN RELIEF EX-STRENGTH 500 MG tablet TAKE ONE TABLET BY MOUTH EVERY 6 HOURS AS NEEDED FOR 30 DAYS **DO NOT EXCEED 6 TABLETS IN 24 HOURS**     • hydrOXYzine pamoate (Vistaril) 25 MG capsule Take 1 capsule by mouth 4 (Four) Times a Day As Needed for Anxiety. 120 capsule 0   • ondansetron ODT (ZOFRAN-ODT) 8 MG disintegrating tablet Place 1 tablet on the tongue Every 8 (Eight) Hours As Needed for Nausea or Vomiting. 45 tablet 0   • buprenorphine-naloxone (SUBOXONE) 8-2 MG per SL tablet Place 2 tablets under the tongue Daily. 14 tablet 0   • buPROPion XL (Wellbutrin XL) 150 MG 24 hr tablet Take 1 tablet by mouth 2 (Two) Times a Day. 60 tablet 0   • naloxone (NARCAN) 4 MG/0.1ML nasal spray 1 spray into the nostril(s) as directed by provider As Needed (opiate over sedation). 1 spray in 1 nostril every 2 to 3 minutes, call 911 (Patient not taking: Reported on 1/8/2024) 2 each 2     No current facility-administered medications for this visit.       Past Medical History:  Past Medical History:   Diagnosis Date   • History of hepatitis B    • Pancreatitis          Social History     Socioeconomic History   • Marital status: Single   Tobacco Use   • Smoking status:  Every Day     Packs/day: 1.00     Years: 15.00     Additional pack years: 0.00     Total pack years: 15.00     Types: Cigarettes   • Smokeless tobacco: Never   Vaping Use   • Vaping Use: Never used   Substance and Sexual Activity   • Alcohol use: Not Currently   • Drug use: Yes     Types: Marijuana, Hydrocodone, Oxycodone, Methamphetamines   • Sexual activity: Defer         Family History   Problem Relation Age of Onset   • No Known Problems Mother    • No Known Problems Father    • No Known Problems Sister    • No Known Problems Brother    • No Known Problems Maternal Aunt    • No Known Problems Paternal Aunt    • No Known Problems Maternal Uncle    • No Known Problems Paternal Uncle    • No Known Problems Maternal Grandfather    • No Known Problems Maternal Grandmother    • No Known Problems Paternal Grandfather    • No Known Problems Paternal Grandmother    • No Known Problems Cousin    • No Known Problems Other    • ADD / ADHD Neg Hx    • Alcohol abuse Neg Hx    • Anxiety disorder Neg Hx    • Bipolar disorder Neg Hx    • Dementia Neg Hx    • Depression Neg Hx    • Drug abuse Neg Hx    • OCD Neg Hx    • Paranoid behavior Neg Hx    • Schizophrenia Neg Hx    • Seizures Neg Hx    • Self-Injurious Behavior  Neg Hx    • Suicide Attempts Neg Hx          Mental Status Exam:   Hygiene:   good  Cooperation:  Cooperative  Eye Contact:  Good  Psychomotor Behavior:  Appropriate  Affect:  Appropriate  Mood: normal  Speech:  Normal  Thought Process:  Goal directed  Thought Content:  Normal  Suicidal:  None  Homicidal:  None  Hallucinations:  None  Delusion:  None  Memory:  Intact  Orientation:  Grossly intact  Reliability:  good  Insight:  Good  Judgement:  Good  Impulse Control:  Good         Review of Systems:  Review of Systems   Constitutional:  Negative for activity change, chills, diaphoresis and fatigue.   Respiratory:  Negative for apnea, cough and shortness of breath.    Cardiovascular:  Negative for chest pain,  "palpitations and leg swelling.   Gastrointestinal:  Negative for abdominal pain, constipation, diarrhea, nausea and vomiting.   Genitourinary:  Negative for difficulty urinating.   Musculoskeletal:  Negative for arthralgias.   Skin:  Negative for rash.   Neurological:  Negative for dizziness, weakness and headaches.   Psychiatric/Behavioral:  Negative for agitation, self-injury, sleep disturbance and suicidal ideas. The patient is not nervous/anxious.        Physical Exam:  Physical Exam  Vitals reviewed.   Constitutional:       General: She is not in acute distress.     Appearance: Normal appearance. She is not ill-appearing or toxic-appearing.   Pulmonary:      Effort: Pulmonary effort is normal.   Musculoskeletal:         General: Normal range of motion.   Neurological:      General: No focal deficit present.      Mental Status: She is alert and oriented to person, place, and time.   Psychiatric:         Attention and Perception: Attention and perception normal.         Mood and Affect: Mood normal. Mood is not anxious or depressed.         Speech: Speech normal.         Behavior: Behavior normal. Behavior is cooperative.         Thought Content: Thought content normal.         Cognition and Memory: Cognition and memory normal.         Judgment: Judgment normal.     Vital Signs:   /72   Pulse 91   Ht 156.2 cm (61.5\")   Wt 78.5 kg (173 lb)   BMI 32.16 kg/m²      Lab Results:   Telemedicine on 01/08/2024   Component Date Value Ref Range Status   • External Amphetamine Screen Urine 01/08/2024 Positive (A)   Final   • External Benzodiazepine Screen Uri* 01/08/2024 Negative   Final   • External Cocaine Screen Urine 01/08/2024 Negative   Final   • External THC Screen Urine 01/08/2024 Positive (A)   Final   • External Methadone Screen Urine 01/08/2024 Negative   Final   • External Methamphetamine Screen Ur* 01/08/2024 Positive (A)   Final   • External Oxycodone Screen Urine 01/08/2024 Negative   Final   • " External Buprenorphine Screen Urine 01/08/2024 Positive (A)   Final   • External MDMA 01/08/2024 Negative   Final   • External Opiates Screen Urine 01/08/2024 Negative   Final   Telemedicine on 12/12/2023   Component Date Value Ref Range Status   • External Amphetamine Screen Urine 12/12/2023 Positive (A)   Final   • External Benzodiazepine Screen Uri* 12/12/2023 Negative   Final   • External Cocaine Screen Urine 12/12/2023 Negative   Final   • External THC Screen Urine 12/12/2023 Negative   Final   • External Methadone Screen Urine 12/12/2023 Negative   Final   • External Methamphetamine Screen Ur* 12/12/2023 Positive (A)   Final   • External Oxycodone Screen Urine 12/12/2023 Negative   Final   • External Buprenorphine Screen Urine 12/12/2023 Positive (A)   Final   • External MDMA 12/12/2023 Negative   Final   • External Opiates Screen Urine 12/12/2023 Negative   Final   Telemedicine on 12/05/2023   Component Date Value Ref Range Status   • External Amphetamine Screen Urine 12/05/2023 Positive (A)   Final   • External Benzodiazepine Screen Uri* 12/05/2023 Negative   Final   • External Cocaine Screen Urine 12/05/2023 Negative   Final   • External THC Screen Urine 12/05/2023 Positive (A)   Final   • External Methadone Screen Urine 12/05/2023 Negative   Final   • External Methamphetamine Screen Ur* 12/05/2023 Positive (A)   Final   • External Oxycodone Screen Urine 12/05/2023 Negative   Final   • External Buprenorphine Screen Urine 12/05/2023 Positive (A)   Final   • External MDMA 12/05/2023 Negative   Final   • External Opiates Screen Urine 12/05/2023 Negative   Final   Telemedicine on 11/16/2023   Component Date Value Ref Range Status   • External Amphetamine Screen Urine 11/16/2023 Negative   Final   • External Benzodiazepine Screen Uri* 11/16/2023 Negative   Final   • External Cocaine Screen Urine 11/16/2023 Negative   Final   • External THC Screen Urine 11/16/2023 Negative   Final   • External Methadone Screen  Urine 11/16/2023 Negative   Final   • External Methamphetamine Screen Ur* 11/16/2023 Negative   Final   • External Oxycodone Screen Urine 11/16/2023 Negative   Final   • External Buprenorphine Screen Urine 11/16/2023 Positive (A)   Final   • External MDMA 11/16/2023 Negative   Final   • External Opiates Screen Urine 11/16/2023 Negative   Final   Telemedicine on 11/09/2023   Component Date Value Ref Range Status   • External Amphetamine Screen Urine 11/09/2023 Negative   Final   • External Benzodiazepine Screen Uri* 11/09/2023 Negative   Final   • External Cocaine Screen Urine 11/09/2023 Negative   Final   • External THC Screen Urine 11/09/2023 Positive (A)   Final   • External Methadone Screen Urine 11/09/2023 Negative   Final   • External Methamphetamine Screen Ur* 11/09/2023 Negative   Final   • External Oxycodone Screen Urine 11/09/2023 Negative   Final   • External Buprenorphine Screen Urine 11/09/2023 Positive (A)   Final   • External MDMA 11/09/2023 Negative   Final   • External Opiates Screen Urine 11/09/2023 Negative   Final   Telemedicine on 11/02/2023   Component Date Value Ref Range Status   • External Amphetamine Screen Urine 11/02/2023 Negative   Final   • External Benzodiazepine Screen Uri* 11/02/2023 Negative   Final   • External Cocaine Screen Urine 11/02/2023 Negative   Final   • External THC Screen Urine 11/02/2023 Negative   Final   • External Methadone Screen Urine 11/02/2023 Negative   Final   • External Methamphetamine Screen Ur* 11/02/2023 Negative   Final   • External Oxycodone Screen Urine 11/02/2023 Negative   Final   • External Buprenorphine Screen Urine 11/02/2023 Positive (A)   Final   • External MDMA 11/02/2023 Negative   Final   • External Opiates Screen Urine 11/02/2023 Negative   Final   Appointment on 10/26/2023   Component Date Value Ref Range Status   • External Amphetamine Screen Urine 10/26/2023 Positive (A)   Final   • External Benzodiazepine Screen Uri* 10/26/2023 Negative    Final   • External Cocaine Screen Urine 10/26/2023 Negative   Final   • External THC Screen Urine 10/26/2023 Positive (A)   Final   • External Methadone Screen Urine 10/26/2023 Negative   Final   • External Methamphetamine Screen Ur* 10/26/2023 Positive (A)   Final   • External Oxycodone Screen Urine 10/26/2023 Negative   Final   • External Buprenorphine Screen Urine 10/26/2023 Positive (A)   Final   • External MDMA 10/26/2023 Positive (A)   Final   • External Opiates Screen Urine 10/26/2023 Negative   Final   Telemedicine on 10/12/2023   Component Date Value Ref Range Status   • External Amphetamine Screen Urine 10/12/2023 Positive (A)   Final   • External Benzodiazepine Screen Uri* 10/12/2023 Negative   Final   • External Cocaine Screen Urine 10/12/2023 Negative   Final   • External THC Screen Urine 10/12/2023 Positive (A)   Final   • External Methadone Screen Urine 10/12/2023 Negative   Final   • External Methamphetamine Screen Ur* 10/12/2023 Positive (A)   Final   • External Oxycodone Screen Urine 10/12/2023 Negative   Final   • External Buprenorphine Screen Urine 10/12/2023 Positive (A)   Final   • External MDMA 10/12/2023 Negative   Final   • External Opiates Screen Urine 10/12/2023 Negative   Final   Telemedicine on 10/05/2023   Component Date Value Ref Range Status   • External Amphetamine Screen Urine 10/05/2023 Positive (A)   Final   • External Benzodiazepine Screen Uri* 10/05/2023 Negative   Final   • External Cocaine Screen Urine 10/05/2023 Negative   Final   • External THC Screen Urine 10/05/2023 Positive (A)   Final   • External Methadone Screen Urine 10/05/2023 Negative   Final   • External Methamphetamine Screen Ur* 10/05/2023 Positive (A)   Final   • External Oxycodone Screen Urine 10/05/2023 Negative   Final   • External Buprenorphine Screen Urine 10/05/2023 Positive (A)   Final   • External MDMA 10/05/2023 Negative   Final   • External Opiates Screen Urine 10/05/2023 Negative   Final    Telemedicine on 09/20/2023   Component Date Value Ref Range Status   • External Amphetamine Screen Urine 09/20/2023 Positive (A)   Final   • External Benzodiazepine Screen Uri* 09/20/2023 Negative   Final   • External Cocaine Screen Urine 09/20/2023 Negative   Final   • External THC Screen Urine 09/20/2023 Positive (A)   Final   • External Methadone Screen Urine 09/20/2023 Negative   Final   • External Methamphetamine Screen Ur* 09/20/2023 Positive (A)   Final   • External Oxycodone Screen Urine 09/20/2023 Negative   Final   • External Buprenorphine Screen Urine 09/20/2023 Positive (A)   Final   • External MDMA 09/20/2023 Positive (A)   Final   • External Opiates Screen Urine 09/20/2023 Negative   Final   There may be more visits with results that are not included.         Assessment & Plan   Diagnoses and all orders for this visit:    1. Opioid type dependence, continuous (Primary)  -     buprenorphine-naloxone (SUBOXONE) 8-2 MG per SL tablet; Place 2 tablets under the tongue Daily.  Dispense: 14 tablet; Refill: 0    2. Medication management  -     [facility] Drug Screen    3. Methamphetamine abuse  -     buPROPion XL (Wellbutrin XL) 150 MG 24 hr tablet; Take 1 tablet by mouth 2 (Two) Times a Day.  Dispense: 60 tablet; Refill: 0        Visit Diagnoses:    ICD-10-CM ICD-9-CM   1. Opioid type dependence, continuous  F11.20 304.01   2. Medication management  Z79.899 V58.69   3. Methamphetamine abuse  F15.10 305.70       PLAN:  Safety: No acute safety concerns  Risk Assessment: Risk of self-harm acutely is low. Risk of self-harm chronically is also low, but could be further elevated in the event of treatment noncompliance and/or AODA.    TREATMENT PLAN/GOALS: Continue supportive psychotherapy efforts and medications as indicated. Treatment and medication options discussed during today's visit. Patient acknowledged and verbally consented to continue with current treatment plan and was educated on the importance of  compliance with treatment and follow-up appointments.    MEDICATION ISSUES:  ROSY reviewed as expected.  Discussed medication options and treatment plan of prescribed medication as well as the risks, benefits, and side effects including potential falls, possible impaired driving and metabolic adversities among others. Patient is agreeable to call the office with any worsening of symptoms or onset of side effects. Patient is agreeable to call 911 or go to the nearest ER should he/she begin having SI/HI. No medication side effects or related complaints today.     MEDS ORDERED DURING VISIT:  New Medications Ordered This Visit   Medications   • buprenorphine-naloxone (SUBOXONE) 8-2 MG per SL tablet     Sig: Place 2 tablets under the tongue Daily.     Dispense:  14 tablet     Refill:  0     NADEAN:[id number]   • buPROPion XL (Wellbutrin XL) 150 MG 24 hr tablet     Sig: Take 1 tablet by mouth 2 (Two) Times a Day.     Dispense:  60 tablet     Refill:  0       No follow-ups on file.           This document has been electronically signed by WALKER Romero  January 8, 2024 14:31 EST      Part of this note may be an electronic transcription/translation of spoken language to printed text using the Dragon Dictation System.

## 2024-01-15 DIAGNOSIS — F11.20 OPIOID TYPE DEPENDENCE, CONTINUOUS: ICD-10-CM

## 2024-01-15 RX ORDER — BUPRENORPHINE HYDROCHLORIDE AND NALOXONE HYDROCHLORIDE DIHYDRATE 8; 2 MG/1; MG/1
2 TABLET SUBLINGUAL DAILY
Qty: 14 TABLET | Refills: 0 | Status: SHIPPED | OUTPATIENT
Start: 2024-01-15

## 2024-01-23 ENCOUNTER — TELEMEDICINE (OUTPATIENT)
Dept: PSYCHIATRY | Facility: CLINIC | Age: 54
End: 2024-01-23
Payer: MEDICAID

## 2024-01-23 VITALS
SYSTOLIC BLOOD PRESSURE: 130 MMHG | HEART RATE: 78 BPM | BODY MASS INDEX: 32.66 KG/M2 | DIASTOLIC BLOOD PRESSURE: 78 MMHG | WEIGHT: 173 LBS | HEIGHT: 61 IN

## 2024-01-23 DIAGNOSIS — Z79.899 MEDICATION MANAGEMENT: ICD-10-CM

## 2024-01-23 DIAGNOSIS — F15.10 METHAMPHETAMINE ABUSE: ICD-10-CM

## 2024-01-23 DIAGNOSIS — F11.20 OPIOID TYPE DEPENDENCE, CONTINUOUS: Primary | ICD-10-CM

## 2024-01-23 RX ORDER — BUPROPION HYDROCHLORIDE 150 MG/1
150 TABLET ORAL 2 TIMES DAILY
Qty: 60 TABLET | Refills: 0 | Status: SHIPPED | OUTPATIENT
Start: 2024-01-23 | End: 2025-01-22

## 2024-01-23 RX ORDER — BUPRENORPHINE HYDROCHLORIDE AND NALOXONE HYDROCHLORIDE DIHYDRATE 8; 2 MG/1; MG/1
2 TABLET SUBLINGUAL DAILY
Qty: 56 TABLET | Refills: 0 | Status: SHIPPED | OUTPATIENT
Start: 2024-01-23

## 2024-01-23 NOTE — PROGRESS NOTES
This provider is located at [facility]. The Patient is seen remotely located at the [facility] ([facility]) using Video. Patient is being seen via telehealth and confirm that they are in a secure environment for this session. The patient's condition being diagnosed/treated is appropriate for telemedicine. Provider identified as Williams Brody as well as credentials APRN MSN FNP-C SRINIVASAN-YOUNG.   The client/patient gave consent to be seen remotely, and when consent is given they understand that the consent allows for patient identifiable information to be sent to a third party as needed.   They may refuse to be seen remotely at any time. The electronic data is encrypted and password protected, and the patient has been advised of the potential risks to privacy not withstanding such measures.    Chief Complaint/History of Present Illness: Follow Up buprenorphine/naloxone Medicated Assisted Treatment for Opiate Use Disorder     Patient/Client Concerns/Updates: Continue Wellbutrin for stimulant cravings   -Patient reports she is doing well and has maintained abstinence from illicit substances since last evaluation; patient also reports risk of secondhand cross-contamination has been resolved  -Patient reports she is doing well as it relates to her mental health and denies depressive or anxious concerns, no suicidal or homicidal thoughts    Triggers (Persons/Places/Things/Events/Thought/Emotions): Life stress    Cravings/Substance Use: Denies cravings or use of illicit substances    Relapse Prevention: Counseling    Urine Drug Screen (today's visit) discussed: Positive buprenorphine, otherwise negative for substances tested    UDS Confirmation (Most recent/Resulted): Positive buprenorphine/nor-buprenorphine, no concerns for urine tampering otherwise positive methamphetamine and THC    Most recent pertinent laboratory studies reviewed: 4/11/23-hepatic function within normal limits, Cr WNL, HIV negative,  hepatitis C not detected          ROSY (PDMP) Reviewed for Current/Active Medications: buprenorphine/naloxone as reviewed today    Past Surgical History:  No past surgical history on file.    Problem List:  There is no problem list on file for this patient.      Allergy:   No Known Allergies     Current Medications:   Current Outpatient Medications   Medication Sig Dispense Refill   • buprenorphine-naloxone (SUBOXONE) 8-2 MG per SL tablet Place 2 tablets under the tongue Daily. 56 tablet 0   • buPROPion XL (Wellbutrin XL) 150 MG 24 hr tablet Take 1 tablet by mouth 2 (Two) Times a Day. 60 tablet 0   • famotidine (PEPCID) 20 MG tablet Take 1 tablet by mouth 2 (Two) Times a Day. 60 tablet 0   • GNP PAIN RELIEF EX-STRENGTH 500 MG tablet TAKE ONE TABLET BY MOUTH EVERY 6 HOURS AS NEEDED FOR 30 DAYS **DO NOT EXCEED 6 TABLETS IN 24 HOURS**     • hydrOXYzine pamoate (Vistaril) 25 MG capsule Take 1 capsule by mouth 4 (Four) Times a Day As Needed for Anxiety. 120 capsule 0   • ondansetron ODT (ZOFRAN-ODT) 8 MG disintegrating tablet Place 1 tablet on the tongue Every 8 (Eight) Hours As Needed for Nausea or Vomiting. 45 tablet 0   • naloxone (NARCAN) 4 MG/0.1ML nasal spray 1 spray into the nostril(s) as directed by provider As Needed (opiate over sedation). 1 spray in 1 nostril every 2 to 3 minutes, call 911 (Patient not taking: Reported on 1/23/2024) 2 each 2     No current facility-administered medications for this visit.       Past Medical History:  Past Medical History:   Diagnosis Date   • History of hepatitis B    • Pancreatitis          Social History     Socioeconomic History   • Marital status: Single   Tobacco Use   • Smoking status: Every Day     Packs/day: 1.00     Years: 15.00     Additional pack years: 0.00     Total pack years: 15.00     Types: Cigarettes   • Smokeless tobacco: Never   Vaping Use   • Vaping Use: Never used   Substance and Sexual Activity   • Alcohol use: Not Currently   • Drug use: Yes      Types: Marijuana, Hydrocodone, Oxycodone, Methamphetamines   • Sexual activity: Defer         Family History   Problem Relation Age of Onset   • No Known Problems Mother    • No Known Problems Father    • No Known Problems Sister    • No Known Problems Brother    • No Known Problems Maternal Aunt    • No Known Problems Paternal Aunt    • No Known Problems Maternal Uncle    • No Known Problems Paternal Uncle    • No Known Problems Maternal Grandfather    • No Known Problems Maternal Grandmother    • No Known Problems Paternal Grandfather    • No Known Problems Paternal Grandmother    • No Known Problems Cousin    • No Known Problems Other    • ADD / ADHD Neg Hx    • Alcohol abuse Neg Hx    • Anxiety disorder Neg Hx    • Bipolar disorder Neg Hx    • Dementia Neg Hx    • Depression Neg Hx    • Drug abuse Neg Hx    • OCD Neg Hx    • Paranoid behavior Neg Hx    • Schizophrenia Neg Hx    • Seizures Neg Hx    • Self-Injurious Behavior  Neg Hx    • Suicide Attempts Neg Hx          Mental Status Exam:   Hygiene:   good  Cooperation:  Cooperative  Eye Contact:  Good  Psychomotor Behavior:  Appropriate  Affect:  Appropriate  Mood: normal  Speech:  Normal  Thought Process:  Goal directed  Thought Content:  Normal  Suicidal:  None  Homicidal:  None  Hallucinations:  None  Delusion:  None  Memory:  Intact  Orientation:  Grossly intact  Reliability:  good  Insight:  Good  Judgement:  Good  Impulse Control:  Good         Review of Systems:  Review of Systems   Constitutional:  Negative for activity change, chills, diaphoresis and fatigue.   Respiratory:  Negative for apnea, cough and shortness of breath.    Cardiovascular:  Negative for chest pain, palpitations and leg swelling.   Gastrointestinal:  Negative for abdominal pain, constipation, diarrhea, nausea and vomiting.   Genitourinary:  Negative for difficulty urinating.   Musculoskeletal:  Negative for arthralgias.   Skin:  Negative for rash.   Neurological:  Negative for  "dizziness, weakness and headaches.   Psychiatric/Behavioral:  Negative for agitation, self-injury, sleep disturbance and suicidal ideas. The patient is not nervous/anxious.        Physical Exam:  Physical Exam  Vitals reviewed.   Constitutional:       General: She is not in acute distress.     Appearance: Normal appearance. She is not ill-appearing or toxic-appearing.   Pulmonary:      Effort: Pulmonary effort is normal.   Musculoskeletal:         General: Normal range of motion.   Neurological:      General: No focal deficit present.      Mental Status: She is alert and oriented to person, place, and time.   Psychiatric:         Attention and Perception: Attention and perception normal.         Mood and Affect: Mood normal. Mood is not anxious or depressed.         Speech: Speech normal.         Behavior: Behavior normal. Behavior is cooperative.         Thought Content: Thought content normal.         Cognition and Memory: Cognition and memory normal.         Judgment: Judgment normal.     Vital Signs:   /78   Pulse 78   Ht 156.2 cm (61.5\")   Wt 78.5 kg (173 lb)   BMI 32.16 kg/m²      Lab Results:   Telemedicine on 01/23/2024   Component Date Value Ref Range Status   • External Amphetamine Screen Urine 01/23/2024 Negative   Final   • External Benzodiazepine Screen Uri* 01/23/2024 Negative   Final   • External Cocaine Screen Urine 01/23/2024 Negative   Final   • External THC Screen Urine 01/23/2024 Negative   Final   • External Methadone Screen Urine 01/23/2024 Negative   Final   • External Methamphetamine Screen Ur* 01/23/2024 Negative   Final   • External Oxycodone Screen Urine 01/23/2024 Negative   Final   • External Buprenorphine Screen Urine 01/23/2024 Positive (A)   Final   • External MDMA 01/23/2024 Negative   Final   • External Opiates Screen Urine 01/23/2024 Negative   Final   Telemedicine on 01/08/2024   Component Date Value Ref Range Status   • External Amphetamine Screen Urine 01/08/2024 " Positive (A)   Final   • External Benzodiazepine Screen Uri* 01/08/2024 Negative   Final   • External Cocaine Screen Urine 01/08/2024 Negative   Final   • External THC Screen Urine 01/08/2024 Positive (A)   Final   • External Methadone Screen Urine 01/08/2024 Negative   Final   • External Methamphetamine Screen Ur* 01/08/2024 Positive (A)   Final   • External Oxycodone Screen Urine 01/08/2024 Negative   Final   • External Buprenorphine Screen Urine 01/08/2024 Positive (A)   Final   • External MDMA 01/08/2024 Negative   Final   • External Opiates Screen Urine 01/08/2024 Negative   Final   Telemedicine on 12/12/2023   Component Date Value Ref Range Status   • External Amphetamine Screen Urine 12/12/2023 Positive (A)   Final   • External Benzodiazepine Screen Uri* 12/12/2023 Negative   Final   • External Cocaine Screen Urine 12/12/2023 Negative   Final   • External THC Screen Urine 12/12/2023 Negative   Final   • External Methadone Screen Urine 12/12/2023 Negative   Final   • External Methamphetamine Screen Ur* 12/12/2023 Positive (A)   Final   • External Oxycodone Screen Urine 12/12/2023 Negative   Final   • External Buprenorphine Screen Urine 12/12/2023 Positive (A)   Final   • External MDMA 12/12/2023 Negative   Final   • External Opiates Screen Urine 12/12/2023 Negative   Final   Telemedicine on 12/05/2023   Component Date Value Ref Range Status   • External Amphetamine Screen Urine 12/05/2023 Positive (A)   Final   • External Benzodiazepine Screen Uri* 12/05/2023 Negative   Final   • External Cocaine Screen Urine 12/05/2023 Negative   Final   • External THC Screen Urine 12/05/2023 Positive (A)   Final   • External Methadone Screen Urine 12/05/2023 Negative   Final   • External Methamphetamine Screen Ur* 12/05/2023 Positive (A)   Final   • External Oxycodone Screen Urine 12/05/2023 Negative   Final   • External Buprenorphine Screen Urine 12/05/2023 Positive (A)   Final   • External MDMA 12/05/2023 Negative   Final    • External Opiates Screen Urine 12/05/2023 Negative   Final   Telemedicine on 11/16/2023   Component Date Value Ref Range Status   • External Amphetamine Screen Urine 11/16/2023 Negative   Final   • External Benzodiazepine Screen Uri* 11/16/2023 Negative   Final   • External Cocaine Screen Urine 11/16/2023 Negative   Final   • External THC Screen Urine 11/16/2023 Negative   Final   • External Methadone Screen Urine 11/16/2023 Negative   Final   • External Methamphetamine Screen Ur* 11/16/2023 Negative   Final   • External Oxycodone Screen Urine 11/16/2023 Negative   Final   • External Buprenorphine Screen Urine 11/16/2023 Positive (A)   Final   • External MDMA 11/16/2023 Negative   Final   • External Opiates Screen Urine 11/16/2023 Negative   Final   Telemedicine on 11/09/2023   Component Date Value Ref Range Status   • External Amphetamine Screen Urine 11/09/2023 Negative   Final   • External Benzodiazepine Screen Uri* 11/09/2023 Negative   Final   • External Cocaine Screen Urine 11/09/2023 Negative   Final   • External THC Screen Urine 11/09/2023 Positive (A)   Final   • External Methadone Screen Urine 11/09/2023 Negative   Final   • External Methamphetamine Screen Ur* 11/09/2023 Negative   Final   • External Oxycodone Screen Urine 11/09/2023 Negative   Final   • External Buprenorphine Screen Urine 11/09/2023 Positive (A)   Final   • External MDMA 11/09/2023 Negative   Final   • External Opiates Screen Urine 11/09/2023 Negative   Final   Telemedicine on 11/02/2023   Component Date Value Ref Range Status   • External Amphetamine Screen Urine 11/02/2023 Negative   Final   • External Benzodiazepine Screen Uri* 11/02/2023 Negative   Final   • External Cocaine Screen Urine 11/02/2023 Negative   Final   • External THC Screen Urine 11/02/2023 Negative   Final   • External Methadone Screen Urine 11/02/2023 Negative   Final   • External Methamphetamine Screen Ur* 11/02/2023 Negative   Final   • External Oxycodone Screen  Urine 11/02/2023 Negative   Final   • External Buprenorphine Screen Urine 11/02/2023 Positive (A)   Final   • External MDMA 11/02/2023 Negative   Final   • External Opiates Screen Urine 11/02/2023 Negative   Final   Appointment on 10/26/2023   Component Date Value Ref Range Status   • External Amphetamine Screen Urine 10/26/2023 Positive (A)   Final   • External Benzodiazepine Screen Uri* 10/26/2023 Negative   Final   • External Cocaine Screen Urine 10/26/2023 Negative   Final   • External THC Screen Urine 10/26/2023 Positive (A)   Final   • External Methadone Screen Urine 10/26/2023 Negative   Final   • External Methamphetamine Screen Ur* 10/26/2023 Positive (A)   Final   • External Oxycodone Screen Urine 10/26/2023 Negative   Final   • External Buprenorphine Screen Urine 10/26/2023 Positive (A)   Final   • External MDMA 10/26/2023 Positive (A)   Final   • External Opiates Screen Urine 10/26/2023 Negative   Final   Telemedicine on 10/12/2023   Component Date Value Ref Range Status   • External Amphetamine Screen Urine 10/12/2023 Positive (A)   Final   • External Benzodiazepine Screen Uri* 10/12/2023 Negative   Final   • External Cocaine Screen Urine 10/12/2023 Negative   Final   • External THC Screen Urine 10/12/2023 Positive (A)   Final   • External Methadone Screen Urine 10/12/2023 Negative   Final   • External Methamphetamine Screen Ur* 10/12/2023 Positive (A)   Final   • External Oxycodone Screen Urine 10/12/2023 Negative   Final   • External Buprenorphine Screen Urine 10/12/2023 Positive (A)   Final   • External MDMA 10/12/2023 Negative   Final   • External Opiates Screen Urine 10/12/2023 Negative   Final   Telemedicine on 10/05/2023   Component Date Value Ref Range Status   • External Amphetamine Screen Urine 10/05/2023 Positive (A)   Final   • External Benzodiazepine Screen Uri* 10/05/2023 Negative   Final   • External Cocaine Screen Urine 10/05/2023 Negative   Final   • External THC Screen Urine 10/05/2023  Positive (A)   Final   • External Methadone Screen Urine 10/05/2023 Negative   Final   • External Methamphetamine Screen Ur* 10/05/2023 Positive (A)   Final   • External Oxycodone Screen Urine 10/05/2023 Negative   Final   • External Buprenorphine Screen Urine 10/05/2023 Positive (A)   Final   • External MDMA 10/05/2023 Negative   Final   • External Opiates Screen Urine 10/05/2023 Negative   Final   There may be more visits with results that are not included.         Assessment & Plan   Diagnoses and all orders for this visit:    1. Opioid type dependence, continuous (Primary)  -     buprenorphine-naloxone (SUBOXONE) 8-2 MG per SL tablet; Place 2 tablets under the tongue Daily.  Dispense: 56 tablet; Refill: 0    2. Medication management  -     [facility] Drug Screen    3. Methamphetamine abuse  -     buPROPion XL (Wellbutrin XL) 150 MG 24 hr tablet; Take 1 tablet by mouth 2 (Two) Times a Day.  Dispense: 60 tablet; Refill: 0        Visit Diagnoses:    ICD-10-CM ICD-9-CM   1. Opioid type dependence, continuous  F11.20 304.01   2. Medication management  Z79.899 V58.69   3. Methamphetamine abuse  F15.10 305.70       PLAN:  Safety: No acute safety concerns  Risk Assessment: Risk of self-harm acutely is low. Risk of self-harm chronically is also low, but could be further elevated in the event of treatment noncompliance and/or AODA.    TREATMENT PLAN/GOALS: Continue supportive psychotherapy efforts and medications as indicated. Treatment and medication options discussed during today's visit. Patient acknowledged and verbally consented to continue with current treatment plan and was educated on the importance of compliance with treatment and follow-up appointments.    MEDICATION ISSUES:  ROSY reviewed as expected.  Discussed medication options and treatment plan of prescribed medication as well as the risks, benefits, and side effects including potential falls, possible impaired driving and metabolic adversities among  others. Patient is agreeable to call the office with any worsening of symptoms or onset of side effects. Patient is agreeable to call 911 or go to the nearest ER should he/she begin having SI/HI. No medication side effects or related complaints today.     MEDS ORDERED DURING VISIT:  New Medications Ordered This Visit   Medications   • buprenorphine-naloxone (SUBOXONE) 8-2 MG per SL tablet     Sig: Place 2 tablets under the tongue Daily.     Dispense:  56 tablet     Refill:  0     NADEAN:[id number]   • buPROPion XL (Wellbutrin XL) 150 MG 24 hr tablet     Sig: Take 1 tablet by mouth 2 (Two) Times a Day.     Dispense:  60 tablet     Refill:  0       No follow-ups on file.           This document has been electronically signed by WALKER Romero  January 23, 2024 10:24 EST      Part of this note may be an electronic transcription/translation of spoken language to printed text using the Dragon Dictation System.

## 2024-02-22 ENCOUNTER — TELEMEDICINE (OUTPATIENT)
Dept: PSYCHIATRY | Facility: CLINIC | Age: 54
End: 2024-02-22
Payer: MEDICAID

## 2024-02-22 VITALS
DIASTOLIC BLOOD PRESSURE: 86 MMHG | SYSTOLIC BLOOD PRESSURE: 147 MMHG | WEIGHT: 174 LBS | BODY MASS INDEX: 32.85 KG/M2 | HEIGHT: 61 IN | HEART RATE: 84 BPM

## 2024-02-22 DIAGNOSIS — F11.20 OPIOID TYPE DEPENDENCE, CONTINUOUS: Primary | ICD-10-CM

## 2024-02-22 DIAGNOSIS — G44.219 EPISODIC TENSION-TYPE HEADACHE, NOT INTRACTABLE: ICD-10-CM

## 2024-02-22 DIAGNOSIS — Z79.899 MEDICATION MANAGEMENT: ICD-10-CM

## 2024-02-22 DIAGNOSIS — F15.11 METHAMPHETAMINE ABUSE IN REMISSION: ICD-10-CM

## 2024-02-22 RX ORDER — ACETAMINOPHEN 325 MG/1
325 TABLET ORAL EVERY 4 HOURS PRN
Qty: 100 TABLET | Refills: 0 | Status: SHIPPED | OUTPATIENT
Start: 2024-02-22 | End: 2025-02-21

## 2024-02-22 RX ORDER — BUPRENORPHINE HYDROCHLORIDE AND NALOXONE HYDROCHLORIDE DIHYDRATE 8; 2 MG/1; MG/1
2 TABLET SUBLINGUAL DAILY
Qty: 56 TABLET | Refills: 0 | Status: SHIPPED | OUTPATIENT
Start: 2024-02-22

## 2024-02-22 RX ORDER — BUPROPION HYDROCHLORIDE 150 MG/1
150 TABLET ORAL 2 TIMES DAILY
Qty: 60 TABLET | Refills: 0 | Status: SHIPPED | OUTPATIENT
Start: 2024-02-22 | End: 2025-02-21

## 2024-02-22 NOTE — PROGRESS NOTES
This provider is located at [facility]. The Patient is seen remotely located at the [facility] ([facility]) using Video. Patient is being seen via telehealth and confirm that they are in a secure environment for this session. The patient's condition being diagnosed/treated is appropriate for telemedicine. Provider identified as Williams Brody as well as credentials APRN MSN FNP-C SRINIVASAN-YOUNG.   The client/patient gave consent to be seen remotely, and when consent is given they understand that the consent allows for patient identifiable information to be sent to a third party as needed.   They may refuse to be seen remotely at any time. The electronic data is encrypted and password protected, and the patient has been advised of the potential risks to privacy not withstanding such measures.    Chief Complaint/History of Present Illness: Follow Up buprenorphine/naloxone Medicated Assisted Treatment for Opiate Use Disorder     Patient/Client Concerns/Updates: Continue Wellbutrin for stimulant cravings  -Patient reports she is doing well maintaining abstinence from illicit substances most notably methamphetamine  -Reports some days she struggles with cravings and triggers however reports she is able to manage these episodes and is improving her coping skills  -Mood is stable with no depressive or anxious concerns, no suicidal or homicidal thoughts    Triggers (Persons/Places/Things/Events/Thought/Emotions): Life stress    Cravings/Substance Use: Controlled intermittent cravings for methamphetamine, no cravings for opiates denies illicit use    Relapse Prevention: Counseling    Urine Drug Screen (today's visit) discussed: Positive buprenorphine, otherwise negative for substances tested    UDS Confirmation (Most recent/Resulted): Positive buprenorphine/nor-buprenorphine, no concerns for urine tampering otherwise positive methamphetamine and THC (most recent 2 definitive urinalysis downward trend of  methamphetamine)    Most recent pertinent laboratory studies reviewed: 4/11/23-hepatic function within normal limits, Cr WNL, HIV negative, hepatitis C not detected           ROSY (PDMP) Reviewed for Current/Active Medications: buprenorphine/naloxone as reviewed today    Past Surgical History:  No past surgical history on file.    Problem List:  There is no problem list on file for this patient.      Allergy:   No Known Allergies     Current Medications:   Current Outpatient Medications   Medication Sig Dispense Refill   • buprenorphine-naloxone (SUBOXONE) 8-2 MG per SL tablet Place 2 tablets under the tongue Daily. 56 tablet 0   • buPROPion XL (Wellbutrin XL) 150 MG 24 hr tablet Take 1 tablet by mouth 2 (Two) Times a Day. 60 tablet 0   • famotidine (PEPCID) 20 MG tablet Take 1 tablet by mouth 2 (Two) Times a Day. 60 tablet 0   • GNP PAIN RELIEF EX-STRENGTH 500 MG tablet TAKE ONE TABLET BY MOUTH EVERY 6 HOURS AS NEEDED FOR 30 DAYS **DO NOT EXCEED 6 TABLETS IN 24 HOURS**     • hydrOXYzine pamoate (Vistaril) 25 MG capsule Take 1 capsule by mouth 4 (Four) Times a Day As Needed for Anxiety. 120 capsule 0   • ondansetron ODT (ZOFRAN-ODT) 8 MG disintegrating tablet Place 1 tablet on the tongue Every 8 (Eight) Hours As Needed for Nausea or Vomiting. 45 tablet 0   • acetaminophen (Tylenol) 325 MG tablet Take 1 tablet by mouth Every 4 (Four) Hours As Needed for Mild Pain or Headache. 100 tablet 0   • naloxone (NARCAN) 4 MG/0.1ML nasal spray 1 spray into the nostril(s) as directed by provider As Needed (opiate over sedation). 1 spray in 1 nostril every 2 to 3 minutes, call 911 (Patient not taking: Reported on 2/22/2024) 2 each 2     No current facility-administered medications for this visit.       Past Medical History:  Past Medical History:   Diagnosis Date   • History of hepatitis B    • Pancreatitis          Social History     Socioeconomic History   • Marital status: Single   Tobacco Use   • Smoking status: Every Day      Packs/day: 1.00     Years: 15.00     Additional pack years: 0.00     Total pack years: 15.00     Types: Cigarettes   • Smokeless tobacco: Never   Vaping Use   • Vaping Use: Never used   Substance and Sexual Activity   • Alcohol use: Not Currently   • Drug use: Yes     Types: Marijuana, Hydrocodone, Oxycodone, Methamphetamines   • Sexual activity: Defer         Family History   Problem Relation Age of Onset   • No Known Problems Mother    • No Known Problems Father    • No Known Problems Sister    • No Known Problems Brother    • No Known Problems Maternal Aunt    • No Known Problems Paternal Aunt    • No Known Problems Maternal Uncle    • No Known Problems Paternal Uncle    • No Known Problems Maternal Grandfather    • No Known Problems Maternal Grandmother    • No Known Problems Paternal Grandfather    • No Known Problems Paternal Grandmother    • No Known Problems Cousin    • No Known Problems Other    • ADD / ADHD Neg Hx    • Alcohol abuse Neg Hx    • Anxiety disorder Neg Hx    • Bipolar disorder Neg Hx    • Dementia Neg Hx    • Depression Neg Hx    • Drug abuse Neg Hx    • OCD Neg Hx    • Paranoid behavior Neg Hx    • Schizophrenia Neg Hx    • Seizures Neg Hx    • Self-Injurious Behavior  Neg Hx    • Suicide Attempts Neg Hx          Mental Status Exam:   Hygiene:   good  Cooperation:  Cooperative  Eye Contact:  Good  Psychomotor Behavior:  Appropriate  Affect:  Appropriate  Mood: normal  Speech:  Normal  Thought Process:  Goal directed  Thought Content:  Normal  Suicidal:  None  Homicidal:  None  Hallucinations:  None  Delusion:  None  Memory:  Intact  Orientation:  Grossly intact  Reliability:  good  Insight:  Good  Judgement:  Good  Impulse Control:  Good         Review of Systems:  Review of Systems   Constitutional:  Negative for activity change, chills, diaphoresis and fatigue.   Respiratory:  Negative for apnea, cough and shortness of breath.    Cardiovascular:  Negative for chest pain, palpitations  "and leg swelling.   Gastrointestinal:  Negative for abdominal pain, constipation, diarrhea, nausea and vomiting.   Genitourinary:  Negative for difficulty urinating.   Musculoskeletal:  Negative for arthralgias.   Skin:  Negative for rash.   Neurological:  Negative for dizziness, weakness and headaches.   Psychiatric/Behavioral:  Negative for agitation, self-injury, sleep disturbance and suicidal ideas. The patient is not nervous/anxious.        Physical Exam:  Physical Exam  Vitals reviewed.   Constitutional:       General: She is not in acute distress.     Appearance: Normal appearance. She is not ill-appearing or toxic-appearing.   Pulmonary:      Effort: Pulmonary effort is normal.   Musculoskeletal:         General: Normal range of motion.   Neurological:      General: No focal deficit present.      Mental Status: She is alert and oriented to person, place, and time.   Psychiatric:         Attention and Perception: Attention and perception normal.         Mood and Affect: Mood normal. Mood is not anxious or depressed.         Speech: Speech normal.         Behavior: Behavior normal. Behavior is cooperative.         Thought Content: Thought content normal.         Cognition and Memory: Cognition and memory normal.         Judgment: Judgment normal.     Vital Signs:   /86   Pulse 84   Ht 156.2 cm (61.5\")   Wt 78.9 kg (174 lb)   BMI 32.35 kg/m²      Lab Results:   Telemedicine on 02/22/2024   Component Date Value Ref Range Status   • External Amphetamine Screen Urine 02/22/2024 Negative   Final   • External Benzodiazepine Screen Uri* 02/22/2024 Negative   Final   • External Cocaine Screen Urine 02/22/2024 Negative   Final   • External THC Screen Urine 02/22/2024 Negative   Final   • External Methadone Screen Urine 02/22/2024 Negative   Final   • External Methamphetamine Screen Ur* 02/22/2024 Negative   Final   • External Oxycodone Screen Urine 02/22/2024 Negative   Final   • External Buprenorphine Screen " Urine 02/22/2024 Positive (A)   Final   • External MDMA 02/22/2024 Negative   Final   • External Opiates Screen Urine 02/22/2024 Negative   Final   Telemedicine on 01/23/2024   Component Date Value Ref Range Status   • External Amphetamine Screen Urine 01/23/2024 Negative   Final   • External Benzodiazepine Screen Uri* 01/23/2024 Negative   Final   • External Cocaine Screen Urine 01/23/2024 Negative   Final   • External THC Screen Urine 01/23/2024 Negative   Final   • External Methadone Screen Urine 01/23/2024 Negative   Final   • External Methamphetamine Screen Ur* 01/23/2024 Negative   Final   • External Oxycodone Screen Urine 01/23/2024 Negative   Final   • External Buprenorphine Screen Urine 01/23/2024 Positive (A)   Final   • External MDMA 01/23/2024 Negative   Final   • External Opiates Screen Urine 01/23/2024 Negative   Final   Telemedicine on 01/08/2024   Component Date Value Ref Range Status   • External Amphetamine Screen Urine 01/08/2024 Positive (A)   Final   • External Benzodiazepine Screen Uri* 01/08/2024 Negative   Final   • External Cocaine Screen Urine 01/08/2024 Negative   Final   • External THC Screen Urine 01/08/2024 Positive (A)   Final   • External Methadone Screen Urine 01/08/2024 Negative   Final   • External Methamphetamine Screen Ur* 01/08/2024 Positive (A)   Final   • External Oxycodone Screen Urine 01/08/2024 Negative   Final   • External Buprenorphine Screen Urine 01/08/2024 Positive (A)   Final   • External MDMA 01/08/2024 Negative   Final   • External Opiates Screen Urine 01/08/2024 Negative   Final   Telemedicine on 12/12/2023   Component Date Value Ref Range Status   • External Amphetamine Screen Urine 12/12/2023 Positive (A)   Final   • External Benzodiazepine Screen Uri* 12/12/2023 Negative   Final   • External Cocaine Screen Urine 12/12/2023 Negative   Final   • External THC Screen Urine 12/12/2023 Negative   Final   • External Methadone Screen Urine 12/12/2023 Negative   Final    • External Methamphetamine Screen Ur* 12/12/2023 Positive (A)   Final   • External Oxycodone Screen Urine 12/12/2023 Negative   Final   • External Buprenorphine Screen Urine 12/12/2023 Positive (A)   Final   • External MDMA 12/12/2023 Negative   Final   • External Opiates Screen Urine 12/12/2023 Negative   Final   Telemedicine on 12/05/2023   Component Date Value Ref Range Status   • External Amphetamine Screen Urine 12/05/2023 Positive (A)   Final   • External Benzodiazepine Screen Uri* 12/05/2023 Negative   Final   • External Cocaine Screen Urine 12/05/2023 Negative   Final   • External THC Screen Urine 12/05/2023 Positive (A)   Final   • External Methadone Screen Urine 12/05/2023 Negative   Final   • External Methamphetamine Screen Ur* 12/05/2023 Positive (A)   Final   • External Oxycodone Screen Urine 12/05/2023 Negative   Final   • External Buprenorphine Screen Urine 12/05/2023 Positive (A)   Final   • External MDMA 12/05/2023 Negative   Final   • External Opiates Screen Urine 12/05/2023 Negative   Final   Telemedicine on 11/16/2023   Component Date Value Ref Range Status   • External Amphetamine Screen Urine 11/16/2023 Negative   Final   • External Benzodiazepine Screen Uri* 11/16/2023 Negative   Final   • External Cocaine Screen Urine 11/16/2023 Negative   Final   • External THC Screen Urine 11/16/2023 Negative   Final   • External Methadone Screen Urine 11/16/2023 Negative   Final   • External Methamphetamine Screen Ur* 11/16/2023 Negative   Final   • External Oxycodone Screen Urine 11/16/2023 Negative   Final   • External Buprenorphine Screen Urine 11/16/2023 Positive (A)   Final   • External MDMA 11/16/2023 Negative   Final   • External Opiates Screen Urine 11/16/2023 Negative   Final   Telemedicine on 11/09/2023   Component Date Value Ref Range Status   • External Amphetamine Screen Urine 11/09/2023 Negative   Final   • External Benzodiazepine Screen Uri* 11/09/2023 Negative   Final   • External Cocaine  Screen Urine 11/09/2023 Negative   Final   • External THC Screen Urine 11/09/2023 Positive (A)   Final   • External Methadone Screen Urine 11/09/2023 Negative   Final   • External Methamphetamine Screen Ur* 11/09/2023 Negative   Final   • External Oxycodone Screen Urine 11/09/2023 Negative   Final   • External Buprenorphine Screen Urine 11/09/2023 Positive (A)   Final   • External MDMA 11/09/2023 Negative   Final   • External Opiates Screen Urine 11/09/2023 Negative   Final   Telemedicine on 11/02/2023   Component Date Value Ref Range Status   • External Amphetamine Screen Urine 11/02/2023 Negative   Final   • External Benzodiazepine Screen Uri* 11/02/2023 Negative   Final   • External Cocaine Screen Urine 11/02/2023 Negative   Final   • External THC Screen Urine 11/02/2023 Negative   Final   • External Methadone Screen Urine 11/02/2023 Negative   Final   • External Methamphetamine Screen Ur* 11/02/2023 Negative   Final   • External Oxycodone Screen Urine 11/02/2023 Negative   Final   • External Buprenorphine Screen Urine 11/02/2023 Positive (A)   Final   • External MDMA 11/02/2023 Negative   Final   • External Opiates Screen Urine 11/02/2023 Negative   Final   Appointment on 10/26/2023   Component Date Value Ref Range Status   • External Amphetamine Screen Urine 10/26/2023 Positive (A)   Final   • External Benzodiazepine Screen Uri* 10/26/2023 Negative   Final   • External Cocaine Screen Urine 10/26/2023 Negative   Final   • External THC Screen Urine 10/26/2023 Positive (A)   Final   • External Methadone Screen Urine 10/26/2023 Negative   Final   • External Methamphetamine Screen Ur* 10/26/2023 Positive (A)   Final   • External Oxycodone Screen Urine 10/26/2023 Negative   Final   • External Buprenorphine Screen Urine 10/26/2023 Positive (A)   Final   • External MDMA 10/26/2023 Positive (A)   Final   • External Opiates Screen Urine 10/26/2023 Negative   Final   Telemedicine on 10/12/2023   Component Date Value Ref  Range Status   • External Amphetamine Screen Urine 10/12/2023 Positive (A)   Final   • External Benzodiazepine Screen Uri* 10/12/2023 Negative   Final   • External Cocaine Screen Urine 10/12/2023 Negative   Final   • External THC Screen Urine 10/12/2023 Positive (A)   Final   • External Methadone Screen Urine 10/12/2023 Negative   Final   • External Methamphetamine Screen Ur* 10/12/2023 Positive (A)   Final   • External Oxycodone Screen Urine 10/12/2023 Negative   Final   • External Buprenorphine Screen Urine 10/12/2023 Positive (A)   Final   • External MDMA 10/12/2023 Negative   Final   • External Opiates Screen Urine 10/12/2023 Negative   Final   There may be more visits with results that are not included.         Assessment & Plan   Diagnoses and all orders for this visit:    1. Opioid type dependence, continuous (Primary)  -     buprenorphine-naloxone (SUBOXONE) 8-2 MG per SL tablet; Place 2 tablets under the tongue Daily.  Dispense: 56 tablet; Refill: 0    2. Medication management  -     [facility] Drug Screen    3. Methamphetamine abuse in remission  -     buPROPion XL (Wellbutrin XL) 150 MG 24 hr tablet; Take 1 tablet by mouth 2 (Two) Times a Day.  Dispense: 60 tablet; Refill: 0    4. Episodic tension-type headache, not intractable  -     acetaminophen (Tylenol) 325 MG tablet; Take 1 tablet by mouth Every 4 (Four) Hours As Needed for Mild Pain or Headache.  Dispense: 100 tablet; Refill: 0        Visit Diagnoses:    ICD-10-CM ICD-9-CM   1. Opioid type dependence, continuous  F11.20 304.01   2. Medication management  Z79.899 V58.69   3. Methamphetamine abuse in remission  F15.11 305.73   4. Episodic tension-type headache, not intractable  G44.219 339.11       PLAN:  Safety: No acute safety concerns  Risk Assessment: Risk of self-harm acutely is low. Risk of self-harm chronically is also low, but could be further elevated in the event of treatment noncompliance and/or AODA.    TREATMENT PLAN/GOALS: Continue  supportive psychotherapy efforts and medications as indicated. Treatment and medication options discussed during today's visit. Patient acknowledged and verbally consented to continue with current treatment plan and was educated on the importance of compliance with treatment and follow-up appointments.    MEDICATION ISSUES:  ROSY reviewed as expected.  Discussed medication options and treatment plan of prescribed medication as well as the risks, benefits, and side effects including potential falls, possible impaired driving and metabolic adversities among others. Patient is agreeable to call the office with any worsening of symptoms or onset of side effects. Patient is agreeable to call 911 or go to the nearest ER should he/she begin having SI/HI. No medication side effects or related complaints today.     MEDS ORDERED DURING VISIT:  New Medications Ordered This Visit   Medications   • acetaminophen (Tylenol) 325 MG tablet     Sig: Take 1 tablet by mouth Every 4 (Four) Hours As Needed for Mild Pain or Headache.     Dispense:  100 tablet     Refill:  0   • buprenorphine-naloxone (SUBOXONE) 8-2 MG per SL tablet     Sig: Place 2 tablets under the tongue Daily.     Dispense:  56 tablet     Refill:  0     TENISHAN:[id number]   • buPROPion XL (Wellbutrin XL) 150 MG 24 hr tablet     Sig: Take 1 tablet by mouth 2 (Two) Times a Day.     Dispense:  60 tablet     Refill:  0       No follow-ups on file.           This document has been electronically signed by WALKER Romero  February 22, 2024 16:26 EST      Part of this note may be an electronic transcription/translation of spoken language to printed text using the Dragon Dictation System.

## 2024-03-20 DIAGNOSIS — F15.11 METHAMPHETAMINE ABUSE IN REMISSION: ICD-10-CM

## 2024-03-20 RX ORDER — BUPROPION HYDROCHLORIDE 150 MG/1
TABLET ORAL
Qty: 60 TABLET | Refills: 1 | Status: SHIPPED | OUTPATIENT
Start: 2024-03-20

## 2024-03-27 ENCOUNTER — TELEMEDICINE (OUTPATIENT)
Dept: PSYCHIATRY | Facility: CLINIC | Age: 54
End: 2024-03-27
Payer: MEDICAID

## 2024-03-27 VITALS
HEIGHT: 61 IN | WEIGHT: 175 LBS | SYSTOLIC BLOOD PRESSURE: 135 MMHG | DIASTOLIC BLOOD PRESSURE: 78 MMHG | BODY MASS INDEX: 33.04 KG/M2 | HEART RATE: 74 BPM

## 2024-03-27 DIAGNOSIS — Z79.899 MEDICATION MANAGEMENT: ICD-10-CM

## 2024-03-27 DIAGNOSIS — F11.20 OPIOID TYPE DEPENDENCE, CONTINUOUS: Primary | ICD-10-CM

## 2024-03-27 DIAGNOSIS — F15.11 METHAMPHETAMINE ABUSE IN REMISSION: ICD-10-CM

## 2024-03-27 LAB
EXTERNAL AMPHETAMINE SCREEN URINE: NEGATIVE
EXTERNAL BENZODIAZEPINE SCREEN URINE: NEGATIVE
EXTERNAL BUPRENORPHINE SCREEN URINE: NEGATIVE
EXTERNAL COCAINE SCREEN URINE: NEGATIVE
EXTERNAL MDMA: NEGATIVE
EXTERNAL METHADONE SCREEN URINE: NEGATIVE
EXTERNAL METHAMPHETAMINE SCREEN URINE: NEGATIVE
EXTERNAL OPIATES SCREEN URINE: NEGATIVE
EXTERNAL OXYCODONE SCREEN URINE: NEGATIVE
EXTERNAL THC SCREEN URINE: NEGATIVE

## 2024-03-27 RX ORDER — BUPRENORPHINE HYDROCHLORIDE AND NALOXONE HYDROCHLORIDE DIHYDRATE 8; 2 MG/1; MG/1
2 TABLET SUBLINGUAL DAILY
Qty: 56 TABLET | Refills: 0 | Status: SHIPPED | OUTPATIENT
Start: 2024-03-27

## 2024-03-27 NOTE — PROGRESS NOTES
This provider is located at [facility]. The Patient is seen remotely located at the [facility] ([facility]) using Video. Patient is being seen via telehealth and confirm that they are in a secure environment for this session. The patient's condition being diagnosed/treated is appropriate for telemedicine. Provider identified as Williams Brody as well as credentials APRN MSN FNP-C SRINIVASAN-YOUNG.   The client/patient gave consent to be seen remotely, and when consent is given they understand that the consent allows for patient identifiable information to be sent to a third party as needed.   They may refuse to be seen remotely at any time. The electronic data is encrypted and password protected, and the patient has been advised of the potential risks to privacy not withstanding such measures.    Chief Complaint/History of Present Illness: Follow Up buprenorphine/naloxone Medicated Assisted Treatment for Opiate Use Disorder     Patient/Client Concerns/Updates: Continue Wellbutrin for stimulant cravings   -Patient reports she is doing well maintaining abstinence from all illicit substances  -Patient reports she missed last week's medication assisted treatment evaluation due to being arrested related to a prior legal issue; despite not having buprenorphine in several days and developing opiate cravings patient is maintaining abstinence from illicit substances  -Patient states she is on probation and must maintain absence of illicit substances over the next 2 years or face possible incarceration; patient agrees to take part and increase support and is scheduled for chemical dependency intensive outpatient next week  -Fluctuating anxious symptoms overall manageable and tolerable  -Patient denies depressive episodes, no suicidal or homicidal thoughts and no symptoms of psychosis    Triggers (Persons/Places/Things/Events/Thought/Emotions): Anxiety    Cravings/Substance Use: Cravings for opiates due to running  out of buprenorphine this week; denies illicit use    Relapse Prevention: Counseling and intake for chemical dependency IOP next week    Urine Drug Screen (today's visit) discussed: Negative for all substances tested    UDS Confirmation (Most recent/Resulted): Positive buprenorphine/nor-buprenorphine, no concerns for urine tampering otherwise positive methamphetamine and THC    Most recent pertinent laboratory studies reviewed: 4/11/23-hepatic function within normal limits, Cr WNL, HIV negative, hepatitis C not detected            ROSY (PDMP) Reviewed for Current/Active Medications: buprenorphine/naloxone as reviewed today    Past Surgical History:  No past surgical history on file.    Problem List:  There is no problem list on file for this patient.      Allergy:   No Known Allergies     Current Medications:   Current Outpatient Medications   Medication Sig Dispense Refill   • acetaminophen (Tylenol) 325 MG tablet Take 1 tablet by mouth Every 4 (Four) Hours As Needed for Mild Pain or Headache. 100 tablet 0   • buprenorphine-naloxone (SUBOXONE) 8-2 MG per SL tablet Place 2 tablets under the tongue Daily. 56 tablet 0   • buPROPion XL (WELLBUTRIN XL) 150 MG 24 hr tablet BID dosing 60 tablet 1   • famotidine (PEPCID) 20 MG tablet Take 1 tablet by mouth 2 (Two) Times a Day. 60 tablet 0   • GNP PAIN RELIEF EX-STRENGTH 500 MG tablet TAKE ONE TABLET BY MOUTH EVERY 6 HOURS AS NEEDED FOR 30 DAYS **DO NOT EXCEED 6 TABLETS IN 24 HOURS**     • hydrOXYzine pamoate (Vistaril) 25 MG capsule Take 1 capsule by mouth 4 (Four) Times a Day As Needed for Anxiety. 120 capsule 0   • ondansetron ODT (ZOFRAN-ODT) 8 MG disintegrating tablet Place 1 tablet on the tongue Every 8 (Eight) Hours As Needed for Nausea or Vomiting. 45 tablet 0   • naloxone (NARCAN) 4 MG/0.1ML nasal spray 1 spray into the nostril(s) as directed by provider As Needed (opiate over sedation). 1 spray in 1 nostril every 2 to 3 minutes, call 911 (Patient not taking:  Reported on 3/27/2024) 2 each 2     No current facility-administered medications for this visit.       Past Medical History:  Past Medical History:   Diagnosis Date   • History of hepatitis B    • Pancreatitis          Social History     Socioeconomic History   • Marital status: Single   Tobacco Use   • Smoking status: Every Day     Current packs/day: 1.00     Average packs/day: 1 pack/day for 15.0 years (15.0 ttl pk-yrs)     Types: Cigarettes   • Smokeless tobacco: Never   Vaping Use   • Vaping status: Never Used   Substance and Sexual Activity   • Alcohol use: Not Currently   • Drug use: Yes     Types: Marijuana, Hydrocodone, Oxycodone, Methamphetamines   • Sexual activity: Defer         Family History   Problem Relation Age of Onset   • No Known Problems Mother    • No Known Problems Father    • No Known Problems Sister    • No Known Problems Brother    • No Known Problems Maternal Aunt    • No Known Problems Paternal Aunt    • No Known Problems Maternal Uncle    • No Known Problems Paternal Uncle    • No Known Problems Maternal Grandfather    • No Known Problems Maternal Grandmother    • No Known Problems Paternal Grandfather    • No Known Problems Paternal Grandmother    • No Known Problems Cousin    • No Known Problems Other    • ADD / ADHD Neg Hx    • Alcohol abuse Neg Hx    • Anxiety disorder Neg Hx    • Bipolar disorder Neg Hx    • Dementia Neg Hx    • Depression Neg Hx    • Drug abuse Neg Hx    • OCD Neg Hx    • Paranoid behavior Neg Hx    • Schizophrenia Neg Hx    • Seizures Neg Hx    • Self-Injurious Behavior  Neg Hx    • Suicide Attempts Neg Hx          Mental Status Exam:   Hygiene:   good  Cooperation:  Cooperative  Eye Contact:  Good  Psychomotor Behavior:  Appropriate  Affect:  Appropriate  Mood: anxious  Speech:  Normal  Thought Process:  Goal directed  Thought Content:  Normal  Suicidal:  None  Homicidal:  None  Hallucinations:  None  Delusion:  None  Memory:  Intact  Orientation:  Grossly  "intact  Reliability:  good  Insight:  Good  Judgement:  Good  Impulse Control:  Good         Review of Systems:  Review of Systems   Constitutional:  Negative for activity change, chills, diaphoresis and fatigue.   Respiratory:  Negative for apnea, cough and shortness of breath.    Cardiovascular:  Negative for chest pain, palpitations and leg swelling.   Gastrointestinal:  Negative for abdominal pain, constipation, diarrhea, nausea and vomiting.   Genitourinary:  Negative for difficulty urinating.   Musculoskeletal:  Negative for arthralgias.   Skin:  Negative for rash.   Neurological:  Negative for dizziness, weakness and headaches.   Psychiatric/Behavioral:  Negative for agitation, self-injury, sleep disturbance and suicidal ideas. The patient is not nervous/anxious.        Physical Exam:  Physical Exam  Vitals reviewed.   Constitutional:       General: She is not in acute distress.     Appearance: Normal appearance. She is not ill-appearing or toxic-appearing.   Pulmonary:      Effort: Pulmonary effort is normal.   Musculoskeletal:         General: Normal range of motion.   Neurological:      General: No focal deficit present.      Mental Status: She is alert and oriented to person, place, and time.   Psychiatric:         Attention and Perception: Attention and perception normal.         Mood and Affect: Mood normal. Mood is anxious. Mood is not depressed.         Speech: Speech normal.         Behavior: Behavior normal. Behavior is cooperative.         Thought Content: Thought content normal.         Cognition and Memory: Cognition and memory normal.         Judgment: Judgment normal.     Vital Signs:   /78   Pulse 74   Ht 156.2 cm (61.5\")   Wt 79.4 kg (175 lb)   BMI 32.53 kg/m²      Lab Results:   Telemedicine on 03/27/2024   Component Date Value Ref Range Status   • External Amphetamine Screen Urine 03/27/2024 Negative   Final   • External Benzodiazepine Screen Uri* 03/27/2024 Negative   Final   • " External Cocaine Screen Urine 03/27/2024 Negative   Final   • External THC Screen Urine 03/27/2024 Negative   Final   • External Methadone Screen Urine 03/27/2024 Negative   Final   • External Methamphetamine Screen Ur* 03/27/2024 Negative   Final   • External Oxycodone Screen Urine 03/27/2024 Negative   Final   • External Buprenorphine Screen Urine 03/27/2024 Negative   Final   • External MDMA 03/27/2024 Negative   Final   • External Opiates Screen Urine 03/27/2024 Negative   Final   Telemedicine on 02/22/2024   Component Date Value Ref Range Status   • External Amphetamine Screen Urine 02/22/2024 Negative   Final   • External Benzodiazepine Screen Uri* 02/22/2024 Negative   Final   • External Cocaine Screen Urine 02/22/2024 Negative   Final   • External THC Screen Urine 02/22/2024 Negative   Final   • External Methadone Screen Urine 02/22/2024 Negative   Final   • External Methamphetamine Screen Ur* 02/22/2024 Negative   Final   • External Oxycodone Screen Urine 02/22/2024 Negative   Final   • External Buprenorphine Screen Urine 02/22/2024 Positive (A)   Final   • External MDMA 02/22/2024 Negative   Final   • External Opiates Screen Urine 02/22/2024 Negative   Final   Telemedicine on 01/23/2024   Component Date Value Ref Range Status   • External Amphetamine Screen Urine 01/23/2024 Negative   Final   • External Benzodiazepine Screen Uri* 01/23/2024 Negative   Final   • External Cocaine Screen Urine 01/23/2024 Negative   Final   • External THC Screen Urine 01/23/2024 Negative   Final   • External Methadone Screen Urine 01/23/2024 Negative   Final   • External Methamphetamine Screen Ur* 01/23/2024 Negative   Final   • External Oxycodone Screen Urine 01/23/2024 Negative   Final   • External Buprenorphine Screen Urine 01/23/2024 Positive (A)   Final   • External MDMA 01/23/2024 Negative   Final   • External Opiates Screen Urine 01/23/2024 Negative   Final   Telemedicine on 01/08/2024   Component Date Value Ref Range  Status   • External Amphetamine Screen Urine 01/08/2024 Positive (A)   Final   • External Benzodiazepine Screen Uri* 01/08/2024 Negative   Final   • External Cocaine Screen Urine 01/08/2024 Negative   Final   • External THC Screen Urine 01/08/2024 Positive (A)   Final   • External Methadone Screen Urine 01/08/2024 Negative   Final   • External Methamphetamine Screen Ur* 01/08/2024 Positive (A)   Final   • External Oxycodone Screen Urine 01/08/2024 Negative   Final   • External Buprenorphine Screen Urine 01/08/2024 Positive (A)   Final   • External MDMA 01/08/2024 Negative   Final   • External Opiates Screen Urine 01/08/2024 Negative   Final   Telemedicine on 12/12/2023   Component Date Value Ref Range Status   • External Amphetamine Screen Urine 12/12/2023 Positive (A)   Final   • External Benzodiazepine Screen Uri* 12/12/2023 Negative   Final   • External Cocaine Screen Urine 12/12/2023 Negative   Final   • External THC Screen Urine 12/12/2023 Negative   Final   • External Methadone Screen Urine 12/12/2023 Negative   Final   • External Methamphetamine Screen Ur* 12/12/2023 Positive (A)   Final   • External Oxycodone Screen Urine 12/12/2023 Negative   Final   • External Buprenorphine Screen Urine 12/12/2023 Positive (A)   Final   • External MDMA 12/12/2023 Negative   Final   • External Opiates Screen Urine 12/12/2023 Negative   Final   Telemedicine on 12/05/2023   Component Date Value Ref Range Status   • External Amphetamine Screen Urine 12/05/2023 Positive (A)   Final   • External Benzodiazepine Screen Uri* 12/05/2023 Negative   Final   • External Cocaine Screen Urine 12/05/2023 Negative   Final   • External THC Screen Urine 12/05/2023 Positive (A)   Final   • External Methadone Screen Urine 12/05/2023 Negative   Final   • External Methamphetamine Screen Ur* 12/05/2023 Positive (A)   Final   • External Oxycodone Screen Urine 12/05/2023 Negative   Final   • External Buprenorphine Screen Urine 12/05/2023 Positive  (A)   Final   • External MDMA 12/05/2023 Negative   Final   • External Opiates Screen Urine 12/05/2023 Negative   Final   Telemedicine on 11/16/2023   Component Date Value Ref Range Status   • External Amphetamine Screen Urine 11/16/2023 Negative   Final   • External Benzodiazepine Screen Uri* 11/16/2023 Negative   Final   • External Cocaine Screen Urine 11/16/2023 Negative   Final   • External THC Screen Urine 11/16/2023 Negative   Final   • External Methadone Screen Urine 11/16/2023 Negative   Final   • External Methamphetamine Screen Ur* 11/16/2023 Negative   Final   • External Oxycodone Screen Urine 11/16/2023 Negative   Final   • External Buprenorphine Screen Urine 11/16/2023 Positive (A)   Final   • External MDMA 11/16/2023 Negative   Final   • External Opiates Screen Urine 11/16/2023 Negative   Final   Telemedicine on 11/09/2023   Component Date Value Ref Range Status   • External Amphetamine Screen Urine 11/09/2023 Negative   Final   • External Benzodiazepine Screen Uri* 11/09/2023 Negative   Final   • External Cocaine Screen Urine 11/09/2023 Negative   Final   • External THC Screen Urine 11/09/2023 Positive (A)   Final   • External Methadone Screen Urine 11/09/2023 Negative   Final   • External Methamphetamine Screen Ur* 11/09/2023 Negative   Final   • External Oxycodone Screen Urine 11/09/2023 Negative   Final   • External Buprenorphine Screen Urine 11/09/2023 Positive (A)   Final   • External MDMA 11/09/2023 Negative   Final   • External Opiates Screen Urine 11/09/2023 Negative   Final   Telemedicine on 11/02/2023   Component Date Value Ref Range Status   • External Amphetamine Screen Urine 11/02/2023 Negative   Final   • External Benzodiazepine Screen Uri* 11/02/2023 Negative   Final   • External Cocaine Screen Urine 11/02/2023 Negative   Final   • External THC Screen Urine 11/02/2023 Negative   Final   • External Methadone Screen Urine 11/02/2023 Negative   Final   • External Methamphetamine Screen Ur*  11/02/2023 Negative   Final   • External Oxycodone Screen Urine 11/02/2023 Negative   Final   • External Buprenorphine Screen Urine 11/02/2023 Positive (A)   Final   • External MDMA 11/02/2023 Negative   Final   • External Opiates Screen Urine 11/02/2023 Negative   Final   Appointment on 10/26/2023   Component Date Value Ref Range Status   • External Amphetamine Screen Urine 10/26/2023 Positive (A)   Final   • External Benzodiazepine Screen Uri* 10/26/2023 Negative   Final   • External Cocaine Screen Urine 10/26/2023 Negative   Final   • External THC Screen Urine 10/26/2023 Positive (A)   Final   • External Methadone Screen Urine 10/26/2023 Negative   Final   • External Methamphetamine Screen Ur* 10/26/2023 Positive (A)   Final   • External Oxycodone Screen Urine 10/26/2023 Negative   Final   • External Buprenorphine Screen Urine 10/26/2023 Positive (A)   Final   • External MDMA 10/26/2023 Positive (A)   Final   • External Opiates Screen Urine 10/26/2023 Negative   Final   There may be more visits with results that are not included.         Assessment & Plan   Diagnoses and all orders for this visit:    1. Opioid type dependence, continuous (Primary)  -     buprenorphine-naloxone (SUBOXONE) 8-2 MG per SL tablet; Place 2 tablets under the tongue Daily.  Dispense: 56 tablet; Refill: 0    2. Medication management  -     [facility] Drug Screen    3. Methamphetamine abuse in remission        Visit Diagnoses:    ICD-10-CM ICD-9-CM   1. Opioid type dependence, continuous  F11.20 304.01   2. Medication management  Z79.899 V58.69   3. Methamphetamine abuse in remission  F15.11 305.73       PLAN:  Safety: No acute safety concerns  Risk Assessment: Risk of self-harm acutely is low. Risk of self-harm chronically is also low, but could be further elevated in the event of treatment noncompliance and/or AODA.    TREATMENT PLAN/GOALS: Continue supportive psychotherapy efforts and medications as indicated. Treatment and medication  options discussed during today's visit. Patient acknowledged and verbally consented to continue with current treatment plan and was educated on the importance of compliance with treatment and follow-up appointments.    MEDICATION ISSUES:  ROSY reviewed as expected.  Discussed medication options and treatment plan of prescribed medication as well as the risks, benefits, and side effects including potential falls, possible impaired driving and metabolic adversities among others. Patient is agreeable to call the office with any worsening of symptoms or onset of side effects. Patient is agreeable to call 911 or go to the nearest ER should he/she begin having SI/HI. No medication side effects or related complaints today.     MEDS ORDERED DURING VISIT:  New Medications Ordered This Visit   Medications   • buprenorphine-naloxone (SUBOXONE) 8-2 MG per SL tablet     Sig: Place 2 tablets under the tongue Daily.     Dispense:  56 tablet     Refill:  0     NADEAN:[id number]       No follow-ups on file.           This document has been electronically signed by WALKER Romero  March 27, 2024 15:31 EDT      Part of this note may be an electronic transcription/translation of spoken language to printed text using the Dragon Dictation System.

## 2024-04-02 ENCOUNTER — TELEPHONE (OUTPATIENT)
Dept: PSYCHIATRY | Facility: CLINIC | Age: 54
End: 2024-04-02
Payer: MEDICAID

## 2024-04-02 NOTE — TELEPHONE ENCOUNTER
Roula asked that I let you know she is on her way to inpatient treatment at an [facility] in [facility]. Roula made the decision to go to inpatient treatment because she was ordered to diversion when she pled guilty to her open court case, and in which one requirement is to pass any and all drug screens. Roula stated she wasn't confident she could pass all drug screens so she made the decision on her own to seek treatment. Roula made me promise to make you aware of her decision. Roula will continue treatment with you upon her discharge from treatment.

## 2024-05-02 ENCOUNTER — OFFICE VISIT (OUTPATIENT)
Dept: PSYCHIATRY | Facility: CLINIC | Age: 54
End: 2024-05-02
Payer: MEDICAID

## 2024-05-02 DIAGNOSIS — F12.20 TETRAHYDROCANNABINOL (THC) USE DISORDER, SEVERE, DEPENDENCE: ICD-10-CM

## 2024-05-02 DIAGNOSIS — F11.20 OPIOID USE DISORDER, SEVERE, ON MAINTENANCE THERAPY, DEPENDENCE: Primary | ICD-10-CM

## 2024-05-02 DIAGNOSIS — F15.20 METHAMPHETAMINE USE DISORDER, SEVERE, DEPENDENCE: ICD-10-CM

## 2024-05-02 DIAGNOSIS — F13.20 SEVERE BENZODIAZEPINE USE DISORDER: ICD-10-CM

## 2024-05-02 DIAGNOSIS — Z79.899 MEDICATION MANAGEMENT: ICD-10-CM
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
## 2024-05-06 ENCOUNTER — OFFICE VISIT (OUTPATIENT)
Dept: PSYCHIATRY | Facility: HOSPITAL | Age: 54
End: 2024-05-06
Payer: MEDICAID

## 2024-05-06 DIAGNOSIS — F11.20 OPIOID USE DISORDER, SEVERE, ON MAINTENANCE THERAPY, DEPENDENCE: ICD-10-CM

## 2024-05-06 DIAGNOSIS — R11.14 BILIOUS VOMITING WITH NAUSEA: ICD-10-CM

## 2024-05-06 DIAGNOSIS — F41.1 GENERALIZED ANXIETY DISORDER: ICD-10-CM

## 2024-05-06 DIAGNOSIS — F13.20 SEVERE BENZODIAZEPINE USE DISORDER: ICD-10-CM

## 2024-05-06 DIAGNOSIS — F33.8 OTHER RECURRENT DEPRESSIVE DISORDERS: ICD-10-CM

## 2024-05-06 DIAGNOSIS — Z79.899 MEDICATION MANAGEMENT: Primary | ICD-10-CM

## 2024-05-06 DIAGNOSIS — F11.20 OPIOID TYPE DEPENDENCE, CONTINUOUS: ICD-10-CM

## 2024-05-06 DIAGNOSIS — F15.20 METHAMPHETAMINE USE DISORDER, SEVERE, DEPENDENCE: Primary | ICD-10-CM

## 2024-05-06 DIAGNOSIS — F12.20 TETRAHYDROCANNABINOL (THC) USE DISORDER, SEVERE, DEPENDENCE: ICD-10-CM

## 2024-05-06 PROCEDURE — H0015 ALCOHOL AND/OR DRUG SERVICES: HCPCS | Performed by: SOCIAL WORKER

## 2024-05-06 PROCEDURE — 1159F MED LIST DOCD IN RCRD: CPT | Performed by: PSYCHIATRY & NEUROLOGY

## 2024-05-06 PROCEDURE — 90792 PSYCH DIAG EVAL W/MED SRVCS: CPT | Performed by: PSYCHIATRY & NEUROLOGY

## 2024-05-06 PROCEDURE — 1160F RVW MEDS BY RX/DR IN RCRD: CPT | Performed by: PSYCHIATRY & NEUROLOGY

## 2024-05-06 RX ORDER — ONDANSETRON 8 MG/1
8 TABLET, ORALLY DISINTEGRATING ORAL EVERY 8 HOURS PRN
Qty: 45 TABLET | Refills: 0 | Status: SHIPPED | OUTPATIENT
Start: 2024-05-06

## 2024-05-06 RX ORDER — BUPRENORPHINE HYDROCHLORIDE AND NALOXONE HYDROCHLORIDE DIHYDRATE 8; 2 MG/1; MG/1
2 TABLET SUBLINGUAL DAILY
Qty: 14 TABLET | Refills: 0 | Status: SHIPPED | OUTPATIENT
Start: 2024-05-06

## 2024-05-07 ENCOUNTER — OFFICE VISIT (OUTPATIENT)
Dept: PSYCHIATRY | Facility: HOSPITAL | Age: 54
End: 2024-05-07
Payer: MEDICAID

## 2024-05-07 DIAGNOSIS — F13.20 SEVERE BENZODIAZEPINE USE DISORDER: ICD-10-CM

## 2024-05-07 DIAGNOSIS — F41.1 GENERALIZED ANXIETY DISORDER: ICD-10-CM

## 2024-05-07 DIAGNOSIS — F11.20 OPIOID USE DISORDER, SEVERE, ON MAINTENANCE THERAPY, DEPENDENCE: ICD-10-CM

## 2024-05-07 DIAGNOSIS — F15.20 METHAMPHETAMINE USE DISORDER, SEVERE, DEPENDENCE: Primary | ICD-10-CM

## 2024-05-07 DIAGNOSIS — F12.20 TETRAHYDROCANNABINOL (THC) USE DISORDER, SEVERE, DEPENDENCE: ICD-10-CM

## 2024-05-07 PROCEDURE — H0015 ALCOHOL AND/OR DRUG SERVICES: HCPCS | Performed by: SOCIAL WORKER

## 2024-05-08 ENCOUNTER — OFFICE VISIT (OUTPATIENT)
Dept: PSYCHIATRY | Facility: HOSPITAL | Age: 54
End: 2024-05-08
Payer: MEDICAID

## 2024-05-08 DIAGNOSIS — F12.20 TETRAHYDROCANNABINOL (THC) USE DISORDER, SEVERE, DEPENDENCE: ICD-10-CM

## 2024-05-08 DIAGNOSIS — F13.20 SEVERE BENZODIAZEPINE USE DISORDER: ICD-10-CM

## 2024-05-08 DIAGNOSIS — F41.1 GENERALIZED ANXIETY DISORDER: ICD-10-CM

## 2024-05-08 DIAGNOSIS — F15.20 METHAMPHETAMINE USE DISORDER, SEVERE, DEPENDENCE: Primary | ICD-10-CM

## 2024-05-08 DIAGNOSIS — F11.20 OPIOID USE DISORDER, SEVERE, ON MAINTENANCE THERAPY, DEPENDENCE: ICD-10-CM

## 2024-05-08 PROCEDURE — H0015 ALCOHOL AND/OR DRUG SERVICES: HCPCS | Performed by: SOCIAL WORKER

## 2024-05-09 ENCOUNTER — OFFICE VISIT (OUTPATIENT)
Dept: PSYCHIATRY | Facility: HOSPITAL | Age: 54
End: 2024-05-09
Payer: MEDICAID

## 2024-05-09 DIAGNOSIS — Z79.899 MEDICATION MANAGEMENT: ICD-10-CM

## 2024-05-09 DIAGNOSIS — F13.20 SEVERE BENZODIAZEPINE USE DISORDER: ICD-10-CM

## 2024-05-09 DIAGNOSIS — F15.20 METHAMPHETAMINE USE DISORDER, SEVERE, DEPENDENCE: Primary | ICD-10-CM

## 2024-05-09 DIAGNOSIS — F11.20 OPIOID USE DISORDER, SEVERE, ON MAINTENANCE THERAPY, DEPENDENCE: ICD-10-CM

## 2024-05-09 DIAGNOSIS — F12.20 TETRAHYDROCANNABINOL (THC) USE DISORDER, SEVERE, DEPENDENCE: ICD-10-CM

## 2024-05-09 DIAGNOSIS — F41.1 GENERALIZED ANXIETY DISORDER: ICD-10-CM

## 2024-05-09 PROCEDURE — H0015 ALCOHOL AND/OR DRUG SERVICES: HCPCS | Performed by: SOCIAL WORKER

## 2024-05-20 ENCOUNTER — APPOINTMENT (OUTPATIENT)
Dept: PSYCHIATRY | Facility: HOSPITAL | Age: 54
End: 2024-05-20
Payer: MEDICAID

## 2024-05-21 ENCOUNTER — APPOINTMENT (OUTPATIENT)
Dept: PSYCHIATRY | Facility: HOSPITAL | Age: 54
End: 2024-05-21
Payer: MEDICAID

## 2024-05-28 ENCOUNTER — APPOINTMENT (OUTPATIENT)
Dept: PSYCHIATRY | Facility: HOSPITAL | Age: 54
End: 2024-05-28
Payer: MEDICAID

## 2024-06-05 ENCOUNTER — DOCUMENTATION (OUTPATIENT)
Dept: PSYCHIATRY | Facility: CLINIC | Age: 54
End: 2024-06-05
Payer: MEDICAID

## 2024-06-05 NOTE — PROGRESS NOTES
[address] [address]  [phone]      HOSPITAL-BASED PROGRAMS   (IOP/PHP/OUTPATIENT MH and CD)  DISCHARGE SUMMARY      Patient Name: Roula Dumont  Patient : 1970  Program: CD IOP  Attending Psychiatric Provider: Dr. Chris Cruz  Therapist: Torrie Nichole LCSW, Ed. D.     Date of Admission: Monday, May 6, 2024  Last Date Attended: Thursday, May 9, 2024  Date of Discharge: Monday, May 20, 2024  Total Number of Days: 4  Reason for Discharge: Noncompliance with attendance   Aftercare Plan (including next appointment date and time, and facility name, if possible): not applicable, due to patient's having withdrawn from the program without notice    Discharge Medications:    Current Outpatient Medications:     acetaminophen (Tylenol) 325 MG tablet, Take 1 tablet by mouth Every 4 (Four) Hours As Needed for Mild Pain or Headache., Disp: 100 tablet, Rfl: 0    buprenorphine-naloxone (SUBOXONE) 8-2 MG per SL tablet, Place 2 tablets under the tongue Daily., Disp: 14 tablet, Rfl: 0    buPROPion XL (WELLBUTRIN XL) 150 MG 24 hr tablet, BID dosing, Disp: 60 tablet, Rfl: 1    famotidine (PEPCID) 20 MG tablet, Take 1 tablet by mouth 2 (Two) Times a Day., Disp: 60 tablet, Rfl: 0    GNP PAIN RELIEF EX-STRENGTH 500 MG tablet, TAKE ONE TABLET BY MOUTH EVERY 6 HOURS AS NEEDED FOR 30 DAYS **DO NOT EXCEED 6 TABLETS IN 24 HOURS**, Disp: , Rfl:     hydrOXYzine pamoate (Vistaril) 25 MG capsule, Take 1 capsule by mouth 4 (Four) Times a Day As Needed for Anxiety., Disp: 120 capsule, Rfl: 0    naloxone (NARCAN) 4 MG/0.1ML nasal spray, 1 spray into the nostril(s) as directed by provider As Needed (opiate over sedation). 1 spray in 1 nostril every 2 to 3 minutes, call 911 (Patient not taking: Reported on 3/27/2024), Disp: 2 each, Rfl: 2    ondansetron ODT (ZOFRAN-ODT) 8 MG disintegrating tablet, Place 1 tablet on the tongue Every 8 (Eight) Hours As Needed for Nausea or Vomiting., Disp: 45 tablet,  Rfl: 0    Patient Diagnosis(es):  1. Opioid use disorder, severe, on maintenance therapy, dependence  F11.20 304.00   2. Methamphetamine use disorder, severe, dependence  F15.20 304.40   3. Severe benzodiazepine use disorder  F13.20 304.10   4. Tetrahydrocannabinol (THC) use disorder, severe, dependence  F12.20 304.30       This document signed by Ashish Gregory LCSW, June 5, 2024, 14:52 EDT

## 2024-06-11 ENCOUNTER — TELEMEDICINE (OUTPATIENT)
Dept: PSYCHIATRY | Facility: CLINIC | Age: 54
End: 2024-06-11
Payer: MEDICAID

## 2024-06-11 VITALS
SYSTOLIC BLOOD PRESSURE: 132 MMHG | HEART RATE: 63 BPM | HEIGHT: 61 IN | WEIGHT: 174 LBS | BODY MASS INDEX: 32.85 KG/M2 | DIASTOLIC BLOOD PRESSURE: 76 MMHG

## 2024-06-11 DIAGNOSIS — F15.11 METHAMPHETAMINE ABUSE IN REMISSION: ICD-10-CM

## 2024-06-11 DIAGNOSIS — Z79.899 MEDICATION MANAGEMENT: ICD-10-CM

## 2024-06-11 DIAGNOSIS — F11.20 OPIOID TYPE DEPENDENCE, CONTINUOUS: Primary | ICD-10-CM

## 2024-06-11 PROCEDURE — 1159F MED LIST DOCD IN RCRD: CPT | Performed by: NURSE PRACTITIONER

## 2024-06-11 PROCEDURE — 1160F RVW MEDS BY RX/DR IN RCRD: CPT | Performed by: NURSE PRACTITIONER

## 2024-06-11 PROCEDURE — 99214 OFFICE O/P EST MOD 30 MIN: CPT | Performed by: NURSE PRACTITIONER

## 2024-06-11 RX ORDER — BUPROPION HYDROCHLORIDE 150 MG/1
150 TABLET ORAL 2 TIMES DAILY
Qty: 60 TABLET | Refills: 1 | Status: SHIPPED | OUTPATIENT
Start: 2024-06-11

## 2024-06-11 RX ORDER — BUPRENORPHINE HYDROCHLORIDE AND NALOXONE HYDROCHLORIDE DIHYDRATE 8; 2 MG/1; MG/1
2 TABLET SUBLINGUAL DAILY
Qty: 28 TABLET | Refills: 0 | Status: SHIPPED | OUTPATIENT
Start: 2024-06-11

## 2024-06-11 NOTE — PROGRESS NOTES
This provider is located at [facility]. The Patient is seen remotely located at the [facility] ([facility]) using Video. Patient is being seen via telehealth and confirm that they are in a secure environment for this session. The patient's condition being diagnosed/treated is appropriate for telemedicine. Provider identified as Williams Brody as well as credentials APRN MSN FNP-C SRINIVASAN-YOUNG.   The client/patient gave consent to be seen remotely, and when consent is given they understand that the consent allows for patient identifiable information to be sent to a third party as needed.   They may refuse to be seen remotely at any time. The electronic data is encrypted and password protected, and the patient has been advised of the potential risks to privacy not withstanding such measures.    Chief Complaint/History of Present Illness: Follow Up buprenorphine/naloxone Medicated Assisted Treatment for Opiate Use Disorder     Patient/Client Concerns/Updates: Continue Wellbutrin for stimulant cravings   -Due to a 30-day residential treatment stay in [facility] patient reports residential treatment was very positive and patient would have stayed longer if it was not for insurance restraints; patient optimistic she will be able to return to residential treatment in July   -Patient has initiated chemical dependency intensive outpatient here in the [facility]  -Patient reports last use of methamphetamine approximate 10 days ago and expresses optimism and goals moving forward to achieve and maintain abstinence from methamphetamine  -Mood is overall stable and patient denies depressive or anxious concerns, no suicidal or homicidal thoughts no symptoms of psychosis or perceptual disturbance    Triggers (PatientPersons/Places/Things/Events/Thought/Emotions): Anxiety    Cravings/Substance Use: Denies cravings for illicit substances last use of methamphetamine 10 days  ago    Relapse Prevention: Patient has initiated chemical dependency IOP and anticipates returning to residential treatment    Urine Drug Screen (today's visit) discussed: Positive buprenorphine, otherwise negative for substances tested    UDS Confirmation (Most recent/Resulted): None available of current clinical significance    Most recent pertinent laboratory studies reviewed: 4/11/23-hepatic function within normal limits, Cr WNL, HIV negative, hepatitis C not detected            ROSY (PDMP) Reviewed for Current/Active Medications: buprenorphine/naloxone as reviewed today with last prescription from myself 3/27/2024 and continued prescriptions up until most recent dispensed date 5/29/2024 for 7 days    Past Surgical History:  No past surgical history on file.    Problem List:  There is no problem list on file for this patient.      Allergy:   No Known Allergies     Current Medications:   Current Outpatient Medications   Medication Sig Dispense Refill   • acetaminophen (Tylenol) 325 MG tablet Take 1 tablet by mouth Every 4 (Four) Hours As Needed for Mild Pain or Headache. 100 tablet 0   • buprenorphine-naloxone (SUBOXONE) 8-2 MG per SL tablet Place 2 tablets under the tongue Daily. 28 tablet 0   • buPROPion XL (WELLBUTRIN XL) 150 MG 24 hr tablet Take 1 tablet by mouth 2 (Two) Times a Day. BID dosing 60 tablet 1   • famotidine (PEPCID) 20 MG tablet Take 1 tablet by mouth 2 (Two) Times a Day. 60 tablet 0   • GNP PAIN RELIEF EX-STRENGTH 500 MG tablet TAKE ONE TABLET BY MOUTH EVERY 6 HOURS AS NEEDED FOR 30 DAYS **DO NOT EXCEED 6 TABLETS IN 24 HOURS**     • hydrOXYzine pamoate (Vistaril) 25 MG capsule Take 1 capsule by mouth 4 (Four) Times a Day As Needed for Anxiety. 120 capsule 0   • ondansetron ODT (ZOFRAN-ODT) 8 MG disintegrating tablet Place 1 tablet on the tongue Every 8 (Eight) Hours As Needed for Nausea or Vomiting. 45 tablet 0   • naloxone (NARCAN) 4 MG/0.1ML nasal spray 1 spray into the nostril(s) as  directed by provider As Needed (opiate over sedation). 1 spray in 1 nostril every 2 to 3 minutes, call 911 (Patient not taking: Reported on 3/27/2024) 2 each 2     No current facility-administered medications for this visit.       Past Medical History:  Past Medical History:   Diagnosis Date   • History of hepatitis B    • Pancreatitis          Social History     Socioeconomic History   • Marital status: Single   Tobacco Use   • Smoking status: Every Day     Current packs/day: 1.00     Average packs/day: 1 pack/day for 15.0 years (15.0 ttl pk-yrs)     Types: Cigarettes   • Smokeless tobacco: Never   Vaping Use   • Vaping status: Never Used   Substance and Sexual Activity   • Alcohol use: Not Currently   • Drug use: Yes     Types: Marijuana, Hydrocodone, Oxycodone, Methamphetamines   • Sexual activity: Defer         Family History   Problem Relation Age of Onset   • No Known Problems Mother    • No Known Problems Father    • No Known Problems Sister    • No Known Problems Brother    • No Known Problems Maternal Aunt    • No Known Problems Paternal Aunt    • No Known Problems Maternal Uncle    • No Known Problems Paternal Uncle    • No Known Problems Maternal Grandfather    • No Known Problems Maternal Grandmother    • No Known Problems Paternal Grandfather    • No Known Problems Paternal Grandmother    • No Known Problems Cousin    • No Known Problems Other    • ADD / ADHD Neg Hx    • Alcohol abuse Neg Hx    • Anxiety disorder Neg Hx    • Bipolar disorder Neg Hx    • Dementia Neg Hx    • Depression Neg Hx    • Drug abuse Neg Hx    • OCD Neg Hx    • Paranoid behavior Neg Hx    • Schizophrenia Neg Hx    • Seizures Neg Hx    • Self-Injurious Behavior  Neg Hx    • Suicide Attempts Neg Hx          Mental Status Exam:   Hygiene:   good  Cooperation:  Cooperative  Eye Contact:  Good  Psychomotor Behavior:  Appropriate  Affect:  Appropriate  Mood: normal  Speech:  Normal  Thought Process:  Goal directed  Thought Content:   "Normal  Suicidal:  None  Homicidal:  None  Hallucinations:  None  Delusion:  None  Memory:  Intact  Orientation:  Grossly intact  Reliability:  good  Insight:  Good  Judgement:  Fair  Impulse Control:  Fair         Review of Systems:  Review of Systems   Constitutional:  Negative for activity change, chills, diaphoresis and fatigue.   Respiratory:  Negative for apnea, cough and shortness of breath.    Cardiovascular:  Negative for chest pain, palpitations and leg swelling.   Gastrointestinal:  Negative for abdominal pain, constipation, diarrhea, nausea and vomiting.   Genitourinary:  Negative for difficulty urinating.   Musculoskeletal:  Negative for arthralgias.   Skin:  Negative for rash.   Neurological:  Negative for dizziness, weakness and headaches.   Psychiatric/Behavioral:  Negative for agitation, self-injury, sleep disturbance and suicidal ideas. The patient is nervous/anxious.        Physical Exam:  Physical Exam  Vitals reviewed.   Constitutional:       General: She is not in acute distress.     Appearance: Normal appearance. She is not ill-appearing or toxic-appearing.   Pulmonary:      Effort: Pulmonary effort is normal.   Musculoskeletal:         General: Normal range of motion.   Neurological:      General: No focal deficit present.      Mental Status: She is alert and oriented to person, place, and time.   Psychiatric:         Attention and Perception: Attention and perception normal.         Mood and Affect: Mood normal. Mood is anxious. Mood is not depressed.         Speech: Speech normal.         Behavior: Behavior normal. Behavior is cooperative.         Thought Content: Thought content normal.         Cognition and Memory: Cognition and memory normal.         Judgment: Judgment normal.     Vital Signs:   /76   Pulse 63   Ht 156.2 cm (61.5\")   Wt 78.9 kg (174 lb)   BMI 32.35 kg/m²      Lab Results:   Telemedicine on 06/11/2024   Component Date Value Ref Range Status   • External " Amphetamine Screen Urine 06/11/2024 Negative   Final   • External Benzodiazepine Screen Uri* 06/11/2024 Negative   Final   • External Cocaine Screen Urine 06/11/2024 Negative   Final   • External THC Screen Urine 06/11/2024 Negative   Final   • External Methadone Screen Urine 06/11/2024 Negative   Final   • External Methamphetamine Screen Ur* 06/11/2024 Negative   Final   • External Oxycodone Screen Urine 06/11/2024 Negative   Final   • External Buprenorphine Screen Urine 06/11/2024 Positive (A)   Final   • External MDMA 06/11/2024 Negative   Final   • External Opiates Screen Urine 06/11/2024 Negative   Final   Orders Only on 05/06/2024   Component Date Value Ref Range Status   • External Amphetamine Screen Urine 05/06/2024 Negative   Final   • External Benzodiazepine Screen Uri* 05/06/2024 Negative   Final   • External Cocaine Screen Urine 05/06/2024 Negative   Final   • External THC Screen Urine 05/06/2024 Negative   Final   • External Methadone Screen Urine 05/06/2024 Negative   Final   • External Methamphetamine Screen Ur* 05/06/2024 Negative   Final   • External Oxycodone Screen Urine 05/06/2024 Negative   Final   • External Buprenorphine Screen Urine 05/06/2024 Positive (A)   Final   • External MDMA 05/06/2024 Negative   Final   • External Opiates Screen Urine 05/06/2024 Negative   Final   Office Visit on 05/02/2024   Component Date Value Ref Range Status   • External Amphetamine Screen Urine 05/02/2024 Negative   Final   • External Benzodiazepine Screen Uri* 05/02/2024 Negative   Final   • External Cocaine Screen Urine 05/02/2024 Negative   Final   • External THC Screen Urine 05/02/2024 Negative   Final   • External Methadone Screen Urine 05/02/2024 Negative   Final   • External Methamphetamine Screen Ur* 05/02/2024 Negative   Final   • External Oxycodone Screen Urine 05/02/2024 Negative   Final   • External Buprenorphine Screen Urine 05/02/2024 Positive (A)   Final   • External MDMA 05/02/2024 Negative    Final   • External Opiates Screen Urine 05/02/2024 Negative   Final   Telemedicine on 03/27/2024   Component Date Value Ref Range Status   • External Amphetamine Screen Urine 03/27/2024 Negative   Final   • External Benzodiazepine Screen Uri* 03/27/2024 Negative   Final   • External Cocaine Screen Urine 03/27/2024 Negative   Final   • External THC Screen Urine 03/27/2024 Negative   Final   • External Methadone Screen Urine 03/27/2024 Negative   Final   • External Methamphetamine Screen Ur* 03/27/2024 Negative   Final   • External Oxycodone Screen Urine 03/27/2024 Negative   Final   • External Buprenorphine Screen Urine 03/27/2024 Negative   Final   • External MDMA 03/27/2024 Negative   Final   • External Opiates Screen Urine 03/27/2024 Negative   Final   Telemedicine on 02/22/2024   Component Date Value Ref Range Status   • External Amphetamine Screen Urine 02/22/2024 Negative   Final   • External Benzodiazepine Screen Uri* 02/22/2024 Negative   Final   • External Cocaine Screen Urine 02/22/2024 Negative   Final   • External THC Screen Urine 02/22/2024 Negative   Final   • External Methadone Screen Urine 02/22/2024 Negative   Final   • External Methamphetamine Screen Ur* 02/22/2024 Negative   Final   • External Oxycodone Screen Urine 02/22/2024 Negative   Final   • External Buprenorphine Screen Urine 02/22/2024 Positive (A)   Final   • External MDMA 02/22/2024 Negative   Final   • External Opiates Screen Urine 02/22/2024 Negative   Final   Telemedicine on 01/23/2024   Component Date Value Ref Range Status   • External Amphetamine Screen Urine 01/23/2024 Negative   Final   • External Benzodiazepine Screen Uri* 01/23/2024 Negative   Final   • External Cocaine Screen Urine 01/23/2024 Negative   Final   • External THC Screen Urine 01/23/2024 Negative   Final   • External Methadone Screen Urine 01/23/2024 Negative   Final   • External Methamphetamine Screen Ur* 01/23/2024 Negative   Final   • External Oxycodone Screen  Urine 01/23/2024 Negative   Final   • External Buprenorphine Screen Urine 01/23/2024 Positive (A)   Final   • External MDMA 01/23/2024 Negative   Final   • External Opiates Screen Urine 01/23/2024 Negative   Final   Telemedicine on 01/08/2024   Component Date Value Ref Range Status   • External Amphetamine Screen Urine 01/08/2024 Positive (A)   Final   • External Benzodiazepine Screen Uri* 01/08/2024 Negative   Final   • External Cocaine Screen Urine 01/08/2024 Negative   Final   • External THC Screen Urine 01/08/2024 Positive (A)   Final   • External Methadone Screen Urine 01/08/2024 Negative   Final   • External Methamphetamine Screen Ur* 01/08/2024 Positive (A)   Final   • External Oxycodone Screen Urine 01/08/2024 Negative   Final   • External Buprenorphine Screen Urine 01/08/2024 Positive (A)   Final   • External MDMA 01/08/2024 Negative   Final   • External Opiates Screen Urine 01/08/2024 Negative   Final   Telemedicine on 12/12/2023   Component Date Value Ref Range Status   • External Amphetamine Screen Urine 12/12/2023 Positive (A)   Final   • External Benzodiazepine Screen Uri* 12/12/2023 Negative   Final   • External Cocaine Screen Urine 12/12/2023 Negative   Final   • External THC Screen Urine 12/12/2023 Negative   Final   • External Methadone Screen Urine 12/12/2023 Negative   Final   • External Methamphetamine Screen Ur* 12/12/2023 Positive (A)   Final   • External Oxycodone Screen Urine 12/12/2023 Negative   Final   • External Buprenorphine Screen Urine 12/12/2023 Positive (A)   Final   • External MDMA 12/12/2023 Negative   Final   • External Opiates Screen Urine 12/12/2023 Negative   Final         Assessment & Plan   Diagnoses and all orders for this visit:    1. Opioid type dependence, continuous (Primary)  -     buprenorphine-naloxone (SUBOXONE) 8-2 MG per SL tablet; Place 2 tablets under the tongue Daily.  Dispense: 28 tablet; Refill: 0    2. Medication management  -     [facility] Drug Screen    3.  Methamphetamine abuse in remission  -     buPROPion XL (WELLBUTRIN XL) 150 MG 24 hr tablet; Take 1 tablet by mouth 2 (Two) Times a Day. BID dosing  Dispense: 60 tablet; Refill: 1        Visit Diagnoses:    ICD-10-CM ICD-9-CM   1. Opioid type dependence, continuous  F11.20 304.01   2. Medication management  Z79.899 V58.69   3. Methamphetamine abuse in remission  F15.11 305.73       PLAN:  Safety: No acute safety concerns  Risk Assessment: Risk of self-harm acutely is low. Risk of self-harm chronically is also low, but could be further elevated in the event of treatment noncompliance and/or AODA.    TREATMENT PLAN/GOALS: Continue supportive psychotherapy efforts and medications as indicated. Treatment and medication options discussed during today's visit. Patient acknowledged and verbally consented to continue with current treatment plan and was educated on the importance of compliance with treatment and follow-up appointments.    MEDICATION ISSUES:  ROSY reviewed as expected.  Discussed medication options and treatment plan of prescribed medication as well as the risks, benefits, and side effects including potential falls, possible impaired driving and metabolic adversities among others. Patient is agreeable to call the office with any worsening of symptoms or onset of side effects. Patient is agreeable to call 911 or go to the nearest ER should he/she begin having SI/HI. No medication side effects or related complaints today.     MEDS ORDERED DURING VISIT:  New Medications Ordered This Visit   Medications   • buprenorphine-naloxone (SUBOXONE) 8-2 MG per SL tablet     Sig: Place 2 tablets under the tongue Daily.     Dispense:  28 tablet     Refill:  0     NADEAN:[id number]   • buPROPion XL (WELLBUTRIN XL) 150 MG 24 hr tablet     Sig: Take 1 tablet by mouth 2 (Two) Times a Day. BID dosing     Dispense:  60 tablet     Refill:  1       No follow-ups on file.           This document has been electronically signed by  Williams Brody, APRN  June 11, 2024 15:25 EDT      Part of this note may be an electronic transcription/translation of spoken language to printed text using the Dragon Dictation System.

## 2024-06-25 ENCOUNTER — TELEMEDICINE (OUTPATIENT)
Dept: PSYCHIATRY | Facility: CLINIC | Age: 54
End: 2024-06-25
Payer: MEDICAID

## 2024-06-25 VITALS
BODY MASS INDEX: 32.74 KG/M2 | DIASTOLIC BLOOD PRESSURE: 79 MMHG | WEIGHT: 173.4 LBS | SYSTOLIC BLOOD PRESSURE: 139 MMHG | HEIGHT: 61 IN | HEART RATE: 80 BPM

## 2024-06-25 DIAGNOSIS — F11.20 OPIOID TYPE DEPENDENCE, CONTINUOUS: Primary | ICD-10-CM

## 2024-06-25 DIAGNOSIS — Z79.899 MEDICATION MANAGEMENT: ICD-10-CM

## 2024-06-25 DIAGNOSIS — F15.11 METHAMPHETAMINE ABUSE IN REMISSION: ICD-10-CM

## 2024-06-25 PROCEDURE — 1160F RVW MEDS BY RX/DR IN RCRD: CPT | Performed by: NURSE PRACTITIONER

## 2024-06-25 PROCEDURE — 99214 OFFICE O/P EST MOD 30 MIN: CPT | Performed by: NURSE PRACTITIONER

## 2024-06-25 PROCEDURE — 1159F MED LIST DOCD IN RCRD: CPT | Performed by: NURSE PRACTITIONER

## 2024-06-25 RX ORDER — BUPRENORPHINE HYDROCHLORIDE AND NALOXONE HYDROCHLORIDE DIHYDRATE 8; 2 MG/1; MG/1
2 TABLET SUBLINGUAL DAILY
Qty: 56 TABLET | Refills: 0 | Status: SHIPPED | OUTPATIENT
Start: 2024-06-25

## 2024-06-25 RX ORDER — BUPROPION HYDROCHLORIDE 150 MG/1
150 TABLET ORAL 2 TIMES DAILY
Qty: 60 TABLET | Refills: 0 | Status: SHIPPED | OUTPATIENT
Start: 2024-06-25

## 2024-06-25 NOTE — PROGRESS NOTES
This provider is located at [facility]. The Patient is seen remotely located at the [facility] ([facility]) using Video. Patient is being seen via telehealth and confirm that they are in a secure environment for this session. The patient's condition being diagnosed/treated is appropriate for telemedicine. Provider identified as Williams Brody as well as credentials APRN MSN FNP-SHAHEEN MATTSON-YOUNG.   The client/patient gave consent to be seen remotely, and when consent is given they understand that the consent allows for patient identifiable information to be sent to a third party as needed.   They may refuse to be seen remotely at any time. The electronic data is encrypted and password protected, and the patient has been advised of the potential risks to privacy not withstanding such measures.    Chief Complaint/History of Present Illness: Follow Up buprenorphine/naloxone Medicated Assisted Treatment for Opiate Use Disorder     Patient/Client Concerns/Updates: Continue Wellbutrin for stimulant cravings   -Reports she is doing well maintaining abstinence from methamphetamine, reports approximately last use 28 days ago and patient denies concerning cravings or desire to use at this time  -Patient has a job interview at [employer] tomorrow and is optimistic about this opportunity to return to the workforce  -Patient caring for her older mother  -Patient anticipates starting the chemical dependency IOP program in the [facility] in July  -Mood is stable patient denies depressive or anxious concerns, no suicidal or homicidal thoughts no perceptual disturbances  -No concerns with sleep    Triggers (Persons/Places/Things/Events/Thought/Emotions): Life stress    Cravings/Substance Use: Denies cravings or use of illicit substances in 20 days    Relapse Prevention: Counseling    Urine Drug Screen (today's visit) discussed: Positive buprenorphine, otherwise negative for substances tested    UDS Confirmation  (Most recent/Resulted): Positive buprenorphine/nor-buprenorphine, no concerns for urine tampering otherwise positive methamphetamine    Most recent pertinent laboratory studies reviewed:  4/11/23-hepatic function within normal limits, Cr WNL, HIV negative, hepatitis C not detected            ROSY (PDMP) Reviewed for Current/Active Medications: buprenorphine/naloxone as reviewed today    Past Surgical History:  No past surgical history on file.    Problem List:  There is no problem list on file for this patient.      Allergy:   No Known Allergies     Current Medications:   Current Outpatient Medications   Medication Sig Dispense Refill   • acetaminophen (Tylenol) 325 MG tablet Take 1 tablet by mouth Every 4 (Four) Hours As Needed for Mild Pain or Headache. 100 tablet 0   • buprenorphine-naloxone (SUBOXONE) 8-2 MG per SL tablet Place 2 tablets under the tongue Daily. 56 tablet 0   • buPROPion XL (WELLBUTRIN XL) 150 MG 24 hr tablet Take 1 tablet by mouth 2 (Two) Times a Day. BID dosing 60 tablet 0   • famotidine (PEPCID) 20 MG tablet Take 1 tablet by mouth 2 (Two) Times a Day. 60 tablet 0   • GNP PAIN RELIEF EX-STRENGTH 500 MG tablet TAKE ONE TABLET BY MOUTH EVERY 6 HOURS AS NEEDED FOR 30 DAYS **DO NOT EXCEED 6 TABLETS IN 24 HOURS**     • hydrOXYzine pamoate (Vistaril) 25 MG capsule Take 1 capsule by mouth 4 (Four) Times a Day As Needed for Anxiety. 120 capsule 0   • ondansetron ODT (ZOFRAN-ODT) 8 MG disintegrating tablet Place 1 tablet on the tongue Every 8 (Eight) Hours As Needed for Nausea or Vomiting. 45 tablet 0   • naloxone (NARCAN) 4 MG/0.1ML nasal spray 1 spray into the nostril(s) as directed by provider As Needed (opiate over sedation). 1 spray in 1 nostril every 2 to 3 minutes, call 911 (Patient not taking: Reported on 3/27/2024) 2 each 2     No current facility-administered medications for this visit.       Past Medical History:  Past Medical History:   Diagnosis Date   • History of hepatitis B    •  Pancreatitis          Social History     Socioeconomic History   • Marital status: Single   Tobacco Use   • Smoking status: Every Day     Current packs/day: 1.00     Average packs/day: 1 pack/day for 15.0 years (15.0 ttl pk-yrs)     Types: Cigarettes   • Smokeless tobacco: Never   Vaping Use   • Vaping status: Never Used   Substance and Sexual Activity   • Alcohol use: Not Currently   • Drug use: Yes     Types: Marijuana, Hydrocodone, Oxycodone, Methamphetamines   • Sexual activity: Defer         Family History   Problem Relation Age of Onset   • No Known Problems Mother    • No Known Problems Father    • No Known Problems Sister    • No Known Problems Brother    • No Known Problems Maternal Aunt    • No Known Problems Paternal Aunt    • No Known Problems Maternal Uncle    • No Known Problems Paternal Uncle    • No Known Problems Maternal Grandfather    • No Known Problems Maternal Grandmother    • No Known Problems Paternal Grandfather    • No Known Problems Paternal Grandmother    • No Known Problems Cousin    • No Known Problems Other    • ADD / ADHD Neg Hx    • Alcohol abuse Neg Hx    • Anxiety disorder Neg Hx    • Bipolar disorder Neg Hx    • Dementia Neg Hx    • Depression Neg Hx    • Drug abuse Neg Hx    • OCD Neg Hx    • Paranoid behavior Neg Hx    • Schizophrenia Neg Hx    • Seizures Neg Hx    • Self-Injurious Behavior  Neg Hx    • Suicide Attempts Neg Hx          Mental Status Exam:   Hygiene:   good  Cooperation:  Cooperative  Eye Contact:  Good  Psychomotor Behavior:  Appropriate  Affect:  Appropriate  Mood: normal  Speech:  Normal  Thought Process:  Goal directed  Thought Content:  Normal  Suicidal:  None  Homicidal:  None  Hallucinations:  None  Delusion:  None  Memory:  Intact  Orientation:  Grossly intact  Reliability:  good  Insight:  Good  Judgement:  Good  Impulse Control:  Good         Review of Systems:  Review of Systems   Constitutional:  Negative for activity change, chills, diaphoresis and  "fatigue.   Respiratory:  Negative for apnea, cough and shortness of breath.    Cardiovascular:  Negative for chest pain, palpitations and leg swelling.   Gastrointestinal:  Negative for abdominal pain, constipation, diarrhea, nausea and vomiting.   Genitourinary:  Negative for difficulty urinating.   Musculoskeletal:  Negative for arthralgias.   Skin:  Negative for rash.   Neurological:  Negative for dizziness, weakness and headaches.   Psychiatric/Behavioral:  Negative for agitation, self-injury, sleep disturbance and suicidal ideas. The patient is not nervous/anxious.        Physical Exam:  Physical Exam  Vitals reviewed.   Constitutional:       General: She is not in acute distress.     Appearance: Normal appearance. She is not ill-appearing or toxic-appearing.   Pulmonary:      Effort: Pulmonary effort is normal.   Musculoskeletal:         General: Normal range of motion.   Neurological:      General: No focal deficit present.      Mental Status: She is alert and oriented to person, place, and time.   Psychiatric:         Attention and Perception: Attention and perception normal.         Mood and Affect: Mood normal. Mood is not anxious or depressed.         Speech: Speech normal.         Behavior: Behavior normal. Behavior is cooperative.         Thought Content: Thought content normal.         Cognition and Memory: Cognition and memory normal.         Judgment: Judgment normal.     Vital Signs:   /79   Pulse 80   Ht 156.2 cm (61.5\")   Wt 78.7 kg (173 lb 6.4 oz)   BMI 32.24 kg/m²      Lab Results:   Telemedicine on 06/25/2024   Component Date Value Ref Range Status   • External Amphetamine Screen Urine 06/25/2024 Negative   Final   • External Benzodiazepine Screen Uri* 06/25/2024 Negative   Final   • External Cocaine Screen Urine 06/25/2024 Negative   Final   • External THC Screen Urine 06/25/2024 Negative   Final   • External Methadone Screen Urine 06/25/2024 Negative   Final   • External " Methamphetamine Screen Ur* 06/25/2024 Negative   Final   • External Oxycodone Screen Urine 06/25/2024 Negative   Final   • External Buprenorphine Screen Urine 06/25/2024 Positive (A)   Final   • External MDMA 06/25/2024 Negative   Final   • External Opiates Screen Urine 06/25/2024 Negative   Final   Telemedicine on 06/11/2024   Component Date Value Ref Range Status   • External Amphetamine Screen Urine 06/11/2024 Negative   Final   • External Benzodiazepine Screen Uri* 06/11/2024 Negative   Final   • External Cocaine Screen Urine 06/11/2024 Negative   Final   • External THC Screen Urine 06/11/2024 Negative   Final   • External Methadone Screen Urine 06/11/2024 Negative   Final   • External Methamphetamine Screen Ur* 06/11/2024 Negative   Final   • External Oxycodone Screen Urine 06/11/2024 Negative   Final   • External Buprenorphine Screen Urine 06/11/2024 Positive (A)   Final   • External MDMA 06/11/2024 Negative   Final   • External Opiates Screen Urine 06/11/2024 Negative   Final   Orders Only on 05/06/2024   Component Date Value Ref Range Status   • External Amphetamine Screen Urine 05/06/2024 Negative   Final   • External Benzodiazepine Screen Uri* 05/06/2024 Negative   Final   • External Cocaine Screen Urine 05/06/2024 Negative   Final   • External THC Screen Urine 05/06/2024 Negative   Final   • External Methadone Screen Urine 05/06/2024 Negative   Final   • External Methamphetamine Screen Ur* 05/06/2024 Negative   Final   • External Oxycodone Screen Urine 05/06/2024 Negative   Final   • External Buprenorphine Screen Urine 05/06/2024 Positive (A)   Final   • External MDMA 05/06/2024 Negative   Final   • External Opiates Screen Urine 05/06/2024 Negative   Final   Office Visit on 05/02/2024   Component Date Value Ref Range Status   • External Amphetamine Screen Urine 05/02/2024 Negative   Final   • External Benzodiazepine Screen Uri* 05/02/2024 Negative   Final   • External Cocaine Screen Urine 05/02/2024  Negative   Final   • External THC Screen Urine 05/02/2024 Negative   Final   • External Methadone Screen Urine 05/02/2024 Negative   Final   • External Methamphetamine Screen Ur* 05/02/2024 Negative   Final   • External Oxycodone Screen Urine 05/02/2024 Negative   Final   • External Buprenorphine Screen Urine 05/02/2024 Positive (A)   Final   • External MDMA 05/02/2024 Negative   Final   • External Opiates Screen Urine 05/02/2024 Negative   Final   Telemedicine on 03/27/2024   Component Date Value Ref Range Status   • External Amphetamine Screen Urine 03/27/2024 Negative   Final   • External Benzodiazepine Screen Uri* 03/27/2024 Negative   Final   • External Cocaine Screen Urine 03/27/2024 Negative   Final   • External THC Screen Urine 03/27/2024 Negative   Final   • External Methadone Screen Urine 03/27/2024 Negative   Final   • External Methamphetamine Screen Ur* 03/27/2024 Negative   Final   • External Oxycodone Screen Urine 03/27/2024 Negative   Final   • External Buprenorphine Screen Urine 03/27/2024 Negative   Final   • External MDMA 03/27/2024 Negative   Final   • External Opiates Screen Urine 03/27/2024 Negative   Final   Telemedicine on 02/22/2024   Component Date Value Ref Range Status   • External Amphetamine Screen Urine 02/22/2024 Negative   Final   • External Benzodiazepine Screen Uri* 02/22/2024 Negative   Final   • External Cocaine Screen Urine 02/22/2024 Negative   Final   • External THC Screen Urine 02/22/2024 Negative   Final   • External Methadone Screen Urine 02/22/2024 Negative   Final   • External Methamphetamine Screen Ur* 02/22/2024 Negative   Final   • External Oxycodone Screen Urine 02/22/2024 Negative   Final   • External Buprenorphine Screen Urine 02/22/2024 Positive (A)   Final   • External MDMA 02/22/2024 Negative   Final   • External Opiates Screen Urine 02/22/2024 Negative   Final   Telemedicine on 01/23/2024   Component Date Value Ref Range Status   • External Amphetamine Screen  Urine 01/23/2024 Negative   Final   • External Benzodiazepine Screen Uri* 01/23/2024 Negative   Final   • External Cocaine Screen Urine 01/23/2024 Negative   Final   • External THC Screen Urine 01/23/2024 Negative   Final   • External Methadone Screen Urine 01/23/2024 Negative   Final   • External Methamphetamine Screen Ur* 01/23/2024 Negative   Final   • External Oxycodone Screen Urine 01/23/2024 Negative   Final   • External Buprenorphine Screen Urine 01/23/2024 Positive (A)   Final   • External MDMA 01/23/2024 Negative   Final   • External Opiates Screen Urine 01/23/2024 Negative   Final   Telemedicine on 01/08/2024   Component Date Value Ref Range Status   • External Amphetamine Screen Urine 01/08/2024 Positive (A)   Final   • External Benzodiazepine Screen Uri* 01/08/2024 Negative   Final   • External Cocaine Screen Urine 01/08/2024 Negative   Final   • External THC Screen Urine 01/08/2024 Positive (A)   Final   • External Methadone Screen Urine 01/08/2024 Negative   Final   • External Methamphetamine Screen Ur* 01/08/2024 Positive (A)   Final   • External Oxycodone Screen Urine 01/08/2024 Negative   Final   • External Buprenorphine Screen Urine 01/08/2024 Positive (A)   Final   • External MDMA 01/08/2024 Negative   Final   • External Opiates Screen Urine 01/08/2024 Negative   Final         Assessment & Plan   Diagnoses and all orders for this visit:    1. Opioid type dependence, continuous (Primary)  -     buprenorphine-naloxone (SUBOXONE) 8-2 MG per SL tablet; Place 2 tablets under the tongue Daily.  Dispense: 56 tablet; Refill: 0    2. Medication management  -     [facility] Drug Screen    3. Methamphetamine abuse in remission  -     buPROPion XL (WELLBUTRIN XL) 150 MG 24 hr tablet; Take 1 tablet by mouth 2 (Two) Times a Day. BID dosing  Dispense: 60 tablet; Refill: 0        Visit Diagnoses:    ICD-10-CM ICD-9-CM   1. Opioid type dependence, continuous  F11.20 304.01   2. Medication management  Z79.899 V58.69    3. Methamphetamine abuse in remission  F15.11 305.73       PLAN:  Safety: No acute safety concerns  Risk Assessment: Risk of self-harm acutely is low. Risk of self-harm chronically is also low, but could be further elevated in the event of treatment noncompliance and/or AODA.    TREATMENT PLAN/GOALS: Continue supportive psychotherapy efforts and medications as indicated. Treatment and medication options discussed during today's visit. Patient acknowledged and verbally consented to continue with current treatment plan and was educated on the importance of compliance with treatment and follow-up appointments.    MEDICATION ISSUES:  ROSY reviewed as expected.  Discussed medication options and treatment plan of prescribed medication as well as the risks, benefits, and side effects including potential falls, possible impaired driving and metabolic adversities among others. Patient is agreeable to call the office with any worsening of symptoms or onset of side effects. Patient is agreeable to call 911 or go to the nearest ER should he/she begin having SI/HI. No medication side effects or related complaints today.     MEDS ORDERED DURING VISIT:  New Medications Ordered This Visit   Medications   • buprenorphine-naloxone (SUBOXONE) 8-2 MG per SL tablet     Sig: Place 2 tablets under the tongue Daily.     Dispense:  56 tablet     Refill:  0     NADEAN:[id number]   • buPROPion XL (WELLBUTRIN XL) 150 MG 24 hr tablet     Sig: Take 1 tablet by mouth 2 (Two) Times a Day. BID dosing     Dispense:  60 tablet     Refill:  0       No follow-ups on file.           This document has been electronically signed by WALKER Romero  June 25, 2024 16:28 EDT      Part of this note may be an electronic transcription/translation of spoken language to printed text using the Dragon Dictation System.

## 2024-08-14 ENCOUNTER — TELEMEDICINE (OUTPATIENT)
Dept: PSYCHIATRY | Facility: CLINIC | Age: 54
End: 2024-08-14
Payer: MEDICAID

## 2024-08-14 VITALS
DIASTOLIC BLOOD PRESSURE: 76 MMHG | HEIGHT: 61 IN | BODY MASS INDEX: 32.13 KG/M2 | HEART RATE: 83 BPM | WEIGHT: 170.2 LBS | SYSTOLIC BLOOD PRESSURE: 132 MMHG

## 2024-08-14 DIAGNOSIS — F11.20 OPIOID TYPE DEPENDENCE, CONTINUOUS: Primary | ICD-10-CM

## 2024-08-14 DIAGNOSIS — Z79.899 MEDICATION MANAGEMENT: ICD-10-CM

## 2024-08-14 DIAGNOSIS — F15.11 METHAMPHETAMINE ABUSE IN REMISSION: ICD-10-CM

## 2024-08-14 PROCEDURE — 99214 OFFICE O/P EST MOD 30 MIN: CPT | Performed by: NURSE PRACTITIONER

## 2024-08-14 PROCEDURE — 1159F MED LIST DOCD IN RCRD: CPT | Performed by: NURSE PRACTITIONER

## 2024-08-14 PROCEDURE — 1160F RVW MEDS BY RX/DR IN RCRD: CPT | Performed by: NURSE PRACTITIONER

## 2024-08-14 RX ORDER — BUPROPION HYDROCHLORIDE 150 MG/1
150 TABLET ORAL 2 TIMES DAILY
Qty: 60 TABLET | Refills: 0 | Status: SHIPPED | OUTPATIENT
Start: 2024-08-14

## 2024-08-14 RX ORDER — BUPRENORPHINE HYDROCHLORIDE AND NALOXONE HYDROCHLORIDE DIHYDRATE 8; 2 MG/1; MG/1
2 TABLET SUBLINGUAL DAILY
Qty: 14 TABLET | Refills: 0 | Status: SHIPPED | OUTPATIENT
Start: 2024-08-14

## 2024-08-14 NOTE — PROGRESS NOTES
This provider is located at [facility]. The Patient is seen remotely located at the [facility] ([facility]) using Video. Patient is being seen via telehealth and confirm that they are in a secure environment for this session. The patient's condition being diagnosed/treated is appropriate for telemedicine. Provider identified as Williams Brody as well as credentials APRN MSN FNP-C SRINIVASAN-YOUNG.   The client/patient gave consent to be seen remotely, and when consent is given they understand that the consent allows for patient identifiable information to be sent to a third party as needed.   They may refuse to be seen remotely at any time. The electronic data is encrypted and password protected, and the patient has been advised of the potential risks to privacy not withstanding such measures.    Chief Complaint/History of Present Illness: Follow Up buprenorphine/naloxone Medicated Assisted Treatment for Opiate Use Disorder     Patient/Client Concerns/Updates: Continue Wellbutrin for stimulant cravings   -Methamphetamine 5 days ago relapse triggered by life stress associated with loved ones in the hospital; in general discussed with client cycles of abstinence and relapse and the relapses associated with poor coping in relation to the life stress; discussed with client relapse prevention techniques including increased coping skills and increased awareness of self-sabotaging destructive behavior  -Reports continued therapeutic benefit and satisfaction with current care plan; reports no concerning side effects with current medication prescribed  -Reports no depressive episodes or concerns, no suicidal or homicidal thoughts  -Reports no perceptual disturbances or otherwise symptoms of psychosis  -Patient reports no concerns with sleep quality or disturbance thereof    Triggers (Persons/Places/Things/Events/Thought/Emotions): Family members in the hospital    Cravings/Substance Use: Cravings and recent  relapse on methamphetamine    Relapse Prevention: Counseling and increase visit frequency to weekly for support    Urine Drug Screen (today's visit) discussed: Positive buprenorphine positive amphetamine methamphetamine and THC    UDS Confirmation (Most recent/Resulted): Positive buprenorphine and positive methamphetamine    Most recent pertinent laboratory studies reviewed: 4/11/23-hepatic function within normal limits, Cr WNL, HIV negative, hepatitis C not detected         ROSY (PDMP) Reviewed for Current/Active Medications: buprenorphine/naloxone as reviewed today    Past Surgical History:  No past surgical history on file.    Problem List:  There is no problem list on file for this patient.      Allergy:   No Known Allergies     Current Medications:   Current Outpatient Medications   Medication Sig Dispense Refill    acetaminophen (Tylenol) 325 MG tablet Take 1 tablet by mouth Every 4 (Four) Hours As Needed for Mild Pain or Headache. 100 tablet 0    buprenorphine-naloxone (SUBOXONE) 8-2 MG per SL tablet Place 2 tablets under the tongue Daily. 14 tablet 0    buPROPion XL (WELLBUTRIN XL) 150 MG 24 hr tablet Take 1 tablet by mouth 2 (Two) Times a Day. BID dosing 60 tablet 0    famotidine (PEPCID) 20 MG tablet Take 1 tablet by mouth 2 (Two) Times a Day. 60 tablet 0    GNP PAIN RELIEF EX-STRENGTH 500 MG tablet TAKE ONE TABLET BY MOUTH EVERY 6 HOURS AS NEEDED FOR 30 DAYS **DO NOT EXCEED 6 TABLETS IN 24 HOURS**      hydrOXYzine pamoate (Vistaril) 25 MG capsule Take 1 capsule by mouth 4 (Four) Times a Day As Needed for Anxiety. 120 capsule 0    ondansetron ODT (ZOFRAN-ODT) 8 MG disintegrating tablet Place 1 tablet on the tongue Every 8 (Eight) Hours As Needed for Nausea or Vomiting. 45 tablet 0    naloxone (NARCAN) 4 MG/0.1ML nasal spray 1 spray into the nostril(s) as directed by provider As Needed (opiate over sedation). 1 spray in 1 nostril every 2 to 3 minutes, call 911 (Patient not taking: Reported on 8/14/2024) 2  each 2     No current facility-administered medications for this visit.       Past Medical History:  Past Medical History:   Diagnosis Date    History of hepatitis B     Pancreatitis          Social History     Socioeconomic History    Marital status: Single   Tobacco Use    Smoking status: Every Day     Current packs/day: 1.00     Average packs/day: 1 pack/day for 15.0 years (15.0 ttl pk-yrs)     Types: Cigarettes    Smokeless tobacco: Never   Vaping Use    Vaping status: Never Used   Substance and Sexual Activity    Alcohol use: Not Currently    Drug use: Yes     Types: Marijuana, Hydrocodone, Oxycodone, Methamphetamines    Sexual activity: Defer         Family History   Problem Relation Age of Onset    No Known Problems Mother     No Known Problems Father     No Known Problems Sister     No Known Problems Brother     No Known Problems Maternal Aunt     No Known Problems Paternal Aunt     No Known Problems Maternal Uncle     No Known Problems Paternal Uncle     No Known Problems Maternal Grandfather     No Known Problems Maternal Grandmother     No Known Problems Paternal Grandfather     No Known Problems Paternal Grandmother     No Known Problems Cousin     No Known Problems Other     ADD / ADHD Neg Hx     Alcohol abuse Neg Hx     Anxiety disorder Neg Hx     Bipolar disorder Neg Hx     Dementia Neg Hx     Depression Neg Hx     Drug abuse Neg Hx     OCD Neg Hx     Paranoid behavior Neg Hx     Schizophrenia Neg Hx     Seizures Neg Hx     Self-Injurious Behavior  Neg Hx     Suicide Attempts Neg Hx          Mental Status Exam:   Hygiene:   good  Cooperation:  Cooperative  Eye Contact:  Good  Psychomotor Behavior:  Appropriate  Affect:  Appropriate  Mood: anxious  Speech:  Normal  Thought Process:  Goal directed  Thought Content:  Normal  Suicidal:  None  Homicidal:  None  Hallucinations:  None  Delusion:  None  Memory:  Intact  Orientation:  Grossly intact  Reliability:  good  Insight:  Good  Judgement:   "Poor  Impulse Control:  Poor         Review of Systems:  Review of Systems   Constitutional:  Negative for activity change, chills, diaphoresis and fatigue.   Respiratory:  Negative for apnea, cough and shortness of breath.    Cardiovascular:  Negative for chest pain, palpitations and leg swelling.   Gastrointestinal:  Negative for abdominal pain, constipation, diarrhea, nausea and vomiting.   Genitourinary:  Negative for difficulty urinating.   Musculoskeletal:  Negative for arthralgias.   Skin:  Negative for rash.   Neurological:  Negative for dizziness, weakness and headaches.   Psychiatric/Behavioral:  Negative for agitation, self-injury, sleep disturbance and suicidal ideas. The patient is nervous/anxious.        Physical Exam:  Physical Exam  Vitals reviewed.   Constitutional:       General: She is not in acute distress.     Appearance: Normal appearance. She is not ill-appearing or toxic-appearing.   Pulmonary:      Effort: Pulmonary effort is normal.   Musculoskeletal:         General: Normal range of motion.   Neurological:      General: No focal deficit present.      Mental Status: She is alert and oriented to person, place, and time.   Psychiatric:         Attention and Perception: Attention and perception normal.         Mood and Affect: Mood normal. Mood is anxious. Mood is not depressed.         Speech: Speech normal.         Behavior: Behavior normal. Behavior is cooperative.         Thought Content: Thought content normal.         Cognition and Memory: Cognition and memory normal.         Judgment: Judgment normal.     Vital Signs:   /76   Pulse 83   Ht 156.2 cm (61.5\")   Wt 77.2 kg (170 lb 3.2 oz)   BMI 31.64 kg/m²      Lab Results:   Telemedicine on 08/14/2024   Component Date Value Ref Range Status    External Amphetamine Screen Urine 08/14/2024 Positive (A)   Final    External Benzodiazepine Screen Uri* 08/14/2024 Negative   Final    External Cocaine Screen Urine 08/14/2024 Negative   " Final    External THC Screen Urine 08/14/2024 Positive (A)   Final    External Methadone Screen Urine 08/14/2024 Negative   Final    External Methamphetamine Screen Ur* 08/14/2024 Positive (A)   Final    External Oxycodone Screen Urine 08/14/2024 Negative   Final    External Buprenorphine Screen Urine 08/14/2024 Positive (A)   Final    External MDMA 08/14/2024 Negative   Final    External Opiates Screen Urine 08/14/2024 Negative   Final   Telemedicine on 06/25/2024   Component Date Value Ref Range Status    External Amphetamine Screen Urine 06/25/2024 Negative   Final    External Benzodiazepine Screen Uri* 06/25/2024 Negative   Final    External Cocaine Screen Urine 06/25/2024 Negative   Final    External THC Screen Urine 06/25/2024 Negative   Final    External Methadone Screen Urine 06/25/2024 Negative   Final    External Methamphetamine Screen Ur* 06/25/2024 Negative   Final    External Oxycodone Screen Urine 06/25/2024 Negative   Final    External Buprenorphine Screen Urine 06/25/2024 Positive (A)   Final    External MDMA 06/25/2024 Negative   Final    External Opiates Screen Urine 06/25/2024 Negative   Final   Telemedicine on 06/11/2024   Component Date Value Ref Range Status    External Amphetamine Screen Urine 06/11/2024 Negative   Final    External Benzodiazepine Screen Uri* 06/11/2024 Negative   Final    External Cocaine Screen Urine 06/11/2024 Negative   Final    External THC Screen Urine 06/11/2024 Negative   Final    External Methadone Screen Urine 06/11/2024 Negative   Final    External Methamphetamine Screen Ur* 06/11/2024 Negative   Final    External Oxycodone Screen Urine 06/11/2024 Negative   Final    External Buprenorphine Screen Urine 06/11/2024 Positive (A)   Final    External MDMA 06/11/2024 Negative   Final    External Opiates Screen Urine 06/11/2024 Negative   Final   Orders Only on 05/06/2024   Component Date Value Ref Range Status    External Amphetamine Screen Urine 05/06/2024 Negative    Final    External Benzodiazepine Screen Uri* 05/06/2024 Negative   Final    External Cocaine Screen Urine 05/06/2024 Negative   Final    External THC Screen Urine 05/06/2024 Negative   Final    External Methadone Screen Urine 05/06/2024 Negative   Final    External Methamphetamine Screen Ur* 05/06/2024 Negative   Final    External Oxycodone Screen Urine 05/06/2024 Negative   Final    External Buprenorphine Screen Urine 05/06/2024 Positive (A)   Final    External MDMA 05/06/2024 Negative   Final    External Opiates Screen Urine 05/06/2024 Negative   Final   Office Visit on 05/02/2024   Component Date Value Ref Range Status    External Amphetamine Screen Urine 05/02/2024 Negative   Final    External Benzodiazepine Screen Uri* 05/02/2024 Negative   Final    External Cocaine Screen Urine 05/02/2024 Negative   Final    External THC Screen Urine 05/02/2024 Negative   Final    External Methadone Screen Urine 05/02/2024 Negative   Final    External Methamphetamine Screen Ur* 05/02/2024 Negative   Final    External Oxycodone Screen Urine 05/02/2024 Negative   Final    External Buprenorphine Screen Urine 05/02/2024 Positive (A)   Final    External MDMA 05/02/2024 Negative   Final    External Opiates Screen Urine 05/02/2024 Negative   Final   Telemedicine on 03/27/2024   Component Date Value Ref Range Status    External Amphetamine Screen Urine 03/27/2024 Negative   Final    External Benzodiazepine Screen Uri* 03/27/2024 Negative   Final    External Cocaine Screen Urine 03/27/2024 Negative   Final    External THC Screen Urine 03/27/2024 Negative   Final    External Methadone Screen Urine 03/27/2024 Negative   Final    External Methamphetamine Screen Ur* 03/27/2024 Negative   Final    External Oxycodone Screen Urine 03/27/2024 Negative   Final    External Buprenorphine Screen Urine 03/27/2024 Negative   Final    External MDMA 03/27/2024 Negative   Final    External Opiates Screen Urine 03/27/2024 Negative   Final   Telemedicine  on 02/22/2024   Component Date Value Ref Range Status    External Amphetamine Screen Urine 02/22/2024 Negative   Final    External Benzodiazepine Screen Uri* 02/22/2024 Negative   Final    External Cocaine Screen Urine 02/22/2024 Negative   Final    External THC Screen Urine 02/22/2024 Negative   Final    External Methadone Screen Urine 02/22/2024 Negative   Final    External Methamphetamine Screen Ur* 02/22/2024 Negative   Final    External Oxycodone Screen Urine 02/22/2024 Negative   Final    External Buprenorphine Screen Urine 02/22/2024 Positive (A)   Final    External MDMA 02/22/2024 Negative   Final    External Opiates Screen Urine 02/22/2024 Negative   Final         Assessment & Plan   Diagnoses and all orders for this visit:    1. Opioid type dependence, continuous (Primary)  -     buprenorphine-naloxone (SUBOXONE) 8-2 MG per SL tablet; Place 2 tablets under the tongue Daily.  Dispense: 14 tablet; Refill: 0    2. Medication management  -     [facility] Drug Screen    3. Methamphetamine abuse in remission  -     buPROPion XL (WELLBUTRIN XL) 150 MG 24 hr tablet; Take 1 tablet by mouth 2 (Two) Times a Day. BID dosing  Dispense: 60 tablet; Refill: 0        Visit Diagnoses:    ICD-10-CM ICD-9-CM   1. Opioid type dependence, continuous  F11.20 304.01   2. Medication management  Z79.899 V58.69   3. Methamphetamine abuse in remission  F15.11 305.73       PLAN:  Safety: No acute safety concerns  Risk Assessment: Risk of self-harm acutely is low. Risk of self-harm chronically is also low, but could be further elevated in the event of treatment noncompliance and/or AODA.    TREATMENT PLAN/GOALS: Continue supportive psychotherapy efforts and medications as indicated. Treatment and medication options discussed during today's visit. Patient acknowledged and verbally consented to continue with current treatment plan and was educated on the importance of compliance with treatment and follow-up appointments.    MEDICATION  ISSUES:  ROSY reviewed as expected.  Discussed medication options and treatment plan of prescribed medication as well as the risks, benefits, and side effects including potential falls, possible impaired driving and metabolic adversities among others. Patient is agreeable to call the office with any worsening of symptoms or onset of side effects. Patient is agreeable to call 911 or go to the nearest ER should he/she begin having SI/HI. No medication side effects or related complaints today.     MEDS ORDERED DURING VISIT:  New Medications Ordered This Visit   Medications    buprenorphine-naloxone (SUBOXONE) 8-2 MG per SL tablet     Sig: Place 2 tablets under the tongue Daily.     Dispense:  14 tablet     Refill:  0     NADEAN:[id number]    buPROPion XL (WELLBUTRIN XL) 150 MG 24 hr tablet     Sig: Take 1 tablet by mouth 2 (Two) Times a Day. BID dosing     Dispense:  60 tablet     Refill:  0       No follow-ups on file.           This document has been electronically signed by WALKER Romero  August 14, 2024 15:35 EDT      Part of this note may be an electronic transcription/translation of spoken language to printed text using the Dragon Dictation System.

## 2024-08-22 ENCOUNTER — TELEMEDICINE (OUTPATIENT)
Dept: PSYCHIATRY | Facility: CLINIC | Age: 54
End: 2024-08-22
Payer: MEDICAID

## 2024-08-22 VITALS
BODY MASS INDEX: 30.88 KG/M2 | SYSTOLIC BLOOD PRESSURE: 139 MMHG | DIASTOLIC BLOOD PRESSURE: 71 MMHG | WEIGHT: 167.8 LBS | HEIGHT: 62 IN | OXYGEN SATURATION: 97 % | HEART RATE: 91 BPM

## 2024-08-22 DIAGNOSIS — F11.20 OPIOID TYPE DEPENDENCE, CONTINUOUS: Primary | ICD-10-CM

## 2024-08-22 DIAGNOSIS — Z79.899 MEDICATION MANAGEMENT: ICD-10-CM

## 2024-08-22 LAB
EXTERNAL AMPHETAMINE SCREEN URINE: POSITIVE
EXTERNAL BENZODIAZEPINE SCREEN URINE: NEGATIVE
EXTERNAL BUPRENORPHINE SCREEN URINE: POSITIVE
EXTERNAL COCAINE SCREEN URINE: NEGATIVE
EXTERNAL MDMA: NEGATIVE
EXTERNAL METHADONE SCREEN URINE: POSITIVE
EXTERNAL METHAMPHETAMINE SCREEN URINE: NEGATIVE
EXTERNAL OPIATES SCREEN URINE: NEGATIVE
EXTERNAL OXYCODONE SCREEN URINE: NEGATIVE
EXTERNAL THC SCREEN URINE: POSITIVE

## 2024-08-22 RX ORDER — BUPRENORPHINE HYDROCHLORIDE AND NALOXONE HYDROCHLORIDE DIHYDRATE 8; 2 MG/1; MG/1
2 TABLET SUBLINGUAL DAILY
Qty: 14 TABLET | Refills: 0 | Status: SHIPPED | OUTPATIENT
Start: 2024-08-22

## 2024-08-22 NOTE — PROGRESS NOTES
This provider is located at [facility]. The Patient is seen remotely located at the [facility] ([facility]) using Video. Patient is being seen via telehealth and confirm that they are in a secure environment for this session. The patient's condition being diagnosed/treated is appropriate for telemedicine. Provider identified as Williams Brody as well as credentials APRN MSN FNP-C SRINIVASAN-YOUNG.   The client/patient gave consent to be seen remotely, and when consent is given they understand that the consent allows for patient identifiable information to be sent to a third party as needed.   They may refuse to be seen remotely at any time. The electronic data is encrypted and password protected, and the patient has been advised of the potential risks to privacy not withstanding such measures.    Chief Complaint/History of Present Illness: Follow Up buprenorphine/naloxone Medicated Assisted Treatment for Opiate Use Disorder     Patient/Client Concerns/Updates: Continue Wellbutrin for stimulant cravings   -Patient reports no significant or concerning life events or changes since last evaluation; reports no acute life stressors patient reports she is doing well maintaining abstinence from methamphetamine this past week  -Reports continued therapeutic benefit and satisfaction with current care plan; reports no concerning side effects with current medication prescribed  -Mood is stable; patient denies anxious episodes, exacerbations thereof or concerning panic attacks  -Reports no depressive episodes or concerns, no suicidal or homicidal thoughts  -Reports no perceptual disturbances or otherwise symptoms of psychosis  -Patient reports no concerns with sleep quality or disturbance thereof    Triggers (Persons/Places/Things/Events/Thought/Emotions): Idle time patient reports she is focusing on keeping busy with positive activities    Cravings/Substance Use: Mild cravings for methamphetamine no use of such in  the last 7 days    Relapse Prevention: Counseling    Urine Drug Screen (today's visit) discussed: Positive buprenorphine positive amphetamine THC and methadone (suspect lab error as it relates to today's presumptive point-of-care positive for methadone however will be vigilant for definitive analysis)    UDS Confirmation (Most recent/Resulted): Positive buprenorphine and positive methamphetamine    Most recent pertinent laboratory studies reviewed: 4/11/23-hepatic function within normal limits, Cr WNL, HIV negative, hepatitis C not detected         ROSY (PDMP) Reviewed for Current/Active Medications: buprenorphine/naloxone as reviewed today    Past Surgical History:  No past surgical history on file.    Problem List:  There is no problem list on file for this patient.      Allergy:   No Known Allergies     Current Medications:   Current Outpatient Medications   Medication Sig Dispense Refill   • acetaminophen (Tylenol) 325 MG tablet Take 1 tablet by mouth Every 4 (Four) Hours As Needed for Mild Pain or Headache. 100 tablet 0   • buprenorphine-naloxone (SUBOXONE) 8-2 MG per SL tablet Place 2 tablets under the tongue Daily. 14 tablet 0   • buPROPion XL (WELLBUTRIN XL) 150 MG 24 hr tablet Take 1 tablet by mouth 2 (Two) Times a Day. BID dosing 60 tablet 0   • famotidine (PEPCID) 20 MG tablet Take 1 tablet by mouth 2 (Two) Times a Day. 60 tablet 0   • GNP PAIN RELIEF EX-STRENGTH 500 MG tablet TAKE ONE TABLET BY MOUTH EVERY 6 HOURS AS NEEDED FOR 30 DAYS **DO NOT EXCEED 6 TABLETS IN 24 HOURS**     • hydrOXYzine pamoate (Vistaril) 25 MG capsule Take 1 capsule by mouth 4 (Four) Times a Day As Needed for Anxiety. 120 capsule 0   • naloxone (NARCAN) 4 MG/0.1ML nasal spray 1 spray into the nostril(s) as directed by provider As Needed (opiate over sedation). 1 spray in 1 nostril every 2 to 3 minutes, call 911 2 each 2   • ondansetron ODT (ZOFRAN-ODT) 8 MG disintegrating tablet Place 1 tablet on the tongue Every 8 (Eight) Hours  As Needed for Nausea or Vomiting. 45 tablet 0     No current facility-administered medications for this visit.       Past Medical History:  Past Medical History:   Diagnosis Date   • History of hepatitis B    • Pancreatitis          Social History     Socioeconomic History   • Marital status: Single   Tobacco Use   • Smoking status: Every Day     Current packs/day: 1.00     Average packs/day: 1 pack/day for 15.0 years (15.0 ttl pk-yrs)     Types: Cigarettes   • Smokeless tobacco: Never   Vaping Use   • Vaping status: Never Used   Substance and Sexual Activity   • Alcohol use: Not Currently   • Drug use: Yes     Types: Marijuana, Hydrocodone, Oxycodone, Methamphetamines   • Sexual activity: Defer         Family History   Problem Relation Age of Onset   • No Known Problems Mother    • No Known Problems Father    • No Known Problems Sister    • No Known Problems Brother    • No Known Problems Maternal Aunt    • No Known Problems Paternal Aunt    • No Known Problems Maternal Uncle    • No Known Problems Paternal Uncle    • No Known Problems Maternal Grandfather    • No Known Problems Maternal Grandmother    • No Known Problems Paternal Grandfather    • No Known Problems Paternal Grandmother    • No Known Problems Cousin    • No Known Problems Other    • ADD / ADHD Neg Hx    • Alcohol abuse Neg Hx    • Anxiety disorder Neg Hx    • Bipolar disorder Neg Hx    • Dementia Neg Hx    • Depression Neg Hx    • Drug abuse Neg Hx    • OCD Neg Hx    • Paranoid behavior Neg Hx    • Schizophrenia Neg Hx    • Seizures Neg Hx    • Self-Injurious Behavior  Neg Hx    • Suicide Attempts Neg Hx          Mental Status Exam:   Hygiene:   good  Cooperation:  Cooperative  Eye Contact:  Good  Psychomotor Behavior:  Appropriate  Affect:  Appropriate  Mood: normal  Speech:  Normal  Thought Process:  Goal directed  Thought Content:  Normal  Suicidal:  None  Homicidal:  None  Hallucinations:  None  Delusion:  None  Memory:  Intact  Orientation:   "Grossly intact  Reliability:  good  Insight:  Good  Judgement:  Good  Impulse Control:  Good         Review of Systems:  Review of Systems   Constitutional:  Negative for activity change, chills, diaphoresis and fatigue.   Respiratory:  Negative for apnea, cough and shortness of breath.    Cardiovascular:  Negative for chest pain, palpitations and leg swelling.   Gastrointestinal:  Negative for abdominal pain, constipation, diarrhea, nausea and vomiting.   Genitourinary:  Negative for difficulty urinating.   Musculoskeletal:  Negative for arthralgias.   Skin:  Negative for rash.   Neurological:  Negative for dizziness, weakness and headaches.   Psychiatric/Behavioral:  Negative for agitation, self-injury, sleep disturbance and suicidal ideas. The patient is not nervous/anxious.        Physical Exam:  Physical Exam  Vitals reviewed.   Constitutional:       General: She is not in acute distress.     Appearance: Normal appearance. She is not ill-appearing or toxic-appearing.   Pulmonary:      Effort: Pulmonary effort is normal.   Musculoskeletal:         General: Normal range of motion.   Neurological:      General: No focal deficit present.      Mental Status: She is alert and oriented to person, place, and time.   Psychiatric:         Attention and Perception: Attention and perception normal.         Mood and Affect: Mood normal. Mood is not anxious or depressed.         Speech: Speech normal.         Behavior: Behavior normal. Behavior is cooperative.         Thought Content: Thought content normal.         Cognition and Memory: Cognition and memory normal.         Judgment: Judgment normal.     Vital Signs:   /71   Pulse 91   Ht 156.2 cm (61.5\")   Wt 76.1 kg (167 lb 12.8 oz)   SpO2 97%   BMI 31.19 kg/m²      Lab Results:   Telemedicine on 08/22/2024   Component Date Value Ref Range Status   • External Amphetamine Screen Urine 08/22/2024 Positive   Final   • External Benzodiazepine Screen Uri* 08/22/2024 " Negative   Final   • External Cocaine Screen Urine 08/22/2024 Negative   Final   • External THC Screen Urine 08/22/2024 Positive   Final   • External Methadone Screen Urine 08/22/2024 Positive   Final   • External Methamphetamine Screen Ur* 08/22/2024 Negative   Final   • External Oxycodone Screen Urine 08/22/2024 Negative   Final   • External Buprenorphine Screen Urine 08/22/2024 Positive   Final   • External MDMA 08/22/2024 Negative   Final   • External Opiates Screen Urine 08/22/2024 Negative   Final   Telemedicine on 08/14/2024   Component Date Value Ref Range Status   • External Amphetamine Screen Urine 08/14/2024 Positive (A)   Final   • External Benzodiazepine Screen Uri* 08/14/2024 Negative   Final   • External Cocaine Screen Urine 08/14/2024 Negative   Final   • External THC Screen Urine 08/14/2024 Positive (A)   Final   • External Methadone Screen Urine 08/14/2024 Negative   Final   • External Methamphetamine Screen Ur* 08/14/2024 Positive (A)   Final   • External Oxycodone Screen Urine 08/14/2024 Negative   Final   • External Buprenorphine Screen Urine 08/14/2024 Positive (A)   Final   • External MDMA 08/14/2024 Negative   Final   • External Opiates Screen Urine 08/14/2024 Negative   Final   Telemedicine on 06/25/2024   Component Date Value Ref Range Status   • External Amphetamine Screen Urine 06/25/2024 Negative   Final   • External Benzodiazepine Screen Uri* 06/25/2024 Negative   Final   • External Cocaine Screen Urine 06/25/2024 Negative   Final   • External THC Screen Urine 06/25/2024 Negative   Final   • External Methadone Screen Urine 06/25/2024 Negative   Final   • External Methamphetamine Screen Ur* 06/25/2024 Negative   Final   • External Oxycodone Screen Urine 06/25/2024 Negative   Final   • External Buprenorphine Screen Urine 06/25/2024 Positive (A)   Final   • External MDMA 06/25/2024 Negative   Final   • External Opiates Screen Urine 06/25/2024 Negative   Final   Telemedicine on 06/11/2024    Component Date Value Ref Range Status   • External Amphetamine Screen Urine 06/11/2024 Negative   Final   • External Benzodiazepine Screen Uri* 06/11/2024 Negative   Final   • External Cocaine Screen Urine 06/11/2024 Negative   Final   • External THC Screen Urine 06/11/2024 Negative   Final   • External Methadone Screen Urine 06/11/2024 Negative   Final   • External Methamphetamine Screen Ur* 06/11/2024 Negative   Final   • External Oxycodone Screen Urine 06/11/2024 Negative   Final   • External Buprenorphine Screen Urine 06/11/2024 Positive (A)   Final   • External MDMA 06/11/2024 Negative   Final   • External Opiates Screen Urine 06/11/2024 Negative   Final   Orders Only on 05/06/2024   Component Date Value Ref Range Status   • External Amphetamine Screen Urine 05/06/2024 Negative   Final   • External Benzodiazepine Screen Uri* 05/06/2024 Negative   Final   • External Cocaine Screen Urine 05/06/2024 Negative   Final   • External THC Screen Urine 05/06/2024 Negative   Final   • External Methadone Screen Urine 05/06/2024 Negative   Final   • External Methamphetamine Screen Ur* 05/06/2024 Negative   Final   • External Oxycodone Screen Urine 05/06/2024 Negative   Final   • External Buprenorphine Screen Urine 05/06/2024 Positive (A)   Final   • External MDMA 05/06/2024 Negative   Final   • External Opiates Screen Urine 05/06/2024 Negative   Final   Office Visit on 05/02/2024   Component Date Value Ref Range Status   • External Amphetamine Screen Urine 05/02/2024 Negative   Final   • External Benzodiazepine Screen Uri* 05/02/2024 Negative   Final   • External Cocaine Screen Urine 05/02/2024 Negative   Final   • External THC Screen Urine 05/02/2024 Negative   Final   • External Methadone Screen Urine 05/02/2024 Negative   Final   • External Methamphetamine Screen Ur* 05/02/2024 Negative   Final   • External Oxycodone Screen Urine 05/02/2024 Negative   Final   • External Buprenorphine Screen Urine 05/02/2024 Positive  (A)   Final   • External MDMA 05/02/2024 Negative   Final   • External Opiates Screen Urine 05/02/2024 Negative   Final   Telemedicine on 03/27/2024   Component Date Value Ref Range Status   • External Amphetamine Screen Urine 03/27/2024 Negative   Final   • External Benzodiazepine Screen Uri* 03/27/2024 Negative   Final   • External Cocaine Screen Urine 03/27/2024 Negative   Final   • External THC Screen Urine 03/27/2024 Negative   Final   • External Methadone Screen Urine 03/27/2024 Negative   Final   • External Methamphetamine Screen Ur* 03/27/2024 Negative   Final   • External Oxycodone Screen Urine 03/27/2024 Negative   Final   • External Buprenorphine Screen Urine 03/27/2024 Negative   Final   • External MDMA 03/27/2024 Negative   Final   • External Opiates Screen Urine 03/27/2024 Negative   Final         Assessment & Plan   Diagnoses and all orders for this visit:    1. Opioid type dependence, continuous (Primary)  -     buprenorphine-naloxone (SUBOXONE) 8-2 MG per SL tablet; Place 2 tablets under the tongue Daily.  Dispense: 14 tablet; Refill: 0    2. Medication management  -     [facility] Drug Screen        Visit Diagnoses:    ICD-10-CM ICD-9-CM   1. Opioid type dependence, continuous  F11.20 304.01   2. Medication management  Z79.899 V58.69       PLAN:  Safety: No acute safety concerns  Risk Assessment: Risk of self-harm acutely is low. Risk of self-harm chronically is also low, but could be further elevated in the event of treatment noncompliance and/or AODA.    TREATMENT PLAN/GOALS: Continue supportive psychotherapy efforts and medications as indicated. Treatment and medication options discussed during today's visit. Patient acknowledged and verbally consented to continue with current treatment plan and was educated on the importance of compliance with treatment and follow-up appointments.    MEDICATION ISSUES:  ROSY reviewed as expected.  Discussed medication options and treatment plan of prescribed  medication as well as the risks, benefits, and side effects including potential falls, possible impaired driving and metabolic adversities among others. Patient is agreeable to call the office with any worsening of symptoms or onset of side effects. Patient is agreeable to call 911 or go to the nearest ER should he/she begin having SI/HI. No medication side effects or related complaints today.     MEDS ORDERED DURING VISIT:  New Medications Ordered This Visit   Medications   • buprenorphine-naloxone (SUBOXONE) 8-2 MG per SL tablet     Sig: Place 2 tablets under the tongue Daily.     Dispense:  14 tablet     Refill:  0     NADEAN:[id number]       No follow-ups on file.           This document has been electronically signed by WALKER Romero  August 22, 2024 16:42 EDT      Part of this note may be an electronic transcription/translation of spoken language to printed text using the Dragon Dictation System.

## 2024-08-29 DIAGNOSIS — F11.20 OPIOID TYPE DEPENDENCE, CONTINUOUS: ICD-10-CM

## 2024-08-29 DIAGNOSIS — F41.1 GENERALIZED ANXIETY DISORDER: ICD-10-CM

## 2024-08-29 DIAGNOSIS — F15.11 METHAMPHETAMINE ABUSE IN REMISSION: ICD-10-CM

## 2024-08-30 RX ORDER — HYDROXYZINE PAMOATE 25 MG/1
25 CAPSULE ORAL 4 TIMES DAILY PRN
Qty: 120 CAPSULE | Refills: 0 | Status: SHIPPED | OUTPATIENT
Start: 2024-08-30

## 2024-08-30 RX ORDER — BUPRENORPHINE HYDROCHLORIDE AND NALOXONE HYDROCHLORIDE DIHYDRATE 8; 2 MG/1; MG/1
2 TABLET SUBLINGUAL DAILY
Qty: 14 TABLET | Refills: 0 | Status: SHIPPED | OUTPATIENT
Start: 2024-08-30

## 2024-08-30 RX ORDER — BUPROPION HYDROCHLORIDE 150 MG/1
150 TABLET ORAL 2 TIMES DAILY
Qty: 60 TABLET | Refills: 0 | Status: SHIPPED | OUTPATIENT
Start: 2024-08-30

## 2024-09-12 ENCOUNTER — TELEMEDICINE (OUTPATIENT)
Dept: PSYCHIATRY | Facility: CLINIC | Age: 54
End: 2024-09-12
Payer: MEDICAID

## 2024-09-12 VITALS
DIASTOLIC BLOOD PRESSURE: 81 MMHG | HEIGHT: 61 IN | BODY MASS INDEX: 32.74 KG/M2 | SYSTOLIC BLOOD PRESSURE: 144 MMHG | HEART RATE: 88 BPM | WEIGHT: 173.4 LBS

## 2024-09-12 DIAGNOSIS — F15.10 METHAMPHETAMINE ABUSE: ICD-10-CM

## 2024-09-12 DIAGNOSIS — Z79.899 MEDICATION MANAGEMENT: ICD-10-CM

## 2024-09-12 DIAGNOSIS — F11.20 OPIOID TYPE DEPENDENCE, CONTINUOUS: Primary | ICD-10-CM

## 2024-09-12 RX ORDER — BUPRENORPHINE HYDROCHLORIDE AND NALOXONE HYDROCHLORIDE DIHYDRATE 8; 2 MG/1; MG/1
2 TABLET SUBLINGUAL DAILY
Qty: 14 TABLET | Refills: 0 | Status: SHIPPED | OUTPATIENT
Start: 2024-09-12

## 2024-09-12 NOTE — PROGRESS NOTES
This provider is located at [facility]. The Patient is seen remotely located at the [facility] ([facility]) using Video. Patient is being seen via telehealth and confirm that they are in a secure environment for this session. The patient's condition being diagnosed/treated is appropriate for telemedicine. Provider identified as Williams Brody as well as credentials APRN MSN FNP-SHAHEEN MATTSON-YOUNG.   The client/patient gave consent to be seen remotely, and when consent is given they understand that the consent allows for patient identifiable information to be sent to a third party as needed.   They may refuse to be seen remotely at any time. The electronic data is encrypted and password protected, and the patient has been advised of the potential risks to privacy not withstanding such measures.    Chief Complaint/History of Present Illness: Follow Up buprenorphine/naloxone Medicated Assisted Treatment for Opiate Use Disorder     Patient/Client Concerns/Updates: Continue Wellbutrin for stimulant cravings   -Patient reports recent incarceration patient having been found with illicit substances in her vehicle; patient reports she anticipates further incarceration pending assessment of urinalysis that will be conducted through the court system  -Patient reports she has been scheduled for chemical dependency intensive outpatient not encouraged her to take this seriously not only for her own goals and recovery and quality of life but also to help mitigate risk of further incarceration  -Fluctuating anxious and depressive symptoms however patient overall denies acute exacerbation of either, no suicidal or homicidal thoughts  -No perceptual disturbance or otherwise symptoms of psychosis    Triggers (Persons/Places/Things/Events/Thought/Emotions): Recent incarceration    Cravings/Substance Use: Cravings and recent relapse on methamphetamine    Relapse Prevention: Counseling and patient scheduled for intake chemical  dependency intensive outpatient    Urine Drug Screen (today's visit) discussed: Positive buprenorphine positive amphetamine methamphetamine and THC    UDS Confirmation (Most recent/Resulted): Positive buprenorphine/nor-buprenorphine, no concerns for urine tampering otherwise positive methamphetamine THC and clonazepam    Most recent pertinent laboratory studies reviewed: 4/11/23-hepatic function within normal limits, Cr WNL, HIV negative, hepatitis C not detected         ROSY (PDMP) Reviewed for Current/Active Medications: buprenorphine/naloxone as reviewed today    Past Surgical History:  No past surgical history on file.    Problem List:  There is no problem list on file for this patient.      Allergy:   No Known Allergies     Current Medications:   Current Outpatient Medications   Medication Sig Dispense Refill   • acetaminophen (Tylenol) 325 MG tablet Take 1 tablet by mouth Every 4 (Four) Hours As Needed for Mild Pain or Headache. 100 tablet 0   • buPROPion XL (WELLBUTRIN XL) 150 MG 24 hr tablet Take 1 tablet by mouth 2 (Two) Times a Day. BID dosing 60 tablet 0   • famotidine (PEPCID) 20 MG tablet Take 1 tablet by mouth 2 (Two) Times a Day. 60 tablet 0   • GNP PAIN RELIEF EX-STRENGTH 500 MG tablet TAKE ONE TABLET BY MOUTH EVERY 6 HOURS AS NEEDED FOR 30 DAYS **DO NOT EXCEED 6 TABLETS IN 24 HOURS**     • hydrOXYzine pamoate (Vistaril) 25 MG capsule Take 1 capsule by mouth 4 (Four) Times a Day As Needed for Anxiety. 120 capsule 0   • ondansetron ODT (ZOFRAN-ODT) 8 MG disintegrating tablet Place 1 tablet on the tongue Every 8 (Eight) Hours As Needed for Nausea or Vomiting. 45 tablet 0   • buprenorphine-naloxone (SUBOXONE) 8-2 MG per SL tablet Place 2 tablets under the tongue Daily. 14 tablet 0   • naloxone (NARCAN) 4 MG/0.1ML nasal spray 1 spray into the nostril(s) as directed by provider As Needed (opiate over sedation). 1 spray in 1 nostril every 2 to 3 minutes, call 911 (Patient not taking: Reported on  9/12/2024) 2 each 2     No current facility-administered medications for this visit.       Past Medical History:  Past Medical History:   Diagnosis Date   • History of hepatitis B    • Pancreatitis          Social History     Socioeconomic History   • Marital status: Single   Tobacco Use   • Smoking status: Every Day     Current packs/day: 1.00     Average packs/day: 1 pack/day for 15.0 years (15.0 ttl pk-yrs)     Types: Cigarettes   • Smokeless tobacco: Never   Vaping Use   • Vaping status: Never Used   Substance and Sexual Activity   • Alcohol use: Not Currently   • Drug use: Yes     Types: Marijuana, Hydrocodone, Oxycodone, Methamphetamines   • Sexual activity: Defer         Family History   Problem Relation Age of Onset   • No Known Problems Mother    • No Known Problems Father    • No Known Problems Sister    • No Known Problems Brother    • No Known Problems Maternal Aunt    • No Known Problems Paternal Aunt    • No Known Problems Maternal Uncle    • No Known Problems Paternal Uncle    • No Known Problems Maternal Grandfather    • No Known Problems Maternal Grandmother    • No Known Problems Paternal Grandfather    • No Known Problems Paternal Grandmother    • No Known Problems Cousin    • No Known Problems Other    • ADD / ADHD Neg Hx    • Alcohol abuse Neg Hx    • Anxiety disorder Neg Hx    • Bipolar disorder Neg Hx    • Dementia Neg Hx    • Depression Neg Hx    • Drug abuse Neg Hx    • OCD Neg Hx    • Paranoid behavior Neg Hx    • Schizophrenia Neg Hx    • Seizures Neg Hx    • Self-Injurious Behavior  Neg Hx    • Suicide Attempts Neg Hx          Mental Status Exam:   Hygiene:   good  Cooperation:  Cooperative  Eye Contact:  Good  Psychomotor Behavior:  Appropriate  Affect:  Appropriate  Mood: sad, depressed, and anxious  Speech:  Normal  Thought Process:  Goal directed  Thought Content:  Normal  Suicidal:  None  Homicidal:  None  Hallucinations:  None  Delusion:  None  Memory:  Intact  Orientation:  Grossly  "intact  Reliability:  fair  Insight:  Fair  Judgement:  Poor  Impulse Control:  Poor         Review of Systems:  Review of Systems   Constitutional:  Negative for activity change, chills, diaphoresis and fatigue.   Respiratory:  Negative for apnea, cough and shortness of breath.    Cardiovascular:  Negative for chest pain, palpitations and leg swelling.   Gastrointestinal:  Negative for abdominal pain, constipation, diarrhea, nausea and vomiting.   Genitourinary:  Negative for difficulty urinating.   Musculoskeletal:  Negative for arthralgias.   Skin:  Negative for rash.   Neurological:  Negative for dizziness, weakness and headaches.   Psychiatric/Behavioral:  Positive for dysphoric mood. Negative for agitation, self-injury, sleep disturbance and suicidal ideas. The patient is nervous/anxious.        Physical Exam:  Physical Exam  Vitals reviewed.   Constitutional:       General: She is not in acute distress.     Appearance: Normal appearance. She is not ill-appearing or toxic-appearing.   Pulmonary:      Effort: Pulmonary effort is normal.   Musculoskeletal:         General: Normal range of motion.   Neurological:      General: No focal deficit present.      Mental Status: She is alert and oriented to person, place, and time.   Psychiatric:         Attention and Perception: Attention and perception normal.         Mood and Affect: Mood normal. Mood is anxious and depressed. Affect is tearful.         Speech: Speech normal.         Behavior: Behavior normal. Behavior is cooperative.         Thought Content: Thought content normal.         Cognition and Memory: Cognition and memory normal.         Judgment: Judgment normal.     Vital Signs:   /81   Pulse 88   Ht 156.2 cm (61.5\")   Wt 78.7 kg (173 lb 6.4 oz)   BMI 32.24 kg/m²      Lab Results:   Telemedicine on 09/12/2024   Component Date Value Ref Range Status   • External Amphetamine Screen Urine 09/12/2024 Positive (A)   Final   • External Benzodiazepine " Screen Uri* 09/12/2024 Negative   Final   • External Cocaine Screen Urine 09/12/2024 Negative   Final   • External THC Screen Urine 09/12/2024 Positive (A)   Final   • External Methadone Screen Urine 09/12/2024 Negative   Final   • External Methamphetamine Screen Ur* 09/12/2024 Positive (A)   Final   • External Oxycodone Screen Urine 09/12/2024 Negative   Final   • External Buprenorphine Screen Urine 09/12/2024 Positive (A)   Final   • External MDMA 09/12/2024 Positive (A)   Final   • External Opiates Screen Urine 09/12/2024 Negative   Final   Telemedicine on 08/22/2024   Component Date Value Ref Range Status   • External Amphetamine Screen Urine 08/22/2024 Positive   Final   • External Benzodiazepine Screen Uri* 08/22/2024 Negative   Final   • External Cocaine Screen Urine 08/22/2024 Negative   Final   • External THC Screen Urine 08/22/2024 Positive   Final   • External Methadone Screen Urine 08/22/2024 Positive   Final   • External Methamphetamine Screen Ur* 08/22/2024 Negative   Final   • External Oxycodone Screen Urine 08/22/2024 Negative   Final   • External Buprenorphine Screen Urine 08/22/2024 Positive   Final   • External MDMA 08/22/2024 Negative   Final   • External Opiates Screen Urine 08/22/2024 Negative   Final   Telemedicine on 08/14/2024   Component Date Value Ref Range Status   • External Amphetamine Screen Urine 08/14/2024 Positive (A)   Final   • External Benzodiazepine Screen Uri* 08/14/2024 Negative   Final   • External Cocaine Screen Urine 08/14/2024 Negative   Final   • External THC Screen Urine 08/14/2024 Positive (A)   Final   • External Methadone Screen Urine 08/14/2024 Negative   Final   • External Methamphetamine Screen Ur* 08/14/2024 Positive (A)   Final   • External Oxycodone Screen Urine 08/14/2024 Negative   Final   • External Buprenorphine Screen Urine 08/14/2024 Positive (A)   Final   • External MDMA 08/14/2024 Negative   Final   • External Opiates Screen Urine 08/14/2024 Negative    Final   Telemedicine on 06/25/2024   Component Date Value Ref Range Status   • External Amphetamine Screen Urine 06/25/2024 Negative   Final   • External Benzodiazepine Screen Uri* 06/25/2024 Negative   Final   • External Cocaine Screen Urine 06/25/2024 Negative   Final   • External THC Screen Urine 06/25/2024 Negative   Final   • External Methadone Screen Urine 06/25/2024 Negative   Final   • External Methamphetamine Screen Ur* 06/25/2024 Negative   Final   • External Oxycodone Screen Urine 06/25/2024 Negative   Final   • External Buprenorphine Screen Urine 06/25/2024 Positive (A)   Final   • External MDMA 06/25/2024 Negative   Final   • External Opiates Screen Urine 06/25/2024 Negative   Final   Telemedicine on 06/11/2024   Component Date Value Ref Range Status   • External Amphetamine Screen Urine 06/11/2024 Negative   Final   • External Benzodiazepine Screen Uri* 06/11/2024 Negative   Final   • External Cocaine Screen Urine 06/11/2024 Negative   Final   • External THC Screen Urine 06/11/2024 Negative   Final   • External Methadone Screen Urine 06/11/2024 Negative   Final   • External Methamphetamine Screen Ur* 06/11/2024 Negative   Final   • External Oxycodone Screen Urine 06/11/2024 Negative   Final   • External Buprenorphine Screen Urine 06/11/2024 Positive (A)   Final   • External MDMA 06/11/2024 Negative   Final   • External Opiates Screen Urine 06/11/2024 Negative   Final   Orders Only on 05/06/2024   Component Date Value Ref Range Status   • External Amphetamine Screen Urine 05/06/2024 Negative   Final   • External Benzodiazepine Screen Uri* 05/06/2024 Negative   Final   • External Cocaine Screen Urine 05/06/2024 Negative   Final   • External THC Screen Urine 05/06/2024 Negative   Final   • External Methadone Screen Urine 05/06/2024 Negative   Final   • External Methamphetamine Screen Ur* 05/06/2024 Negative   Final   • External Oxycodone Screen Urine 05/06/2024 Negative   Final   • External Buprenorphine  Screen Urine 05/06/2024 Positive (A)   Final   • External MDMA 05/06/2024 Negative   Final   • External Opiates Screen Urine 05/06/2024 Negative   Final   Office Visit on 05/02/2024   Component Date Value Ref Range Status   • External Amphetamine Screen Urine 05/02/2024 Negative   Final   • External Benzodiazepine Screen Uri* 05/02/2024 Negative   Final   • External Cocaine Screen Urine 05/02/2024 Negative   Final   • External THC Screen Urine 05/02/2024 Negative   Final   • External Methadone Screen Urine 05/02/2024 Negative   Final   • External Methamphetamine Screen Ur* 05/02/2024 Negative   Final   • External Oxycodone Screen Urine 05/02/2024 Negative   Final   • External Buprenorphine Screen Urine 05/02/2024 Positive (A)   Final   • External MDMA 05/02/2024 Negative   Final   • External Opiates Screen Urine 05/02/2024 Negative   Final   Telemedicine on 03/27/2024   Component Date Value Ref Range Status   • External Amphetamine Screen Urine 03/27/2024 Negative   Final   • External Benzodiazepine Screen Uri* 03/27/2024 Negative   Final   • External Cocaine Screen Urine 03/27/2024 Negative   Final   • External THC Screen Urine 03/27/2024 Negative   Final   • External Methadone Screen Urine 03/27/2024 Negative   Final   • External Methamphetamine Screen Ur* 03/27/2024 Negative   Final   • External Oxycodone Screen Urine 03/27/2024 Negative   Final   • External Buprenorphine Screen Urine 03/27/2024 Negative   Final   • External MDMA 03/27/2024 Negative   Final   • External Opiates Screen Urine 03/27/2024 Negative   Final         Assessment & Plan   Diagnoses and all orders for this visit:    1. Opioid type dependence, continuous (Primary)  -     buprenorphine-naloxone (SUBOXONE) 8-2 MG per SL tablet; Place 2 tablets under the tongue Daily.  Dispense: 14 tablet; Refill: 0    2. Medication management  -     [facility] Drug Screen    3. Methamphetamine abuse        Visit Diagnoses:    ICD-10-CM ICD-9-CM   1. Opioid type  dependence, continuous  F11.20 304.01   2. Medication management  Z79.899 V58.69   3. Methamphetamine abuse  F15.10 305.70       PLAN:  Safety: No acute safety concerns  Risk Assessment: Risk of self-harm acutely is low. Risk of self-harm chronically is also low, but could be further elevated in the event of treatment noncompliance and/or AODA.    TREATMENT PLAN/GOALS: Continue supportive psychotherapy efforts and medications as indicated. Treatment and medication options discussed during today's visit. Patient acknowledged and verbally consented to continue with current treatment plan and was educated on the importance of compliance with treatment and follow-up appointments.    MEDICATION ISSUES:  ROSY reviewed as expected.  Discussed medication options and treatment plan of prescribed medication as well as the risks, benefits, and side effects including potential falls, possible impaired driving and metabolic adversities among others. Patient is agreeable to call the office with any worsening of symptoms or onset of side effects. Patient is agreeable to call 911 or go to the nearest ER should he/she begin having SI/HI. No medication side effects or related complaints today.     MEDS ORDERED DURING VISIT:  New Medications Ordered This Visit   Medications   • buprenorphine-naloxone (SUBOXONE) 8-2 MG per SL tablet     Sig: Place 2 tablets under the tongue Daily.     Dispense:  14 tablet     Refill:  0     NADEAN:[id number]       No follow-ups on file.           This document has been electronically signed by WALKER Romero  September 12, 2024 14:58 EDT      Part of this note may be an electronic transcription/translation of spoken language to printed text using the Dragon Dictation System.

## 2024-09-19 ENCOUNTER — TELEMEDICINE (OUTPATIENT)
Dept: PSYCHIATRY | Facility: CLINIC | Age: 54
End: 2024-09-19
Payer: MEDICAID

## 2024-09-19 VITALS
DIASTOLIC BLOOD PRESSURE: 79 MMHG | HEIGHT: 61 IN | HEART RATE: 86 BPM | SYSTOLIC BLOOD PRESSURE: 140 MMHG | WEIGHT: 170.8 LBS | BODY MASS INDEX: 32.25 KG/M2

## 2024-09-19 DIAGNOSIS — F11.20 OPIOID TYPE DEPENDENCE, CONTINUOUS: Primary | ICD-10-CM

## 2024-09-19 DIAGNOSIS — Z79.899 MEDICATION MANAGEMENT: ICD-10-CM

## 2024-09-19 RX ORDER — BUPRENORPHINE HYDROCHLORIDE AND NALOXONE HYDROCHLORIDE DIHYDRATE 8; 2 MG/1; MG/1
2 TABLET SUBLINGUAL DAILY
Qty: 14 TABLET | Refills: 0 | Status: SHIPPED | OUTPATIENT
Start: 2024-09-19

## 2024-09-26 ENCOUNTER — TELEMEDICINE (OUTPATIENT)
Dept: PSYCHIATRY | Facility: CLINIC | Age: 54
End: 2024-09-26
Payer: MEDICAID

## 2024-09-26 VITALS
WEIGHT: 172.8 LBS | HEART RATE: 78 BPM | BODY MASS INDEX: 32.62 KG/M2 | DIASTOLIC BLOOD PRESSURE: 82 MMHG | HEIGHT: 61 IN | SYSTOLIC BLOOD PRESSURE: 135 MMHG

## 2024-09-26 DIAGNOSIS — Z79.899 MEDICATION MANAGEMENT: ICD-10-CM

## 2024-09-26 DIAGNOSIS — F11.20 OPIOID TYPE DEPENDENCE, CONTINUOUS: Primary | ICD-10-CM

## 2024-09-26 DIAGNOSIS — F15.11 METHAMPHETAMINE ABUSE IN REMISSION: ICD-10-CM

## 2024-09-26 RX ORDER — BUPROPION HYDROCHLORIDE 150 MG/1
150 TABLET ORAL 2 TIMES DAILY
Qty: 60 TABLET | Refills: 0 | Status: SHIPPED | OUTPATIENT
Start: 2024-09-26

## 2024-09-26 RX ORDER — BUPRENORPHINE HYDROCHLORIDE AND NALOXONE HYDROCHLORIDE DIHYDRATE 8; 2 MG/1; MG/1
2 TABLET SUBLINGUAL DAILY
Qty: 28 TABLET | Refills: 0 | Status: SHIPPED | OUTPATIENT
Start: 2024-09-26

## 2024-09-30 ENCOUNTER — OFFICE VISIT (OUTPATIENT)
Dept: PSYCHIATRY | Facility: CLINIC | Age: 54
End: 2024-09-30
Payer: MEDICAID

## 2024-09-30 DIAGNOSIS — F15.20 METHAMPHETAMINE USE DISORDER, SEVERE, DEPENDENCE: Primary | ICD-10-CM

## 2024-09-30 DIAGNOSIS — Z79.899 MEDICATION MANAGEMENT: ICD-10-CM

## 2024-09-30 DIAGNOSIS — F12.20 TETRAHYDROCANNABINOL (THC) USE DISORDER, SEVERE, DEPENDENCE: ICD-10-CM

## 2024-09-30 DIAGNOSIS — F13.20 SEVERE BENZODIAZEPINE USE DISORDER: ICD-10-CM

## 2024-09-30 DIAGNOSIS — F11.20 OPIOID USE DISORDER, SEVERE, ON MAINTENANCE THERAPY, DEPENDENCE: ICD-10-CM

## 2024-09-30 PROCEDURE — 90791 PSYCH DIAGNOSTIC EVALUATION: CPT | Performed by: SOCIAL WORKER
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
## 2024-10-01 ENCOUNTER — OFFICE VISIT (OUTPATIENT)
Dept: PSYCHIATRY | Facility: HOSPITAL | Age: 54
End: 2024-10-01
Payer: MEDICAID

## 2024-10-01 DIAGNOSIS — F12.20 TETRAHYDROCANNABINOL (THC) USE DISORDER, SEVERE, DEPENDENCE: ICD-10-CM

## 2024-10-01 DIAGNOSIS — F13.20 SEVERE BENZODIAZEPINE USE DISORDER: ICD-10-CM

## 2024-10-01 DIAGNOSIS — Z79.899 MEDICATION MANAGEMENT: ICD-10-CM

## 2024-10-01 DIAGNOSIS — F15.20 METHAMPHETAMINE USE DISORDER, SEVERE, DEPENDENCE: Primary | ICD-10-CM

## 2024-10-01 DIAGNOSIS — F11.20 OPIOID USE DISORDER, SEVERE, ON MAINTENANCE THERAPY, DEPENDENCE: ICD-10-CM

## 2024-10-01 PROCEDURE — H0015 ALCOHOL AND/OR DRUG SERVICES: HCPCS | Performed by: SOCIAL WORKER

## 2024-10-01 NOTE — PROGRESS NOTES
NAME: Roula Dumont  DATE: 10/01/2024    [facility] Phase I  [id number]    Services Provided    Group Psychotherapy x 3  Education/Training x 0  Activity Therapy  x 0  Individual Therapy x 0 0 minutes  Family Therapy x 0  MD Initial Visit  x 0  MD Follow-Up   x 0    Number of participants: 3    DATA:  Patient shared with the group about her history of addiction and recovery. She shared about her legal issues as well and her motivation for pursuing recovery again.     Individual: No    Homework: None assigned    Group 1 (Psychotherapy: Check-ins): Therapist continued facilitation of rapport building strategies between group members. Therapist asked that each patient check in with home life and recovery efforts and identify triggers, cravings, and high risk situations that arise between group sessions.  Group members discussed barriers and benefits of attending recovery meetings.  Therapist provided empathy and support during group session. Daily Reading: The [employer] (1990) by Blu Mead    Group 2  (12 Steps) Facilitated discussion on the spiritual principles of recovery from the NA 12 Step Working Guide.     Group 3 (12 Steps) Facilitated discussion on the spiritual principles of recovery from the NA 12 Step Working Guide.     ASSESSMENT:    Personal Assessment 0-10 Scale (10 worst)    Anxiety:  0 (no comment)  Depression:  0 (no comment)  Cravings: 0 (no comment)     MSE within normal limits? Yes Affect: somber Mood: anxious  Pt Response:  open/receptive  Engaged in activity/Process and self-disclosed: adequate  Applies today's group topics to self: adequate  Able to give and receive feedback: adequate  Demonstrated insightful thinking: consistent and adequate  Other pt response comments:  Patient was a positive participant. They demonstrated a relatively optimistic attitude, a strong degree of motivation, and adequate insight, as evidenced by her level of engagement combined with her efforts  to be honest in group therapy. They seemed interested and attentive. They appeared to comprehend well the concepts being discussed. The patient seemed receptive of, and willing to implement, the ideas being discussed. Their thought process appeared linear and goal directed, and their speech was regular rate and rhythm.       Community Group Engagement:  No: not yet engaged    Reported relapse since last meeting? No: no lapse since yesterday's intake session    .  Office Visit on 10/01/2024   Component Date Value Ref Range Status    External Amphetamine Screen Urine 10/01/2024 Positive (A)   Final    External Benzodiazepine Screen Uri* 10/01/2024 Negative   Final    External Cocaine Screen Urine 10/01/2024 Negative   Final    External THC Screen Urine 10/01/2024 Positive (A)   Final    External Methadone Screen Urine 10/01/2024 Negative   Final    External Methamphetamine Screen Ur* 10/01/2024 Positive (A)   Final    External Oxycodone Screen Urine 10/01/2024 Negative   Final    External Buprenorphine Screen Urine 10/01/2024 Positive (A)   Final    External MDMA 10/01/2024 Negative   Final    External Opiates Screen Urine 10/01/2024 Negative   Final   Telemedicine on 09/26/2024   Component Date Value Ref Range Status    External Amphetamine Screen Urine 09/26/2024 Negative   Final    External Benzodiazepine Screen Uri* 09/26/2024 Negative   Final    External Cocaine Screen Urine 09/26/2024 Negative   Final    External THC Screen Urine 09/26/2024 Negative   Final    External Methadone Screen Urine 09/26/2024 Negative   Final    External Methamphetamine Screen Ur* 09/26/2024 Negative   Final    External Oxycodone Screen Urine 09/26/2024 Negative   Final    External Buprenorphine Screen Urine 09/26/2024 Positive (A)   Final    External MDMA 09/26/2024 Negative   Final    External Opiates Screen Urine 09/26/2024 Negative   Final   Telemedicine on 09/19/2024   Component Date Value Ref Range Status    External Amphetamine  Screen Urine 09/19/2024 Positive   Final    External Benzodiazepine Screen Uri* 09/19/2024 Negative   Final    External Cocaine Screen Urine 09/19/2024 Negative   Final    External THC Screen Urine 09/19/2024 Negative   Final    External Methadone Screen Urine 09/19/2024 Negative   Final    External Methamphetamine Screen Ur* 09/19/2024 Positive (A)   Final    External Oxycodone Screen Urine 09/19/2024 Negative   Final    External Buprenorphine Screen Urine 09/19/2024 Positive (A)   Final    External MDMA 09/19/2024 Negative   Final    External Opiates Screen Urine 09/19/2024 Negative   Final   Telemedicine on 09/12/2024   Component Date Value Ref Range Status    External Amphetamine Screen Urine 09/12/2024 Positive (A)   Final    External Benzodiazepine Screen Uri* 09/12/2024 Negative   Final    External Cocaine Screen Urine 09/12/2024 Negative   Final    External THC Screen Urine 09/12/2024 Positive (A)   Final    External Methadone Screen Urine 09/12/2024 Negative   Final    External Methamphetamine Screen Ur* 09/12/2024 Positive (A)   Final    External Oxycodone Screen Urine 09/12/2024 Negative   Final    External Buprenorphine Screen Urine 09/12/2024 Positive (A)   Final    External MDMA 09/12/2024 Positive (A)   Final    External Opiates Screen Urine 09/12/2024 Negative   Final       Impression/Formulation:    ICD-10-CM ICD-9-CM   1. Methamphetamine use disorder, severe, dependence  F15.20 304.40   2. Opioid use disorder, severe, on maintenance therapy, dependence  F11.20 304.00   3. Severe benzodiazepine use disorder  F13.20 304.10   4. Tetrahydrocannabinol (THC) use disorder, severe, dependence  F12.20 304.30   5. Medication management  Z79.899 V58.69        CLINICAL MANEUVERING/INTERVENTIONS: Therapist utilized a person-centered approach to build and maintain rapport with group member.   Therapist promoted safe nonjudgmental environment by providing group members with unconditional positive regard.   Assisted member in processing above session content; acknowledged and normalized patient's thoughts, feelings and concerns.  Allowed patient to freely discuss issues without interruption or judgment. Provided safe, confidential environment to facilitate the development of positive therapeutic relationship and encourage open, honest communication. Therapist assisted group member with identifying and implementing healthier coping strategies and maintaining healthier boundaries. Assisted patient in identifying risk factors which would indicate the need for higher level of care including thoughts to harm self or others and/or self-harming behavior and encouraged patient to contact this office, call 911, or present to the nearest emergency room should any of these events occur. Pt agreeable to adhere to medication regimen as prescribed and report any side effects.  Discussed crisis intervention services and means to access.  Patient adamantly and convincingly denies current suicidal or homicidal ideation or perceptual disturbance.      Therapist implemented motivational interviewing techniques to assist client with exploring and resolving ambivalence associated with commitment to change behaviors.  Yes  Therapist utilized dialectical behavior techniques to teach and model emotional regulation and relaxation.  No   Therapist applied cognitive behavioral strategies to facilitate identification of maladaptive patterns of thinking and behavior.  Yes     PLAN:    Continue [facility]  IOP Phase I  Aftercare:  [facility]  Outpatient Phase 2      Please note that portions of this note were completed with a voice recognition program. Efforts were made to edit dictation, but occasionally words are mistranscribed.     This document signed by Ashish Gregory LCSW, October 1, 2024, 10:17 EDT

## 2024-10-03 ENCOUNTER — APPOINTMENT (OUTPATIENT)
Dept: PSYCHIATRY | Facility: HOSPITAL | Age: 54
End: 2024-10-03
Payer: MEDICAID

## 2024-10-07 ENCOUNTER — APPOINTMENT (OUTPATIENT)
Dept: PSYCHIATRY | Facility: HOSPITAL | Age: 54
End: 2024-10-07
Payer: MEDICAID

## 2024-10-08 ENCOUNTER — APPOINTMENT (OUTPATIENT)
Dept: PSYCHIATRY | Facility: HOSPITAL | Age: 54
End: 2024-10-08
Payer: MEDICAID

## 2024-10-09 ENCOUNTER — APPOINTMENT (OUTPATIENT)
Dept: PSYCHIATRY | Facility: HOSPITAL | Age: 54
End: 2024-10-09
Payer: MEDICAID

## 2024-10-15 ENCOUNTER — TELEMEDICINE (OUTPATIENT)
Dept: PSYCHIATRY | Facility: CLINIC | Age: 54
End: 2024-10-15
Payer: MEDICAID

## 2024-10-15 VITALS
HEIGHT: 61 IN | HEART RATE: 65 BPM | WEIGHT: 173.8 LBS | DIASTOLIC BLOOD PRESSURE: 78 MMHG | SYSTOLIC BLOOD PRESSURE: 152 MMHG | BODY MASS INDEX: 32.81 KG/M2

## 2024-10-15 DIAGNOSIS — F15.11 METHAMPHETAMINE ABUSE IN REMISSION: ICD-10-CM

## 2024-10-15 DIAGNOSIS — F11.20 OPIOID TYPE DEPENDENCE, CONTINUOUS: Primary | ICD-10-CM

## 2024-10-15 DIAGNOSIS — Z79.899 MEDICATION MANAGEMENT: ICD-10-CM

## 2024-10-15 LAB
AMPHET+METHAMPHET UR QL: NEGATIVE
AMPHETAMINES UR QL: NEGATIVE
BARBITURATES UR QL SCN: NEGATIVE
BENZODIAZ UR QL SCN: NEGATIVE
BUPRENORPHINE SERPL-MCNC: POSITIVE NG/ML
CANNABINOIDS SERPL QL: POSITIVE
COCAINE UR QL: NEGATIVE
MDMA UR QL SCN: NEGATIVE
METHADONE UR QL SCN: NEGATIVE
MORPHINE/OPIATES SCREEN, URINE: NEGATIVE
OXYCODONE UR QL SCN: NEGATIVE
PCP UR QL SCN: NEGATIVE
PROPOXYPH UR QL SCN: NEGATIVE
TRICYCLICS UR QL SCN: NEGATIVE

## 2024-10-15 RX ORDER — BUPROPION HYDROCHLORIDE 150 MG/1
150 TABLET ORAL 2 TIMES DAILY
Qty: 60 TABLET | Refills: 0 | Status: SHIPPED | OUTPATIENT
Start: 2024-10-15

## 2024-10-15 RX ORDER — BUPRENORPHINE HYDROCHLORIDE AND NALOXONE HYDROCHLORIDE DIHYDRATE 8; 2 MG/1; MG/1
2 TABLET SUBLINGUAL DAILY
Qty: 56 TABLET | Refills: 0 | Status: SHIPPED | OUTPATIENT
Start: 2024-10-15

## 2024-10-15 NOTE — PROGRESS NOTES
This provider is located at [facility]. The Patient is seen remotely located at the [facility] ([facility]) using Video. Patient is being seen via telehealth and confirm that they are in a secure environment for this session. The patient's condition being diagnosed/treated is appropriate for telemedicine. Provider identified as Williams Brody as well as credentials APRN MSN FNP-C SRINIVASAN-YOUNG.   The client/patient gave consent to be seen remotely, and when consent is given they understand that the consent allows for patient identifiable information to be sent to a third party as needed.   They may refuse to be seen remotely at any time. The electronic data is encrypted and password protected, and the patient has been advised of the potential risks to privacy not withstanding such measures.    Chief Complaint/History of Present Illness: Follow Up buprenorphine/naloxone Medicated Assisted Treatment for Opiate Use Disorder     Patient/Client Concerns/Updates: Continue Wellbutrin for stimulant cravings   -Patient reports she is doing well maintaining abstinence from illicit substances most notably methamphetamine  -Patient reports no significant or concerning life events or changes since last evaluation; reports no acute life stressors  -Reports continued therapeutic benefit and satisfaction with current care plan; reports no concerning side effects with current medication prescribed  -Mood is stable; patient denies acute anxious episodes, exacerbations thereof or concerning panic attacks  -Reports no acute depressive episodes or concerns, no suicidal or homicidal thoughts  -Reports no perceptual disturbances or otherwise symptoms of psychosis  -Patient reports no concerns with sleep quality or disturbance thereof    Triggers (Persons/Places/Things/Events/Thought/Emotions): Life stress    Cravings/Substance Use: Denies cravings or use of illicit substances    Relapse Prevention: Counseling    Urine Drug  Screen (today's visit)/Presumptive Point of Care discussed: Positive buprenorphine and positive THC    UDS Confirmation (Most recent/Resulted): Positive buprenorphine/nor-buprenorphine, no concerns for urine tampering otherwise positive methamphetamine    Most recent pertinent laboratory studies reviewed:  4/11/23-hepatic function within normal limits, Cr WNL, HIV negative, hepatitis C not detected         ROSY (PDMP) Reviewed for Current/Active Medications: buprenorphine/naloxone as reviewed today    Past Surgical History:  No past surgical history on file.    Problem List:  There is no problem list on file for this patient.      Allergy:   No Known Allergies     Current Medications:   Current Outpatient Medications   Medication Sig Dispense Refill   • acetaminophen (Tylenol) 325 MG tablet Take 1 tablet by mouth Every 4 (Four) Hours As Needed for Mild Pain or Headache. 100 tablet 0   • buprenorphine-naloxone (SUBOXONE) 8-2 MG per SL tablet Place 2 tablets under the tongue Daily. 56 tablet 0   • buPROPion XL (WELLBUTRIN XL) 150 MG 24 hr tablet Take 1 tablet by mouth 2 (Two) Times a Day. BID dosing 60 tablet 0   • famotidine (PEPCID) 20 MG tablet Take 1 tablet by mouth 2 (Two) Times a Day. 60 tablet 0   • GNP PAIN RELIEF EX-STRENGTH 500 MG tablet TAKE ONE TABLET BY MOUTH EVERY 6 HOURS AS NEEDED FOR 30 DAYS **DO NOT EXCEED 6 TABLETS IN 24 HOURS**     • hydrOXYzine pamoate (Vistaril) 25 MG capsule Take 1 capsule by mouth 4 (Four) Times a Day As Needed for Anxiety. 120 capsule 0   • ondansetron ODT (ZOFRAN-ODT) 8 MG disintegrating tablet Place 1 tablet on the tongue Every 8 (Eight) Hours As Needed for Nausea or Vomiting. 45 tablet 0   • naloxone (NARCAN) 4 MG/0.1ML nasal spray 1 spray into the nostril(s) as directed by provider As Needed (opiate over sedation). 1 spray in 1 nostril every 2 to 3 minutes, call 911 (Patient not taking: Reported on 10/15/2024) 2 each 2     No current facility-administered medications for  this visit.       Past Medical History:  Past Medical History:   Diagnosis Date   • History of hepatitis B    • Pancreatitis          Social History     Socioeconomic History   • Marital status: Single   Tobacco Use   • Smoking status: Every Day     Current packs/day: 1.00     Average packs/day: 1 pack/day for 15.0 years (15.0 ttl pk-yrs)     Types: Cigarettes   • Smokeless tobacco: Never   Vaping Use   • Vaping status: Never Used   Substance and Sexual Activity   • Alcohol use: Not Currently   • Drug use: Yes     Types: Marijuana, Hydrocodone, Oxycodone, Methamphetamines   • Sexual activity: Defer         Family History   Problem Relation Age of Onset   • No Known Problems Mother    • No Known Problems Father    • No Known Problems Sister    • No Known Problems Brother    • No Known Problems Maternal Aunt    • No Known Problems Paternal Aunt    • No Known Problems Maternal Uncle    • No Known Problems Paternal Uncle    • No Known Problems Maternal Grandfather    • No Known Problems Maternal Grandmother    • No Known Problems Paternal Grandfather    • No Known Problems Paternal Grandmother    • No Known Problems Cousin    • No Known Problems Other    • ADD / ADHD Neg Hx    • Alcohol abuse Neg Hx    • Anxiety disorder Neg Hx    • Bipolar disorder Neg Hx    • Dementia Neg Hx    • Depression Neg Hx    • Drug abuse Neg Hx    • OCD Neg Hx    • Paranoid behavior Neg Hx    • Schizophrenia Neg Hx    • Seizures Neg Hx    • Self-Injurious Behavior  Neg Hx    • Suicide Attempts Neg Hx          Mental Status Exam:   Hygiene:   good  Cooperation:  Cooperative  Eye Contact:  Good  Psychomotor Behavior:  Appropriate  Affect:  Appropriate  Mood: normal  Speech:  Normal  Thought Process:  Goal directed  Thought Content:  Normal  Suicidal:  None  Homicidal:  None  Hallucinations:  None  Delusion:  None  Memory:  Intact  Orientation:  Grossly intact  Reliability:  good  Insight:  Good  Judgement:  Good  Impulse Control:  Good    "      Review of Systems:  Review of Systems   Constitutional:  Negative for activity change, chills, diaphoresis and fatigue.   Respiratory:  Negative for apnea, cough and shortness of breath.    Cardiovascular:  Negative for chest pain, palpitations and leg swelling.   Gastrointestinal:  Negative for abdominal pain, constipation, diarrhea, nausea and vomiting.   Genitourinary:  Negative for difficulty urinating.   Musculoskeletal:  Negative for arthralgias.   Skin:  Negative for rash.   Neurological:  Negative for dizziness, weakness and headaches.   Psychiatric/Behavioral:  Negative for agitation, self-injury, sleep disturbance and suicidal ideas. The patient is not nervous/anxious.        Physical Exam:  Physical Exam  Vitals reviewed.   Constitutional:       General: She is not in acute distress.     Appearance: Normal appearance. She is not ill-appearing or toxic-appearing.   Pulmonary:      Effort: Pulmonary effort is normal.   Musculoskeletal:         General: Normal range of motion.   Neurological:      General: No focal deficit present.      Mental Status: She is alert and oriented to person, place, and time.   Psychiatric:         Attention and Perception: Attention and perception normal.         Mood and Affect: Mood normal. Mood is not anxious or depressed.         Speech: Speech normal.         Behavior: Behavior normal. Behavior is cooperative.         Thought Content: Thought content normal.         Cognition and Memory: Cognition and memory normal.         Judgment: Judgment normal.     Vital Signs:   /78   Pulse 65   Ht 156.2 cm (61.5\")   Wt 78.8 kg (173 lb 12.8 oz)   BMI 32.31 kg/m²      Lab Results:   Telemedicine on 10/15/2024   Component Date Value Ref Range Status   • Amphetamine Screen, Urine 10/15/2024 Negative  Negative Final    PRELIMINARY RESULTS. PLEASE REFER TO [facility] LAB CONFIRMATION FOR FINAL RESULTS.   • Barbiturates Screen, Urine 10/15/2024 Negative  Negative Final   • " Buprenorphine, Screen, Urine 10/15/2024 Positive (A)  Negative Final   • Benzodiazepine Screen, Urine 10/15/2024 Negative  Negative Final   • Cocaine Screen, Urine 10/15/2024 Negative  Negative Final   • MDMA (ECSTASY) 10/15/2024 Negative  Negative Final   • Methamphetamine, Ur 10/15/2024 Negative  Negative Final   • Methadone Screen, Urine 10/15/2024 Negative  Negative Final   • Morphine/Opiates Screen, Urine 10/15/2024 Negative  Negative Final   • Oxycodone Screen, Urine 10/15/2024 Negative  Negative Final   • Phencyclidine (PCP), Urine 10/15/2024 Negative  Negative Final   • Propoxyphene Scree, Urine 10/15/2024 Negative  Negative Final   • Tricyclic Antidepressants Screen 10/15/2024 Negative  Negative Final   • THC, Screen, Urine 10/15/2024 Positive (A)  Negative Final   Telemedicine on 09/26/2024   Component Date Value Ref Range Status   • External Amphetamine Screen Urine 09/26/2024 Negative   Final   • External Benzodiazepine Screen Uri* 09/26/2024 Negative   Final   • External Cocaine Screen Urine 09/26/2024 Negative   Final   • External THC Screen Urine 09/26/2024 Negative   Final   • External Methadone Screen Urine 09/26/2024 Negative   Final   • External Methamphetamine Screen Ur* 09/26/2024 Negative   Final   • External Oxycodone Screen Urine 09/26/2024 Negative   Final   • External Buprenorphine Screen Urine 09/26/2024 Positive (A)   Final   • External MDMA 09/26/2024 Negative   Final   • External Opiates Screen Urine 09/26/2024 Negative   Final   Telemedicine on 09/19/2024   Component Date Value Ref Range Status   • External Amphetamine Screen Urine 09/19/2024 Positive   Final   • External Benzodiazepine Screen Uri* 09/19/2024 Negative   Final   • External Cocaine Screen Urine 09/19/2024 Negative   Final   • External THC Screen Urine 09/19/2024 Negative   Final   • External Methadone Screen Urine 09/19/2024 Negative   Final   • External Methamphetamine Screen Ur* 09/19/2024 Positive (A)   Final   •  External Oxycodone Screen Urine 09/19/2024 Negative   Final   • External Buprenorphine Screen Urine 09/19/2024 Positive (A)   Final   • External MDMA 09/19/2024 Negative   Final   • External Opiates Screen Urine 09/19/2024 Negative   Final   Telemedicine on 09/12/2024   Component Date Value Ref Range Status   • External Amphetamine Screen Urine 09/12/2024 Positive (A)   Final   • External Benzodiazepine Screen Uri* 09/12/2024 Negative   Final   • External Cocaine Screen Urine 09/12/2024 Negative   Final   • External THC Screen Urine 09/12/2024 Positive (A)   Final   • External Methadone Screen Urine 09/12/2024 Negative   Final   • External Methamphetamine Screen Ur* 09/12/2024 Positive (A)   Final   • External Oxycodone Screen Urine 09/12/2024 Negative   Final   • External Buprenorphine Screen Urine 09/12/2024 Positive (A)   Final   • External MDMA 09/12/2024 Positive (A)   Final   • External Opiates Screen Urine 09/12/2024 Negative   Final   Telemedicine on 08/22/2024   Component Date Value Ref Range Status   • External Amphetamine Screen Urine 08/22/2024 Positive   Final   • External Benzodiazepine Screen Uri* 08/22/2024 Negative   Final   • External Cocaine Screen Urine 08/22/2024 Negative   Final   • External THC Screen Urine 08/22/2024 Positive   Final   • External Methadone Screen Urine 08/22/2024 Positive   Final   • External Methamphetamine Screen Ur* 08/22/2024 Negative   Final   • External Oxycodone Screen Urine 08/22/2024 Negative   Final   • External Buprenorphine Screen Urine 08/22/2024 Positive   Final   • External MDMA 08/22/2024 Negative   Final   • External Opiates Screen Urine 08/22/2024 Negative   Final   Telemedicine on 08/14/2024   Component Date Value Ref Range Status   • External Amphetamine Screen Urine 08/14/2024 Positive (A)   Final   • External Benzodiazepine Screen Uri* 08/14/2024 Negative   Final   • External Cocaine Screen Urine 08/14/2024 Negative   Final   • External THC Screen Urine  08/14/2024 Positive (A)   Final   • External Methadone Screen Urine 08/14/2024 Negative   Final   • External Methamphetamine Screen Ur* 08/14/2024 Positive (A)   Final   • External Oxycodone Screen Urine 08/14/2024 Negative   Final   • External Buprenorphine Screen Urine 08/14/2024 Positive (A)   Final   • External MDMA 08/14/2024 Negative   Final   • External Opiates Screen Urine 08/14/2024 Negative   Final   Telemedicine on 06/25/2024   Component Date Value Ref Range Status   • External Amphetamine Screen Urine 06/25/2024 Negative   Final   • External Benzodiazepine Screen Uri* 06/25/2024 Negative   Final   • External Cocaine Screen Urine 06/25/2024 Negative   Final   • External THC Screen Urine 06/25/2024 Negative   Final   • External Methadone Screen Urine 06/25/2024 Negative   Final   • External Methamphetamine Screen Ur* 06/25/2024 Negative   Final   • External Oxycodone Screen Urine 06/25/2024 Negative   Final   • External Buprenorphine Screen Urine 06/25/2024 Positive (A)   Final   • External MDMA 06/25/2024 Negative   Final   • External Opiates Screen Urine 06/25/2024 Negative   Final   Telemedicine on 06/11/2024   Component Date Value Ref Range Status   • External Amphetamine Screen Urine 06/11/2024 Negative   Final   • External Benzodiazepine Screen Uri* 06/11/2024 Negative   Final   • External Cocaine Screen Urine 06/11/2024 Negative   Final   • External THC Screen Urine 06/11/2024 Negative   Final   • External Methadone Screen Urine 06/11/2024 Negative   Final   • External Methamphetamine Screen Ur* 06/11/2024 Negative   Final   • External Oxycodone Screen Urine 06/11/2024 Negative   Final   • External Buprenorphine Screen Urine 06/11/2024 Positive (A)   Final   • External MDMA 06/11/2024 Negative   Final   • External Opiates Screen Urine 06/11/2024 Negative   Final   Orders Only on 05/06/2024   Component Date Value Ref Range Status   • External Amphetamine Screen Urine 05/06/2024 Negative   Final   •  External Benzodiazepine Screen Uri* 05/06/2024 Negative   Final   • External Cocaine Screen Urine 05/06/2024 Negative   Final   • External THC Screen Urine 05/06/2024 Negative   Final   • External Methadone Screen Urine 05/06/2024 Negative   Final   • External Methamphetamine Screen Ur* 05/06/2024 Negative   Final   • External Oxycodone Screen Urine 05/06/2024 Negative   Final   • External Buprenorphine Screen Urine 05/06/2024 Positive (A)   Final   • External MDMA 05/06/2024 Negative   Final   • External Opiates Screen Urine 05/06/2024 Negative   Final   Office Visit on 05/02/2024   Component Date Value Ref Range Status   • External Amphetamine Screen Urine 05/02/2024 Negative   Final   • External Benzodiazepine Screen Uri* 05/02/2024 Negative   Final   • External Cocaine Screen Urine 05/02/2024 Negative   Final   • External THC Screen Urine 05/02/2024 Negative   Final   • External Methadone Screen Urine 05/02/2024 Negative   Final   • External Methamphetamine Screen Ur* 05/02/2024 Negative   Final   • External Oxycodone Screen Urine 05/02/2024 Negative   Final   • External Buprenorphine Screen Urine 05/02/2024 Positive (A)   Final   • External MDMA 05/02/2024 Negative   Final   • External Opiates Screen Urine 05/02/2024 Negative   Final   There may be more visits with results that are not included.         Assessment & Plan   Diagnoses and all orders for this visit:    1. Opioid type dependence, continuous (Primary)  -     buprenorphine-naloxone (SUBOXONE) 8-2 MG per SL tablet; Place 2 tablets under the tongue Daily.  Dispense: 56 tablet; Refill: 0    2. Medication management  -     POC Medline 14 Panel Urine Drug Screen    3. Methamphetamine abuse in remission  -     buPROPion XL (WELLBUTRIN XL) 150 MG 24 hr tablet; Take 1 tablet by mouth 2 (Two) Times a Day. BID dosing  Dispense: 60 tablet; Refill: 0        Visit Diagnoses:    ICD-10-CM ICD-9-CM   1. Opioid type dependence, continuous  F11.20 304.01   2.  Medication management  Z79.899 V58.69   3. Methamphetamine abuse in remission  F15.11 305.73       PLAN:  Safety: No acute safety concerns  Risk Assessment: Risk of self-harm acutely is low. Risk of self-harm chronically is also low, but could be further elevated in the event of treatment noncompliance and/or AODA.    TREATMENT PLAN/GOALS: Continue supportive psychotherapy efforts and medications as indicated. Treatment and medication options discussed during today's visit. Patient acknowledged and verbally consented to continue with current treatment plan and was educated on the importance of compliance with treatment and follow-up appointments.    MEDICATION ISSUES:  ROSY reviewed as expected.  Discussed medication options and treatment plan of prescribed medication as well as the risks, benefits, and side effects including potential falls, possible impaired driving and metabolic adversities among others. Patient is agreeable to call the office with any worsening of symptoms or onset of side effects. Patient is agreeable to call 911 or go to the nearest ER should he/she begin having SI/HI. No medication side effects or related complaints today.     MEDS ORDERED DURING VISIT:  New Medications Ordered This Visit   Medications   • buprenorphine-naloxone (SUBOXONE) 8-2 MG per SL tablet     Sig: Place 2 tablets under the tongue Daily.     Dispense:  56 tablet     Refill:  0     NADEAN:[id number]   • buPROPion XL (WELLBUTRIN XL) 150 MG 24 hr tablet     Sig: Take 1 tablet by mouth 2 (Two) Times a Day. BID dosing     Dispense:  60 tablet     Refill:  0       No follow-ups on file.           This document has been electronically signed by WALKER Romero  October 15, 2024 14:03 EDT      Part of this note may be an electronic transcription/translation of spoken language to printed text using the Dragon Dictation System.

## 2024-10-24 ENCOUNTER — TRANSCRIBE ORDERS (OUTPATIENT)
Dept: ADMINISTRATIVE | Facility: HOSPITAL | Age: 54
End: 2024-10-24
Payer: MEDICAID

## 2024-10-24 DIAGNOSIS — Z12.31 VISIT FOR SCREENING MAMMOGRAM: Primary | ICD-10-CM

## 2024-11-12 ENCOUNTER — TELEMEDICINE (OUTPATIENT)
Dept: PSYCHIATRY | Facility: CLINIC | Age: 54
End: 2024-11-12
Payer: MEDICAID

## 2024-11-12 VITALS
HEART RATE: 72 BPM | SYSTOLIC BLOOD PRESSURE: 135 MMHG | DIASTOLIC BLOOD PRESSURE: 78 MMHG | WEIGHT: 174.4 LBS | HEIGHT: 61 IN | BODY MASS INDEX: 32.93 KG/M2

## 2024-11-12 DIAGNOSIS — R11.14 BILIOUS VOMITING WITH NAUSEA: ICD-10-CM

## 2024-11-12 DIAGNOSIS — K21.9 GASTROESOPHAGEAL REFLUX DISEASE WITHOUT ESOPHAGITIS: ICD-10-CM

## 2024-11-12 DIAGNOSIS — F11.20 OPIOID TYPE DEPENDENCE, CONTINUOUS: Primary | ICD-10-CM

## 2024-11-12 DIAGNOSIS — Z79.899 MEDICATION MANAGEMENT: ICD-10-CM

## 2024-11-12 LAB
AMPHET+METHAMPHET UR QL: NEGATIVE
AMPHETAMINES UR QL: NEGATIVE
BARBITURATES UR QL SCN: NEGATIVE
BENZODIAZ UR QL SCN: NEGATIVE
BUPRENORPHINE SERPL-MCNC: POSITIVE NG/ML
CANNABINOIDS SERPL QL: POSITIVE
COCAINE UR QL: NEGATIVE
ETHANOL URINE SCREEN: NEGATIVE
FENTANYL UR-MCNC: NEGATIVE NG/ML
GABAPENTIN SERPLBLD-MCNC: NEGATIVE UG/ML
MDMA UR QL SCN: NEGATIVE
METHADONE UR QL SCN: NEGATIVE
MORPHINE/OPIATES SCREEN, URINE: NEGATIVE
OXYCODONE UR QL SCN: NEGATIVE
PCP UR QL SCN: NEGATIVE
PROPOXYPH UR QL SCN: NEGATIVE
TRICYCLICS UR QL SCN: NEGATIVE

## 2024-11-12 RX ORDER — BUPRENORPHINE HYDROCHLORIDE AND NALOXONE HYDROCHLORIDE DIHYDRATE 8; 2 MG/1; MG/1
2 TABLET SUBLINGUAL DAILY
Qty: 56 TABLET | Refills: 0 | Status: SHIPPED | OUTPATIENT
Start: 2024-11-12

## 2024-11-12 RX ORDER — ONDANSETRON 8 MG/1
8 TABLET, ORALLY DISINTEGRATING ORAL EVERY 8 HOURS PRN
Qty: 45 TABLET | Refills: 0 | Status: SHIPPED | OUTPATIENT
Start: 2024-11-12

## 2024-11-12 RX ORDER — FAMOTIDINE 20 MG/1
20 TABLET, FILM COATED ORAL 2 TIMES DAILY
Qty: 60 TABLET | Refills: 0 | Status: SHIPPED | OUTPATIENT
Start: 2024-11-12

## 2024-11-12 NOTE — PROGRESS NOTES
This provider is located at [facility]. The Patient is seen remotely located at the [facility] ([facility]) using Video. Patient is being seen via telehealth and confirm that they are in a secure environment for this session. The patient's condition being diagnosed/treated is appropriate for telemedicine. Provider identified as Williams Brody as well as credentials APRN MSN FNP-C SRINIVASAN-YOUNG.   The client/patient gave consent to be seen remotely, and when consent is given they understand that the consent allows for patient identifiable information to be sent to a third party as needed.   They may refuse to be seen remotely at any time. The electronic data is encrypted and password protected, and the patient has been advised of the potential risks to privacy not withstanding such measures.    Chief Complaint/History of Present Illness: Follow Up buprenorphine/naloxone Medicated Assisted Treatment for Opiate Use Disorder     Patient/Client Concerns/Updates: Continue Wellbutrin for stimulant cravings,  -Patient reports no significant or concerning life events or changes since last evaluation; reports no acute life stressors patient reports she is doing well maintaining abstinence from illicit or nonprescribed substances; patient also maintaining compliance with parole requirements  -Reports continued therapeutic benefit and satisfaction with current care plan; reports no concerning side effects with current medication prescribed  -Mood is stable; patient denies acute anxious episodes, exacerbations thereof or concerning panic attacks  -Reports no acute depressive episodes or concerns, no suicidal or homicidal thoughts  -Reports no perceptual disturbances or otherwise symptoms of psychosis  -Patient reports no concerns with sleep quality or disturbance thereof    Triggers (Persons/Places/Things/Events/Thought/Emotions): Life stress    Cravings/Substance Use: Denies cravings or use of illicit or  nonprescribed substances    Relapse Prevention: Counseling    Urine Drug Screen (today's visit)/Presumptive Point of Care discussed: Positive buprenorphine and positive THC    UDS Confirmation (Most recent/Resulted): Positive buprenorphine/nor-buprenorphine, no concerns for urine tampering otherwise positive THC and clonazepam    Most recent pertinent laboratory studies reviewed:  4/11/23-hepatic function within normal limits, Cr WNL, HIV negative, hepatitis C not detected         ROSY (PDMP) Reviewed for Current/Active Medications: buprenorphine/naloxone as reviewed today    Past Surgical History:  No past surgical history on file.    Problem List:  There is no problem list on file for this patient.      Allergy:   No Known Allergies     Current Medications:   Current Outpatient Medications   Medication Sig Dispense Refill   • acetaminophen (Tylenol) 325 MG tablet Take 1 tablet by mouth Every 4 (Four) Hours As Needed for Mild Pain or Headache. 100 tablet 0   • buprenorphine-naloxone (SUBOXONE) 8-2 MG per SL tablet Place 2 tablets under the tongue Daily. 56 tablet 0   • buPROPion XL (WELLBUTRIN XL) 150 MG 24 hr tablet Take 1 tablet by mouth 2 (Two) Times a Day. BID dosing 60 tablet 0   • famotidine (PEPCID) 20 MG tablet Take 1 tablet by mouth 2 (Two) Times a Day. 60 tablet 0   • GNP PAIN RELIEF EX-STRENGTH 500 MG tablet TAKE ONE TABLET BY MOUTH EVERY 6 HOURS AS NEEDED FOR 30 DAYS **DO NOT EXCEED 6 TABLETS IN 24 HOURS**     • hydrOXYzine pamoate (Vistaril) 25 MG capsule Take 1 capsule by mouth 4 (Four) Times a Day As Needed for Anxiety. 120 capsule 0   • ondansetron ODT (ZOFRAN-ODT) 8 MG disintegrating tablet Place 1 tablet on the tongue Every 8 (Eight) Hours As Needed for Nausea or Vomiting. 45 tablet 0   • naloxone (NARCAN) 4 MG/0.1ML nasal spray 1 spray into the nostril(s) as directed by provider As Needed (opiate over sedation). 1 spray in 1 nostril every 2 to 3 minutes, call 911 (Patient not taking: Reported  on 11/12/2024) 2 each 2     No current facility-administered medications for this visit.       Past Medical History:  Past Medical History:   Diagnosis Date   • History of hepatitis B    • Pancreatitis          Social History     Socioeconomic History   • Marital status: Single   Tobacco Use   • Smoking status: Every Day     Current packs/day: 1.00     Average packs/day: 1 pack/day for 15.0 years (15.0 ttl pk-yrs)     Types: Cigarettes   • Smokeless tobacco: Never   Vaping Use   • Vaping status: Never Used   Substance and Sexual Activity   • Alcohol use: Not Currently   • Drug use: Yes     Types: Marijuana, Hydrocodone, Oxycodone, Methamphetamines   • Sexual activity: Defer         Family History   Problem Relation Age of Onset   • No Known Problems Mother    • No Known Problems Father    • No Known Problems Sister    • No Known Problems Brother    • No Known Problems Maternal Aunt    • No Known Problems Paternal Aunt    • No Known Problems Maternal Uncle    • No Known Problems Paternal Uncle    • No Known Problems Maternal Grandfather    • No Known Problems Maternal Grandmother    • No Known Problems Paternal Grandfather    • No Known Problems Paternal Grandmother    • No Known Problems Cousin    • No Known Problems Other    • ADD / ADHD Neg Hx    • Alcohol abuse Neg Hx    • Anxiety disorder Neg Hx    • Bipolar disorder Neg Hx    • Dementia Neg Hx    • Depression Neg Hx    • Drug abuse Neg Hx    • OCD Neg Hx    • Paranoid behavior Neg Hx    • Schizophrenia Neg Hx    • Seizures Neg Hx    • Self-Injurious Behavior  Neg Hx    • Suicide Attempts Neg Hx          Mental Status Exam:   Hygiene:   good  Cooperation:  Cooperative  Eye Contact:  Good  Psychomotor Behavior:  Appropriate  Affect:  Appropriate  Mood: normal  Speech:  Normal  Thought Process:  Goal directed  Thought Content:  Normal  Suicidal:  None  Homicidal:  None  Hallucinations:  None  Delusion:  None  Memory:  Intact  Orientation:  Grossly  "intact  Reliability:  good  Insight:  Good  Judgement:  Good  Impulse Control:  Good         Review of Systems:  Review of Systems   Constitutional:  Negative for activity change, chills, diaphoresis and fatigue.   Respiratory:  Negative for apnea, cough and shortness of breath.    Cardiovascular:  Negative for chest pain, palpitations and leg swelling.   Gastrointestinal:  Positive for nausea. Negative for abdominal pain, constipation, diarrhea and vomiting.   Genitourinary:  Negative for difficulty urinating.   Musculoskeletal:  Negative for arthralgias.   Skin:  Negative for rash.   Neurological:  Negative for dizziness, weakness and headaches.   Psychiatric/Behavioral:  Negative for agitation, self-injury, sleep disturbance and suicidal ideas. The patient is not nervous/anxious.        Physical Exam:  Physical Exam  Vitals reviewed.   Constitutional:       General: She is not in acute distress.     Appearance: Normal appearance. She is not ill-appearing or toxic-appearing.   Pulmonary:      Effort: Pulmonary effort is normal.   Musculoskeletal:         General: Normal range of motion.   Neurological:      General: No focal deficit present.      Mental Status: She is alert and oriented to person, place, and time.   Psychiatric:         Attention and Perception: Attention and perception normal.         Mood and Affect: Mood normal. Mood is not anxious or depressed.         Speech: Speech normal.         Behavior: Behavior normal. Behavior is cooperative.         Thought Content: Thought content normal.         Cognition and Memory: Cognition and memory normal.         Judgment: Judgment normal.     Vital Signs:   /78   Pulse 72   Ht 156.2 cm (61.5\")   Wt 79.1 kg (174 lb 6.4 oz)   BMI 32.42 kg/m²      Lab Results:   Telemedicine on 11/12/2024   Component Date Value Ref Range Status   • Amphetamine Screen, Urine 11/12/2024 Negative  Negative Final    PRELIMINARY RESULTS. PLEASE REFER TO [facility] " CONFIRMATION FOR FINAL RESULTS.   • Barbiturates Screen, Urine 11/12/2024 Negative  Negative Final   • Buprenorphine, Screen, Urine 11/12/2024 Positive (A)  Negative Final   • Benzodiazepine Screen, Urine 11/12/2024 Negative  Negative Final   • Cocaine Screen, Urine 11/12/2024 Negative  Negative Final   • MDMA (ECSTASY) 11/12/2024 Negative  Negative Final   • Methamphetamine, Ur 11/12/2024 Negative  Negative Final   • Methadone Screen, Urine 11/12/2024 Negative  Negative Final   • Morphine/Opiates Screen, Urine 11/12/2024 Negative  Negative Final   • Oxycodone Screen, Urine 11/12/2024 Negative  Negative Final   • Phencyclidine (PCP), Urine 11/12/2024 Negative  Negative Final   • Propoxyphene Scree, Urine 11/12/2024 Negative  Negative Final   • Tricyclic Antidepressants Screen 11/12/2024 Negative  Negative Final   • THC, Screen, Urine 11/12/2024 Positive (A)  Negative Final   • Gabapentin 11/12/2024 NEGATIVE   Final   • ETHANOL URINE SCREEN 11/12/2024 Negative   Final   • Fentanyl, Urine 11/12/2024 Negative  Negative Final   Telemedicine on 10/15/2024   Component Date Value Ref Range Status   • Amphetamine Screen, Urine 10/15/2024 Negative  Negative Final    PRELIMINARY RESULTS. PLEASE REFER TO [facility] CONFIRMATION FOR FINAL RESULTS.   • Barbiturates Screen, Urine 10/15/2024 Negative  Negative Final   • Buprenorphine, Screen, Urine 10/15/2024 Positive (A)  Negative Final   • Benzodiazepine Screen, Urine 10/15/2024 Negative  Negative Final   • Cocaine Screen, Urine 10/15/2024 Negative  Negative Final   • MDMA (ECSTASY) 10/15/2024 Negative  Negative Final   • Methamphetamine, Ur 10/15/2024 Negative  Negative Final   • Methadone Screen, Urine 10/15/2024 Negative  Negative Final   • Morphine/Opiates Screen, Urine 10/15/2024 Negative  Negative Final   • Oxycodone Screen, Urine 10/15/2024 Negative  Negative Final   • Phencyclidine (PCP), Urine 10/15/2024 Negative  Negative Final   • Propoxyphene Scree, Urine 10/15/2024  Negative  Negative Final   • Tricyclic Antidepressants Screen 10/15/2024 Negative  Negative Final   • THC, Screen, Urine 10/15/2024 Positive (A)  Negative Final   Telemedicine on 09/26/2024   Component Date Value Ref Range Status   • External Amphetamine Screen Urine 09/26/2024 Negative   Final   • External Benzodiazepine Screen Uri* 09/26/2024 Negative   Final   • External Cocaine Screen Urine 09/26/2024 Negative   Final   • External THC Screen Urine 09/26/2024 Negative   Final   • External Methadone Screen Urine 09/26/2024 Negative   Final   • External Methamphetamine Screen Ur* 09/26/2024 Negative   Final   • External Oxycodone Screen Urine 09/26/2024 Negative   Final   • External Buprenorphine Screen Urine 09/26/2024 Positive (A)   Final   • External MDMA 09/26/2024 Negative   Final   • External Opiates Screen Urine 09/26/2024 Negative   Final   Telemedicine on 09/19/2024   Component Date Value Ref Range Status   • External Amphetamine Screen Urine 09/19/2024 Positive   Final   • External Benzodiazepine Screen Uri* 09/19/2024 Negative   Final   • External Cocaine Screen Urine 09/19/2024 Negative   Final   • External THC Screen Urine 09/19/2024 Negative   Final   • External Methadone Screen Urine 09/19/2024 Negative   Final   • External Methamphetamine Screen Ur* 09/19/2024 Positive (A)   Final   • External Oxycodone Screen Urine 09/19/2024 Negative   Final   • External Buprenorphine Screen Urine 09/19/2024 Positive (A)   Final   • External MDMA 09/19/2024 Negative   Final   • External Opiates Screen Urine 09/19/2024 Negative   Final   Telemedicine on 09/12/2024   Component Date Value Ref Range Status   • External Amphetamine Screen Urine 09/12/2024 Positive (A)   Final   • External Benzodiazepine Screen Uri* 09/12/2024 Negative   Final   • External Cocaine Screen Urine 09/12/2024 Negative   Final   • External THC Screen Urine 09/12/2024 Positive (A)   Final   • External Methadone Screen Urine 09/12/2024 Negative    Final   • External Methamphetamine Screen Ur* 09/12/2024 Positive (A)   Final   • External Oxycodone Screen Urine 09/12/2024 Negative   Final   • External Buprenorphine Screen Urine 09/12/2024 Positive (A)   Final   • External MDMA 09/12/2024 Positive (A)   Final   • External Opiates Screen Urine 09/12/2024 Negative   Final   Telemedicine on 08/22/2024   Component Date Value Ref Range Status   • External Amphetamine Screen Urine 08/22/2024 Positive   Final   • External Benzodiazepine Screen Uri* 08/22/2024 Negative   Final   • External Cocaine Screen Urine 08/22/2024 Negative   Final   • External THC Screen Urine 08/22/2024 Positive   Final   • External Methadone Screen Urine 08/22/2024 Positive   Final   • External Methamphetamine Screen Ur* 08/22/2024 Negative   Final   • External Oxycodone Screen Urine 08/22/2024 Negative   Final   • External Buprenorphine Screen Urine 08/22/2024 Positive   Final   • External MDMA 08/22/2024 Negative   Final   • External Opiates Screen Urine 08/22/2024 Negative   Final   Telemedicine on 08/14/2024   Component Date Value Ref Range Status   • External Amphetamine Screen Urine 08/14/2024 Positive (A)   Final   • External Benzodiazepine Screen Uri* 08/14/2024 Negative   Final   • External Cocaine Screen Urine 08/14/2024 Negative   Final   • External THC Screen Urine 08/14/2024 Positive (A)   Final   • External Methadone Screen Urine 08/14/2024 Negative   Final   • External Methamphetamine Screen Ur* 08/14/2024 Positive (A)   Final   • External Oxycodone Screen Urine 08/14/2024 Negative   Final   • External Buprenorphine Screen Urine 08/14/2024 Positive (A)   Final   • External MDMA 08/14/2024 Negative   Final   • External Opiates Screen Urine 08/14/2024 Negative   Final   Telemedicine on 06/25/2024   Component Date Value Ref Range Status   • External Amphetamine Screen Urine 06/25/2024 Negative   Final   • External Benzodiazepine Screen Uri* 06/25/2024 Negative   Final   • External  Cocaine Screen Urine 06/25/2024 Negative   Final   • External THC Screen Urine 06/25/2024 Negative   Final   • External Methadone Screen Urine 06/25/2024 Negative   Final   • External Methamphetamine Screen Ur* 06/25/2024 Negative   Final   • External Oxycodone Screen Urine 06/25/2024 Negative   Final   • External Buprenorphine Screen Urine 06/25/2024 Positive (A)   Final   • External MDMA 06/25/2024 Negative   Final   • External Opiates Screen Urine 06/25/2024 Negative   Final   Telemedicine on 06/11/2024   Component Date Value Ref Range Status   • External Amphetamine Screen Urine 06/11/2024 Negative   Final   • External Benzodiazepine Screen Uri* 06/11/2024 Negative   Final   • External Cocaine Screen Urine 06/11/2024 Negative   Final   • External THC Screen Urine 06/11/2024 Negative   Final   • External Methadone Screen Urine 06/11/2024 Negative   Final   • External Methamphetamine Screen Ur* 06/11/2024 Negative   Final   • External Oxycodone Screen Urine 06/11/2024 Negative   Final   • External Buprenorphine Screen Urine 06/11/2024 Positive (A)   Final   • External MDMA 06/11/2024 Negative   Final   • External Opiates Screen Urine 06/11/2024 Negative   Final         Assessment & Plan   Diagnoses and all orders for this visit:    1. Opioid type dependence, continuous (Primary)  -     buprenorphine-naloxone (SUBOXONE) 8-2 MG per SL tablet; Place 2 tablets under the tongue Daily.  Dispense: 56 tablet; Refill: 0    2. Medication management  -     POC Medline 14 Panel Urine Drug Screen    3. Gastroesophageal reflux disease without esophagitis  -     famotidine (PEPCID) 20 MG tablet; Take 1 tablet by mouth 2 (Two) Times a Day.  Dispense: 60 tablet; Refill: 0    4. Bilious vomiting with nausea  -     ondansetron ODT (ZOFRAN-ODT) 8 MG disintegrating tablet; Place 1 tablet on the tongue Every 8 (Eight) Hours As Needed for Nausea or Vomiting.  Dispense: 45 tablet; Refill: 0        Visit Diagnoses:    ICD-10-CM ICD-9-CM    1. Opioid type dependence, continuous  F11.20 304.01   2. Medication management  Z79.899 V58.69   3. Gastroesophageal reflux disease without esophagitis  K21.9 530.81   4. Bilious vomiting with nausea  R11.14 787.04       PLAN:  Safety: No acute safety concerns  Risk Assessment: Risk of self-harm acutely is low. Risk of self-harm chronically is also low, but could be further elevated in the event of treatment noncompliance and/or AODA.    TREATMENT PLAN/GOALS: Continue supportive psychotherapy efforts and medications as indicated. Treatment and medication options discussed during today's visit. Patient acknowledged and verbally consented to continue with current treatment plan and was educated on the importance of compliance with treatment and follow-up appointments.    MEDICATION ISSUES:  ROSY reviewed as expected.  Discussed medication options and treatment plan of prescribed medication as well as the risks, benefits, and side effects including potential falls, possible impaired driving and metabolic adversities among others. Patient is agreeable to call the office with any worsening of symptoms or onset of side effects. Patient is agreeable to call 911 or go to the nearest ER should he/she begin having SI/HI. No medication side effects or related complaints today.     MEDS ORDERED DURING VISIT:  New Medications Ordered This Visit   Medications   • famotidine (PEPCID) 20 MG tablet     Sig: Take 1 tablet by mouth 2 (Two) Times a Day.     Dispense:  60 tablet     Refill:  0   • buprenorphine-naloxone (SUBOXONE) 8-2 MG per SL tablet     Sig: Place 2 tablets under the tongue Daily.     Dispense:  56 tablet     Refill:  0     NADEAN:[id number]   • ondansetron ODT (ZOFRAN-ODT) 8 MG disintegrating tablet     Sig: Place 1 tablet on the tongue Every 8 (Eight) Hours As Needed for Nausea or Vomiting.     Dispense:  45 tablet     Refill:  0       No follow-ups on file.           This document has been electronically signed  by Williams Brody, APRN  November 12, 2024 16:22 EST      Part of this note may be an electronic transcription/translation of spoken language to printed text using the Dragon Dictation System.

## 2024-12-06 ENCOUNTER — HOSPITAL ENCOUNTER (OUTPATIENT)
Facility: HOSPITAL | Age: 54
Discharge: HOME | End: 2024-12-06
Admitting: NURSE PRACTITIONER
Payer: MEDICAID

## 2024-12-06 DIAGNOSIS — Z12.31 VISIT FOR SCREENING MAMMOGRAM: ICD-10-CM

## 2024-12-06 PROCEDURE — 77067 SCR MAMMO BI INCL CAD: CPT

## 2024-12-06 PROCEDURE — 77063 BREAST TOMOSYNTHESIS BI: CPT

## 2024-12-18 ENCOUNTER — TELEMEDICINE (OUTPATIENT)
Dept: PSYCHIATRY | Facility: CLINIC | Age: 54
End: 2024-12-18
Payer: MEDICAID

## 2024-12-18 VITALS
SYSTOLIC BLOOD PRESSURE: 132 MMHG | WEIGHT: 177.4 LBS | BODY MASS INDEX: 33.49 KG/M2 | HEART RATE: 81 BPM | HEIGHT: 61 IN | DIASTOLIC BLOOD PRESSURE: 72 MMHG

## 2024-12-18 DIAGNOSIS — Z79.899 MEDICATION MANAGEMENT: ICD-10-CM

## 2024-12-18 DIAGNOSIS — F15.10 METHAMPHETAMINE ABUSE: ICD-10-CM

## 2024-12-18 DIAGNOSIS — F11.20 OPIOID TYPE DEPENDENCE, CONTINUOUS: Primary | ICD-10-CM

## 2024-12-18 LAB
AMPHET+METHAMPHET UR QL: POSITIVE
AMPHETAMINES UR QL: POSITIVE
BARBITURATES UR QL SCN: NEGATIVE
BENZODIAZ UR QL SCN: NEGATIVE
BUPRENORPHINE SERPL-MCNC: POSITIVE NG/ML
CANNABINOIDS SERPL QL: NEGATIVE
COCAINE UR QL: NEGATIVE
ETHANOL URINE SCREEN: NEGATIVE
FENTANYL UR-MCNC: NEGATIVE NG/ML
GABAPENTIN SERPLBLD-MCNC: NEGATIVE UG/ML
MDMA UR QL SCN: NEGATIVE
METHADONE UR QL SCN: NEGATIVE
MORPHINE/OPIATES SCREEN, URINE: NEGATIVE
OXYCODONE UR QL SCN: NEGATIVE
PCP UR QL SCN: NEGATIVE
PROPOXYPH UR QL SCN: NEGATIVE
TRICYCLICS UR QL SCN: NEGATIVE

## 2024-12-18 RX ORDER — BUPRENORPHINE HYDROCHLORIDE AND NALOXONE HYDROCHLORIDE DIHYDRATE 8; 2 MG/1; MG/1
2 TABLET SUBLINGUAL DAILY
Qty: 12 TABLET | Refills: 0 | Status: SHIPPED | OUTPATIENT
Start: 2024-12-18 | End: 2024-12-23 | Stop reason: SDUPTHER

## 2024-12-18 RX ORDER — BUPROPION HYDROCHLORIDE 150 MG/1
150 TABLET ORAL 2 TIMES DAILY
Qty: 60 TABLET | Refills: 0 | Status: SHIPPED | OUTPATIENT
Start: 2024-12-18

## 2024-12-18 RX ORDER — FOLIC ACID 1 MG/1
1 TABLET ORAL DAILY
COMMUNITY
Start: 2024-11-25

## 2024-12-18 RX ORDER — METHOTREXATE 2.5 MG/1
2.5 TABLET ORAL
COMMUNITY
Start: 2024-11-25

## 2024-12-18 NOTE — PROGRESS NOTES
This provider is located at [facility]. The Patient is seen remotely located at the [facility] ([facility]) using Video. Patient is being seen via telehealth and confirm that they are in a secure environment for this session. The patient's condition being diagnosed/treated is appropriate for telemedicine. Provider identified as Williams Brody as well as credentials APRN MSN FNP-C SRINIVASAN-YOUNG.   The client/patient gave consent to be seen remotely, and when consent is given they understand that the consent allows for patient identifiable information to be sent to a third party as needed.   They may refuse to be seen remotely at any time. The electronic data is encrypted and password protected, and the patient has been advised of the potential risks to privacy not withstanding such measures.    Chief Complaint/History of Present Illness: Follow Up buprenorphine/naloxone Medicated Assisted Treatment for Opiate Use Disorder     Patient/Client Concerns/Updates: Continue Wellbutrin for stimulant cravings  -Patient reports she recently reacquire her 's license and also has been taking part in gainful employment; patient reports having acquired her license she subsequently drove to acquire methamphetamine  -Increase visit frequency to weekly for support and accountability  -Encouraged patient to take part in further services such as one-on-one therapy or chemical dependency IOP  -Denies acute depressive or anxious episodes or concerns, no suicidal or homicidal thoughts    Triggers (Persons/Places/Things/Events/Thought/Emotions): Increased financial means due to gainful employment and also having reacquire her 's license which has provided increased transportation opportunities    Cravings/Substance Use: Cravings and recent use of methamphetamine    Relapse Prevention: Counseling    Urine Drug Screen (today's visit)/Presumptive Point of Care discussed: Positive buprenorphine and positive  methamphetamine    UDS Confirmation (Most recent/Resulted): Positive buprenorphine/nor-buprenorphine, no concerns for urine tampering otherwise methamphetamine, thc and clonazepam     Most recent pertinent laboratory studies reviewed:  4/11/23-hepatic function within normal limits, Cr WNL, HIV negative, hepatitis C not detected         ROSY (PDMP) Reviewed for Current/Active Medications: buprenorphine/naloxone as reviewed today     Past Surgical History:  No past surgical history on file.    Problem List:  There is no problem list on file for this patient.      Allergy:   No Known Allergies     Current Medications:   Current Outpatient Medications   Medication Sig Dispense Refill   • acetaminophen (Tylenol) 325 MG tablet Take 1 tablet by mouth Every 4 (Four) Hours As Needed for Mild Pain or Headache. 100 tablet 0   • buprenorphine-naloxone (SUBOXONE) 8-2 MG per SL tablet Place 2 tablets under the tongue Daily. 12 tablet 0   • buPROPion XL (WELLBUTRIN XL) 150 MG 24 hr tablet Take 1 tablet by mouth 2 (Two) Times a Day. BID dosing 60 tablet 0   • famotidine (PEPCID) 20 MG tablet Take 1 tablet by mouth 2 (Two) Times a Day. 60 tablet 0   • folic acid (FOLVITE) 1 MG tablet Take 1 tablet by mouth Daily.     • GNP PAIN RELIEF EX-STRENGTH 500 MG tablet TAKE ONE TABLET BY MOUTH EVERY 6 HOURS AS NEEDED FOR 30 DAYS **DO NOT EXCEED 6 TABLETS IN 24 HOURS**     • hydrOXYzine pamoate (Vistaril) 25 MG capsule Take 1 capsule by mouth 4 (Four) Times a Day As Needed for Anxiety. 120 capsule 0   • methotrexate 2.5 MG tablet Take 1 tablet by mouth 3 (Three) Doses Each Week. Take Doses 12 (Twelve) Hours Apart.     • ondansetron ODT (ZOFRAN-ODT) 8 MG disintegrating tablet Place 1 tablet on the tongue Every 8 (Eight) Hours As Needed for Nausea or Vomiting. 45 tablet 0   • naloxone (NARCAN) 4 MG/0.1ML nasal spray 1 spray into the nostril(s) as directed by provider As Needed (opiate over sedation). 1 spray in 1 nostril every 2 to 3 minutes,  call 911 (Patient not taking: Reported on 12/18/2024) 2 each 2     No current facility-administered medications for this visit.       Past Medical History:  Past Medical History:   Diagnosis Date   • History of hepatitis B    • Pancreatitis          Social History     Socioeconomic History   • Marital status: Single   Tobacco Use   • Smoking status: Every Day     Current packs/day: 1.00     Average packs/day: 1 pack/day for 15.0 years (15.0 ttl pk-yrs)     Types: Cigarettes   • Smokeless tobacco: Never   Vaping Use   • Vaping status: Never Used   Substance and Sexual Activity   • Alcohol use: Not Currently   • Drug use: Yes     Types: Marijuana, Hydrocodone, Oxycodone, Methamphetamines   • Sexual activity: Defer         Family History   Problem Relation Age of Onset   • No Known Problems Mother    • No Known Problems Father    • No Known Problems Sister    • No Known Problems Brother    • No Known Problems Maternal Grandmother    • No Known Problems Paternal Grandmother    • No Known Problems Maternal Aunt    • No Known Problems Paternal Aunt    • No Known Problems Maternal Grandfather    • No Known Problems Paternal Grandfather    • No Known Problems Maternal Uncle    • No Known Problems Paternal Uncle    • No Known Problems Cousin    • No Known Problems Other    • ADD / ADHD Neg Hx    • Alcohol abuse Neg Hx    • Anxiety disorder Neg Hx    • Bipolar disorder Neg Hx    • Dementia Neg Hx    • Depression Neg Hx    • Drug abuse Neg Hx    • OCD Neg Hx    • Paranoid behavior Neg Hx    • Schizophrenia Neg Hx    • Seizures Neg Hx    • Self-Injurious Behavior  Neg Hx    • Suicide Attempts Neg Hx    • Breast cancer Neg Hx          Mental Status Exam:   Hygiene:   good  Cooperation:  Cooperative  Eye Contact:  Good  Psychomotor Behavior:  Appropriate  Affect:  Appropriate  Mood: anxious  Speech:  Normal  Thought Process:  Goal directed  Thought Content:  Normal  Suicidal:  None  Homicidal:  None  Hallucinations:   "None  Delusion:  None  Memory:  Intact  Orientation:  Grossly intact  Reliability:  good  Insight:  Fair  Judgement:  Poor  Impulse Control:  Poor         Review of Systems:  Review of Systems   Constitutional:  Negative for activity change, chills, diaphoresis and fatigue.   Respiratory:  Negative for apnea, cough and shortness of breath.    Cardiovascular:  Negative for chest pain, palpitations and leg swelling.   Gastrointestinal:  Negative for abdominal pain, constipation, diarrhea, nausea and vomiting.   Genitourinary:  Negative for difficulty urinating.   Musculoskeletal:  Negative for arthralgias.   Skin:  Negative for rash.   Neurological:  Negative for dizziness, weakness and headaches.   Psychiatric/Behavioral:  Negative for agitation, self-injury, sleep disturbance and suicidal ideas. The patient is nervous/anxious.        Physical Exam:  Physical Exam  Vitals reviewed.   Constitutional:       General: She is not in acute distress.     Appearance: Normal appearance. She is not ill-appearing or toxic-appearing.   Pulmonary:      Effort: Pulmonary effort is normal.   Musculoskeletal:         General: Normal range of motion.   Neurological:      General: No focal deficit present.      Mental Status: She is alert and oriented to person, place, and time.   Psychiatric:         Attention and Perception: Attention and perception normal.         Mood and Affect: Mood is anxious. Mood is not depressed.         Speech: Speech normal.         Behavior: Behavior normal. Behavior is cooperative.         Thought Content: Thought content normal.         Cognition and Memory: Cognition and memory normal.         Judgment: Judgment normal.     Vital Signs:   /72   Pulse 81   Ht 156.2 cm (61.5\")   Wt 80.5 kg (177 lb 6.4 oz)   BMI 32.98 kg/m²      Lab Results:   Telemedicine on 12/18/2024   Component Date Value Ref Range Status   • Amphetamine Screen, Urine 12/18/2024 Positive (A)  Negative Final   • Barbiturates " Screen, Urine 12/18/2024 Negative  Negative Final   • Buprenorphine, Screen, Urine 12/18/2024 Positive (A)  Negative Final   • Benzodiazepine Screen, Urine 12/18/2024 Negative  Negative Final   • Cocaine Screen, Urine 12/18/2024 Negative  Negative Final   • MDMA (ECSTASY) 12/18/2024 Negative  Negative Final   • Methamphetamine, Ur 12/18/2024 Positive (A)  Negative Final   • Methadone Screen, Urine 12/18/2024 Negative  Negative Final   • Morphine/Opiates Screen, Urine 12/18/2024 Negative  Negative Final   • Oxycodone Screen, Urine 12/18/2024 Negative  Negative Final   • Phencyclidine (PCP), Urine 12/18/2024 Negative  Negative Final   • Propoxyphene Scree, Urine 12/18/2024 Negative  Negative Final   • Tricyclic Antidepressants Screen 12/18/2024 Negative  Negative Final   • THC, Screen, Urine 12/18/2024 Negative  Negative Final   • Gabapentin 12/18/2024 negative   Final   • ETHANOL URINE SCREEN 12/18/2024 Negative   Final   • Fentanyl, Urine 12/18/2024 Negative  Negative Final   Telemedicine on 11/12/2024   Component Date Value Ref Range Status   • Amphetamine Screen, Urine 11/12/2024 Negative  Negative Final    PRELIMINARY RESULTS. PLEASE REFER TO [facility] LAB CONFIRMATION FOR FINAL RESULTS.   • Barbiturates Screen, Urine 11/12/2024 Negative  Negative Final   • Buprenorphine, Screen, Urine 11/12/2024 Positive (A)  Negative Final   • Benzodiazepine Screen, Urine 11/12/2024 Negative  Negative Final   • Cocaine Screen, Urine 11/12/2024 Negative  Negative Final   • MDMA (ECSTASY) 11/12/2024 Negative  Negative Final   • Methamphetamine, Ur 11/12/2024 Negative  Negative Final   • Methadone Screen, Urine 11/12/2024 Negative  Negative Final   • Morphine/Opiates Screen, Urine 11/12/2024 Negative  Negative Final   • Oxycodone Screen, Urine 11/12/2024 Negative  Negative Final   • Phencyclidine (PCP), Urine 11/12/2024 Negative  Negative Final   • Propoxyphene Scree, Urine 11/12/2024 Negative  Negative Final   • Tricyclic  Antidepressants Screen 11/12/2024 Negative  Negative Final   • THC, Screen, Urine 11/12/2024 Positive (A)  Negative Final   • Gabapentin 11/12/2024 NEGATIVE   Final   • ETHANOL URINE SCREEN 11/12/2024 Negative   Final   • Fentanyl, Urine 11/12/2024 Negative  Negative Final   Telemedicine on 10/15/2024   Component Date Value Ref Range Status   • Amphetamine Screen, Urine 10/15/2024 Negative  Negative Final    PRELIMINARY RESULTS. PLEASE REFER TO [facility] LAB CONFIRMATION FOR FINAL RESULTS.   • Barbiturates Screen, Urine 10/15/2024 Negative  Negative Final   • Buprenorphine, Screen, Urine 10/15/2024 Positive (A)  Negative Final   • Benzodiazepine Screen, Urine 10/15/2024 Negative  Negative Final   • Cocaine Screen, Urine 10/15/2024 Negative  Negative Final   • MDMA (ECSTASY) 10/15/2024 Negative  Negative Final   • Methamphetamine, Ur 10/15/2024 Negative  Negative Final   • Methadone Screen, Urine 10/15/2024 Negative  Negative Final   • Morphine/Opiates Screen, Urine 10/15/2024 Negative  Negative Final   • Oxycodone Screen, Urine 10/15/2024 Negative  Negative Final   • Phencyclidine (PCP), Urine 10/15/2024 Negative  Negative Final   • Propoxyphene Scree, Urine 10/15/2024 Negative  Negative Final   • Tricyclic Antidepressants Screen 10/15/2024 Negative  Negative Final   • THC, Screen, Urine 10/15/2024 Positive (A)  Negative Final   Telemedicine on 09/26/2024   Component Date Value Ref Range Status   • External Amphetamine Screen Urine 09/26/2024 Negative   Final   • External Benzodiazepine Screen Uri* 09/26/2024 Negative   Final   • External Cocaine Screen Urine 09/26/2024 Negative   Final   • External THC Screen Urine 09/26/2024 Negative   Final   • External Methadone Screen Urine 09/26/2024 Negative   Final   • External Methamphetamine Screen Ur* 09/26/2024 Negative   Final   • External Oxycodone Screen Urine 09/26/2024 Negative   Final   • External Buprenorphine Screen Urine 09/26/2024 Positive (A)   Final   • External  MDMA 09/26/2024 Negative   Final   • External Opiates Screen Urine 09/26/2024 Negative   Final   Telemedicine on 09/19/2024   Component Date Value Ref Range Status   • External Amphetamine Screen Urine 09/19/2024 Positive   Final   • External Benzodiazepine Screen Uri* 09/19/2024 Negative   Final   • External Cocaine Screen Urine 09/19/2024 Negative   Final   • External THC Screen Urine 09/19/2024 Negative   Final   • External Methadone Screen Urine 09/19/2024 Negative   Final   • External Methamphetamine Screen Ur* 09/19/2024 Positive (A)   Final   • External Oxycodone Screen Urine 09/19/2024 Negative   Final   • External Buprenorphine Screen Urine 09/19/2024 Positive (A)   Final   • External MDMA 09/19/2024 Negative   Final   • External Opiates Screen Urine 09/19/2024 Negative   Final   Telemedicine on 09/12/2024   Component Date Value Ref Range Status   • External Amphetamine Screen Urine 09/12/2024 Positive (A)   Final   • External Benzodiazepine Screen Uri* 09/12/2024 Negative   Final   • External Cocaine Screen Urine 09/12/2024 Negative   Final   • External THC Screen Urine 09/12/2024 Positive (A)   Final   • External Methadone Screen Urine 09/12/2024 Negative   Final   • External Methamphetamine Screen Ur* 09/12/2024 Positive (A)   Final   • External Oxycodone Screen Urine 09/12/2024 Negative   Final   • External Buprenorphine Screen Urine 09/12/2024 Positive (A)   Final   • External MDMA 09/12/2024 Positive (A)   Final   • External Opiates Screen Urine 09/12/2024 Negative   Final   Telemedicine on 08/22/2024   Component Date Value Ref Range Status   • External Amphetamine Screen Urine 08/22/2024 Positive   Final   • External Benzodiazepine Screen Uri* 08/22/2024 Negative   Final   • External Cocaine Screen Urine 08/22/2024 Negative   Final   • External THC Screen Urine 08/22/2024 Positive   Final   • External Methadone Screen Urine 08/22/2024 Positive   Final   • External Methamphetamine Screen Ur*  08/22/2024 Negative   Final   • External Oxycodone Screen Urine 08/22/2024 Negative   Final   • External Buprenorphine Screen Urine 08/22/2024 Positive   Final   • External MDMA 08/22/2024 Negative   Final   • External Opiates Screen Urine 08/22/2024 Negative   Final   Telemedicine on 08/14/2024   Component Date Value Ref Range Status   • External Amphetamine Screen Urine 08/14/2024 Positive (A)   Final   • External Benzodiazepine Screen Uri* 08/14/2024 Negative   Final   • External Cocaine Screen Urine 08/14/2024 Negative   Final   • External THC Screen Urine 08/14/2024 Positive (A)   Final   • External Methadone Screen Urine 08/14/2024 Negative   Final   • External Methamphetamine Screen Ur* 08/14/2024 Positive (A)   Final   • External Oxycodone Screen Urine 08/14/2024 Negative   Final   • External Buprenorphine Screen Urine 08/14/2024 Positive (A)   Final   • External MDMA 08/14/2024 Negative   Final   • External Opiates Screen Urine 08/14/2024 Negative   Final   Telemedicine on 06/25/2024   Component Date Value Ref Range Status   • External Amphetamine Screen Urine 06/25/2024 Negative   Final   • External Benzodiazepine Screen Uri* 06/25/2024 Negative   Final   • External Cocaine Screen Urine 06/25/2024 Negative   Final   • External THC Screen Urine 06/25/2024 Negative   Final   • External Methadone Screen Urine 06/25/2024 Negative   Final   • External Methamphetamine Screen Ur* 06/25/2024 Negative   Final   • External Oxycodone Screen Urine 06/25/2024 Negative   Final   • External Buprenorphine Screen Urine 06/25/2024 Positive (A)   Final   • External MDMA 06/25/2024 Negative   Final   • External Opiates Screen Urine 06/25/2024 Negative   Final         Assessment & Plan   Problems Addressed this Visit    None  Visit Diagnoses       Opioid type dependence, continuous    -  Primary    Relevant Medications    buprenorphine-naloxone (SUBOXONE) 8-2 MG per SL tablet    Medication management        Relevant Orders     POC Medline 14 Panel Urine Drug Screen (Completed)    Methamphetamine abuse        Relevant Medications    buPROPion XL (WELLBUTRIN XL) 150 MG 24 hr tablet          Diagnoses         Codes Comments    Opioid type dependence, continuous    -  Primary ICD-10-CM: F11.20  ICD-9-CM: 304.01     Medication management     ICD-10-CM: Z79.899  ICD-9-CM: V58.69     Methamphetamine abuse     ICD-10-CM: F15.10  ICD-9-CM: 305.70             Visit Diagnoses:    ICD-10-CM ICD-9-CM   1. Opioid type dependence, continuous  F11.20 304.01   2. Medication management  Z79.899 V58.69   3. Methamphetamine abuse  F15.10 305.70       PLAN:  Safety: No acute safety concerns  Risk Assessment: Risk of self-harm acutely is low. Risk of self-harm chronically is also low, but could be further elevated in the event of treatment noncompliance and/or AODA.    TREATMENT PLAN/GOALS: Continue supportive psychotherapy efforts and medications as indicated. Treatment and medication options discussed during today's visit. Patient acknowledged and verbally consented to continue with current treatment plan and was educated on the importance of compliance with treatment and follow-up appointments.    MEDICATION ISSUES:  ROSY reviewed as expected.  Discussed medication options and treatment plan of prescribed medication as well as the risks, benefits, and side effects including potential falls, possible impaired driving and metabolic adversities among others. Patient is agreeable to call the office with any worsening of symptoms or onset of side effects. Patient is agreeable to call 911 or go to the nearest ER should he/she begin having SI/HI. No medication side effects or related complaints today.     MEDS ORDERED DURING VISIT:  New Medications Ordered This Visit   Medications   • buprenorphine-naloxone (SUBOXONE) 8-2 MG per SL tablet     Sig: Place 2 tablets under the tongue Daily.     Dispense:  12 tablet     Refill:  0     NADEAN:[id number]   • buPROPion  XL (WELLBUTRIN XL) 150 MG 24 hr tablet     Sig: Take 1 tablet by mouth 2 (Two) Times a Day. BID dosing     Dispense:  60 tablet     Refill:  0       No follow-ups on file.           This document has been electronically signed by WALKER Romero  December 18, 2024 14:37 EST      Part of this note may be an electronic transcription/translation of spoken language to printed text using the Dragon Dictation System.

## 2024-12-23 ENCOUNTER — TELEMEDICINE (OUTPATIENT)
Dept: PSYCHIATRY | Facility: CLINIC | Age: 54
End: 2024-12-23
Payer: MEDICAID

## 2024-12-23 VITALS
DIASTOLIC BLOOD PRESSURE: 74 MMHG | BODY MASS INDEX: 33.38 KG/M2 | SYSTOLIC BLOOD PRESSURE: 112 MMHG | HEART RATE: 88 BPM | HEIGHT: 61 IN | WEIGHT: 176.8 LBS

## 2024-12-23 DIAGNOSIS — Z79.899 MEDICATION MANAGEMENT: ICD-10-CM

## 2024-12-23 DIAGNOSIS — F11.20 OPIOID TYPE DEPENDENCE, CONTINUOUS: Primary | ICD-10-CM

## 2024-12-23 LAB
AMPHET+METHAMPHET UR QL: POSITIVE
AMPHETAMINES UR QL: POSITIVE
BARBITURATES UR QL SCN: NEGATIVE
BENZODIAZ UR QL SCN: NEGATIVE
BUPRENORPHINE SERPL-MCNC: POSITIVE NG/ML
CANNABINOIDS SERPL QL: POSITIVE
COCAINE UR QL: NEGATIVE
ETHANOL URINE SCREEN: NEGATIVE
FENTANYL UR-MCNC: NEGATIVE NG/ML
GABAPENTIN: NEGATIVE
MDMA UR QL SCN: POSITIVE
METHADONE UR QL SCN: NEGATIVE
MORPHINE/OPIATES SCREEN, URINE: NEGATIVE
OXYCODONE UR QL SCN: NEGATIVE
PCP UR QL SCN: NEGATIVE
PROPOXYPH UR QL SCN: NEGATIVE
TRICYCLICS UR QL SCN: NEGATIVE

## 2024-12-23 RX ORDER — BUPRENORPHINE HYDROCHLORIDE AND NALOXONE HYDROCHLORIDE DIHYDRATE 8; 2 MG/1; MG/1
2 TABLET SUBLINGUAL DAILY
Qty: 14 TABLET | Refills: 0 | Status: SHIPPED | OUTPATIENT
Start: 2024-12-23

## 2024-12-23 NOTE — PROGRESS NOTES
This provider is located at [facility]. The Patient is seen remotely located at the [facility] ([facility]) using Video. Patient is being seen via telehealth and confirm that they are in a secure environment for this session. The patient's condition being diagnosed/treated is appropriate for telemedicine. Provider identified as Williams Brody as well as credentials APRN MSN FNP-C SRINIVASAN-YOUNG.   The client/patient gave consent to be seen remotely, and when consent is given they understand that the consent allows for patient identifiable information to be sent to a third party as needed.   They may refuse to be seen remotely at any time. The electronic data is encrypted and password protected, and the patient has been advised of the potential risks to privacy not withstanding such measures.    Chief Complaint/History of Present Illness: Follow Up buprenorphine/naloxone Medicated Assisted Treatment for Opiate Use Disorder     Patient/Client Concerns/Updates: Continue Wellbutrin for stimulant cravings   -Patient reports he has not used methamphetamine in 7 days however reports continued cravings for such; encouraged client to take part in further services for support and patient reports she is considering returning to residential treatment  -Continue weekly medication assisted treatment evaluations for support and accountability  -Patient expresses awareness of negative consequences of continued methamphetamine use most notably possible reincarceration    Triggers (Persons/Places/Things/Events/Thought/Emotions): Life stress    Cravings/Substance Use: Cravings for methamphetamine with last use of such 7 days ago    Relapse Prevention: Counseling and continue weekly MAT evaluations    Urine Drug Screen (today's visit)/Presumptive Point of Care discussed: Positive buprenorphine positive amphetamine methamphetamine and THC    UDS Confirmation (Most recent/Resulted): Positive buprenorphine/nor-buprenorphine,  no concerns for urine tampering otherwise positive THC methamphetamine and metabolite for clonazepam    Most recent pertinent laboratory studies reviewed: 4/11/23-hepatic function within normal limits, Cr WNL, HIV negative, hepatitis C not detected         ROSY (PDMP) Reviewed for Current/Active Medications: buprenorphine/naloxone as reviewed today    Past Surgical History:  No past surgical history on file.    Problem List:  There is no problem list on file for this patient.      Allergy:   No Known Allergies     Current Medications:   Current Outpatient Medications   Medication Sig Dispense Refill   • acetaminophen (Tylenol) 325 MG tablet Take 1 tablet by mouth Every 4 (Four) Hours As Needed for Mild Pain or Headache. 100 tablet 0   • buprenorphine-naloxone (SUBOXONE) 8-2 MG per SL tablet Place 2 tablets under the tongue Daily. 14 tablet 0   • buPROPion XL (WELLBUTRIN XL) 150 MG 24 hr tablet Take 1 tablet by mouth 2 (Two) Times a Day. BID dosing 60 tablet 0   • famotidine (PEPCID) 20 MG tablet Take 1 tablet by mouth 2 (Two) Times a Day. 60 tablet 0   • folic acid (FOLVITE) 1 MG tablet Take 1 tablet by mouth Daily.     • GNP PAIN RELIEF EX-STRENGTH 500 MG tablet TAKE ONE TABLET BY MOUTH EVERY 6 HOURS AS NEEDED FOR 30 DAYS **DO NOT EXCEED 6 TABLETS IN 24 HOURS**     • hydrOXYzine pamoate (Vistaril) 25 MG capsule Take 1 capsule by mouth 4 (Four) Times a Day As Needed for Anxiety. 120 capsule 0   • methotrexate 2.5 MG tablet Take 1 tablet by mouth 3 (Three) Doses Each Week. Take Doses 12 (Twelve) Hours Apart.     • ondansetron ODT (ZOFRAN-ODT) 8 MG disintegrating tablet Place 1 tablet on the tongue Every 8 (Eight) Hours As Needed for Nausea or Vomiting. 45 tablet 0   • naloxone (NARCAN) 4 MG/0.1ML nasal spray 1 spray into the nostril(s) as directed by provider As Needed (opiate over sedation). 1 spray in 1 nostril every 2 to 3 minutes, call 911 (Patient not taking: Reported on 12/23/2024) 2 each 2     No current  facility-administered medications for this visit.       Past Medical History:  Past Medical History:   Diagnosis Date   • History of hepatitis B    • Pancreatitis          Social History     Socioeconomic History   • Marital status: Single   Tobacco Use   • Smoking status: Every Day     Current packs/day: 1.00     Average packs/day: 1 pack/day for 15.0 years (15.0 ttl pk-yrs)     Types: Cigarettes   • Smokeless tobacco: Never   Vaping Use   • Vaping status: Never Used   Substance and Sexual Activity   • Alcohol use: Not Currently   • Drug use: Yes     Types: Marijuana, Hydrocodone, Oxycodone, Methamphetamines   • Sexual activity: Defer         Family History   Problem Relation Age of Onset   • No Known Problems Mother    • No Known Problems Father    • No Known Problems Sister    • No Known Problems Brother    • No Known Problems Maternal Grandmother    • No Known Problems Paternal Grandmother    • No Known Problems Maternal Aunt    • No Known Problems Paternal Aunt    • No Known Problems Maternal Grandfather    • No Known Problems Paternal Grandfather    • No Known Problems Maternal Uncle    • No Known Problems Paternal Uncle    • No Known Problems Cousin    • No Known Problems Other    • ADD / ADHD Neg Hx    • Alcohol abuse Neg Hx    • Anxiety disorder Neg Hx    • Bipolar disorder Neg Hx    • Dementia Neg Hx    • Depression Neg Hx    • Drug abuse Neg Hx    • OCD Neg Hx    • Paranoid behavior Neg Hx    • Schizophrenia Neg Hx    • Seizures Neg Hx    • Self-Injurious Behavior  Neg Hx    • Suicide Attempts Neg Hx    • Breast cancer Neg Hx          Mental Status Exam:   Hygiene:   good  Cooperation:  Cooperative  Eye Contact:  Good  Psychomotor Behavior:  Appropriate  Affect:  Appropriate  Mood: anxious  Speech:  Normal  Thought Process:  Goal directed  Thought Content:  Normal  Suicidal:  None  Homicidal:  None  Hallucinations:  None  Delusion:  None  Memory:  Intact  Orientation:  Grossly intact  Reliability:   "good  Insight:  Good  Judgement:  Poor  Impulse Control:  Poor         Review of Systems:  Review of Systems   Constitutional:  Negative for activity change, chills, diaphoresis and fatigue.   Respiratory:  Negative for apnea, cough and shortness of breath.    Cardiovascular:  Negative for chest pain, palpitations and leg swelling.   Gastrointestinal:  Negative for abdominal pain, constipation, diarrhea, nausea and vomiting.   Genitourinary:  Negative for difficulty urinating.   Musculoskeletal:  Negative for arthralgias.   Skin:  Negative for rash.   Neurological:  Negative for dizziness, weakness and headaches.   Psychiatric/Behavioral:  Negative for agitation, self-injury, sleep disturbance and suicidal ideas. The patient is nervous/anxious.        Physical Exam:  Physical Exam  Vitals reviewed.   Constitutional:       General: She is not in acute distress.     Appearance: Normal appearance. She is not ill-appearing or toxic-appearing.   Pulmonary:      Effort: Pulmonary effort is normal.   Musculoskeletal:         General: Normal range of motion.   Neurological:      General: No focal deficit present.      Mental Status: She is alert and oriented to person, place, and time.   Psychiatric:         Attention and Perception: Attention and perception normal.         Mood and Affect: Mood is anxious. Mood is not depressed.         Speech: Speech normal.         Behavior: Behavior normal. Behavior is cooperative.         Thought Content: Thought content normal.         Cognition and Memory: Cognition and memory normal.         Judgment: Judgment normal.     Vital Signs:   /74   Pulse 88   Ht 156.2 cm (61.5\")   Wt 80.2 kg (176 lb 12.8 oz)   BMI 32.87 kg/m²      Lab Results:   Telemedicine on 12/23/2024   Component Date Value Ref Range Status   • Amphetamine Screen, Urine 12/23/2024 Positive (A)  Negative Final   • Barbiturates Screen, Urine 12/23/2024 Negative  Negative Final   • Buprenorphine, Screen, Urine " 12/23/2024 Positive (A)  Negative Final   • Benzodiazepine Screen, Urine 12/23/2024 Negative  Negative Final   • Cocaine Screen, Urine 12/23/2024 Negative  Negative Final   • MDMA (ECSTASY) 12/23/2024 Positive (A)  Negative Final   • Methamphetamine, Ur 12/23/2024 Positive (A)  Negative Final   • Methadone Screen, Urine 12/23/2024 Negative  Negative Final   • Morphine/Opiates Screen, Urine 12/23/2024 Negative  Negative Final   • Oxycodone Screen, Urine 12/23/2024 Negative  Negative Final   • Phencyclidine (PCP), Urine 12/23/2024 Negative  Negative Final   • Propoxyphene Scree, Urine 12/23/2024 Negative  Negative Final   • Tricyclic Antidepressants Screen 12/23/2024 Negative  Negative Final   • THC, Screen, Urine 12/23/2024 Positive (A)  Negative Final   • GABAPENTIN 12/23/2024 negative   Final   • ETHANOL URINE SCREEN 12/23/2024 Negative   Final   • Fentanyl, Urine 12/23/2024 Negative  Negative Final   Telemedicine on 12/18/2024   Component Date Value Ref Range Status   • Amphetamine Screen, Urine 12/18/2024 Positive (A)  Negative Final   • Barbiturates Screen, Urine 12/18/2024 Negative  Negative Final   • Buprenorphine, Screen, Urine 12/18/2024 Positive (A)  Negative Final   • Benzodiazepine Screen, Urine 12/18/2024 Negative  Negative Final   • Cocaine Screen, Urine 12/18/2024 Negative  Negative Final   • MDMA (ECSTASY) 12/18/2024 Negative  Negative Final   • Methamphetamine, Ur 12/18/2024 Positive (A)  Negative Final   • Methadone Screen, Urine 12/18/2024 Negative  Negative Final   • Morphine/Opiates Screen, Urine 12/18/2024 Negative  Negative Final   • Oxycodone Screen, Urine 12/18/2024 Negative  Negative Final   • Phencyclidine (PCP), Urine 12/18/2024 Negative  Negative Final   • Propoxyphene Scree, Urine 12/18/2024 Negative  Negative Final   • Tricyclic Antidepressants Screen 12/18/2024 Negative  Negative Final   • THC, Screen, Urine 12/18/2024 Negative  Negative Final   • Gabapentin 12/18/2024 negative   Final    • ETHANOL URINE SCREEN 12/18/2024 Negative   Final   • Fentanyl, Urine 12/18/2024 Negative  Negative Final   Telemedicine on 11/12/2024   Component Date Value Ref Range Status   • Amphetamine Screen, Urine 11/12/2024 Negative  Negative Final    PRELIMINARY RESULTS. PLEASE REFER TO [facility] LAB CONFIRMATION FOR FINAL RESULTS.   • Barbiturates Screen, Urine 11/12/2024 Negative  Negative Final   • Buprenorphine, Screen, Urine 11/12/2024 Positive (A)  Negative Final   • Benzodiazepine Screen, Urine 11/12/2024 Negative  Negative Final   • Cocaine Screen, Urine 11/12/2024 Negative  Negative Final   • MDMA (ECSTASY) 11/12/2024 Negative  Negative Final   • Methamphetamine, Ur 11/12/2024 Negative  Negative Final   • Methadone Screen, Urine 11/12/2024 Negative  Negative Final   • Morphine/Opiates Screen, Urine 11/12/2024 Negative  Negative Final   • Oxycodone Screen, Urine 11/12/2024 Negative  Negative Final   • Phencyclidine (PCP), Urine 11/12/2024 Negative  Negative Final   • Propoxyphene Scree, Urine 11/12/2024 Negative  Negative Final   • Tricyclic Antidepressants Screen 11/12/2024 Negative  Negative Final   • THC, Screen, Urine 11/12/2024 Positive (A)  Negative Final   • Gabapentin 11/12/2024 NEGATIVE   Final   • ETHANOL URINE SCREEN 11/12/2024 Negative   Final   • Fentanyl, Urine 11/12/2024 Negative  Negative Final   Telemedicine on 10/15/2024   Component Date Value Ref Range Status   • Amphetamine Screen, Urine 10/15/2024 Negative  Negative Final    PRELIMINARY RESULTS. PLEASE REFER TO [facility] LAB CONFIRMATION FOR FINAL RESULTS.   • Barbiturates Screen, Urine 10/15/2024 Negative  Negative Final   • Buprenorphine, Screen, Urine 10/15/2024 Positive (A)  Negative Final   • Benzodiazepine Screen, Urine 10/15/2024 Negative  Negative Final   • Cocaine Screen, Urine 10/15/2024 Negative  Negative Final   • MDMA (ECSTASY) 10/15/2024 Negative  Negative Final   • Methamphetamine, Ur 10/15/2024 Negative  Negative Final   • Methadone  Screen, Urine 10/15/2024 Negative  Negative Final   • Morphine/Opiates Screen, Urine 10/15/2024 Negative  Negative Final   • Oxycodone Screen, Urine 10/15/2024 Negative  Negative Final   • Phencyclidine (PCP), Urine 10/15/2024 Negative  Negative Final   • Propoxyphene Scree, Urine 10/15/2024 Negative  Negative Final   • Tricyclic Antidepressants Screen 10/15/2024 Negative  Negative Final   • THC, Screen, Urine 10/15/2024 Positive (A)  Negative Final   Telemedicine on 09/26/2024   Component Date Value Ref Range Status   • External Amphetamine Screen Urine 09/26/2024 Negative   Final   • External Benzodiazepine Screen Uri* 09/26/2024 Negative   Final   • External Cocaine Screen Urine 09/26/2024 Negative   Final   • External THC Screen Urine 09/26/2024 Negative   Final   • External Methadone Screen Urine 09/26/2024 Negative   Final   • External Methamphetamine Screen Ur* 09/26/2024 Negative   Final   • External Oxycodone Screen Urine 09/26/2024 Negative   Final   • External Buprenorphine Screen Urine 09/26/2024 Positive (A)   Final   • External MDMA 09/26/2024 Negative   Final   • External Opiates Screen Urine 09/26/2024 Negative   Final   Telemedicine on 09/19/2024   Component Date Value Ref Range Status   • External Amphetamine Screen Urine 09/19/2024 Positive   Final   • External Benzodiazepine Screen Uri* 09/19/2024 Negative   Final   • External Cocaine Screen Urine 09/19/2024 Negative   Final   • External THC Screen Urine 09/19/2024 Negative   Final   • External Methadone Screen Urine 09/19/2024 Negative   Final   • External Methamphetamine Screen Ur* 09/19/2024 Positive (A)   Final   • External Oxycodone Screen Urine 09/19/2024 Negative   Final   • External Buprenorphine Screen Urine 09/19/2024 Positive (A)   Final   • External MDMA 09/19/2024 Negative   Final   • External Opiates Screen Urine 09/19/2024 Negative   Final   Telemedicine on 09/12/2024   Component Date Value Ref Range Status   • External Amphetamine  Screen Urine 09/12/2024 Positive (A)   Final   • External Benzodiazepine Screen Uri* 09/12/2024 Negative   Final   • External Cocaine Screen Urine 09/12/2024 Negative   Final   • External THC Screen Urine 09/12/2024 Positive (A)   Final   • External Methadone Screen Urine 09/12/2024 Negative   Final   • External Methamphetamine Screen Ur* 09/12/2024 Positive (A)   Final   • External Oxycodone Screen Urine 09/12/2024 Negative   Final   • External Buprenorphine Screen Urine 09/12/2024 Positive (A)   Final   • External MDMA 09/12/2024 Positive (A)   Final   • External Opiates Screen Urine 09/12/2024 Negative   Final   Telemedicine on 08/22/2024   Component Date Value Ref Range Status   • External Amphetamine Screen Urine 08/22/2024 Positive   Final   • External Benzodiazepine Screen Uri* 08/22/2024 Negative   Final   • External Cocaine Screen Urine 08/22/2024 Negative   Final   • External THC Screen Urine 08/22/2024 Positive   Final   • External Methadone Screen Urine 08/22/2024 Positive   Final   • External Methamphetamine Screen Ur* 08/22/2024 Negative   Final   • External Oxycodone Screen Urine 08/22/2024 Negative   Final   • External Buprenorphine Screen Urine 08/22/2024 Positive   Final   • External MDMA 08/22/2024 Negative   Final   • External Opiates Screen Urine 08/22/2024 Negative   Final   Telemedicine on 08/14/2024   Component Date Value Ref Range Status   • External Amphetamine Screen Urine 08/14/2024 Positive (A)   Final   • External Benzodiazepine Screen Uri* 08/14/2024 Negative   Final   • External Cocaine Screen Urine 08/14/2024 Negative   Final   • External THC Screen Urine 08/14/2024 Positive (A)   Final   • External Methadone Screen Urine 08/14/2024 Negative   Final   • External Methamphetamine Screen Ur* 08/14/2024 Positive (A)   Final   • External Oxycodone Screen Urine 08/14/2024 Negative   Final   • External Buprenorphine Screen Urine 08/14/2024 Positive (A)   Final   • External MDMA 08/14/2024  Negative   Final   • External Opiates Screen Urine 08/14/2024 Negative   Final   Telemedicine on 06/25/2024   Component Date Value Ref Range Status   • External Amphetamine Screen Urine 06/25/2024 Negative   Final   • External Benzodiazepine Screen Uri* 06/25/2024 Negative   Final   • External Cocaine Screen Urine 06/25/2024 Negative   Final   • External THC Screen Urine 06/25/2024 Negative   Final   • External Methadone Screen Urine 06/25/2024 Negative   Final   • External Methamphetamine Screen Ur* 06/25/2024 Negative   Final   • External Oxycodone Screen Urine 06/25/2024 Negative   Final   • External Buprenorphine Screen Urine 06/25/2024 Positive (A)   Final   • External MDMA 06/25/2024 Negative   Final   • External Opiates Screen Urine 06/25/2024 Negative   Final   There may be more visits with results that are not included.         Assessment & Plan   Diagnoses and all orders for this visit:    1. Opioid type dependence, continuous (Primary)  -     buprenorphine-naloxone (SUBOXONE) 8-2 MG per SL tablet; Place 2 tablets under the tongue Daily.  Dispense: 14 tablet; Refill: 0    2. Medication management  -     POC Medline 14 Panel Urine Drug Screen        Visit Diagnoses:    ICD-10-CM ICD-9-CM   1. Opioid type dependence, continuous  F11.20 304.01   2. Medication management  Z79.899 V58.69       PLAN:  Safety: No acute safety concerns  Risk Assessment: Risk of self-harm acutely is low. Risk of self-harm chronically is also low, but could be further elevated in the event of treatment noncompliance and/or AODA.    TREATMENT PLAN/GOALS: Continue supportive psychotherapy efforts and medications as indicated. Treatment and medication options discussed during today's visit. Patient acknowledged and verbally consented to continue with current treatment plan and was educated on the importance of compliance with treatment and follow-up appointments.    MEDICATION ISSUES:  ROSY reviewed as expected.  Discussed medication  options and treatment plan of prescribed medication as well as the risks, benefits, and side effects including potential falls, possible impaired driving and metabolic adversities among others. Patient is agreeable to call the office with any worsening of symptoms or onset of side effects. Patient is agreeable to call 911 or go to the nearest ER should he/she begin having SI/HI. No medication side effects or related complaints today.     MEDS ORDERED DURING VISIT:  New Medications Ordered This Visit   Medications   • buprenorphine-naloxone (SUBOXONE) 8-2 MG per SL tablet     Sig: Place 2 tablets under the tongue Daily.     Dispense:  14 tablet     Refill:  0     NADEAN:[id number]       No follow-ups on file.           This document has been electronically signed by WALKER Romero  December 23, 2024 15:49 EST      Part of this note may be an electronic transcription/translation of spoken language to printed text using the Dragon Dictation System.

## 2024-12-31 ENCOUNTER — TELEMEDICINE (OUTPATIENT)
Dept: PSYCHIATRY | Facility: CLINIC | Age: 54
End: 2024-12-31
Payer: MEDICAID

## 2024-12-31 VITALS
SYSTOLIC BLOOD PRESSURE: 119 MMHG | HEART RATE: 88 BPM | HEIGHT: 61 IN | DIASTOLIC BLOOD PRESSURE: 79 MMHG | BODY MASS INDEX: 33.04 KG/M2 | WEIGHT: 175 LBS

## 2024-12-31 DIAGNOSIS — F11.20 OPIOID TYPE DEPENDENCE, CONTINUOUS: Primary | ICD-10-CM

## 2024-12-31 DIAGNOSIS — Z79.899 MEDICATION MANAGEMENT: ICD-10-CM

## 2024-12-31 LAB
AMPHET+METHAMPHET UR QL: POSITIVE
AMPHETAMINES UR QL: POSITIVE
BARBITURATES UR QL SCN: NEGATIVE
BENZODIAZ UR QL SCN: NEGATIVE
BUPRENORPHINE SERPL-MCNC: POSITIVE NG/ML
CANNABINOIDS SERPL QL: POSITIVE
COCAINE UR QL: NEGATIVE
ETHANOL URINE SCREEN: NEGATIVE
FENTANYL UR-MCNC: NEGATIVE NG/ML
GABAPENTIN SERPLBLD-MCNC: NEGATIVE UG/ML
MDMA UR QL SCN: POSITIVE
METHADONE UR QL SCN: NEGATIVE
MORPHINE/OPIATES SCREEN, URINE: NEGATIVE
OXYCODONE UR QL SCN: NEGATIVE
PCP UR QL SCN: NEGATIVE
PROPOXYPH UR QL SCN: NEGATIVE
TRICYCLICS UR QL SCN: NEGATIVE

## 2024-12-31 RX ORDER — BUPRENORPHINE HYDROCHLORIDE AND NALOXONE HYDROCHLORIDE DIHYDRATE 8; 2 MG/1; MG/1
2 TABLET SUBLINGUAL DAILY
Qty: 14 TABLET | Refills: 0 | Status: SHIPPED | OUTPATIENT
Start: 2024-12-31

## 2024-12-31 NOTE — PROGRESS NOTES
This provider is located at [facility]. The Patient is seen remotely located at the [facility] ([facility]) using Video. Patient is being seen via telehealth and confirm that they are in a secure environment for this session. The patient's condition being diagnosed/treated is appropriate for telemedicine. Provider identified as Williams Brody as well as credentials APRN MSN FNP-SHAHEEN MATTSON-YOUNG.   The client/patient gave consent to be seen remotely, and when consent is given they understand that the consent allows for patient identifiable information to be sent to a third party as needed.   They may refuse to be seen remotely at any time. The electronic data is encrypted and password protected, and the patient has been advised of the potential risks to privacy not withstanding such measures.    Chief Complaint/History of Present Illness: Follow Up buprenorphine/naloxone Medicated Assisted Treatment for Opiate Use Disorder     Patient/Client Concerns/Updates: Continue Wellbutrin for stimulant cravings   -And continues to struggle with methamphetamine and expresses awareness she has fallen back into a toxic cycle of use patient reports has been specially triggered this week by the [place] holiday  -Patient fearful of reincarceration having to potentially provide a urine drug screen for a  next week  -Strong encouragement as I recommend every evaluation to take part in further services such as residential treatment or chemical dependency IOP and will assist client with such services if she agrees to take part  -Denies acute depressive concerns denies suicidal thoughts    Triggers (Persons/Places/Things/Events/Thought/Emotions): Holiday stress    Cravings/Substance Use: Cravings and continued struggle with methamphetamine    Relapse Prevention: Counseling and continued encouragement to take part in higher level of care    Urine Drug Screen (today's visit)/Presumptive Point of Care discussed:  Positive buprenorphine positive amphetamine methamphetamine and THC    UDS Confirmation (Most recent/Resulted): Positive buprenorphine/nor-buprenorphine, no concerns for urine tampering otherwise positive methamphetamine THC and clonazepam    Most recent pertinent laboratory studies reviewed: 4/11/23-hepatic function within normal limits, Cr WNL, HIV negative, hepatitis C not detected         ROSY (PDMP) Reviewed for Current/Active Medications: buprenorphine/naloxone as reviewed today    Past Surgical History:  No past surgical history on file.    Problem List:  There is no problem list on file for this patient.      Allergy:   No Known Allergies     Current Medications:   Current Outpatient Medications   Medication Sig Dispense Refill   • acetaminophen (Tylenol) 325 MG tablet Take 1 tablet by mouth Every 4 (Four) Hours As Needed for Mild Pain or Headache. 100 tablet 0   • buprenorphine-naloxone (SUBOXONE) 8-2 MG per SL tablet Place 2 tablets under the tongue Daily. 14 tablet 0   • buPROPion XL (WELLBUTRIN XL) 150 MG 24 hr tablet Take 1 tablet by mouth 2 (Two) Times a Day. BID dosing 60 tablet 0   • famotidine (PEPCID) 20 MG tablet Take 1 tablet by mouth 2 (Two) Times a Day. 60 tablet 0   • folic acid (FOLVITE) 1 MG tablet Take 1 tablet by mouth Daily.     • GNP PAIN RELIEF EX-STRENGTH 500 MG tablet TAKE ONE TABLET BY MOUTH EVERY 6 HOURS AS NEEDED FOR 30 DAYS **DO NOT EXCEED 6 TABLETS IN 24 HOURS**     • hydrOXYzine pamoate (Vistaril) 25 MG capsule Take 1 capsule by mouth 4 (Four) Times a Day As Needed for Anxiety. 120 capsule 0   • methotrexate 2.5 MG tablet Take 1 tablet by mouth 3 (Three) Doses Each Week. Take Doses 12 (Twelve) Hours Apart.     • ondansetron ODT (ZOFRAN-ODT) 8 MG disintegrating tablet Place 1 tablet on the tongue Every 8 (Eight) Hours As Needed for Nausea or Vomiting. 45 tablet 0   • naloxone (NARCAN) 4 MG/0.1ML nasal spray 1 spray into the nostril(s) as directed by provider As Needed (opiate  over sedation). 1 spray in 1 nostril every 2 to 3 minutes, call 911 (Patient not taking: Reported on 12/31/2024) 2 each 2     No current facility-administered medications for this visit.       Past Medical History:  Past Medical History:   Diagnosis Date   • History of hepatitis B    • Pancreatitis          Social History     Socioeconomic History   • Marital status: Single   Tobacco Use   • Smoking status: Every Day     Current packs/day: 1.00     Average packs/day: 1 pack/day for 15.0 years (15.0 ttl pk-yrs)     Types: Cigarettes   • Smokeless tobacco: Never   Vaping Use   • Vaping status: Never Used   Substance and Sexual Activity   • Alcohol use: Not Currently   • Drug use: Yes     Types: Marijuana, Hydrocodone, Oxycodone, Methamphetamines   • Sexual activity: Defer         Family History   Problem Relation Age of Onset   • No Known Problems Mother    • No Known Problems Father    • No Known Problems Sister    • No Known Problems Brother    • No Known Problems Maternal Grandmother    • No Known Problems Paternal Grandmother    • No Known Problems Maternal Aunt    • No Known Problems Paternal Aunt    • No Known Problems Maternal Grandfather    • No Known Problems Paternal Grandfather    • No Known Problems Maternal Uncle    • No Known Problems Paternal Uncle    • No Known Problems Cousin    • No Known Problems Other    • ADD / ADHD Neg Hx    • Alcohol abuse Neg Hx    • Anxiety disorder Neg Hx    • Bipolar disorder Neg Hx    • Dementia Neg Hx    • Depression Neg Hx    • Drug abuse Neg Hx    • OCD Neg Hx    • Paranoid behavior Neg Hx    • Schizophrenia Neg Hx    • Seizures Neg Hx    • Self-Injurious Behavior  Neg Hx    • Suicide Attempts Neg Hx    • Breast cancer Neg Hx          Mental Status Exam:   Hygiene:   good  Cooperation:  Cooperative  Eye Contact:  Good  Psychomotor Behavior:  Appropriate  Affect:  Appropriate  Mood: anxious  Speech:  Normal  Thought Process:  Goal directed  Thought Content:   "Normal  Suicidal:  None  Homicidal:  None  Hallucinations:  None  Delusion:  None  Memory:  Intact  Orientation:  Grossly intact  Reliability:  good  Insight:  Fair  Judgement:  Fair  Impulse Control:  Poor         Review of Systems:  Review of Systems   Constitutional:  Negative for activity change, chills, diaphoresis and fatigue.   Respiratory:  Negative for apnea, cough and shortness of breath.    Cardiovascular:  Negative for chest pain, palpitations and leg swelling.   Gastrointestinal:  Negative for abdominal pain, constipation, diarrhea, nausea and vomiting.   Genitourinary:  Negative for difficulty urinating.   Musculoskeletal:  Negative for arthralgias.   Skin:  Negative for rash.   Neurological:  Negative for dizziness, weakness and headaches.   Psychiatric/Behavioral:  Negative for agitation, self-injury, sleep disturbance and suicidal ideas. The patient is nervous/anxious.        Physical Exam:  Physical Exam  Vitals reviewed.   Constitutional:       General: She is not in acute distress.     Appearance: Normal appearance. She is not ill-appearing or toxic-appearing.   Pulmonary:      Effort: Pulmonary effort is normal.   Musculoskeletal:         General: Normal range of motion.   Neurological:      General: No focal deficit present.      Mental Status: She is alert and oriented to person, place, and time.   Psychiatric:         Attention and Perception: Attention and perception normal.         Mood and Affect: Mood normal. Mood is anxious. Mood is not depressed.         Speech: Speech normal.         Behavior: Behavior normal. Behavior is cooperative.         Thought Content: Thought content normal.         Cognition and Memory: Cognition and memory normal.         Judgment: Judgment normal.     Vital Signs:   /79   Pulse 88   Ht 156.2 cm (61.5\")   Wt 79.4 kg (175 lb)   BMI 32.53 kg/m²      Lab Results:   Telemedicine on 12/31/2024   Component Date Value Ref Range Status   • Amphetamine " Screen, Urine 12/31/2024 Positive (A)  Negative Final   • Barbiturates Screen, Urine 12/31/2024 Negative  Negative Final   • Buprenorphine, Screen, Urine 12/31/2024 Positive (A)  Negative Final   • Benzodiazepine Screen, Urine 12/31/2024 Negative  Negative Final   • Cocaine Screen, Urine 12/31/2024 Negative  Negative Final   • MDMA (ECSTASY) 12/31/2024 Positive (A)  Negative Final   • Methamphetamine, Ur 12/31/2024 Positive (A)  Negative Final   • Methadone Screen, Urine 12/31/2024 Negative  Negative Final   • Morphine/Opiates Screen, Urine 12/31/2024 Negative  Negative Final   • Oxycodone Screen, Urine 12/31/2024 Negative  Negative Final   • Phencyclidine (PCP), Urine 12/31/2024 Negative  Negative Final   • Propoxyphene Scree, Urine 12/31/2024 Negative  Negative Final   • Tricyclic Antidepressants Screen 12/31/2024 Negative  Negative Final   • THC, Screen, Urine 12/31/2024 Positive (A)  Negative Final   • Gabapentin 12/31/2024 negative   Final   • ETHANOL URINE SCREEN 12/31/2024 Negative   Final   • Fentanyl, Urine 12/31/2024 Negative  Negative Final   Telemedicine on 12/23/2024   Component Date Value Ref Range Status   • Amphetamine Screen, Urine 12/23/2024 Positive (A)  Negative Final   • Barbiturates Screen, Urine 12/23/2024 Negative  Negative Final   • Buprenorphine, Screen, Urine 12/23/2024 Positive (A)  Negative Final   • Benzodiazepine Screen, Urine 12/23/2024 Negative  Negative Final   • Cocaine Screen, Urine 12/23/2024 Negative  Negative Final   • MDMA (ECSTASY) 12/23/2024 Positive (A)  Negative Final   • Methamphetamine, Ur 12/23/2024 Positive (A)  Negative Final   • Methadone Screen, Urine 12/23/2024 Negative  Negative Final   • Morphine/Opiates Screen, Urine 12/23/2024 Negative  Negative Final   • Oxycodone Screen, Urine 12/23/2024 Negative  Negative Final   • Phencyclidine (PCP), Urine 12/23/2024 Negative  Negative Final   • Propoxyphene Scree, Urine 12/23/2024 Negative  Negative Final   • Tricyclic  Antidepressants Screen 12/23/2024 Negative  Negative Final   • THC, Screen, Urine 12/23/2024 Positive (A)  Negative Final   • GABAPENTIN 12/23/2024 negative   Final   • ETHANOL URINE SCREEN 12/23/2024 Negative   Final   • Fentanyl, Urine 12/23/2024 Negative  Negative Final   Telemedicine on 12/18/2024   Component Date Value Ref Range Status   • Amphetamine Screen, Urine 12/18/2024 Positive (A)  Negative Final   • Barbiturates Screen, Urine 12/18/2024 Negative  Negative Final   • Buprenorphine, Screen, Urine 12/18/2024 Positive (A)  Negative Final   • Benzodiazepine Screen, Urine 12/18/2024 Negative  Negative Final   • Cocaine Screen, Urine 12/18/2024 Negative  Negative Final   • MDMA (ECSTASY) 12/18/2024 Negative  Negative Final   • Methamphetamine, Ur 12/18/2024 Positive (A)  Negative Final   • Methadone Screen, Urine 12/18/2024 Negative  Negative Final   • Morphine/Opiates Screen, Urine 12/18/2024 Negative  Negative Final   • Oxycodone Screen, Urine 12/18/2024 Negative  Negative Final   • Phencyclidine (PCP), Urine 12/18/2024 Negative  Negative Final   • Propoxyphene Scree, Urine 12/18/2024 Negative  Negative Final   • Tricyclic Antidepressants Screen 12/18/2024 Negative  Negative Final   • THC, Screen, Urine 12/18/2024 Negative  Negative Final   • Gabapentin 12/18/2024 negative   Final   • ETHANOL URINE SCREEN 12/18/2024 Negative   Final   • Fentanyl, Urine 12/18/2024 Negative  Negative Final   Telemedicine on 11/12/2024   Component Date Value Ref Range Status   • Amphetamine Screen, Urine 11/12/2024 Negative  Negative Final    PRELIMINARY RESULTS. PLEASE REFER TO [facility] LAB CONFIRMATION FOR FINAL RESULTS.   • Barbiturates Screen, Urine 11/12/2024 Negative  Negative Final   • Buprenorphine, Screen, Urine 11/12/2024 Positive (A)  Negative Final   • Benzodiazepine Screen, Urine 11/12/2024 Negative  Negative Final   • Cocaine Screen, Urine 11/12/2024 Negative  Negative Final   • MDMA (ECSTASY) 11/12/2024 Negative   Negative Final   • Methamphetamine, Ur 11/12/2024 Negative  Negative Final   • Methadone Screen, Urine 11/12/2024 Negative  Negative Final   • Morphine/Opiates Screen, Urine 11/12/2024 Negative  Negative Final   • Oxycodone Screen, Urine 11/12/2024 Negative  Negative Final   • Phencyclidine (PCP), Urine 11/12/2024 Negative  Negative Final   • Propoxyphene Scree, Urine 11/12/2024 Negative  Negative Final   • Tricyclic Antidepressants Screen 11/12/2024 Negative  Negative Final   • THC, Screen, Urine 11/12/2024 Positive (A)  Negative Final   • Gabapentin 11/12/2024 NEGATIVE   Final   • ETHANOL URINE SCREEN 11/12/2024 Negative   Final   • Fentanyl, Urine 11/12/2024 Negative  Negative Final   Telemedicine on 10/15/2024   Component Date Value Ref Range Status   • Amphetamine Screen, Urine 10/15/2024 Negative  Negative Final    PRELIMINARY RESULTS. PLEASE REFER TO [facility] LAB CONFIRMATION FOR FINAL RESULTS.   • Barbiturates Screen, Urine 10/15/2024 Negative  Negative Final   • Buprenorphine, Screen, Urine 10/15/2024 Positive (A)  Negative Final   • Benzodiazepine Screen, Urine 10/15/2024 Negative  Negative Final   • Cocaine Screen, Urine 10/15/2024 Negative  Negative Final   • MDMA (ECSTASY) 10/15/2024 Negative  Negative Final   • Methamphetamine, Ur 10/15/2024 Negative  Negative Final   • Methadone Screen, Urine 10/15/2024 Negative  Negative Final   • Morphine/Opiates Screen, Urine 10/15/2024 Negative  Negative Final   • Oxycodone Screen, Urine 10/15/2024 Negative  Negative Final   • Phencyclidine (PCP), Urine 10/15/2024 Negative  Negative Final   • Propoxyphene Scree, Urine 10/15/2024 Negative  Negative Final   • Tricyclic Antidepressants Screen 10/15/2024 Negative  Negative Final   • THC, Screen, Urine 10/15/2024 Positive (A)  Negative Final   Telemedicine on 09/26/2024   Component Date Value Ref Range Status   • External Amphetamine Screen Urine 09/26/2024 Negative   Final   • External Benzodiazepine Screen Uri*  09/26/2024 Negative   Final   • External Cocaine Screen Urine 09/26/2024 Negative   Final   • External THC Screen Urine 09/26/2024 Negative   Final   • External Methadone Screen Urine 09/26/2024 Negative   Final   • External Methamphetamine Screen Ur* 09/26/2024 Negative   Final   • External Oxycodone Screen Urine 09/26/2024 Negative   Final   • External Buprenorphine Screen Urine 09/26/2024 Positive (A)   Final   • External MDMA 09/26/2024 Negative   Final   • External Opiates Screen Urine 09/26/2024 Negative   Final   Telemedicine on 09/19/2024   Component Date Value Ref Range Status   • External Amphetamine Screen Urine 09/19/2024 Positive   Final   • External Benzodiazepine Screen Uri* 09/19/2024 Negative   Final   • External Cocaine Screen Urine 09/19/2024 Negative   Final   • External THC Screen Urine 09/19/2024 Negative   Final   • External Methadone Screen Urine 09/19/2024 Negative   Final   • External Methamphetamine Screen Ur* 09/19/2024 Positive (A)   Final   • External Oxycodone Screen Urine 09/19/2024 Negative   Final   • External Buprenorphine Screen Urine 09/19/2024 Positive (A)   Final   • External MDMA 09/19/2024 Negative   Final   • External Opiates Screen Urine 09/19/2024 Negative   Final   Telemedicine on 09/12/2024   Component Date Value Ref Range Status   • External Amphetamine Screen Urine 09/12/2024 Positive (A)   Final   • External Benzodiazepine Screen Uri* 09/12/2024 Negative   Final   • External Cocaine Screen Urine 09/12/2024 Negative   Final   • External THC Screen Urine 09/12/2024 Positive (A)   Final   • External Methadone Screen Urine 09/12/2024 Negative   Final   • External Methamphetamine Screen Ur* 09/12/2024 Positive (A)   Final   • External Oxycodone Screen Urine 09/12/2024 Negative   Final   • External Buprenorphine Screen Urine 09/12/2024 Positive (A)   Final   • External MDMA 09/12/2024 Positive (A)   Final   • External Opiates Screen Urine 09/12/2024 Negative   Final    Telemedicine on 08/22/2024   Component Date Value Ref Range Status   • External Amphetamine Screen Urine 08/22/2024 Positive   Final   • External Benzodiazepine Screen Uri* 08/22/2024 Negative   Final   • External Cocaine Screen Urine 08/22/2024 Negative   Final   • External THC Screen Urine 08/22/2024 Positive   Final   • External Methadone Screen Urine 08/22/2024 Positive   Final   • External Methamphetamine Screen Ur* 08/22/2024 Negative   Final   • External Oxycodone Screen Urine 08/22/2024 Negative   Final   • External Buprenorphine Screen Urine 08/22/2024 Positive   Final   • External MDMA 08/22/2024 Negative   Final   • External Opiates Screen Urine 08/22/2024 Negative   Final   Telemedicine on 08/14/2024   Component Date Value Ref Range Status   • External Amphetamine Screen Urine 08/14/2024 Positive (A)   Final   • External Benzodiazepine Screen Uri* 08/14/2024 Negative   Final   • External Cocaine Screen Urine 08/14/2024 Negative   Final   • External THC Screen Urine 08/14/2024 Positive (A)   Final   • External Methadone Screen Urine 08/14/2024 Negative   Final   • External Methamphetamine Screen Ur* 08/14/2024 Positive (A)   Final   • External Oxycodone Screen Urine 08/14/2024 Negative   Final   • External Buprenorphine Screen Urine 08/14/2024 Positive (A)   Final   • External MDMA 08/14/2024 Negative   Final   • External Opiates Screen Urine 08/14/2024 Negative   Final   There may be more visits with results that are not included.         Assessment & Plan   Diagnoses and all orders for this visit:    1. Opioid type dependence, continuous (Primary)  -     buprenorphine-naloxone (SUBOXONE) 8-2 MG per SL tablet; Place 2 tablets under the tongue Daily.  Dispense: 14 tablet; Refill: 0    2. Medication management  -     POC Medline 14 Panel Urine Drug Screen        Visit Diagnoses:    ICD-10-CM ICD-9-CM   1. Opioid type dependence, continuous  F11.20 304.01   2. Medication management  Z79.899 V58.69        PLAN:  Safety: No acute safety concerns  Risk Assessment: Risk of self-harm acutely is low. Risk of self-harm chronically is also low, but could be further elevated in the event of treatment noncompliance and/or AODA.    TREATMENT PLAN/GOALS: Continue supportive psychotherapy efforts and medications as indicated. Treatment and medication options discussed during today's visit. Patient acknowledged and verbally consented to continue with current treatment plan and was educated on the importance of compliance with treatment and follow-up appointments.    MEDICATION ISSUES:  ROSY reviewed as expected.  Discussed medication options and treatment plan of prescribed medication as well as the risks, benefits, and side effects including potential falls, possible impaired driving and metabolic adversities among others. Patient is agreeable to call the office with any worsening of symptoms or onset of side effects. Patient is agreeable to call 911 or go to the nearest ER should he/she begin having SI/HI. No medication side effects or related complaints today.     MEDS ORDERED DURING VISIT:  New Medications Ordered This Visit   Medications   • buprenorphine-naloxone (SUBOXONE) 8-2 MG per SL tablet     Sig: Place 2 tablets under the tongue Daily.     Dispense:  14 tablet     Refill:  0     NADEAN:[id number]       No follow-ups on file.           This document has been electronically signed by WALKER Romero  December 31, 2024 15:40 EST      Part of this note may be an electronic transcription/translation of spoken language to printed text using the Dragon Dictation System.

## 2025-01-20 ENCOUNTER — TELEMEDICINE (OUTPATIENT)
Dept: PSYCHIATRY | Facility: CLINIC | Age: 55
End: 2025-01-20
Payer: MEDICAID

## 2025-01-20 VITALS
DIASTOLIC BLOOD PRESSURE: 85 MMHG | WEIGHT: 178.4 LBS | HEART RATE: 83 BPM | HEIGHT: 61 IN | BODY MASS INDEX: 33.68 KG/M2 | SYSTOLIC BLOOD PRESSURE: 141 MMHG

## 2025-01-20 DIAGNOSIS — Z79.899 MEDICATION MANAGEMENT: ICD-10-CM

## 2025-01-20 DIAGNOSIS — F11.20 OPIOID TYPE DEPENDENCE, CONTINUOUS: Primary | ICD-10-CM

## 2025-01-20 DIAGNOSIS — F15.10 METHAMPHETAMINE ABUSE: ICD-10-CM

## 2025-01-20 LAB
AMPHET+METHAMPHET UR QL: POSITIVE
AMPHETAMINES UR QL: POSITIVE
BARBITURATES UR QL SCN: NEGATIVE
BENZODIAZ UR QL SCN: NEGATIVE
BUPRENORPHINE SERPL-MCNC: POSITIVE NG/ML
CANNABINOIDS SERPL QL: POSITIVE
COCAINE UR QL: NEGATIVE
ETHANOL URINE SCREEN: NEGATIVE
FENTANYL UR-MCNC: NEGATIVE NG/ML
GABAPENTIN SERPLBLD-MCNC: NEGATIVE UG/ML
MDMA UR QL SCN: NEGATIVE
METHADONE UR QL SCN: NEGATIVE
MORPHINE/OPIATES SCREEN, URINE: NEGATIVE
OXYCODONE UR QL SCN: NEGATIVE
PCP UR QL SCN: NEGATIVE
PROPOXYPH UR QL SCN: NEGATIVE
TRICYCLICS UR QL SCN: NEGATIVE

## 2025-01-20 RX ORDER — BUPROPION HYDROCHLORIDE 150 MG/1
150 TABLET ORAL 2 TIMES DAILY
Qty: 60 TABLET | Refills: 0 | Status: SHIPPED | OUTPATIENT
Start: 2025-01-20

## 2025-01-20 RX ORDER — BUPRENORPHINE HYDROCHLORIDE AND NALOXONE HYDROCHLORIDE DIHYDRATE 8; 2 MG/1; MG/1
2 TABLET SUBLINGUAL DAILY
Qty: 14 TABLET | Refills: 0 | Status: SHIPPED | OUTPATIENT
Start: 2025-01-20 | End: 2025-01-27 | Stop reason: SDUPTHER

## 2025-01-20 NOTE — PROGRESS NOTES
This provider is located at [facility]. The Patient is seen remotely located at the [facility] ([facility]) using Video. Patient is being seen via telehealth and confirm that they are in a secure environment for this session. The patient's condition being diagnosed/treated is appropriate for telemedicine. Provider identified as Williams Brody as well as credentials APRN MSN FNP-SHAHEEN MATTSON-YOUNG.   The client/patient gave consent to be seen remotely, and when consent is given they understand that the consent allows for patient identifiable information to be sent to a third party as needed.   They may refuse to be seen remotely at any time. The electronic data is encrypted and password protected, and the patient has been advised of the potential risks to privacy not withstanding such measures.    Chief Complaint/History of Present Illness: Follow Up buprenorphine/naloxone Medicated Assisted Treatment for Opiate Use Disorder     Patient/Client Concerns/Updates: Continue Wellbutrin for stimulant cravings   -Patient continues to struggle with methamphetamine; patient acknowledges today further support is needed and patient expresses interest in chemical dependency IOP however reports transportation of the [facility] remains a barrier; will work with client as to any resources to help with such  -Fluctuating life stress and anxiety and patient exhibits poor coping skills as it relates to continued use of methamphetamine to mitigate adverse life stress; further encouragement to take part in therapy or chemical dependency IOP for such  -Denies suicidal or homicidal thoughts, no symptoms of psychosis otherwise symptoms of perceptual disturbance    Triggers (Persons/Places/Things/Events/Thought/Emotions): Generalized anxiety and chronic depression    Cravings/Substance Use: Cravings and continued struggle with methamphetamine    Relapse Prevention: Higher level of care required the patient encouraged to take  part in chemical dependency intensive outpatient    Urine Drug Screen (today's visit)/Presumptive Point of Care discussed: Positive buprenorphine amphetamine methamphetamine and THC     UDS Confirmation (Most recent/Resulted): Positive buprenorphine/nor-buprenorphine, no concerns for urine tampering otherwise positive methamphetamine THC and clonazepam    Most recent pertinent laboratory studies reviewed: 4/11/23-hepatic function within normal limits, Cr WNL, HIV negative, hepatitis C not detected         ROSY (PDMP) Reviewed for Current/Active Medications: buprenorphine/naloxone as reviewed today    Past Surgical History:  No past surgical history on file.    Problem List:  There is no problem list on file for this patient.      Allergy:   No Known Allergies     Current Medications:   Current Outpatient Medications   Medication Sig Dispense Refill   • acetaminophen (Tylenol) 325 MG tablet Take 1 tablet by mouth Every 4 (Four) Hours As Needed for Mild Pain or Headache. 100 tablet 0   • buprenorphine-naloxone (SUBOXONE) 8-2 MG per SL tablet Place 2 tablets under the tongue Daily. 14 tablet 0   • buPROPion XL (WELLBUTRIN XL) 150 MG 24 hr tablet Take 1 tablet by mouth 2 (Two) Times a Day. BID dosing 60 tablet 0   • famotidine (PEPCID) 20 MG tablet Take 1 tablet by mouth 2 (Two) Times a Day. 60 tablet 0   • folic acid (FOLVITE) 1 MG tablet Take 1 tablet by mouth Daily.     • GNP PAIN RELIEF EX-STRENGTH 500 MG tablet TAKE ONE TABLET BY MOUTH EVERY 6 HOURS AS NEEDED FOR 30 DAYS **DO NOT EXCEED 6 TABLETS IN 24 HOURS**     • hydrOXYzine pamoate (Vistaril) 25 MG capsule Take 1 capsule by mouth 4 (Four) Times a Day As Needed for Anxiety. 120 capsule 0   • methotrexate 2.5 MG tablet Take 1 tablet by mouth 3 (Three) Doses Each Week. Take Doses 12 (Twelve) Hours Apart.     • ondansetron ODT (ZOFRAN-ODT) 8 MG disintegrating tablet Place 1 tablet on the tongue Every 8 (Eight) Hours As Needed for Nausea or Vomiting. 45 tablet 0    • naloxone (NARCAN) 4 MG/0.1ML nasal spray 1 spray into the nostril(s) as directed by provider As Needed (opiate over sedation). 1 spray in 1 nostril every 2 to 3 minutes, call 911 (Patient not taking: Reported on 1/20/2025) 2 each 2     No current facility-administered medications for this visit.       Past Medical History:  Past Medical History:   Diagnosis Date   • History of hepatitis B    • Pancreatitis          Social History     Socioeconomic History   • Marital status: Single   Tobacco Use   • Smoking status: Every Day     Current packs/day: 1.00     Average packs/day: 1 pack/day for 15.0 years (15.0 ttl pk-yrs)     Types: Cigarettes   • Smokeless tobacco: Never   Vaping Use   • Vaping status: Never Used   Substance and Sexual Activity   • Alcohol use: Not Currently   • Drug use: Yes     Types: Marijuana, Hydrocodone, Oxycodone, Methamphetamines   • Sexual activity: Defer         Family History   Problem Relation Age of Onset   • No Known Problems Mother    • No Known Problems Father    • No Known Problems Sister    • No Known Problems Brother    • No Known Problems Maternal Grandmother    • No Known Problems Paternal Grandmother    • No Known Problems Maternal Aunt    • No Known Problems Paternal Aunt    • No Known Problems Maternal Grandfather    • No Known Problems Paternal Grandfather    • No Known Problems Maternal Uncle    • No Known Problems Paternal Uncle    • No Known Problems Cousin    • No Known Problems Other    • ADD / ADHD Neg Hx    • Alcohol abuse Neg Hx    • Anxiety disorder Neg Hx    • Bipolar disorder Neg Hx    • Dementia Neg Hx    • Depression Neg Hx    • Drug abuse Neg Hx    • OCD Neg Hx    • Paranoid behavior Neg Hx    • Schizophrenia Neg Hx    • Seizures Neg Hx    • Self-Injurious Behavior  Neg Hx    • Suicide Attempts Neg Hx    • Breast cancer Neg Hx          Mental Status Exam:   Hygiene:   good  Cooperation:  Cooperative  Eye Contact:  Good  Psychomotor Behavior:   Appropriate  Affect:  Appropriate  Mood: depressed and anxious  Speech:  Normal  Thought Process:  Goal directed  Thought Content:  Normal  Suicidal:  None  Homicidal:  None  Hallucinations:  None  Delusion:  None  Memory:  Intact  Orientation:  Grossly intact  Reliability:  good  Insight:  Fair  Judgement:  Poor  Impulse Control:  Poor         Review of Systems:  Review of Systems   Constitutional:  Negative for activity change, chills, diaphoresis and fatigue.   Respiratory:  Negative for apnea, cough and shortness of breath.    Cardiovascular:  Negative for chest pain, palpitations and leg swelling.   Gastrointestinal:  Negative for abdominal pain, constipation, diarrhea, nausea and vomiting.   Genitourinary:  Negative for difficulty urinating.   Musculoskeletal:  Negative for arthralgias.   Skin:  Negative for rash.   Neurological:  Negative for dizziness, weakness and headaches.   Psychiatric/Behavioral:  Positive for dysphoric mood. Negative for agitation, self-injury, sleep disturbance and suicidal ideas. The patient is nervous/anxious.        Physical Exam:  Physical Exam  Vitals reviewed.   Constitutional:       General: She is not in acute distress.     Appearance: Normal appearance. She is not ill-appearing or toxic-appearing.   Pulmonary:      Effort: Pulmonary effort is normal.   Musculoskeletal:         General: Normal range of motion.   Neurological:      General: No focal deficit present.      Mental Status: She is alert and oriented to person, place, and time.   Psychiatric:         Attention and Perception: Attention and perception normal.         Mood and Affect: Mood normal. Mood is depressed. Mood is not anxious.         Speech: Speech normal.         Behavior: Behavior normal. Behavior is cooperative.         Thought Content: Thought content normal.         Cognition and Memory: Cognition and memory normal.         Judgment: Judgment normal.     Vital Signs:   /85   Pulse 83   Ht 156.2  "cm (61.5\")   Wt 80.9 kg (178 lb 6.4 oz)   BMI 33.17 kg/m²      Lab Results:   Telemedicine on 01/20/2025   Component Date Value Ref Range Status   • Amphetamine Screen, Urine 01/20/2025 Positive (A)  Negative Final    PRELIMINARY RESULTS. REFER TO [facility] LAB CONFIRMATION FOR FINAL RESULTS.   • Barbiturates Screen, Urine 01/20/2025 Negative  Negative Final   • Buprenorphine, Screen, Urine 01/20/2025 Positive (A)  Negative Final   • Benzodiazepine Screen, Urine 01/20/2025 Negative  Negative Final   • Cocaine Screen, Urine 01/20/2025 Negative  Negative Final   • MDMA (ECSTASY) 01/20/2025 Negative  Negative Final   • Methamphetamine, Ur 01/20/2025 Positive (A)  Negative Final   • Methadone Screen, Urine 01/20/2025 Negative  Negative Final   • Morphine/Opiates Screen, Urine 01/20/2025 Negative  Negative Final   • Oxycodone Screen, Urine 01/20/2025 Negative  Negative Final   • Phencyclidine (PCP), Urine 01/20/2025 Negative  Negative Final   • Propoxyphene Scree, Urine 01/20/2025 Negative  Negative Final   • Tricyclic Antidepressants Screen 01/20/2025 Negative  Negative Final   • THC, Screen, Urine 01/20/2025 Positive (A)  Negative Final   • Gabapentin 01/20/2025 negative   Final   • ETHANOL URINE SCREEN 01/20/2025 Negative   Final   • Fentanyl, Urine 01/20/2025 Negative  Negative Final   Telemedicine on 12/31/2024   Component Date Value Ref Range Status   • Amphetamine Screen, Urine 12/31/2024 Positive (A)  Negative Final   • Barbiturates Screen, Urine 12/31/2024 Negative  Negative Final   • Buprenorphine, Screen, Urine 12/31/2024 Positive (A)  Negative Final   • Benzodiazepine Screen, Urine 12/31/2024 Negative  Negative Final   • Cocaine Screen, Urine 12/31/2024 Negative  Negative Final   • MDMA (ECSTASY) 12/31/2024 Positive (A)  Negative Final   • Methamphetamine, Ur 12/31/2024 Positive (A)  Negative Final   • Methadone Screen, Urine 12/31/2024 Negative  Negative Final   • Morphine/Opiates Screen, Urine 12/31/2024 " Negative  Negative Final   • Oxycodone Screen, Urine 12/31/2024 Negative  Negative Final   • Phencyclidine (PCP), Urine 12/31/2024 Negative  Negative Final   • Propoxyphene Scree, Urine 12/31/2024 Negative  Negative Final   • Tricyclic Antidepressants Screen 12/31/2024 Negative  Negative Final   • THC, Screen, Urine 12/31/2024 Positive (A)  Negative Final   • Gabapentin 12/31/2024 negative   Final   • ETHANOL URINE SCREEN 12/31/2024 Negative   Final   • Fentanyl, Urine 12/31/2024 Negative  Negative Final   Telemedicine on 12/23/2024   Component Date Value Ref Range Status   • Amphetamine Screen, Urine 12/23/2024 Positive (A)  Negative Final   • Barbiturates Screen, Urine 12/23/2024 Negative  Negative Final   • Buprenorphine, Screen, Urine 12/23/2024 Positive (A)  Negative Final   • Benzodiazepine Screen, Urine 12/23/2024 Negative  Negative Final   • Cocaine Screen, Urine 12/23/2024 Negative  Negative Final   • MDMA (ECSTASY) 12/23/2024 Positive (A)  Negative Final   • Methamphetamine, Ur 12/23/2024 Positive (A)  Negative Final   • Methadone Screen, Urine 12/23/2024 Negative  Negative Final   • Morphine/Opiates Screen, Urine 12/23/2024 Negative  Negative Final   • Oxycodone Screen, Urine 12/23/2024 Negative  Negative Final   • Phencyclidine (PCP), Urine 12/23/2024 Negative  Negative Final   • Propoxyphene Scree, Urine 12/23/2024 Negative  Negative Final   • Tricyclic Antidepressants Screen 12/23/2024 Negative  Negative Final   • THC, Screen, Urine 12/23/2024 Positive (A)  Negative Final   • GABAPENTIN 12/23/2024 negative   Final   • ETHANOL URINE SCREEN 12/23/2024 Negative   Final   • Fentanyl, Urine 12/23/2024 Negative  Negative Final   Telemedicine on 12/18/2024   Component Date Value Ref Range Status   • Amphetamine Screen, Urine 12/18/2024 Positive (A)  Negative Final   • Barbiturates Screen, Urine 12/18/2024 Negative  Negative Final   • Buprenorphine, Screen, Urine 12/18/2024 Positive (A)  Negative Final   •  Benzodiazepine Screen, Urine 12/18/2024 Negative  Negative Final   • Cocaine Screen, Urine 12/18/2024 Negative  Negative Final   • MDMA (ECSTASY) 12/18/2024 Negative  Negative Final   • Methamphetamine, Ur 12/18/2024 Positive (A)  Negative Final   • Methadone Screen, Urine 12/18/2024 Negative  Negative Final   • Morphine/Opiates Screen, Urine 12/18/2024 Negative  Negative Final   • Oxycodone Screen, Urine 12/18/2024 Negative  Negative Final   • Phencyclidine (PCP), Urine 12/18/2024 Negative  Negative Final   • Propoxyphene Scree, Urine 12/18/2024 Negative  Negative Final   • Tricyclic Antidepressants Screen 12/18/2024 Negative  Negative Final   • THC, Screen, Urine 12/18/2024 Negative  Negative Final   • Gabapentin 12/18/2024 negative   Final   • ETHANOL URINE SCREEN 12/18/2024 Negative   Final   • Fentanyl, Urine 12/18/2024 Negative  Negative Final   Telemedicine on 11/12/2024   Component Date Value Ref Range Status   • Amphetamine Screen, Urine 11/12/2024 Negative  Negative Final    PRELIMINARY RESULTS. PLEASE REFER TO [facility] LAB CONFIRMATION FOR FINAL RESULTS.   • Barbiturates Screen, Urine 11/12/2024 Negative  Negative Final   • Buprenorphine, Screen, Urine 11/12/2024 Positive (A)  Negative Final   • Benzodiazepine Screen, Urine 11/12/2024 Negative  Negative Final   • Cocaine Screen, Urine 11/12/2024 Negative  Negative Final   • MDMA (ECSTASY) 11/12/2024 Negative  Negative Final   • Methamphetamine, Ur 11/12/2024 Negative  Negative Final   • Methadone Screen, Urine 11/12/2024 Negative  Negative Final   • Morphine/Opiates Screen, Urine 11/12/2024 Negative  Negative Final   • Oxycodone Screen, Urine 11/12/2024 Negative  Negative Final   • Phencyclidine (PCP), Urine 11/12/2024 Negative  Negative Final   • Propoxyphene Scree, Urine 11/12/2024 Negative  Negative Final   • Tricyclic Antidepressants Screen 11/12/2024 Negative  Negative Final   • THC, Screen, Urine 11/12/2024 Positive (A)  Negative Final   • Gabapentin  11/12/2024 NEGATIVE   Final   • ETHANOL URINE SCREEN 11/12/2024 Negative   Final   • Fentanyl, Urine 11/12/2024 Negative  Negative Final   Telemedicine on 10/15/2024   Component Date Value Ref Range Status   • Amphetamine Screen, Urine 10/15/2024 Negative  Negative Final    PRELIMINARY RESULTS. PLEASE REFER TO [facility] LAB CONFIRMATION FOR FINAL RESULTS.   • Barbiturates Screen, Urine 10/15/2024 Negative  Negative Final   • Buprenorphine, Screen, Urine 10/15/2024 Positive (A)  Negative Final   • Benzodiazepine Screen, Urine 10/15/2024 Negative  Negative Final   • Cocaine Screen, Urine 10/15/2024 Negative  Negative Final   • MDMA (ECSTASY) 10/15/2024 Negative  Negative Final   • Methamphetamine, Ur 10/15/2024 Negative  Negative Final   • Methadone Screen, Urine 10/15/2024 Negative  Negative Final   • Morphine/Opiates Screen, Urine 10/15/2024 Negative  Negative Final   • Oxycodone Screen, Urine 10/15/2024 Negative  Negative Final   • Phencyclidine (PCP), Urine 10/15/2024 Negative  Negative Final   • Propoxyphene Scree, Urine 10/15/2024 Negative  Negative Final   • Tricyclic Antidepressants Screen 10/15/2024 Negative  Negative Final   • THC, Screen, Urine 10/15/2024 Positive (A)  Negative Final   Telemedicine on 09/26/2024   Component Date Value Ref Range Status   • External Amphetamine Screen Urine 09/26/2024 Negative   Final   • External Benzodiazepine Screen Uri* 09/26/2024 Negative   Final   • External Cocaine Screen Urine 09/26/2024 Negative   Final   • External THC Screen Urine 09/26/2024 Negative   Final   • External Methadone Screen Urine 09/26/2024 Negative   Final   • External Methamphetamine Screen Ur* 09/26/2024 Negative   Final   • External Oxycodone Screen Urine 09/26/2024 Negative   Final   • External Buprenorphine Screen Urine 09/26/2024 Positive (A)   Final   • External MDMA 09/26/2024 Negative   Final   • External Opiates Screen Urine 09/26/2024 Negative   Final   Telemedicine on 09/19/2024   Component  Date Value Ref Range Status   • External Amphetamine Screen Urine 09/19/2024 Positive   Final   • External Benzodiazepine Screen Uri* 09/19/2024 Negative   Final   • External Cocaine Screen Urine 09/19/2024 Negative   Final   • External THC Screen Urine 09/19/2024 Negative   Final   • External Methadone Screen Urine 09/19/2024 Negative   Final   • External Methamphetamine Screen Ur* 09/19/2024 Positive (A)   Final   • External Oxycodone Screen Urine 09/19/2024 Negative   Final   • External Buprenorphine Screen Urine 09/19/2024 Positive (A)   Final   • External MDMA 09/19/2024 Negative   Final   • External Opiates Screen Urine 09/19/2024 Negative   Final   Telemedicine on 09/12/2024   Component Date Value Ref Range Status   • External Amphetamine Screen Urine 09/12/2024 Positive (A)   Final   • External Benzodiazepine Screen Uri* 09/12/2024 Negative   Final   • External Cocaine Screen Urine 09/12/2024 Negative   Final   • External THC Screen Urine 09/12/2024 Positive (A)   Final   • External Methadone Screen Urine 09/12/2024 Negative   Final   • External Methamphetamine Screen Ur* 09/12/2024 Positive (A)   Final   • External Oxycodone Screen Urine 09/12/2024 Negative   Final   • External Buprenorphine Screen Urine 09/12/2024 Positive (A)   Final   • External MDMA 09/12/2024 Positive (A)   Final   • External Opiates Screen Urine 09/12/2024 Negative   Final   Telemedicine on 08/22/2024   Component Date Value Ref Range Status   • External Amphetamine Screen Urine 08/22/2024 Positive   Final   • External Benzodiazepine Screen Uri* 08/22/2024 Negative   Final   • External Cocaine Screen Urine 08/22/2024 Negative   Final   • External THC Screen Urine 08/22/2024 Positive   Final   • External Methadone Screen Urine 08/22/2024 Positive   Final   • External Methamphetamine Screen Ur* 08/22/2024 Negative   Final   • External Oxycodone Screen Urine 08/22/2024 Negative   Final   • External Buprenorphine Screen Urine 08/22/2024  Positive   Final   • External MDMA 08/22/2024 Negative   Final   • External Opiates Screen Urine 08/22/2024 Negative   Final   There may be more visits with results that are not included.         Assessment & Plan   Diagnoses and all orders for this visit:    1. Opioid type dependence, continuous (Primary)  -     buprenorphine-naloxone (SUBOXONE) 8-2 MG per SL tablet; Place 2 tablets under the tongue Daily.  Dispense: 14 tablet; Refill: 0    2. Medication management  -     POC Medline 14 Panel Urine Drug Screen    3. Methamphetamine abuse  -     buPROPion XL (WELLBUTRIN XL) 150 MG 24 hr tablet; Take 1 tablet by mouth 2 (Two) Times a Day. BID dosing  Dispense: 60 tablet; Refill: 0        Visit Diagnoses:    ICD-10-CM ICD-9-CM   1. Opioid type dependence, continuous  F11.20 304.01   2. Medication management  Z79.899 V58.69   3. Methamphetamine abuse  F15.10 305.70       PLAN:  Safety: No acute safety concerns  Risk Assessment: Risk of self-harm acutely is low. Risk of self-harm chronically is also low, but could be further elevated in the event of treatment noncompliance and/or AODA.    TREATMENT PLAN/GOALS: Continue supportive psychotherapy efforts and medications as indicated. Treatment and medication options discussed during today's visit. Patient acknowledged and verbally consented to continue with current treatment plan and was educated on the importance of compliance with treatment and follow-up appointments.    MEDICATION ISSUES:  ROSY reviewed as expected.  Discussed medication options and treatment plan of prescribed medication as well as the risks, benefits, and side effects including potential falls, possible impaired driving and metabolic adversities among others. Patient is agreeable to call the office with any worsening of symptoms or onset of side effects. Patient is agreeable to call 911 or go to the nearest ER should he/she begin having SI/HI. No medication side effects or related complaints today.      MEDS ORDERED DURING VISIT:  New Medications Ordered This Visit   Medications   • buprenorphine-naloxone (SUBOXONE) 8-2 MG per SL tablet     Sig: Place 2 tablets under the tongue Daily.     Dispense:  14 tablet     Refill:  0     NADEAN:[id number]   • buPROPion XL (WELLBUTRIN XL) 150 MG 24 hr tablet     Sig: Take 1 tablet by mouth 2 (Two) Times a Day. BID dosing     Dispense:  60 tablet     Refill:  0       No follow-ups on file.           This document has been electronically signed by WALKER Romero  January 20, 2025 15:26 EST      Part of this note may be an electronic transcription/translation of spoken language to printed text using the Dragon Dictation System.

## 2025-01-27 ENCOUNTER — TELEMEDICINE (OUTPATIENT)
Dept: PSYCHIATRY | Facility: CLINIC | Age: 55
End: 2025-01-27
Payer: MEDICAID

## 2025-01-27 VITALS
HEIGHT: 61 IN | HEART RATE: 99 BPM | BODY MASS INDEX: 33.08 KG/M2 | DIASTOLIC BLOOD PRESSURE: 81 MMHG | WEIGHT: 175.2 LBS | SYSTOLIC BLOOD PRESSURE: 145 MMHG

## 2025-01-27 DIAGNOSIS — Z79.899 MEDICATION MANAGEMENT: ICD-10-CM

## 2025-01-27 DIAGNOSIS — F11.20 OPIOID TYPE DEPENDENCE, CONTINUOUS: Primary | ICD-10-CM

## 2025-01-27 DIAGNOSIS — F43.23 ADJUSTMENT DISORDER WITH MIXED ANXIETY AND DEPRESSED MOOD: ICD-10-CM

## 2025-01-27 LAB
AMPHET+METHAMPHET UR QL: POSITIVE
AMPHETAMINES UR QL: NEGATIVE
BARBITURATES UR QL SCN: NEGATIVE
BENZODIAZ UR QL SCN: NEGATIVE
BUPRENORPHINE SERPL-MCNC: POSITIVE NG/ML
CANNABINOIDS SERPL QL: NEGATIVE
COCAINE UR QL: NEGATIVE
ETHANOL UR-MCNC: NEGATIVE MG/DL
FENTANYL UR-MCNC: NEGATIVE NG/ML
GABAPENTIN SERPLBLD-MCNC: NEGATIVE UG/ML
MDMA UR QL SCN: POSITIVE
METHADONE UR QL SCN: NEGATIVE
MORPHINE/OPIATES SCREEN, URINE: NEGATIVE
OXYCODONE UR QL SCN: NEGATIVE
PCP UR QL SCN: NEGATIVE
PROPOXYPH UR QL SCN: NEGATIVE
TRICYCLICS UR QL SCN: NEGATIVE

## 2025-01-27 PROCEDURE — 1159F MED LIST DOCD IN RCRD: CPT | Performed by: NURSE PRACTITIONER

## 2025-01-27 PROCEDURE — 1160F RVW MEDS BY RX/DR IN RCRD: CPT | Performed by: NURSE PRACTITIONER

## 2025-01-27 PROCEDURE — 99213 OFFICE O/P EST LOW 20 MIN: CPT | Performed by: NURSE PRACTITIONER

## 2025-01-27 RX ORDER — BUPRENORPHINE HYDROCHLORIDE AND NALOXONE HYDROCHLORIDE DIHYDRATE 8; 2 MG/1; MG/1
2 TABLET SUBLINGUAL DAILY
Qty: 14 TABLET | Refills: 0 | Status: SHIPPED | OUTPATIENT
Start: 2025-01-27

## 2025-01-27 NOTE — PROGRESS NOTES
This provider is located at [facility]. The Patient is seen remotely located at the [facility] ([facility]) using Video. Patient is being seen via telehealth and confirm that they are in a secure environment for this session. The patient's condition being diagnosed/treated is appropriate for telemedicine. Provider identified as Williams Brody as well as credentials APRN MSN FNP-C SRINIVASAN-YOUNG.   The client/patient gave consent to be seen remotely, and when consent is given they understand that the consent allows for patient identifiable information to be sent to a third party as needed.   They may refuse to be seen remotely at any time. The electronic data is encrypted and password protected, and the patient has been advised of the potential risks to privacy not withstanding such measures.    Chief Complaint/History of Present Illness: Follow Up buprenorphine/naloxone Medicated Assisted Treatment for Opiate Use Disorder     Patient/Client Concerns/Updates: Continue Wellbutrin for stimulant cravings   -Patient reports last use of methamphetamine approximately 12 days ago the patient reports she is feeling optimistic going forward  -We will continue weekly MAT evaluations at this time for support and accountability  -Continued recommendations to practice principles of self-care    Triggers (Persons/Places/Things/Events/Thought/Emotions): Life stress    Cravings/Substance Use: Denies cravings or use of methamphetamine in 12 days    Relapse Prevention: Counseling    Urine Drug Screen (today's visit)/Presumptive Point of Care discussed: Positive buprenorphine and positive amphetamine    UDS Confirmation (Most recent/Resulted): Positive buprenorphine/nor-buprenorphine, no concerns for urine tampering otherwise positive THC methamphetamine and clonazepam    Most recent pertinent laboratory studies reviewed: 4/11/23-hepatic function within normal limits, Cr WNL, HIV negative, hepatitis C not detected          ROSY (PDMP) Reviewed for Current/Active Medications: buprenorphine/naloxone as reviewed today    Past Surgical History:  No past surgical history on file.    Problem List:  There is no problem list on file for this patient.      Allergy:   No Known Allergies     Current Medications:   Current Outpatient Medications   Medication Sig Dispense Refill   • acetaminophen (Tylenol) 325 MG tablet Take 1 tablet by mouth Every 4 (Four) Hours As Needed for Mild Pain or Headache. 100 tablet 0   • buPROPion XL (WELLBUTRIN XL) 150 MG 24 hr tablet Take 1 tablet by mouth 2 (Two) Times a Day. BID dosing 60 tablet 0   • famotidine (PEPCID) 20 MG tablet Take 1 tablet by mouth 2 (Two) Times a Day. 60 tablet 0   • folic acid (FOLVITE) 1 MG tablet Take 1 tablet by mouth Daily.     • GNP PAIN RELIEF EX-STRENGTH 500 MG tablet TAKE ONE TABLET BY MOUTH EVERY 6 HOURS AS NEEDED FOR 30 DAYS **DO NOT EXCEED 6 TABLETS IN 24 HOURS**     • hydrOXYzine pamoate (Vistaril) 25 MG capsule Take 1 capsule by mouth 4 (Four) Times a Day As Needed for Anxiety. 120 capsule 0   • methotrexate 2.5 MG tablet Take 1 tablet by mouth 3 (Three) Doses Each Week. Take Doses 12 (Twelve) Hours Apart.     • ondansetron ODT (ZOFRAN-ODT) 8 MG disintegrating tablet Place 1 tablet on the tongue Every 8 (Eight) Hours As Needed for Nausea or Vomiting. 45 tablet 0   • buprenorphine-naloxone (SUBOXONE) 8-2 MG per SL tablet Place 2 tablets under the tongue Daily. 14 tablet 0   • naloxone (NARCAN) 4 MG/0.1ML nasal spray 1 spray into the nostril(s) as directed by provider As Needed (opiate over sedation). 1 spray in 1 nostril every 2 to 3 minutes, call 911 (Patient not taking: Reported on 1/27/2025) 2 each 2     No current facility-administered medications for this visit.       Past Medical History:  Past Medical History:   Diagnosis Date   • History of hepatitis B    • Pancreatitis          Social History     Socioeconomic History   • Marital status: Single   Tobacco Use   •  Smoking status: Every Day     Current packs/day: 1.00     Average packs/day: 1 pack/day for 15.0 years (15.0 ttl pk-yrs)     Types: Cigarettes   • Smokeless tobacco: Never   Vaping Use   • Vaping status: Never Used   Substance and Sexual Activity   • Alcohol use: Not Currently   • Drug use: Yes     Types: Marijuana, Hydrocodone, Oxycodone, Methamphetamines   • Sexual activity: Defer         Family History   Problem Relation Age of Onset   • No Known Problems Mother    • No Known Problems Father    • No Known Problems Sister    • No Known Problems Brother    • No Known Problems Maternal Grandmother    • No Known Problems Paternal Grandmother    • No Known Problems Maternal Aunt    • No Known Problems Paternal Aunt    • No Known Problems Maternal Grandfather    • No Known Problems Paternal Grandfather    • No Known Problems Maternal Uncle    • No Known Problems Paternal Uncle    • No Known Problems Cousin    • No Known Problems Other    • ADD / ADHD Neg Hx    • Alcohol abuse Neg Hx    • Anxiety disorder Neg Hx    • Bipolar disorder Neg Hx    • Dementia Neg Hx    • Depression Neg Hx    • Drug abuse Neg Hx    • OCD Neg Hx    • Paranoid behavior Neg Hx    • Schizophrenia Neg Hx    • Seizures Neg Hx    • Self-Injurious Behavior  Neg Hx    • Suicide Attempts Neg Hx    • Breast cancer Neg Hx          Mental Status Exam:   Hygiene:   good  Cooperation:  Cooperative  Eye Contact:  Good  Psychomotor Behavior:  Appropriate  Affect:  Appropriate  Mood: normal  Speech:  Normal  Thought Process:  Goal directed  Thought Content:  Normal  Suicidal:  None  Homicidal:  None  Hallucinations:  None  Delusion:  None  Memory:  Intact  Orientation:  Grossly intact  Reliability:  good  Insight:  Good  Judgement:  Good  Impulse Control:  Good         Review of Systems:  Review of Systems   Constitutional:  Negative for activity change, chills, diaphoresis and fatigue.   Respiratory:  Negative for apnea, cough and shortness of breath.   "  Cardiovascular:  Negative for chest pain, palpitations and leg swelling.   Gastrointestinal:  Negative for abdominal pain, constipation, diarrhea, nausea and vomiting.   Genitourinary:  Negative for difficulty urinating.   Musculoskeletal:  Negative for arthralgias.   Skin:  Negative for rash.   Neurological:  Negative for dizziness, weakness and headaches.   Psychiatric/Behavioral:  Negative for agitation, self-injury, sleep disturbance and suicidal ideas. The patient is not nervous/anxious.        Physical Exam:  Physical Exam  Vitals reviewed.   Constitutional:       General: She is not in acute distress.     Appearance: Normal appearance. She is not ill-appearing or toxic-appearing.   Pulmonary:      Effort: Pulmonary effort is normal.   Musculoskeletal:         General: Normal range of motion.   Neurological:      General: No focal deficit present.      Mental Status: She is alert and oriented to person, place, and time.   Psychiatric:         Attention and Perception: Attention and perception normal.         Mood and Affect: Mood normal. Mood is not anxious or depressed.         Speech: Speech normal.         Behavior: Behavior normal. Behavior is cooperative.         Thought Content: Thought content normal.         Cognition and Memory: Cognition and memory normal.         Judgment: Judgment normal.     Vital Signs:   /81   Pulse 99   Ht 156.2 cm (61.5\")   Wt 79.5 kg (175 lb 3.2 oz)   BMI 32.57 kg/m²      Lab Results:   Telemedicine on 01/27/2025   Component Date Value Ref Range Status   • Amphetamine Screen, Urine 01/27/2025 Positive (A)  Negative Final   • Barbiturates Screen, Urine 01/27/2025 Negative  Negative Final   • Buprenorphine, Screen, Urine 01/27/2025 Positive (A)  Negative Final   • Benzodiazepine Screen, Urine 01/27/2025 Negative  Negative Final   • Cocaine Screen, Urine 01/27/2025 Negative  Negative Final   • MDMA (ECSTASY) 01/27/2025 Positive (A)  Negative Final   • " Methamphetamine, Ur 01/27/2025 Negative  Negative Final   • Methadone Screen, Urine 01/27/2025 Negative  Negative Final   • Morphine/Opiates Screen, Urine 01/27/2025 Negative  Negative Final   • Oxycodone Screen, Urine 01/27/2025 Negative  Negative Final   • Phencyclidine (PCP), Urine 01/27/2025 Negative  Negative Final   • Propoxyphene Scree, Urine 01/27/2025 Negative  Negative Final   • Tricyclic Antidepressants Screen 01/27/2025 Negative  Negative Final   • THC, Screen, Urine 01/27/2025 Negative  Negative Final   • Gabapentin 01/27/2025 NEGATIVE   Final   • Ethanol, Urine 01/27/2025 NEGATIVE   Final   • Fentanyl, Urine 01/27/2025 Negative  Negative Final   Telemedicine on 01/20/2025   Component Date Value Ref Range Status   • Amphetamine Screen, Urine 01/20/2025 Positive (A)  Negative Final    PRELIMINARY RESULTS. REFER TO [facility] LAB CONFIRMATION FOR FINAL RESULTS.   • Barbiturates Screen, Urine 01/20/2025 Negative  Negative Final   • Buprenorphine, Screen, Urine 01/20/2025 Positive (A)  Negative Final   • Benzodiazepine Screen, Urine 01/20/2025 Negative  Negative Final   • Cocaine Screen, Urine 01/20/2025 Negative  Negative Final   • MDMA (ECSTASY) 01/20/2025 Negative  Negative Final   • Methamphetamine, Ur 01/20/2025 Positive (A)  Negative Final   • Methadone Screen, Urine 01/20/2025 Negative  Negative Final   • Morphine/Opiates Screen, Urine 01/20/2025 Negative  Negative Final   • Oxycodone Screen, Urine 01/20/2025 Negative  Negative Final   • Phencyclidine (PCP), Urine 01/20/2025 Negative  Negative Final   • Propoxyphene Scree, Urine 01/20/2025 Negative  Negative Final   • Tricyclic Antidepressants Screen 01/20/2025 Negative  Negative Final   • THC, Screen, Urine 01/20/2025 Positive (A)  Negative Final   • Gabapentin 01/20/2025 negative   Final   • ETHANOL URINE SCREEN 01/20/2025 Negative   Final   • Fentanyl, Urine 01/20/2025 Negative  Negative Final   Telemedicine on 12/31/2024   Component Date Value Ref  Range Status   • Amphetamine Screen, Urine 12/31/2024 Positive (A)  Negative Final   • Barbiturates Screen, Urine 12/31/2024 Negative  Negative Final   • Buprenorphine, Screen, Urine 12/31/2024 Positive (A)  Negative Final   • Benzodiazepine Screen, Urine 12/31/2024 Negative  Negative Final   • Cocaine Screen, Urine 12/31/2024 Negative  Negative Final   • MDMA (ECSTASY) 12/31/2024 Positive (A)  Negative Final   • Methamphetamine, Ur 12/31/2024 Positive (A)  Negative Final   • Methadone Screen, Urine 12/31/2024 Negative  Negative Final   • Morphine/Opiates Screen, Urine 12/31/2024 Negative  Negative Final   • Oxycodone Screen, Urine 12/31/2024 Negative  Negative Final   • Phencyclidine (PCP), Urine 12/31/2024 Negative  Negative Final   • Propoxyphene Scree, Urine 12/31/2024 Negative  Negative Final   • Tricyclic Antidepressants Screen 12/31/2024 Negative  Negative Final   • THC, Screen, Urine 12/31/2024 Positive (A)  Negative Final   • Gabapentin 12/31/2024 negative   Final   • ETHANOL URINE SCREEN 12/31/2024 Negative   Final   • Fentanyl, Urine 12/31/2024 Negative  Negative Final   Telemedicine on 12/23/2024   Component Date Value Ref Range Status   • Amphetamine Screen, Urine 12/23/2024 Positive (A)  Negative Final   • Barbiturates Screen, Urine 12/23/2024 Negative  Negative Final   • Buprenorphine, Screen, Urine 12/23/2024 Positive (A)  Negative Final   • Benzodiazepine Screen, Urine 12/23/2024 Negative  Negative Final   • Cocaine Screen, Urine 12/23/2024 Negative  Negative Final   • MDMA (ECSTASY) 12/23/2024 Positive (A)  Negative Final   • Methamphetamine, Ur 12/23/2024 Positive (A)  Negative Final   • Methadone Screen, Urine 12/23/2024 Negative  Negative Final   • Morphine/Opiates Screen, Urine 12/23/2024 Negative  Negative Final   • Oxycodone Screen, Urine 12/23/2024 Negative  Negative Final   • Phencyclidine (PCP), Urine 12/23/2024 Negative  Negative Final   • Propoxyphene Scree, Urine 12/23/2024 Negative   Negative Final   • Tricyclic Antidepressants Screen 12/23/2024 Negative  Negative Final   • THC, Screen, Urine 12/23/2024 Positive (A)  Negative Final   • GABAPENTIN 12/23/2024 negative   Final   • ETHANOL URINE SCREEN 12/23/2024 Negative   Final   • Fentanyl, Urine 12/23/2024 Negative  Negative Final   Telemedicine on 12/18/2024   Component Date Value Ref Range Status   • Amphetamine Screen, Urine 12/18/2024 Positive (A)  Negative Final   • Barbiturates Screen, Urine 12/18/2024 Negative  Negative Final   • Buprenorphine, Screen, Urine 12/18/2024 Positive (A)  Negative Final   • Benzodiazepine Screen, Urine 12/18/2024 Negative  Negative Final   • Cocaine Screen, Urine 12/18/2024 Negative  Negative Final   • MDMA (ECSTASY) 12/18/2024 Negative  Negative Final   • Methamphetamine, Ur 12/18/2024 Positive (A)  Negative Final   • Methadone Screen, Urine 12/18/2024 Negative  Negative Final   • Morphine/Opiates Screen, Urine 12/18/2024 Negative  Negative Final   • Oxycodone Screen, Urine 12/18/2024 Negative  Negative Final   • Phencyclidine (PCP), Urine 12/18/2024 Negative  Negative Final   • Propoxyphene Scree, Urine 12/18/2024 Negative  Negative Final   • Tricyclic Antidepressants Screen 12/18/2024 Negative  Negative Final   • THC, Screen, Urine 12/18/2024 Negative  Negative Final   • Gabapentin 12/18/2024 negative   Final   • ETHANOL URINE SCREEN 12/18/2024 Negative   Final   • Fentanyl, Urine 12/18/2024 Negative  Negative Final   Telemedicine on 11/12/2024   Component Date Value Ref Range Status   • Amphetamine Screen, Urine 11/12/2024 Negative  Negative Final    PRELIMINARY RESULTS. PLEASE REFER TO [facility] LAB CONFIRMATION FOR FINAL RESULTS.   • Barbiturates Screen, Urine 11/12/2024 Negative  Negative Final   • Buprenorphine, Screen, Urine 11/12/2024 Positive (A)  Negative Final   • Benzodiazepine Screen, Urine 11/12/2024 Negative  Negative Final   • Cocaine Screen, Urine 11/12/2024 Negative  Negative Final   • MDMA  (ECSTASY) 11/12/2024 Negative  Negative Final   • Methamphetamine, Ur 11/12/2024 Negative  Negative Final   • Methadone Screen, Urine 11/12/2024 Negative  Negative Final   • Morphine/Opiates Screen, Urine 11/12/2024 Negative  Negative Final   • Oxycodone Screen, Urine 11/12/2024 Negative  Negative Final   • Phencyclidine (PCP), Urine 11/12/2024 Negative  Negative Final   • Propoxyphene Scree, Urine 11/12/2024 Negative  Negative Final   • Tricyclic Antidepressants Screen 11/12/2024 Negative  Negative Final   • THC, Screen, Urine 11/12/2024 Positive (A)  Negative Final   • Gabapentin 11/12/2024 NEGATIVE   Final   • ETHANOL URINE SCREEN 11/12/2024 Negative   Final   • Fentanyl, Urine 11/12/2024 Negative  Negative Final   Telemedicine on 10/15/2024   Component Date Value Ref Range Status   • Amphetamine Screen, Urine 10/15/2024 Negative  Negative Final    PRELIMINARY RESULTS. PLEASE REFER TO [facility] LAB CONFIRMATION FOR FINAL RESULTS.   • Barbiturates Screen, Urine 10/15/2024 Negative  Negative Final   • Buprenorphine, Screen, Urine 10/15/2024 Positive (A)  Negative Final   • Benzodiazepine Screen, Urine 10/15/2024 Negative  Negative Final   • Cocaine Screen, Urine 10/15/2024 Negative  Negative Final   • MDMA (ECSTASY) 10/15/2024 Negative  Negative Final   • Methamphetamine, Ur 10/15/2024 Negative  Negative Final   • Methadone Screen, Urine 10/15/2024 Negative  Negative Final   • Morphine/Opiates Screen, Urine 10/15/2024 Negative  Negative Final   • Oxycodone Screen, Urine 10/15/2024 Negative  Negative Final   • Phencyclidine (PCP), Urine 10/15/2024 Negative  Negative Final   • Propoxyphene Scree, Urine 10/15/2024 Negative  Negative Final   • Tricyclic Antidepressants Screen 10/15/2024 Negative  Negative Final   • THC, Screen, Urine 10/15/2024 Positive (A)  Negative Final   Telemedicine on 09/26/2024   Component Date Value Ref Range Status   • External Amphetamine Screen Urine 09/26/2024 Negative   Final   • External  Benzodiazepine Screen Uri* 09/26/2024 Negative   Final   • External Cocaine Screen Urine 09/26/2024 Negative   Final   • External THC Screen Urine 09/26/2024 Negative   Final   • External Methadone Screen Urine 09/26/2024 Negative   Final   • External Methamphetamine Screen Ur* 09/26/2024 Negative   Final   • External Oxycodone Screen Urine 09/26/2024 Negative   Final   • External Buprenorphine Screen Urine 09/26/2024 Positive (A)   Final   • External MDMA 09/26/2024 Negative   Final   • External Opiates Screen Urine 09/26/2024 Negative   Final   Telemedicine on 09/19/2024   Component Date Value Ref Range Status   • External Amphetamine Screen Urine 09/19/2024 Positive   Final   • External Benzodiazepine Screen Uri* 09/19/2024 Negative   Final   • External Cocaine Screen Urine 09/19/2024 Negative   Final   • External THC Screen Urine 09/19/2024 Negative   Final   • External Methadone Screen Urine 09/19/2024 Negative   Final   • External Methamphetamine Screen Ur* 09/19/2024 Positive (A)   Final   • External Oxycodone Screen Urine 09/19/2024 Negative   Final   • External Buprenorphine Screen Urine 09/19/2024 Positive (A)   Final   • External MDMA 09/19/2024 Negative   Final   • External Opiates Screen Urine 09/19/2024 Negative   Final   Telemedicine on 09/12/2024   Component Date Value Ref Range Status   • External Amphetamine Screen Urine 09/12/2024 Positive (A)   Final   • External Benzodiazepine Screen Uri* 09/12/2024 Negative   Final   • External Cocaine Screen Urine 09/12/2024 Negative   Final   • External THC Screen Urine 09/12/2024 Positive (A)   Final   • External Methadone Screen Urine 09/12/2024 Negative   Final   • External Methamphetamine Screen Ur* 09/12/2024 Positive (A)   Final   • External Oxycodone Screen Urine 09/12/2024 Negative   Final   • External Buprenorphine Screen Urine 09/12/2024 Positive (A)   Final   • External MDMA 09/12/2024 Positive (A)   Final   • External Opiates Screen Urine  09/12/2024 Negative   Final   There may be more visits with results that are not included.         Assessment & Plan   Diagnoses and all orders for this visit:    1. Opioid type dependence, continuous (Primary)  -     buprenorphine-naloxone (SUBOXONE) 8-2 MG per SL tablet; Place 2 tablets under the tongue Daily.  Dispense: 14 tablet; Refill: 0    2. Medication management  -     Cancel: [facility] Drug Screen  -     POC Medline 14 Panel Urine Drug Screen    3. Adjustment disorder with mixed anxiety and depressed mood        Visit Diagnoses:    ICD-10-CM ICD-9-CM   1. Opioid type dependence, continuous  F11.20 304.01   2. Medication management  Z79.899 V58.69   3. Adjustment disorder with mixed anxiety and depressed mood  F43.23 309.28       PLAN:  Safety: No acute safety concerns  Risk Assessment: Risk of self-harm acutely is low. Risk of self-harm chronically is also low, but could be further elevated in the event of treatment noncompliance and/or AODA.    TREATMENT PLAN/GOALS: Continue supportive psychotherapy efforts and medications as indicated. Treatment and medication options discussed during today's visit. Patient acknowledged and verbally consented to continue with current treatment plan and was educated on the importance of compliance with treatment and follow-up appointments.    MEDICATION ISSUES:  ROSY reviewed as expected.  Discussed medication options and treatment plan of prescribed medication as well as the risks, benefits, and side effects including potential falls, possible impaired driving and metabolic adversities among others. Patient is agreeable to call the office with any worsening of symptoms or onset of side effects. Patient is agreeable to call 911 or go to the nearest ER should he/she begin having SI/HI. No medication side effects or related complaints today.     MEDS ORDERED DURING VISIT:  New Medications Ordered This Visit   Medications   • buprenorphine-naloxone (SUBOXONE) 8-2 MG per SL  tablet     Sig: Place 2 tablets under the tongue Daily.     Dispense:  14 tablet     Refill:  0     NADEAN:[id number]       No follow-ups on file.           This document has been electronically signed by WALKER Romero  January 27, 2025 15:04 EST      Part of this note may be an electronic transcription/translation of spoken language to printed text using the Dragon Dictation System.

## 2025-02-03 DIAGNOSIS — F11.20 OPIOID TYPE DEPENDENCE, CONTINUOUS: ICD-10-CM

## 2025-02-04 ENCOUNTER — TELEMEDICINE (OUTPATIENT)
Dept: PSYCHIATRY | Facility: CLINIC | Age: 55
End: 2025-02-04
Payer: MEDICAID

## 2025-02-04 DIAGNOSIS — F11.20 OPIOID TYPE DEPENDENCE, CONTINUOUS: Primary | ICD-10-CM

## 2025-02-04 RX ORDER — BUPRENORPHINE HYDROCHLORIDE AND NALOXONE HYDROCHLORIDE DIHYDRATE 8; 2 MG/1; MG/1
2 TABLET SUBLINGUAL DAILY
Qty: 14 TABLET | Refills: 0 | OUTPATIENT
Start: 2025-02-04

## 2025-02-04 RX ORDER — BUPRENORPHINE HYDROCHLORIDE AND NALOXONE HYDROCHLORIDE DIHYDRATE 8; 2 MG/1; MG/1
2 TABLET SUBLINGUAL DAILY
Qty: 14 TABLET | Refills: 0 | Status: SHIPPED | OUTPATIENT
Start: 2025-02-04

## 2025-02-04 NOTE — PROGRESS NOTES
This provider is located at [facility]. The Patient is seen remotely. Patient is being seen via telehealth and confirm that they are in a secure environment for this session. The patient's condition being diagnosed/treated is appropriate for telemedicine. Provider identified as Williams Brody as well as credentials APRN FLAVIA MANDEL-SHAHEEN ÁLVAREZ.   The client/patient gave consent to be seen remotely, and when consent is given they understand that the consent allows for patient identifiable information to be sent to a third party as needed.   They may refuse to be seen remotely at any time. The electronic data is encrypted and password protected, and the patient has been advised of the potential risks to privacy not withstanding such measures.    Patient located at home for today's remote telehealth evaluation.  Patient unable to report to the [facility] in person due to an unexpected and unforeseen emergent life situation.  This remote telehealth evaluation is conducted with the goal of harm reduction to the patient to ensure required medications are represcribed without interruption.  Patient will report in person to the [facility] next week to submit a urinalysis for drug testing and continued accountability.    Chief Complaint/History of Present Illness: Follow Up buprenorphine/naloxone Medicated Assisted Treatment for Opiate Use Disorder     Patient/Client Concerns/Updates: Continue Wellbutrin for stimulant cravings   -Patient is currently in route to [facility] as her mother has sustained a cerebral vascular accident    Triggers (Persons/Places/Things/Events/Thought/Emotions): Mother in the hospital having sustained a cerebrovascular accident    Cravings/Substance Use: Cravings and continued struggle with methamphetamine    Relapse Prevention: Counseling and continue weekly evaluations for support and accountability    UDS Confirmation (Most recent/Resulted): Positive  buprenorphine/nor-buprenorphine, no concerns for urine tampering otherwise positive methamphetamine THC and clonazepam    Most recent pertinent laboratory studies reviewed: 4/11/23-hepatic function within normal limits, Cr WNL, HIV negative, hepatitis C not detected         ROSY (PDMP) Reviewed for Current/Active Medications: buprenorphine/naloxone as reviewed today    Past Surgical History:  No past surgical history on file.    Problem List:  There is no problem list on file for this patient.      Allergy:   No Known Allergies     Current Medications:   Current Outpatient Medications   Medication Sig Dispense Refill   • buprenorphine-naloxone (SUBOXONE) 8-2 MG per SL tablet Place 2 tablets under the tongue Daily. 14 tablet 0   • acetaminophen (Tylenol) 325 MG tablet Take 1 tablet by mouth Every 4 (Four) Hours As Needed for Mild Pain or Headache. 100 tablet 0   • buPROPion XL (WELLBUTRIN XL) 150 MG 24 hr tablet Take 1 tablet by mouth 2 (Two) Times a Day. BID dosing 60 tablet 0   • famotidine (PEPCID) 20 MG tablet Take 1 tablet by mouth 2 (Two) Times a Day. 60 tablet 0   • folic acid (FOLVITE) 1 MG tablet Take 1 tablet by mouth Daily.     • GNP PAIN RELIEF EX-STRENGTH 500 MG tablet TAKE ONE TABLET BY MOUTH EVERY 6 HOURS AS NEEDED FOR 30 DAYS **DO NOT EXCEED 6 TABLETS IN 24 HOURS**     • hydrOXYzine pamoate (Vistaril) 25 MG capsule Take 1 capsule by mouth 4 (Four) Times a Day As Needed for Anxiety. 120 capsule 0   • methotrexate 2.5 MG tablet Take 1 tablet by mouth 3 (Three) Doses Each Week. Take Doses 12 (Twelve) Hours Apart.     • naloxone (NARCAN) 4 MG/0.1ML nasal spray 1 spray into the nostril(s) as directed by provider As Needed (opiate over sedation). 1 spray in 1 nostril every 2 to 3 minutes, call 911 (Patient not taking: Reported on 1/27/2025) 2 each 2   • ondansetron ODT (ZOFRAN-ODT) 8 MG disintegrating tablet Place 1 tablet on the tongue Every 8 (Eight) Hours As Needed for Nausea or Vomiting. 45 tablet 0      No current facility-administered medications for this visit.       Past Medical History:  Past Medical History:   Diagnosis Date   • History of hepatitis B    • Pancreatitis          Social History     Socioeconomic History   • Marital status: Single   Tobacco Use   • Smoking status: Every Day     Current packs/day: 1.00     Average packs/day: 1 pack/day for 15.0 years (15.0 ttl pk-yrs)     Types: Cigarettes   • Smokeless tobacco: Never   Vaping Use   • Vaping status: Never Used   Substance and Sexual Activity   • Alcohol use: Not Currently   • Drug use: Yes     Types: Marijuana, Hydrocodone, Oxycodone, Methamphetamines   • Sexual activity: Defer         Family History   Problem Relation Age of Onset   • No Known Problems Mother    • No Known Problems Father    • No Known Problems Sister    • No Known Problems Brother    • No Known Problems Maternal Grandmother    • No Known Problems Paternal Grandmother    • No Known Problems Maternal Aunt    • No Known Problems Paternal Aunt    • No Known Problems Maternal Grandfather    • No Known Problems Paternal Grandfather    • No Known Problems Maternal Uncle    • No Known Problems Paternal Uncle    • No Known Problems Cousin    • No Known Problems Other    • ADD / ADHD Neg Hx    • Alcohol abuse Neg Hx    • Anxiety disorder Neg Hx    • Bipolar disorder Neg Hx    • Dementia Neg Hx    • Depression Neg Hx    • Drug abuse Neg Hx    • OCD Neg Hx    • Paranoid behavior Neg Hx    • Schizophrenia Neg Hx    • Seizures Neg Hx    • Self-Injurious Behavior  Neg Hx    • Suicide Attempts Neg Hx    • Breast cancer Neg Hx          Mental Status Exam:   Hygiene:   good  Cooperation:  Cooperative  Eye Contact:  Good  Psychomotor Behavior:  Appropriate  Affect:  Appropriate  Mood: anxious  Speech:  Normal  Thought Process:  Goal directed  Thought Content:  Normal  Suicidal:  None  Homicidal:  None  Hallucinations:  None  Delusion:  None  Memory:  Intact  Orientation:  Grossly  intact  Reliability:  good  Insight:  Fair  Judgement:  Fair  Impulse Control:  Fair         Review of Systems:  Review of Systems   Constitutional:  Negative for activity change, chills, diaphoresis and fatigue.   Respiratory:  Negative for apnea, cough and shortness of breath.    Cardiovascular:  Negative for chest pain, palpitations and leg swelling.   Gastrointestinal:  Negative for abdominal pain, constipation, diarrhea, nausea and vomiting.   Genitourinary:  Negative for difficulty urinating.   Musculoskeletal:  Negative for arthralgias.   Skin:  Negative for rash.   Neurological:  Negative for dizziness, weakness and headaches.   Psychiatric/Behavioral:  Negative for agitation, self-injury, sleep disturbance and suicidal ideas. The patient is nervous/anxious.        Physical Exam:  Physical Exam  Vitals reviewed.   Constitutional:       General: She is not in acute distress.     Appearance: Normal appearance. She is not ill-appearing or toxic-appearing.   Pulmonary:      Effort: Pulmonary effort is normal.   Musculoskeletal:         General: Normal range of motion.   Neurological:      General: No focal deficit present.      Mental Status: She is alert and oriented to person, place, and time.   Psychiatric:         Attention and Perception: Attention and perception normal.         Mood and Affect: Mood is anxious. Mood is not depressed.         Speech: Speech normal.         Behavior: Behavior normal. Behavior is cooperative.         Thought Content: Thought content normal.         Cognition and Memory: Cognition and memory normal.         Judgment: Judgment normal.     Vital Signs:   There were no vitals taken for this visit.     Lab Results:   Telemedicine on 01/27/2025   Component Date Value Ref Range Status   • Amphetamine Screen, Urine 01/27/2025 Positive (A)  Negative Final   • Barbiturates Screen, Urine 01/27/2025 Negative  Negative Final   • Buprenorphine, Screen, Urine 01/27/2025 Positive (A)   Negative Final   • Benzodiazepine Screen, Urine 01/27/2025 Negative  Negative Final   • Cocaine Screen, Urine 01/27/2025 Negative  Negative Final   • MDMA (ECSTASY) 01/27/2025 Positive (A)  Negative Final   • Methamphetamine, Ur 01/27/2025 Negative  Negative Final   • Methadone Screen, Urine 01/27/2025 Negative  Negative Final   • Morphine/Opiates Screen, Urine 01/27/2025 Negative  Negative Final   • Oxycodone Screen, Urine 01/27/2025 Negative  Negative Final   • Phencyclidine (PCP), Urine 01/27/2025 Negative  Negative Final   • Propoxyphene Scree, Urine 01/27/2025 Negative  Negative Final   • Tricyclic Antidepressants Screen 01/27/2025 Negative  Negative Final   • THC, Screen, Urine 01/27/2025 Negative  Negative Final   • Gabapentin 01/27/2025 NEGATIVE   Final   • Ethanol, Urine 01/27/2025 NEGATIVE   Final   • Fentanyl, Urine 01/27/2025 Negative  Negative Final   Telemedicine on 01/20/2025   Component Date Value Ref Range Status   • Amphetamine Screen, Urine 01/20/2025 Positive (A)  Negative Final    PRELIMINARY RESULTS. REFER TO [facility] LAB CONFIRMATION FOR FINAL RESULTS.   • Barbiturates Screen, Urine 01/20/2025 Negative  Negative Final   • Buprenorphine, Screen, Urine 01/20/2025 Positive (A)  Negative Final   • Benzodiazepine Screen, Urine 01/20/2025 Negative  Negative Final   • Cocaine Screen, Urine 01/20/2025 Negative  Negative Final   • MDMA (ECSTASY) 01/20/2025 Negative  Negative Final   • Methamphetamine, Ur 01/20/2025 Positive (A)  Negative Final   • Methadone Screen, Urine 01/20/2025 Negative  Negative Final   • Morphine/Opiates Screen, Urine 01/20/2025 Negative  Negative Final   • Oxycodone Screen, Urine 01/20/2025 Negative  Negative Final   • Phencyclidine (PCP), Urine 01/20/2025 Negative  Negative Final   • Propoxyphene Scree, Urine 01/20/2025 Negative  Negative Final   • Tricyclic Antidepressants Screen 01/20/2025 Negative  Negative Final   • THC, Screen, Urine 01/20/2025 Positive (A)  Negative Final   •  Gabapentin 01/20/2025 negative   Final   • ETHANOL URINE SCREEN 01/20/2025 Negative   Final   • Fentanyl, Urine 01/20/2025 Negative  Negative Final   Telemedicine on 12/31/2024   Component Date Value Ref Range Status   • Amphetamine Screen, Urine 12/31/2024 Positive (A)  Negative Final   • Barbiturates Screen, Urine 12/31/2024 Negative  Negative Final   • Buprenorphine, Screen, Urine 12/31/2024 Positive (A)  Negative Final   • Benzodiazepine Screen, Urine 12/31/2024 Negative  Negative Final   • Cocaine Screen, Urine 12/31/2024 Negative  Negative Final   • MDMA (ECSTASY) 12/31/2024 Positive (A)  Negative Final   • Methamphetamine, Ur 12/31/2024 Positive (A)  Negative Final   • Methadone Screen, Urine 12/31/2024 Negative  Negative Final   • Morphine/Opiates Screen, Urine 12/31/2024 Negative  Negative Final   • Oxycodone Screen, Urine 12/31/2024 Negative  Negative Final   • Phencyclidine (PCP), Urine 12/31/2024 Negative  Negative Final   • Propoxyphene Scree, Urine 12/31/2024 Negative  Negative Final   • Tricyclic Antidepressants Screen 12/31/2024 Negative  Negative Final   • THC, Screen, Urine 12/31/2024 Positive (A)  Negative Final   • Gabapentin 12/31/2024 negative   Final   • ETHANOL URINE SCREEN 12/31/2024 Negative   Final   • Fentanyl, Urine 12/31/2024 Negative  Negative Final   Telemedicine on 12/23/2024   Component Date Value Ref Range Status   • Amphetamine Screen, Urine 12/23/2024 Positive (A)  Negative Final   • Barbiturates Screen, Urine 12/23/2024 Negative  Negative Final   • Buprenorphine, Screen, Urine 12/23/2024 Positive (A)  Negative Final   • Benzodiazepine Screen, Urine 12/23/2024 Negative  Negative Final   • Cocaine Screen, Urine 12/23/2024 Negative  Negative Final   • MDMA (ECSTASY) 12/23/2024 Positive (A)  Negative Final   • Methamphetamine, Ur 12/23/2024 Positive (A)  Negative Final   • Methadone Screen, Urine 12/23/2024 Negative  Negative Final   • Morphine/Opiates Screen, Urine 12/23/2024  Negative  Negative Final   • Oxycodone Screen, Urine 12/23/2024 Negative  Negative Final   • Phencyclidine (PCP), Urine 12/23/2024 Negative  Negative Final   • Propoxyphene Scree, Urine 12/23/2024 Negative  Negative Final   • Tricyclic Antidepressants Screen 12/23/2024 Negative  Negative Final   • THC, Screen, Urine 12/23/2024 Positive (A)  Negative Final   • GABAPENTIN 12/23/2024 negative   Final   • ETHANOL URINE SCREEN 12/23/2024 Negative   Final   • Fentanyl, Urine 12/23/2024 Negative  Negative Final   Telemedicine on 12/18/2024   Component Date Value Ref Range Status   • Amphetamine Screen, Urine 12/18/2024 Positive (A)  Negative Final   • Barbiturates Screen, Urine 12/18/2024 Negative  Negative Final   • Buprenorphine, Screen, Urine 12/18/2024 Positive (A)  Negative Final   • Benzodiazepine Screen, Urine 12/18/2024 Negative  Negative Final   • Cocaine Screen, Urine 12/18/2024 Negative  Negative Final   • MDMA (ECSTASY) 12/18/2024 Negative  Negative Final   • Methamphetamine, Ur 12/18/2024 Positive (A)  Negative Final   • Methadone Screen, Urine 12/18/2024 Negative  Negative Final   • Morphine/Opiates Screen, Urine 12/18/2024 Negative  Negative Final   • Oxycodone Screen, Urine 12/18/2024 Negative  Negative Final   • Phencyclidine (PCP), Urine 12/18/2024 Negative  Negative Final   • Propoxyphene Scree, Urine 12/18/2024 Negative  Negative Final   • Tricyclic Antidepressants Screen 12/18/2024 Negative  Negative Final   • THC, Screen, Urine 12/18/2024 Negative  Negative Final   • Gabapentin 12/18/2024 negative   Final   • ETHANOL URINE SCREEN 12/18/2024 Negative   Final   • Fentanyl, Urine 12/18/2024 Negative  Negative Final   Telemedicine on 11/12/2024   Component Date Value Ref Range Status   • Amphetamine Screen, Urine 11/12/2024 Negative  Negative Final    PRELIMINARY RESULTS. PLEASE REFER TO [facility] LAB CONFIRMATION FOR FINAL RESULTS.   • Barbiturates Screen, Urine 11/12/2024 Negative  Negative Final   •  Buprenorphine, Screen, Urine 11/12/2024 Positive (A)  Negative Final   • Benzodiazepine Screen, Urine 11/12/2024 Negative  Negative Final   • Cocaine Screen, Urine 11/12/2024 Negative  Negative Final   • MDMA (ECSTASY) 11/12/2024 Negative  Negative Final   • Methamphetamine, Ur 11/12/2024 Negative  Negative Final   • Methadone Screen, Urine 11/12/2024 Negative  Negative Final   • Morphine/Opiates Screen, Urine 11/12/2024 Negative  Negative Final   • Oxycodone Screen, Urine 11/12/2024 Negative  Negative Final   • Phencyclidine (PCP), Urine 11/12/2024 Negative  Negative Final   • Propoxyphene Scree, Urine 11/12/2024 Negative  Negative Final   • Tricyclic Antidepressants Screen 11/12/2024 Negative  Negative Final   • THC, Screen, Urine 11/12/2024 Positive (A)  Negative Final   • Gabapentin 11/12/2024 NEGATIVE   Final   • ETHANOL URINE SCREEN 11/12/2024 Negative   Final   • Fentanyl, Urine 11/12/2024 Negative  Negative Final   Telemedicine on 10/15/2024   Component Date Value Ref Range Status   • Amphetamine Screen, Urine 10/15/2024 Negative  Negative Final    PRELIMINARY RESULTS. PLEASE REFER TO [facility] LAB CONFIRMATION FOR FINAL RESULTS.   • Barbiturates Screen, Urine 10/15/2024 Negative  Negative Final   • Buprenorphine, Screen, Urine 10/15/2024 Positive (A)  Negative Final   • Benzodiazepine Screen, Urine 10/15/2024 Negative  Negative Final   • Cocaine Screen, Urine 10/15/2024 Negative  Negative Final   • MDMA (ECSTASY) 10/15/2024 Negative  Negative Final   • Methamphetamine, Ur 10/15/2024 Negative  Negative Final   • Methadone Screen, Urine 10/15/2024 Negative  Negative Final   • Morphine/Opiates Screen, Urine 10/15/2024 Negative  Negative Final   • Oxycodone Screen, Urine 10/15/2024 Negative  Negative Final   • Phencyclidine (PCP), Urine 10/15/2024 Negative  Negative Final   • Propoxyphene Scree, Urine 10/15/2024 Negative  Negative Final   • Tricyclic Antidepressants Screen 10/15/2024 Negative  Negative Final   •  THC, Screen, Urine 10/15/2024 Positive (A)  Negative Final   Telemedicine on 09/26/2024   Component Date Value Ref Range Status   • External Amphetamine Screen Urine 09/26/2024 Negative   Final   • External Benzodiazepine Screen Uri* 09/26/2024 Negative   Final   • External Cocaine Screen Urine 09/26/2024 Negative   Final   • External THC Screen Urine 09/26/2024 Negative   Final   • External Methadone Screen Urine 09/26/2024 Negative   Final   • External Methamphetamine Screen Ur* 09/26/2024 Negative   Final   • External Oxycodone Screen Urine 09/26/2024 Negative   Final   • External Buprenorphine Screen Urine 09/26/2024 Positive (A)   Final   • External MDMA 09/26/2024 Negative   Final   • External Opiates Screen Urine 09/26/2024 Negative   Final   Telemedicine on 09/19/2024   Component Date Value Ref Range Status   • External Amphetamine Screen Urine 09/19/2024 Positive   Final   • External Benzodiazepine Screen Uri* 09/19/2024 Negative   Final   • External Cocaine Screen Urine 09/19/2024 Negative   Final   • External THC Screen Urine 09/19/2024 Negative   Final   • External Methadone Screen Urine 09/19/2024 Negative   Final   • External Methamphetamine Screen Ur* 09/19/2024 Positive (A)   Final   • External Oxycodone Screen Urine 09/19/2024 Negative   Final   • External Buprenorphine Screen Urine 09/19/2024 Positive (A)   Final   • External MDMA 09/19/2024 Negative   Final   • External Opiates Screen Urine 09/19/2024 Negative   Final   Telemedicine on 09/12/2024   Component Date Value Ref Range Status   • External Amphetamine Screen Urine 09/12/2024 Positive (A)   Final   • External Benzodiazepine Screen Uri* 09/12/2024 Negative   Final   • External Cocaine Screen Urine 09/12/2024 Negative   Final   • External THC Screen Urine 09/12/2024 Positive (A)   Final   • External Methadone Screen Urine 09/12/2024 Negative   Final   • External Methamphetamine Screen Ur* 09/12/2024 Positive (A)   Final   • External  Oxycodone Screen Urine 09/12/2024 Negative   Final   • External Buprenorphine Screen Urine 09/12/2024 Positive (A)   Final   • External MDMA 09/12/2024 Positive (A)   Final   • External Opiates Screen Urine 09/12/2024 Negative   Final   There may be more visits with results that are not included.         Assessment & Plan   Diagnoses and all orders for this visit:    1. Opioid type dependence, continuous (Primary)  -     buprenorphine-naloxone (SUBOXONE) 8-2 MG per SL tablet; Place 2 tablets under the tongue Daily.  Dispense: 14 tablet; Refill: 0        Visit Diagnoses:    ICD-10-CM ICD-9-CM   1. Opioid type dependence, continuous  F11.20 304.01       PLAN:  Safety: No acute safety concerns  Risk Assessment: Risk of self-harm acutely is low. Risk of self-harm chronically is also low, but could be further elevated in the event of treatment noncompliance and/or AODA.    TREATMENT PLAN/GOALS: Continue supportive psychotherapy efforts and medications as indicated. Treatment and medication options discussed during today's visit. Patient acknowledged and verbally consented to continue with current treatment plan and was educated on the importance of compliance with treatment and follow-up appointments.    MEDICATION ISSUES:  ROSY reviewed as expected.  Discussed medication options and treatment plan of prescribed medication as well as the risks, benefits, and side effects including potential falls, possible impaired driving and metabolic adversities among others. Patient is agreeable to call the office with any worsening of symptoms or onset of side effects. Patient is agreeable to call 911 or go to the nearest ER should he/she begin having SI/HI. No medication side effects or related complaints today.     MEDS ORDERED DURING VISIT:  New Medications Ordered This Visit   Medications   • buprenorphine-naloxone (SUBOXONE) 8-2 MG per SL tablet     Sig: Place 2 tablets under the tongue Daily.     Dispense:  14 tablet      Refill:  0     NADEAN:[id number]       No follow-ups on file.           This document has been electronically signed by WALKER Romero  February 4, 2025 15:12 EST      Part of this note may be an electronic transcription/translation of spoken language to printed text using the Dragon Dictation System.

## 2025-02-04 NOTE — TELEPHONE ENCOUNTER
Please schedule a my chart telehealth appointment so I may conduct an evaluation and refill her medication-this may be scheduled today immediately if needed

## 2025-02-25 ENCOUNTER — TELEMEDICINE (OUTPATIENT)
Dept: PSYCHIATRY | Facility: CLINIC | Age: 55
End: 2025-02-25
Payer: MEDICAID

## 2025-02-25 VITALS
DIASTOLIC BLOOD PRESSURE: 82 MMHG | BODY MASS INDEX: 33.87 KG/M2 | WEIGHT: 179.4 LBS | HEART RATE: 86 BPM | HEIGHT: 61 IN | SYSTOLIC BLOOD PRESSURE: 144 MMHG

## 2025-02-25 DIAGNOSIS — F11.20 OPIOID TYPE DEPENDENCE, CONTINUOUS: Primary | ICD-10-CM

## 2025-02-25 DIAGNOSIS — Z79.899 MEDICATION MANAGEMENT: ICD-10-CM

## 2025-02-25 DIAGNOSIS — F15.10 METHAMPHETAMINE ABUSE: ICD-10-CM

## 2025-02-25 LAB
AMPHET+METHAMPHET UR QL: POSITIVE
AMPHETAMINES UR QL: POSITIVE
BARBITURATES UR QL SCN: NEGATIVE
BENZODIAZ UR QL SCN: NEGATIVE
BUPRENORPHINE SERPL-MCNC: POSITIVE NG/ML
CANNABINOIDS SERPL QL: POSITIVE
COCAINE UR QL: NEGATIVE
MDMA UR QL SCN: NEGATIVE
METHADONE UR QL SCN: NEGATIVE
MORPHINE/OPIATES SCREEN, URINE: NEGATIVE
OXYCODONE UR QL SCN: NEGATIVE
PCP UR QL SCN: NEGATIVE
PROPOXYPH UR QL SCN: NEGATIVE
TRICYCLICS UR QL SCN: NEGATIVE

## 2025-02-25 RX ORDER — BUPROPION HYDROCHLORIDE 150 MG/1
150 TABLET ORAL 2 TIMES DAILY
Qty: 60 TABLET | Refills: 0 | Status: SHIPPED | OUTPATIENT
Start: 2025-02-25

## 2025-02-25 RX ORDER — BUPRENORPHINE HYDROCHLORIDE AND NALOXONE HYDROCHLORIDE DIHYDRATE 8; 2 MG/1; MG/1
2 TABLET SUBLINGUAL DAILY
Qty: 14 TABLET | Refills: 0 | Status: SHIPPED | OUTPATIENT
Start: 2025-02-25

## 2025-02-25 NOTE — PROGRESS NOTES
This provider is located at [facility]. The Patient is seen remotely located at the [facility] ([facility]) using Video. Patient is being seen via telehealth and confirm that they are in a secure environment for this session. The patient's condition being diagnosed/treated is appropriate for telemedicine. Provider identified as Williams Brody as well as credentials APRN MSN FNP-C SRINIVASAN-YOUNG.   The client/patient gave consent to be seen remotely, and when consent is given they understand that the consent allows for patient identifiable information to be sent to a third party as needed.   They may refuse to be seen remotely at any time. The electronic data is encrypted and password protected, and the patient has been advised of the potential risks to privacy not withstanding such measures.    Chief Complaint/History of Present Illness: Follow Up buprenorphine/naloxone Medicated Assisted Treatment for Opiate Use Disorder     Patient/Client Concerns/Updates: Continue Wellbutrin for stimulant cravings   -Patient reports her mother has returned home from the hospital and is recovering from a recent cerebrovascular accident  -Patient reports upon returning home sustained a relapse on methamphetamine triggered by an awareness of convenient access and opportunity  -Patient expresses awareness she is struggling with the trigger of use of access; encouraged establishment and maintenance of boundaries to support recovery    Triggers (Persons/Places/Things/Events/Thought/Emotions): Social peer pressure    Cravings/Substance Use: Cravings and continued struggle with methamphetamine    Relapse Prevention: Counseling and continue weekly evaluations for support and accountability    Urine Drug Screen (today's visit)/Presumptive Point of Care discussed: Positive buprenorphine positive amphetamine methamphetamine and THC    UDS Confirmation (Most recent/Resulted): Positive buprenorphine/nor-buprenorphine, no concerns  for urine tampering otherwise positive methamphetamine clonazepam and THC    Most recent pertinent laboratory studies reviewed: 4/11/23-hepatic function within normal limits, Cr WNL, HIV negative, hepatitis C not detected         ROSY (PDMP) Reviewed for Current/Active Medications: buprenorphine/naloxone as reviewed today    Past Surgical History:  No past surgical history on file.    Problem List:  There is no problem list on file for this patient.      Allergy:   No Known Allergies     Current Medications:   Current Outpatient Medications   Medication Sig Dispense Refill   • buprenorphine-naloxone (SUBOXONE) 8-2 MG per SL tablet Place 2 tablets under the tongue Daily. 14 tablet 0   • buPROPion XL (WELLBUTRIN XL) 150 MG 24 hr tablet Take 1 tablet by mouth 2 (Two) Times a Day. BID dosing 60 tablet 0   • famotidine (PEPCID) 20 MG tablet Take 1 tablet by mouth 2 (Two) Times a Day. 60 tablet 0   • folic acid (FOLVITE) 1 MG tablet Take 1 tablet by mouth Daily.     • GNP PAIN RELIEF EX-STRENGTH 500 MG tablet TAKE ONE TABLET BY MOUTH EVERY 6 HOURS AS NEEDED FOR 30 DAYS **DO NOT EXCEED 6 TABLETS IN 24 HOURS**     • hydrOXYzine pamoate (Vistaril) 25 MG capsule Take 1 capsule by mouth 4 (Four) Times a Day As Needed for Anxiety. 120 capsule 0   • ondansetron ODT (ZOFRAN-ODT) 8 MG disintegrating tablet Place 1 tablet on the tongue Every 8 (Eight) Hours As Needed for Nausea or Vomiting. 45 tablet 0   • methotrexate 2.5 MG tablet Take 1 tablet by mouth 3 (Three) Doses Each Week. Take Doses 12 (Twelve) Hours Apart. (Patient not taking: Reported on 2/25/2025)     • naloxone (NARCAN) 4 MG/0.1ML nasal spray 1 spray into the nostril(s) as directed by provider As Needed (opiate over sedation). 1 spray in 1 nostril every 2 to 3 minutes, call 911 (Patient not taking: Reported on 2/25/2025) 2 each 2     No current facility-administered medications for this visit.       Past Medical History:  Past Medical History:   Diagnosis Date   •  History of hepatitis B    • Pancreatitis          Social History     Socioeconomic History   • Marital status: Single   Tobacco Use   • Smoking status: Every Day     Current packs/day: 1.00     Average packs/day: 1 pack/day for 15.0 years (15.0 ttl pk-yrs)     Types: Cigarettes   • Smokeless tobacco: Never   Vaping Use   • Vaping status: Never Used   Substance and Sexual Activity   • Alcohol use: Not Currently   • Drug use: Yes     Types: Marijuana, Hydrocodone, Oxycodone, Methamphetamines   • Sexual activity: Defer         Family History   Problem Relation Age of Onset   • No Known Problems Mother    • No Known Problems Father    • No Known Problems Sister    • No Known Problems Brother    • No Known Problems Maternal Grandmother    • No Known Problems Paternal Grandmother    • No Known Problems Maternal Aunt    • No Known Problems Paternal Aunt    • No Known Problems Maternal Grandfather    • No Known Problems Paternal Grandfather    • No Known Problems Maternal Uncle    • No Known Problems Paternal Uncle    • No Known Problems Cousin    • No Known Problems Other    • ADD / ADHD Neg Hx    • Alcohol abuse Neg Hx    • Anxiety disorder Neg Hx    • Bipolar disorder Neg Hx    • Dementia Neg Hx    • Depression Neg Hx    • Drug abuse Neg Hx    • OCD Neg Hx    • Paranoid behavior Neg Hx    • Schizophrenia Neg Hx    • Seizures Neg Hx    • Self-Injurious Behavior  Neg Hx    • Suicide Attempts Neg Hx    • Breast cancer Neg Hx          Mental Status Exam:   Hygiene:   good  Cooperation:  Cooperative  Eye Contact:  Good  Psychomotor Behavior:  Appropriate  Affect:  Appropriate  Mood: anxious  Speech:  Normal  Thought Process:  Goal directed  Thought Content:  Normal  Suicidal:  None  Homicidal:  None  Hallucinations:  None  Delusion:  None  Memory:  Intact  Orientation:  Grossly intact  Reliability:  good  Insight:  Fair  Judgement:  Poor  Impulse Control:  Poor         Review of Systems:  Review of Systems   Constitutional:   "Negative for activity change, chills, diaphoresis and fatigue.   Respiratory:  Negative for apnea, cough and shortness of breath.    Cardiovascular:  Negative for chest pain, palpitations and leg swelling.   Gastrointestinal:  Negative for abdominal pain, constipation, diarrhea, nausea and vomiting.   Genitourinary:  Negative for difficulty urinating.   Musculoskeletal:  Negative for arthralgias.   Skin:  Negative for rash.   Neurological:  Negative for dizziness, weakness and headaches.   Psychiatric/Behavioral:  Negative for agitation, self-injury, sleep disturbance and suicidal ideas. The patient is nervous/anxious.        Physical Exam:  Physical Exam  Vitals reviewed.   Constitutional:       General: She is not in acute distress.     Appearance: Normal appearance. She is not ill-appearing or toxic-appearing.   Pulmonary:      Effort: Pulmonary effort is normal.   Musculoskeletal:         General: Normal range of motion.   Neurological:      General: No focal deficit present.      Mental Status: She is alert and oriented to person, place, and time.   Psychiatric:         Attention and Perception: Attention and perception normal.         Mood and Affect: Mood is anxious. Mood is not depressed.         Speech: Speech normal.         Behavior: Behavior normal. Behavior is cooperative.         Thought Content: Thought content normal.         Cognition and Memory: Cognition and memory normal.         Judgment: Judgment normal.     Vital Signs:   /82   Pulse 86   Ht 156.2 cm (61.5\")   Wt 81.4 kg (179 lb 6.4 oz)   BMI 33.35 kg/m²      Lab Results:   Telemedicine on 02/25/2025   Component Date Value Ref Range Status   • Amphetamine Screen, Urine 02/25/2025 Positive (A)  Negative Final   • Barbiturates Screen, Urine 02/25/2025 Negative  Negative Final   • Buprenorphine, Screen, Urine 02/25/2025 Positive (A)  Negative Final   • Benzodiazepine Screen, Urine 02/25/2025 Negative  Negative Final   • Cocaine Screen, " Urine 02/25/2025 Negative  Negative Final   • MDMA (ECSTASY) 02/25/2025 Negative  Negative Final   • Methamphetamine, Ur 02/25/2025 Positive (A)  Negative Final   • Methadone Screen, Urine 02/25/2025 Negative  Negative Final   • Morphine/Opiates Screen, Urine 02/25/2025 Negative  Negative Final   • Oxycodone Screen, Urine 02/25/2025 Negative  Negative Final   • Phencyclidine (PCP), Urine 02/25/2025 Negative  Negative Final   • Propoxyphene Scree, Urine 02/25/2025 Negative  Negative Final   • Tricyclic Antidepressants Screen 02/25/2025 Negative  Negative Final   • THC, Screen, Urine 02/25/2025 Positive (A)  Negative Final   Telemedicine on 01/27/2025   Component Date Value Ref Range Status   • Amphetamine Screen, Urine 01/27/2025 Positive (A)  Negative Final   • Barbiturates Screen, Urine 01/27/2025 Negative  Negative Final   • Buprenorphine, Screen, Urine 01/27/2025 Positive (A)  Negative Final   • Benzodiazepine Screen, Urine 01/27/2025 Negative  Negative Final   • Cocaine Screen, Urine 01/27/2025 Negative  Negative Final   • MDMA (ECSTASY) 01/27/2025 Positive (A)  Negative Final   • Methamphetamine, Ur 01/27/2025 Negative  Negative Final   • Methadone Screen, Urine 01/27/2025 Negative  Negative Final   • Morphine/Opiates Screen, Urine 01/27/2025 Negative  Negative Final   • Oxycodone Screen, Urine 01/27/2025 Negative  Negative Final   • Phencyclidine (PCP), Urine 01/27/2025 Negative  Negative Final   • Propoxyphene Scree, Urine 01/27/2025 Negative  Negative Final   • Tricyclic Antidepressants Screen 01/27/2025 Negative  Negative Final   • THC, Screen, Urine 01/27/2025 Negative  Negative Final   • Gabapentin 01/27/2025 NEGATIVE   Final   • Ethanol, Urine 01/27/2025 NEGATIVE   Final   • Fentanyl, Urine 01/27/2025 Negative  Negative Final   Telemedicine on 01/20/2025   Component Date Value Ref Range Status   • Amphetamine Screen, Urine 01/20/2025 Positive (A)  Negative Final    PRELIMINARY RESULTS. REFER TO [facility] LAB  CONFIRMATION FOR FINAL RESULTS.   • Barbiturates Screen, Urine 01/20/2025 Negative  Negative Final   • Buprenorphine, Screen, Urine 01/20/2025 Positive (A)  Negative Final   • Benzodiazepine Screen, Urine 01/20/2025 Negative  Negative Final   • Cocaine Screen, Urine 01/20/2025 Negative  Negative Final   • MDMA (ECSTASY) 01/20/2025 Negative  Negative Final   • Methamphetamine, Ur 01/20/2025 Positive (A)  Negative Final   • Methadone Screen, Urine 01/20/2025 Negative  Negative Final   • Morphine/Opiates Screen, Urine 01/20/2025 Negative  Negative Final   • Oxycodone Screen, Urine 01/20/2025 Negative  Negative Final   • Phencyclidine (PCP), Urine 01/20/2025 Negative  Negative Final   • Propoxyphene Scree, Urine 01/20/2025 Negative  Negative Final   • Tricyclic Antidepressants Screen 01/20/2025 Negative  Negative Final   • THC, Screen, Urine 01/20/2025 Positive (A)  Negative Final   • Gabapentin 01/20/2025 negative   Final   • ETHANOL URINE SCREEN 01/20/2025 Negative   Final   • Fentanyl, Urine 01/20/2025 Negative  Negative Final   Telemedicine on 12/31/2024   Component Date Value Ref Range Status   • Amphetamine Screen, Urine 12/31/2024 Positive (A)  Negative Final   • Barbiturates Screen, Urine 12/31/2024 Negative  Negative Final   • Buprenorphine, Screen, Urine 12/31/2024 Positive (A)  Negative Final   • Benzodiazepine Screen, Urine 12/31/2024 Negative  Negative Final   • Cocaine Screen, Urine 12/31/2024 Negative  Negative Final   • MDMA (ECSTASY) 12/31/2024 Positive (A)  Negative Final   • Methamphetamine, Ur 12/31/2024 Positive (A)  Negative Final   • Methadone Screen, Urine 12/31/2024 Negative  Negative Final   • Morphine/Opiates Screen, Urine 12/31/2024 Negative  Negative Final   • Oxycodone Screen, Urine 12/31/2024 Negative  Negative Final   • Phencyclidine (PCP), Urine 12/31/2024 Negative  Negative Final   • Propoxyphene Scree, Urine 12/31/2024 Negative  Negative Final   • Tricyclic Antidepressants Screen  12/31/2024 Negative  Negative Final   • THC, Screen, Urine 12/31/2024 Positive (A)  Negative Final   • Gabapentin 12/31/2024 negative   Final   • ETHANOL URINE SCREEN 12/31/2024 Negative   Final   • Fentanyl, Urine 12/31/2024 Negative  Negative Final   Telemedicine on 12/23/2024   Component Date Value Ref Range Status   • Amphetamine Screen, Urine 12/23/2024 Positive (A)  Negative Final   • Barbiturates Screen, Urine 12/23/2024 Negative  Negative Final   • Buprenorphine, Screen, Urine 12/23/2024 Positive (A)  Negative Final   • Benzodiazepine Screen, Urine 12/23/2024 Negative  Negative Final   • Cocaine Screen, Urine 12/23/2024 Negative  Negative Final   • MDMA (ECSTASY) 12/23/2024 Positive (A)  Negative Final   • Methamphetamine, Ur 12/23/2024 Positive (A)  Negative Final   • Methadone Screen, Urine 12/23/2024 Negative  Negative Final   • Morphine/Opiates Screen, Urine 12/23/2024 Negative  Negative Final   • Oxycodone Screen, Urine 12/23/2024 Negative  Negative Final   • Phencyclidine (PCP), Urine 12/23/2024 Negative  Negative Final   • Propoxyphene Scree, Urine 12/23/2024 Negative  Negative Final   • Tricyclic Antidepressants Screen 12/23/2024 Negative  Negative Final   • THC, Screen, Urine 12/23/2024 Positive (A)  Negative Final   • GABAPENTIN 12/23/2024 negative   Final   • ETHANOL URINE SCREEN 12/23/2024 Negative   Final   • Fentanyl, Urine 12/23/2024 Negative  Negative Final   Telemedicine on 12/18/2024   Component Date Value Ref Range Status   • Amphetamine Screen, Urine 12/18/2024 Positive (A)  Negative Final   • Barbiturates Screen, Urine 12/18/2024 Negative  Negative Final   • Buprenorphine, Screen, Urine 12/18/2024 Positive (A)  Negative Final   • Benzodiazepine Screen, Urine 12/18/2024 Negative  Negative Final   • Cocaine Screen, Urine 12/18/2024 Negative  Negative Final   • MDMA (ECSTASY) 12/18/2024 Negative  Negative Final   • Methamphetamine, Ur 12/18/2024 Positive (A)  Negative Final   • Methadone  Screen, Urine 12/18/2024 Negative  Negative Final   • Morphine/Opiates Screen, Urine 12/18/2024 Negative  Negative Final   • Oxycodone Screen, Urine 12/18/2024 Negative  Negative Final   • Phencyclidine (PCP), Urine 12/18/2024 Negative  Negative Final   • Propoxyphene Scree, Urine 12/18/2024 Negative  Negative Final   • Tricyclic Antidepressants Screen 12/18/2024 Negative  Negative Final   • THC, Screen, Urine 12/18/2024 Negative  Negative Final   • Gabapentin 12/18/2024 negative   Final   • ETHANOL URINE SCREEN 12/18/2024 Negative   Final   • Fentanyl, Urine 12/18/2024 Negative  Negative Final   Telemedicine on 11/12/2024   Component Date Value Ref Range Status   • Amphetamine Screen, Urine 11/12/2024 Negative  Negative Final    PRELIMINARY RESULTS. PLEASE REFER TO [facility] LAB CONFIRMATION FOR FINAL RESULTS.   • Barbiturates Screen, Urine 11/12/2024 Negative  Negative Final   • Buprenorphine, Screen, Urine 11/12/2024 Positive (A)  Negative Final   • Benzodiazepine Screen, Urine 11/12/2024 Negative  Negative Final   • Cocaine Screen, Urine 11/12/2024 Negative  Negative Final   • MDMA (ECSTASY) 11/12/2024 Negative  Negative Final   • Methamphetamine, Ur 11/12/2024 Negative  Negative Final   • Methadone Screen, Urine 11/12/2024 Negative  Negative Final   • Morphine/Opiates Screen, Urine 11/12/2024 Negative  Negative Final   • Oxycodone Screen, Urine 11/12/2024 Negative  Negative Final   • Phencyclidine (PCP), Urine 11/12/2024 Negative  Negative Final   • Propoxyphene Scree, Urine 11/12/2024 Negative  Negative Final   • Tricyclic Antidepressants Screen 11/12/2024 Negative  Negative Final   • THC, Screen, Urine 11/12/2024 Positive (A)  Negative Final   • Gabapentin 11/12/2024 NEGATIVE   Final   • ETHANOL URINE SCREEN 11/12/2024 Negative   Final   • Fentanyl, Urine 11/12/2024 Negative  Negative Final   Telemedicine on 10/15/2024   Component Date Value Ref Range Status   • Amphetamine Screen, Urine 10/15/2024 Negative   Negative Final    PRELIMINARY RESULTS. PLEASE REFER TO [facility] LAB CONFIRMATION FOR FINAL RESULTS.   • Barbiturates Screen, Urine 10/15/2024 Negative  Negative Final   • Buprenorphine, Screen, Urine 10/15/2024 Positive (A)  Negative Final   • Benzodiazepine Screen, Urine 10/15/2024 Negative  Negative Final   • Cocaine Screen, Urine 10/15/2024 Negative  Negative Final   • MDMA (ECSTASY) 10/15/2024 Negative  Negative Final   • Methamphetamine, Ur 10/15/2024 Negative  Negative Final   • Methadone Screen, Urine 10/15/2024 Negative  Negative Final   • Morphine/Opiates Screen, Urine 10/15/2024 Negative  Negative Final   • Oxycodone Screen, Urine 10/15/2024 Negative  Negative Final   • Phencyclidine (PCP), Urine 10/15/2024 Negative  Negative Final   • Propoxyphene Scree, Urine 10/15/2024 Negative  Negative Final   • Tricyclic Antidepressants Screen 10/15/2024 Negative  Negative Final   • THC, Screen, Urine 10/15/2024 Positive (A)  Negative Final   Telemedicine on 09/26/2024   Component Date Value Ref Range Status   • External Amphetamine Screen Urine 09/26/2024 Negative   Final   • External Benzodiazepine Screen Uri* 09/26/2024 Negative   Final   • External Cocaine Screen Urine 09/26/2024 Negative   Final   • External THC Screen Urine 09/26/2024 Negative   Final   • External Methadone Screen Urine 09/26/2024 Negative   Final   • External Methamphetamine Screen Ur* 09/26/2024 Negative   Final   • External Oxycodone Screen Urine 09/26/2024 Negative   Final   • External Buprenorphine Screen Urine 09/26/2024 Positive (A)   Final   • External MDMA 09/26/2024 Negative   Final   • External Opiates Screen Urine 09/26/2024 Negative   Final   Telemedicine on 09/19/2024   Component Date Value Ref Range Status   • External Amphetamine Screen Urine 09/19/2024 Positive   Final   • External Benzodiazepine Screen Uri* 09/19/2024 Negative   Final   • External Cocaine Screen Urine 09/19/2024 Negative   Final   • External THC Screen Urine  09/19/2024 Negative   Final   • External Methadone Screen Urine 09/19/2024 Negative   Final   • External Methamphetamine Screen Ur* 09/19/2024 Positive (A)   Final   • External Oxycodone Screen Urine 09/19/2024 Negative   Final   • External Buprenorphine Screen Urine 09/19/2024 Positive (A)   Final   • External MDMA 09/19/2024 Negative   Final   • External Opiates Screen Urine 09/19/2024 Negative   Final   There may be more visits with results that are not included.         Assessment & Plan   Diagnoses and all orders for this visit:    1. Opioid type dependence, continuous (Primary)  -     buprenorphine-naloxone (SUBOXONE) 8-2 MG per SL tablet; Place 2 tablets under the tongue Daily.  Dispense: 14 tablet; Refill: 0    2. Medication management  -     POC Medline 14 Panel Urine Drug Screen    3. Methamphetamine abuse  -     buPROPion XL (WELLBUTRIN XL) 150 MG 24 hr tablet; Take 1 tablet by mouth 2 (Two) Times a Day. BID dosing  Dispense: 60 tablet; Refill: 0        Visit Diagnoses:    ICD-10-CM ICD-9-CM   1. Opioid type dependence, continuous  F11.20 304.01   2. Medication management  Z79.899 V58.69   3. Methamphetamine abuse  F15.10 305.70       PLAN:  Safety: No acute safety concerns  Risk Assessment: Risk of self-harm acutely is low. Risk of self-harm chronically is also low, but could be further elevated in the event of treatment noncompliance and/or AODA.    TREATMENT PLAN/GOALS: Continue supportive psychotherapy efforts and medications as indicated. Treatment and medication options discussed during today's visit. Patient acknowledged and verbally consented to continue with current treatment plan and was educated on the importance of compliance with treatment and follow-up appointments.    MEDICATION ISSUES:  ROSY reviewed as expected.  Discussed medication options and treatment plan of prescribed medication as well as the risks, benefits, and side effects including potential falls, possible impaired driving  and metabolic adversities among others. Patient is agreeable to call the office with any worsening of symptoms or onset of side effects. Patient is agreeable to call 911 or go to the nearest ER should he/she begin having SI/HI. No medication side effects or related complaints today.     MEDS ORDERED DURING VISIT:  New Medications Ordered This Visit   Medications   • buprenorphine-naloxone (SUBOXONE) 8-2 MG per SL tablet     Sig: Place 2 tablets under the tongue Daily.     Dispense:  14 tablet     Refill:  0     NADEAN:[id number]   • buPROPion XL (WELLBUTRIN XL) 150 MG 24 hr tablet     Sig: Take 1 tablet by mouth 2 (Two) Times a Day. BID dosing     Dispense:  60 tablet     Refill:  0       No follow-ups on file.           This document has been electronically signed by WALKER Romero  February 25, 2025 14:03 EST      Part of this note may be an electronic transcription/translation of spoken language to printed text using the Dragon Dictation System.

## 2025-03-18 DIAGNOSIS — F11.20 OPIOID TYPE DEPENDENCE, CONTINUOUS: ICD-10-CM

## 2025-03-18 RX ORDER — BUPRENORPHINE HYDROCHLORIDE AND NALOXONE HYDROCHLORIDE DIHYDRATE 8; 2 MG/1; MG/1
2 TABLET SUBLINGUAL DAILY
Qty: 6 TABLET | Refills: 0 | Status: SHIPPED | OUTPATIENT
Start: 2025-03-18 | End: 2025-03-20 | Stop reason: SDUPTHER

## 2025-03-18 NOTE — TELEPHONE ENCOUNTER
Patient called stating that her mother is in the hospital at [place]. She states that she is not going to leave her mother while she is admitted and wants to know if you will send in a few days worth of medication for her?

## 2025-03-20 ENCOUNTER — CLINICAL SUPPORT (OUTPATIENT)
Dept: PSYCHIATRY | Facility: CLINIC | Age: 55
End: 2025-03-20
Payer: MEDICAID

## 2025-03-20 DIAGNOSIS — F11.20 OPIOID TYPE DEPENDENCE, CONTINUOUS: ICD-10-CM

## 2025-03-20 DIAGNOSIS — Z79.899 MEDICATION MANAGEMENT: Primary | ICD-10-CM

## 2025-03-20 DIAGNOSIS — F15.10 METHAMPHETAMINE ABUSE: ICD-10-CM

## 2025-03-20 LAB
AMPHET+METHAMPHET UR QL: NEGATIVE
AMPHETAMINES UR QL: NEGATIVE
BARBITURATES UR QL SCN: NEGATIVE
BENZODIAZ UR QL SCN: NEGATIVE
BUPRENORPHINE SERPL-MCNC: POSITIVE NG/ML
CANNABINOIDS SERPL QL: NEGATIVE
COCAINE UR QL: NEGATIVE
MDMA UR QL SCN: NEGATIVE
METHADONE UR QL SCN: NEGATIVE
MORPHINE/OPIATES SCREEN, URINE: NEGATIVE
OXYCODONE UR QL SCN: NEGATIVE
PCP UR QL SCN: NEGATIVE
PROPOXYPH UR QL SCN: NEGATIVE
TRICYCLICS UR QL SCN: NEGATIVE

## 2025-03-20 RX ORDER — BUPRENORPHINE HYDROCHLORIDE AND NALOXONE HYDROCHLORIDE DIHYDRATE 8; 2 MG/1; MG/1
2 TABLET SUBLINGUAL DAILY
Qty: 14 TABLET | Refills: 0 | Status: SHIPPED | OUTPATIENT
Start: 2025-03-20

## 2025-03-20 RX ORDER — BUPROPION HYDROCHLORIDE 150 MG/1
150 TABLET ORAL 2 TIMES DAILY
Qty: 60 TABLET | Refills: 0 | Status: SHIPPED | OUTPATIENT
Start: 2025-03-20

## 2025-03-22 LAB — REF LAB TEST METHOD: NORMAL

## 2025-03-26 LAB
6-ACETYLMORPHINE: NOT DETECTED NG/ML
9-DELTA-THC-COOH: NORMAL NG/ML
ALCOHOL, ETHYL: NOT DETECTED NG/ML
AMPHETAMINE: NOT DETECTED NG/ML
BENZODIAZ BLD QL: NOT DETECTED NG/ML
BUPRENORPHINE SAL CFM-MCNC: DETECTED NG/ML
BUPRENORPHINE: NORMAL NG/ML
COCAINE METABOLITE: NOT DETECTED NG/ML
EDDP SERPL QL: NOT DETECTED NG/ML
FENTANYL-EIA: NOT DETECTED NG/ML
GABAPENTIN: NOT DETECTED NG/ML
METHAMPHETAMINE: NOT DETECTED NG/ML
NORBUPRENORPHINE: NORMAL NG/ML
OPIATES: NOT DETECTED NG/ML
OXYCODONE: NOT DETECTED NG/ML
PCP: NOT DETECTED NG/ML
THC: DETECTED NG/ML
TRICYCLIC ANTIDEPRESSANTS: NOT DETECTED NG/ML

## 2025-03-27 ENCOUNTER — TELEMEDICINE (OUTPATIENT)
Dept: PSYCHIATRY | Facility: CLINIC | Age: 55
End: 2025-03-27
Payer: MEDICAID

## 2025-03-27 VITALS
DIASTOLIC BLOOD PRESSURE: 75 MMHG | HEART RATE: 91 BPM | WEIGHT: 177 LBS | BODY MASS INDEX: 33.42 KG/M2 | SYSTOLIC BLOOD PRESSURE: 133 MMHG | HEIGHT: 61 IN

## 2025-03-27 DIAGNOSIS — F15.10 METHAMPHETAMINE ABUSE: ICD-10-CM

## 2025-03-27 DIAGNOSIS — F11.20 OPIOID TYPE DEPENDENCE, CONTINUOUS: Primary | ICD-10-CM

## 2025-03-27 DIAGNOSIS — Z79.899 MEDICATION MANAGEMENT: ICD-10-CM

## 2025-03-27 RX ORDER — BUPRENORPHINE HYDROCHLORIDE AND NALOXONE HYDROCHLORIDE DIHYDRATE 8; 2 MG/1; MG/1
2 TABLET SUBLINGUAL DAILY
Qty: 42 TABLET | Refills: 0 | Status: SHIPPED | OUTPATIENT
Start: 2025-03-27

## 2025-03-27 RX ORDER — BUPROPION HYDROCHLORIDE 150 MG/1
150 TABLET ORAL 2 TIMES DAILY
Qty: 60 TABLET | Refills: 0 | Status: SHIPPED | OUTPATIENT
Start: 2025-03-27

## 2025-03-27 NOTE — PROGRESS NOTES
This provider is located at [facility]. The Patient is seen remotely located at the [facility] ([facility]) using Video. Patient is being seen via telehealth and confirm that they are in a secure environment for this session. The patient's condition being diagnosed/treated is appropriate for telemedicine. Provider identified as Williams Brody as well as credentials APRN MSN FNP-C SRINIVASAN-YOUNG.   The client/patient gave consent to be seen remotely, and when consent is given they understand that the consent allows for patient identifiable information to be sent to a third party as needed.   They may refuse to be seen remotely at any time. The electronic data is encrypted and password protected, and the patient has been advised of the potential risks to privacy not withstanding such measures.    Chief Complaint/History of Present Illness: Follow Up buprenorphine/naloxone Medicated Assisted Treatment for Opiate Use Disorder     Patient/Client Concerns/Updates: Continue Wellbutrin for stimulant cravings   -Patient reports no significant or concerning life events or changes since last evaluation;  -Reports continued therapeutic benefit and satisfaction with current care plan; reports no concerning side effects with current medication prescribed  -Mood is stable; patient denies acute anxious episodes, exacerbations thereof or concerning panic attacks  -Reports no acute depressive episodes or concerns, no suicidal or homicidal thoughts  -Reports no perceptual disturbances or otherwise symptoms of psychosis  -Patient reports no concerns with sleep quality or disturbance thereof    Triggers (Persons/Places/Things/Events/Thought/Emotions): Caring for her mother who has been ill in the hospital    Cravings/Substance Use: Cravings and continue struggles methamphetamine    Relapse Prevention: Counseling    Urine Drug Screen (today's visit)/Presumptive Point of Care discussed: Positive buprenorphine positive  amphetamine methamphetamine and THC    UDS Confirmation (Most recent/Resulted): Positive buprenorphine/nor-buprenorphine, no concerns for urine tampering otherwise positive methamphetamine THC and clonazepam    Most recent pertinent laboratory studies reviewed: 4/11/23-hepatic function within normal limits, Cr WNL, HIV negative, hepatitis C not detected         ROSY (PDMP) Reviewed for Current/Active Medications: buprenorphine/naloxone as reviewed today    Past Surgical History:  No past surgical history on file.    Problem List:  There is no problem list on file for this patient.      Allergy:   No Known Allergies     Current Medications:   Current Outpatient Medications   Medication Sig Dispense Refill   • buprenorphine-naloxone (SUBOXONE) 8-2 MG per SL tablet Place 2 tablets under the tongue Daily. 42 tablet 0   • buPROPion XL (WELLBUTRIN XL) 150 MG 24 hr tablet Take 1 tablet by mouth 2 (Two) Times a Day. BID dosing 60 tablet 0   • famotidine (PEPCID) 20 MG tablet Take 1 tablet by mouth 2 (Two) Times a Day. 60 tablet 0   • folic acid (FOLVITE) 1 MG tablet Take 1 tablet by mouth Daily.     • GNP PAIN RELIEF EX-STRENGTH 500 MG tablet TAKE ONE TABLET BY MOUTH EVERY 6 HOURS AS NEEDED FOR 30 DAYS **DO NOT EXCEED 6 TABLETS IN 24 HOURS**     • hydrOXYzine pamoate (Vistaril) 25 MG capsule Take 1 capsule by mouth 4 (Four) Times a Day As Needed for Anxiety. 120 capsule 0   • methotrexate 2.5 MG tablet Take 1 tablet by mouth 3 (Three) Doses Each Week. Take Doses 12 (Twelve) Hours Apart.     • ondansetron ODT (ZOFRAN-ODT) 8 MG disintegrating tablet Place 1 tablet on the tongue Every 8 (Eight) Hours As Needed for Nausea or Vomiting. 45 tablet 0   • naloxone (NARCAN) 4 MG/0.1ML nasal spray 1 spray into the nostril(s) as directed by provider As Needed (opiate over sedation). 1 spray in 1 nostril every 2 to 3 minutes, call 911 (Patient not taking: Reported on 3/27/2025) 2 each 2     No current facility-administered medications  for this visit.       Past Medical History:  Past Medical History:   Diagnosis Date   • History of hepatitis B    • Pancreatitis          Social History     Socioeconomic History   • Marital status: Single   Tobacco Use   • Smoking status: Every Day     Current packs/day: 1.00     Average packs/day: 1 pack/day for 15.0 years (15.0 ttl pk-yrs)     Types: Cigarettes   • Smokeless tobacco: Never   Vaping Use   • Vaping status: Never Used   Substance and Sexual Activity   • Alcohol use: Not Currently   • Drug use: Yes     Types: Marijuana, Hydrocodone, Oxycodone, Methamphetamines   • Sexual activity: Defer         Family History   Problem Relation Age of Onset   • No Known Problems Mother    • No Known Problems Father    • No Known Problems Sister    • No Known Problems Brother    • No Known Problems Maternal Grandmother    • No Known Problems Paternal Grandmother    • No Known Problems Maternal Aunt    • No Known Problems Paternal Aunt    • No Known Problems Maternal Grandfather    • No Known Problems Paternal Grandfather    • No Known Problems Maternal Uncle    • No Known Problems Paternal Uncle    • No Known Problems Cousin    • No Known Problems Other    • ADD / ADHD Neg Hx    • Alcohol abuse Neg Hx    • Anxiety disorder Neg Hx    • Bipolar disorder Neg Hx    • Dementia Neg Hx    • Depression Neg Hx    • Drug abuse Neg Hx    • OCD Neg Hx    • Paranoid behavior Neg Hx    • Schizophrenia Neg Hx    • Seizures Neg Hx    • Self-Injurious Behavior  Neg Hx    • Suicide Attempts Neg Hx    • Breast cancer Neg Hx          Mental Status Exam:   Hygiene:   good  Cooperation:  Cooperative  Eye Contact:  Good  Psychomotor Behavior:  Appropriate  Affect:  Appropriate  Mood: anxious  Speech:  Normal  Thought Process:  Goal directed  Thought Content:  Normal  Suicidal:  None  Homicidal:  None  Hallucinations:  None  Delusion:  None  Memory:  Intact  Orientation:  Grossly intact  Reliability:  good  Insight:  Fair  Judgement:   "Fair  Impulse Control:  Poor         Review of Systems:  Review of Systems   Constitutional:  Negative for activity change, chills, diaphoresis and fatigue.   Respiratory:  Negative for apnea, cough and shortness of breath.    Cardiovascular:  Negative for chest pain, palpitations and leg swelling.   Gastrointestinal:  Negative for abdominal pain, constipation, diarrhea, nausea and vomiting.   Genitourinary:  Negative for difficulty urinating.   Musculoskeletal:  Negative for arthralgias.   Skin:  Negative for rash.   Neurological:  Negative for dizziness, weakness and headaches.   Psychiatric/Behavioral:  Negative for agitation, self-injury, sleep disturbance and suicidal ideas. The patient is nervous/anxious.        Physical Exam:  Physical Exam  Vitals reviewed.   Constitutional:       General: She is not in acute distress.     Appearance: Normal appearance. She is not ill-appearing or toxic-appearing.   Pulmonary:      Effort: Pulmonary effort is normal.   Musculoskeletal:         General: Normal range of motion.   Neurological:      General: No focal deficit present.      Mental Status: She is alert and oriented to person, place, and time.   Psychiatric:         Attention and Perception: Attention and perception normal.         Mood and Affect: Mood normal. Mood is anxious. Mood is not depressed.         Speech: Speech normal.         Behavior: Behavior normal. Behavior is cooperative.         Thought Content: Thought content normal.         Cognition and Memory: Cognition and memory normal.         Judgment: Judgment normal.     Vital Signs:   /75   Pulse 91   Ht 156.2 cm (61.5\")   Wt 80.3 kg (177 lb)   BMI 32.91 kg/m²      Lab Results:   Telemedicine on 03/27/2025   Component Date Value Ref Range Status   • Amphetamine Screen, Urine 03/27/2025 Positive (A)  Negative Final   • Barbiturates Screen, Urine 03/27/2025 Negative  Negative Final   • Buprenorphine, Screen, Urine 03/27/2025 Positive (A)  " Negative Final   • Benzodiazepine Screen, Urine 03/27/2025 Negative  Negative Final   • Cocaine Screen, Urine 03/27/2025 Negative  Negative Final   • MDMA (ECSTASY) 03/27/2025 Negative  Negative Final   • Methamphetamine, Ur 03/27/2025 Positive (A)  Negative Final   • Methadone Screen, Urine 03/27/2025 Negative  Negative Final   • Morphine/Opiates Screen, Urine 03/27/2025 Negative  Negative Final   • Oxycodone Screen, Urine 03/27/2025 Negative  Negative Final   • Phencyclidine (PCP), Urine 03/27/2025 Negative  Negative Final   • Propoxyphene Scree, Urine 03/27/2025 Negative  Negative Final   • Tricyclic Antidepressants Screen 03/27/2025 Negative  Negative Final   • THC, Screen, Urine 03/27/2025 Positive (A)  Negative Final   Clinical Support on 03/20/2025   Component Date Value Ref Range Status   • Amphetamine Screen, Urine 03/20/2025 Negative  Negative Final   • Barbiturates Screen, Urine 03/20/2025 Negative  Negative Final   • Buprenorphine, Screen, Urine 03/20/2025 Positive (A)  Negative Final   • Benzodiazepine Screen, Urine 03/20/2025 Negative  Negative Final   • Cocaine Screen, Urine 03/20/2025 Negative  Negative Final   • MDMA (ECSTASY) 03/20/2025 Negative  Negative Final   • Methamphetamine, Ur 03/20/2025 Negative  Negative Final   • Methadone Screen, Urine 03/20/2025 Negative  Negative Final   • Morphine/Opiates Screen, Urine 03/20/2025 Negative  Negative Final   • Oxycodone Screen, Urine 03/20/2025 Negative  Negative Final   • Phencyclidine (PCP), Urine 03/20/2025 Negative  Negative Final   • Propoxyphene Scree, Urine 03/20/2025 Negative  Negative Final   • Tricyclic Antidepressants Screen 03/20/2025 Negative  Negative Final   • THC, Screen, Urine 03/20/2025 Negative  Negative Final   • AMPHETAMINE 03/20/2025 Not Detected  500 ng/mL ng/mL Final   • Barbiturates 03/20/2025 Not Detected  200 ng/mL ng/mL Final   • Benzodiazepines 03/20/2025 Not Detected  200 ng/mL ng/mL Final   • TRICYCLIC ANTIDEPRESSANTS  03/20/2025 Not Detected  300 ng/mL ng/mL Final   • 6-ACETYLMORPHINE 03/20/2025 Not Detected  10 ng/mL ng/mL Final   • BUPRENORPHINE 03/20/2025 Detected  5 ng/mL ng/mL Final   • Cocaine Metabolite 03/20/2025 Not Detected  150 ng/mL ng/mL Final   • EDDP 03/20/2025 Not Detected  100 ng/mL ng/mL Final   • ALCOHOL, ETHYL 03/20/2025 Not Detected  100 ng/mL ng/mL Final   • FENTANYL 03/20/2025 Not Detected  1 ng/mL ng/mL Final   • METHAMPHETAMINE 03/20/2025 Not Detected  500 ng/mL ng/mL Final   • Opiates 03/20/2025 Not Detected  300 ng/mL ng/mL Final   • OXYCODONE 03/20/2025 Not Detected  100 ng/mL ng/mL Final   • PCP 03/20/2025 Not Detected  25 ng/mL ng/mL Final   • THC 03/20/2025 Detected  50 ng/mL ng/mL Final   • Reference Lab Report 03/20/2025    Final    See Full Screen for results.   • BUPRENORPHINE 03/20/2025 18^DETECTED  10 ng/mL ng/mL Final    Detected   • NORBUPRENORPHINE 03/20/2025 46^DETECTED  10 ng/mL ng/mL Final    Detected   • GABAPENTIN 03/20/2025 Not Detected  500 ng/mL ng/mL Final   • 9-DELTA-THC-COOH 03/20/2025 26^NOT DETECTED  100 ng/mL ng/mL Final    Not Detected   Telemedicine on 02/25/2025   Component Date Value Ref Range Status   • Amphetamine Screen, Urine 02/25/2025 Positive (A)  Negative Final   • Barbiturates Screen, Urine 02/25/2025 Negative  Negative Final   • Buprenorphine, Screen, Urine 02/25/2025 Positive (A)  Negative Final   • Benzodiazepine Screen, Urine 02/25/2025 Negative  Negative Final   • Cocaine Screen, Urine 02/25/2025 Negative  Negative Final   • MDMA (ECSTASY) 02/25/2025 Negative  Negative Final   • Methamphetamine, Ur 02/25/2025 Positive (A)  Negative Final   • Methadone Screen, Urine 02/25/2025 Negative  Negative Final   • Morphine/Opiates Screen, Urine 02/25/2025 Negative  Negative Final   • Oxycodone Screen, Urine 02/25/2025 Negative  Negative Final   • Phencyclidine (PCP), Urine 02/25/2025 Negative  Negative Final   • Propoxyphene Scree, Urine 02/25/2025 Negative  Negative  Final   • Tricyclic Antidepressants Screen 02/25/2025 Negative  Negative Final   • THC, Screen, Urine 02/25/2025 Positive (A)  Negative Final   Telemedicine on 01/27/2025   Component Date Value Ref Range Status   • Amphetamine Screen, Urine 01/27/2025 Positive (A)  Negative Final   • Barbiturates Screen, Urine 01/27/2025 Negative  Negative Final   • Buprenorphine, Screen, Urine 01/27/2025 Positive (A)  Negative Final   • Benzodiazepine Screen, Urine 01/27/2025 Negative  Negative Final   • Cocaine Screen, Urine 01/27/2025 Negative  Negative Final   • MDMA (ECSTASY) 01/27/2025 Positive (A)  Negative Final   • Methamphetamine, Ur 01/27/2025 Negative  Negative Final   • Methadone Screen, Urine 01/27/2025 Negative  Negative Final   • Morphine/Opiates Screen, Urine 01/27/2025 Negative  Negative Final   • Oxycodone Screen, Urine 01/27/2025 Negative  Negative Final   • Phencyclidine (PCP), Urine 01/27/2025 Negative  Negative Final   • Propoxyphene Scree, Urine 01/27/2025 Negative  Negative Final   • Tricyclic Antidepressants Screen 01/27/2025 Negative  Negative Final   • THC, Screen, Urine 01/27/2025 Negative  Negative Final   • Gabapentin 01/27/2025 NEGATIVE   Final   • Ethanol, Urine 01/27/2025 NEGATIVE   Final   • Fentanyl, Urine 01/27/2025 Negative  Negative Final   Telemedicine on 01/20/2025   Component Date Value Ref Range Status   • Amphetamine Screen, Urine 01/20/2025 Positive (A)  Negative Final    PRELIMINARY RESULTS. REFER TO [facility] LAB CONFIRMATION FOR FINAL RESULTS.   • Barbiturates Screen, Urine 01/20/2025 Negative  Negative Final   • Buprenorphine, Screen, Urine 01/20/2025 Positive (A)  Negative Final   • Benzodiazepine Screen, Urine 01/20/2025 Negative  Negative Final   • Cocaine Screen, Urine 01/20/2025 Negative  Negative Final   • MDMA (ECSTASY) 01/20/2025 Negative  Negative Final   • Methamphetamine, Ur 01/20/2025 Positive (A)  Negative Final   • Methadone Screen, Urine 01/20/2025 Negative  Negative Final    • Morphine/Opiates Screen, Urine 01/20/2025 Negative  Negative Final   • Oxycodone Screen, Urine 01/20/2025 Negative  Negative Final   • Phencyclidine (PCP), Urine 01/20/2025 Negative  Negative Final   • Propoxyphene Scree, Urine 01/20/2025 Negative  Negative Final   • Tricyclic Antidepressants Screen 01/20/2025 Negative  Negative Final   • THC, Screen, Urine 01/20/2025 Positive (A)  Negative Final   • Gabapentin 01/20/2025 negative   Final   • ETHANOL URINE SCREEN 01/20/2025 Negative   Final   • Fentanyl, Urine 01/20/2025 Negative  Negative Final   Telemedicine on 12/31/2024   Component Date Value Ref Range Status   • Amphetamine Screen, Urine 12/31/2024 Positive (A)  Negative Final   • Barbiturates Screen, Urine 12/31/2024 Negative  Negative Final   • Buprenorphine, Screen, Urine 12/31/2024 Positive (A)  Negative Final   • Benzodiazepine Screen, Urine 12/31/2024 Negative  Negative Final   • Cocaine Screen, Urine 12/31/2024 Negative  Negative Final   • MDMA (ECSTASY) 12/31/2024 Positive (A)  Negative Final   • Methamphetamine, Ur 12/31/2024 Positive (A)  Negative Final   • Methadone Screen, Urine 12/31/2024 Negative  Negative Final   • Morphine/Opiates Screen, Urine 12/31/2024 Negative  Negative Final   • Oxycodone Screen, Urine 12/31/2024 Negative  Negative Final   • Phencyclidine (PCP), Urine 12/31/2024 Negative  Negative Final   • Propoxyphene Scree, Urine 12/31/2024 Negative  Negative Final   • Tricyclic Antidepressants Screen 12/31/2024 Negative  Negative Final   • THC, Screen, Urine 12/31/2024 Positive (A)  Negative Final   • Gabapentin 12/31/2024 negative   Final   • ETHANOL URINE SCREEN 12/31/2024 Negative   Final   • Fentanyl, Urine 12/31/2024 Negative  Negative Final   Telemedicine on 12/23/2024   Component Date Value Ref Range Status   • Amphetamine Screen, Urine 12/23/2024 Positive (A)  Negative Final   • Barbiturates Screen, Urine 12/23/2024 Negative  Negative Final   • Buprenorphine, Screen, Urine  12/23/2024 Positive (A)  Negative Final   • Benzodiazepine Screen, Urine 12/23/2024 Negative  Negative Final   • Cocaine Screen, Urine 12/23/2024 Negative  Negative Final   • MDMA (ECSTASY) 12/23/2024 Positive (A)  Negative Final   • Methamphetamine, Ur 12/23/2024 Positive (A)  Negative Final   • Methadone Screen, Urine 12/23/2024 Negative  Negative Final   • Morphine/Opiates Screen, Urine 12/23/2024 Negative  Negative Final   • Oxycodone Screen, Urine 12/23/2024 Negative  Negative Final   • Phencyclidine (PCP), Urine 12/23/2024 Negative  Negative Final   • Propoxyphene Scree, Urine 12/23/2024 Negative  Negative Final   • Tricyclic Antidepressants Screen 12/23/2024 Negative  Negative Final   • THC, Screen, Urine 12/23/2024 Positive (A)  Negative Final   • GABAPENTIN 12/23/2024 negative   Final   • ETHANOL URINE SCREEN 12/23/2024 Negative   Final   • Fentanyl, Urine 12/23/2024 Negative  Negative Final   Telemedicine on 12/18/2024   Component Date Value Ref Range Status   • Amphetamine Screen, Urine 12/18/2024 Positive (A)  Negative Final   • Barbiturates Screen, Urine 12/18/2024 Negative  Negative Final   • Buprenorphine, Screen, Urine 12/18/2024 Positive (A)  Negative Final   • Benzodiazepine Screen, Urine 12/18/2024 Negative  Negative Final   • Cocaine Screen, Urine 12/18/2024 Negative  Negative Final   • MDMA (ECSTASY) 12/18/2024 Negative  Negative Final   • Methamphetamine, Ur 12/18/2024 Positive (A)  Negative Final   • Methadone Screen, Urine 12/18/2024 Negative  Negative Final   • Morphine/Opiates Screen, Urine 12/18/2024 Negative  Negative Final   • Oxycodone Screen, Urine 12/18/2024 Negative  Negative Final   • Phencyclidine (PCP), Urine 12/18/2024 Negative  Negative Final   • Propoxyphene Scree, Urine 12/18/2024 Negative  Negative Final   • Tricyclic Antidepressants Screen 12/18/2024 Negative  Negative Final   • THC, Screen, Urine 12/18/2024 Negative  Negative Final   • Gabapentin 12/18/2024 negative   Final    • ETHANOL URINE SCREEN 12/18/2024 Negative   Final   • Fentanyl, Urine 12/18/2024 Negative  Negative Final   Telemedicine on 11/12/2024   Component Date Value Ref Range Status   • Amphetamine Screen, Urine 11/12/2024 Negative  Negative Final    PRELIMINARY RESULTS. PLEASE REFER TO [facility] LAB CONFIRMATION FOR FINAL RESULTS.   • Barbiturates Screen, Urine 11/12/2024 Negative  Negative Final   • Buprenorphine, Screen, Urine 11/12/2024 Positive (A)  Negative Final   • Benzodiazepine Screen, Urine 11/12/2024 Negative  Negative Final   • Cocaine Screen, Urine 11/12/2024 Negative  Negative Final   • MDMA (ECSTASY) 11/12/2024 Negative  Negative Final   • Methamphetamine, Ur 11/12/2024 Negative  Negative Final   • Methadone Screen, Urine 11/12/2024 Negative  Negative Final   • Morphine/Opiates Screen, Urine 11/12/2024 Negative  Negative Final   • Oxycodone Screen, Urine 11/12/2024 Negative  Negative Final   • Phencyclidine (PCP), Urine 11/12/2024 Negative  Negative Final   • Propoxyphene Scree, Urine 11/12/2024 Negative  Negative Final   • Tricyclic Antidepressants Screen 11/12/2024 Negative  Negative Final   • THC, Screen, Urine 11/12/2024 Positive (A)  Negative Final   • Gabapentin 11/12/2024 NEGATIVE   Final   • ETHANOL URINE SCREEN 11/12/2024 Negative   Final   • Fentanyl, Urine 11/12/2024 Negative  Negative Final   Telemedicine on 10/15/2024   Component Date Value Ref Range Status   • Amphetamine Screen, Urine 10/15/2024 Negative  Negative Final    PRELIMINARY RESULTS. PLEASE REFER TO [facility] LAB CONFIRMATION FOR FINAL RESULTS.   • Barbiturates Screen, Urine 10/15/2024 Negative  Negative Final   • Buprenorphine, Screen, Urine 10/15/2024 Positive (A)  Negative Final   • Benzodiazepine Screen, Urine 10/15/2024 Negative  Negative Final   • Cocaine Screen, Urine 10/15/2024 Negative  Negative Final   • MDMA (ECSTASY) 10/15/2024 Negative  Negative Final   • Methamphetamine, Ur 10/15/2024 Negative  Negative Final   • Methadone  Screen, Urine 10/15/2024 Negative  Negative Final   • Morphine/Opiates Screen, Urine 10/15/2024 Negative  Negative Final   • Oxycodone Screen, Urine 10/15/2024 Negative  Negative Final   • Phencyclidine (PCP), Urine 10/15/2024 Negative  Negative Final   • Propoxyphene Scree, Urine 10/15/2024 Negative  Negative Final   • Tricyclic Antidepressants Screen 10/15/2024 Negative  Negative Final   • THC, Screen, Urine 10/15/2024 Positive (A)  Negative Final   There may be more visits with results that are not included.         Assessment & Plan   Diagnoses and all orders for this visit:    1. Opioid type dependence, continuous (Primary)  -     buprenorphine-naloxone (SUBOXONE) 8-2 MG per SL tablet; Place 2 tablets under the tongue Daily.  Dispense: 42 tablet; Refill: 0    2. Medication management  -     POC Medline 14 Panel Urine Drug Screen  -     Behavioral Health Full Screen and Definitive Testing (MARTIN) -; Future  -     Behavioral Health Full Screen and Definitive Testing (MARTIN) -    3. Methamphetamine abuse  -     buPROPion XL (WELLBUTRIN XL) 150 MG 24 hr tablet; Take 1 tablet by mouth 2 (Two) Times a Day. BID dosing  Dispense: 60 tablet; Refill: 0        Visit Diagnoses:    ICD-10-CM ICD-9-CM   1. Opioid type dependence, continuous  F11.20 304.01   2. Medication management  Z79.899 V58.69   3. Methamphetamine abuse  F15.10 305.70       PLAN:  Safety: No acute safety concerns  Risk Assessment: Risk of self-harm acutely is low. Risk of self-harm chronically is also low, but could be further elevated in the event of treatment noncompliance and/or AODA.    TREATMENT PLAN/GOALS: Continue supportive psychotherapy efforts and medications as indicated. Treatment and medication options discussed during today's visit. Patient acknowledged and verbally consented to continue with current treatment plan and was educated on the importance of compliance with treatment and follow-up appointments.    MEDICATION ISSUES:  ROSY  reviewed as expected.  Discussed medication options and treatment plan of prescribed medication as well as the risks, benefits, and side effects including potential falls, possible impaired driving and metabolic adversities among others. Patient is agreeable to call the office with any worsening of symptoms or onset of side effects. Patient is agreeable to call 911 or go to the nearest ER should he/she begin having SI/HI. No medication side effects or related complaints today.     MEDS ORDERED DURING VISIT:  New Medications Ordered This Visit   Medications   • buprenorphine-naloxone (SUBOXONE) 8-2 MG per SL tablet     Sig: Place 2 tablets under the tongue Daily.     Dispense:  42 tablet     Refill:  0     NA - NADEAN:[id number]   • buPROPion XL (WELLBUTRIN XL) 150 MG 24 hr tablet     Sig: Take 1 tablet by mouth 2 (Two) Times a Day. BID dosing     Dispense:  60 tablet     Refill:  0       No follow-ups on file.           This document has been electronically signed by WALKER Romero  March 27, 2025 15:10 EDT      Part of this note may be an electronic transcription/translation of spoken language to printed text using the Dragon Dictation System.

## 2025-03-29 LAB — REF LAB TEST METHOD: NORMAL

## 2025-03-31 LAB
6-ACETYLMORPHINE: NOT DETECTED NG/ML
ALCOHOL, ETHYL: NOT DETECTED NG/ML
AMPHET SAL QL CFM: NORMAL NG/ML
AMPHETAMINE: DETECTED NG/ML
BENZODIAZ BLD QL: NOT DETECTED NG/ML
BUPRENORPHINE SAL CFM-MCNC: DETECTED NG/ML
COCAINE METABOLITE: NOT DETECTED NG/ML
EDDP SERPL QL: NOT DETECTED NG/ML
FENTANYL-EIA: NOT DETECTED NG/ML
METHAMPHET/CREAT UR: NORMAL NG/ML
METHAMPHETAMINE: DETECTED NG/ML
OPIATES: NOT DETECTED NG/ML
OXYCODONE: NOT DETECTED NG/ML
PCP: NOT DETECTED NG/ML
PHENTERMINE: NOT DETECTED NG/ML
THC: DETECTED NG/ML
TRICYCLIC ANTIDEPRESSANTS: NOT DETECTED NG/ML

## 2025-04-17 ENCOUNTER — TELEMEDICINE (OUTPATIENT)
Dept: PSYCHIATRY | Facility: CLINIC | Age: 55
End: 2025-04-17
Payer: MEDICAID

## 2025-04-17 VITALS
HEART RATE: 88 BPM | SYSTOLIC BLOOD PRESSURE: 132 MMHG | WEIGHT: 173.6 LBS | DIASTOLIC BLOOD PRESSURE: 76 MMHG | HEIGHT: 61 IN | BODY MASS INDEX: 32.77 KG/M2

## 2025-04-17 DIAGNOSIS — F15.10 METHAMPHETAMINE ABUSE: ICD-10-CM

## 2025-04-17 DIAGNOSIS — F11.20 OPIOID TYPE DEPENDENCE, CONTINUOUS: Primary | ICD-10-CM

## 2025-04-17 DIAGNOSIS — Z79.899 MEDICATION MANAGEMENT: ICD-10-CM

## 2025-04-17 LAB
AMPHET+METHAMPHET UR QL: POSITIVE
AMPHETAMINES UR QL: POSITIVE
BARBITURATES UR QL SCN: NEGATIVE
BENZODIAZ UR QL SCN: NEGATIVE
BUPRENORPHINE SERPL-MCNC: POSITIVE NG/ML
CANNABINOIDS SERPL QL: POSITIVE
COCAINE UR QL: NEGATIVE
MDMA UR QL SCN: POSITIVE
METHADONE UR QL SCN: NEGATIVE
MORPHINE/OPIATES SCREEN, URINE: NEGATIVE
OXYCODONE UR QL SCN: NEGATIVE
PCP UR QL SCN: NEGATIVE
PROPOXYPH UR QL SCN: NEGATIVE
TRICYCLICS UR QL SCN: NEGATIVE

## 2025-04-17 RX ORDER — BUPRENORPHINE HYDROCHLORIDE AND NALOXONE HYDROCHLORIDE DIHYDRATE 8; 2 MG/1; MG/1
2 TABLET SUBLINGUAL DAILY
Qty: 14 TABLET | Refills: 0 | Status: SHIPPED | OUTPATIENT
Start: 2025-04-17

## 2025-04-17 NOTE — PROGRESS NOTES
This provider is located at [facility]. The Patient is seen remotely located at the [facility] ([facility]) using Video. Patient is being seen via telehealth and confirm that they are in a secure environment for this session. The patient's condition being diagnosed/treated is appropriate for telemedicine. Provider identified as Williams Brody as well as credentials APRN MSN FNP-C SRINIVASAN-YOUNG.   The client/patient gave consent to be seen remotely, and when consent is given they understand that the consent allows for patient identifiable information to be sent to a third party as needed.   They may refuse to be seen remotely at any time. The electronic data is encrypted and password protected, and the patient has been advised of the potential risks to privacy not withstanding such measures.    Chief Complaint/History of Present Illness: Follow Up buprenorphine/naloxone Medicated Assisted Treatment for Opiate Use Disorder     Patient/Client Concerns/Updates: Continue Wellbutrin for stimulant cravings   - Patient continues to struggle with methamphetamine and is triggered by earning a paycheck and subsequently having money to pay for side methamphetamine; advised patient to consider risk reducing goals such as overall decrease in use    Triggers (Persons/Places/Things/Events/Thought/Emotions): Mother who is ill    Cravings/Substance Use: Cravings and continued struggle with methamphetamine    Relapse Prevention: Counseling and continue weekly evaluations for support and accountability    Urine Drug Screen (today's visit)/Presumptive Point of Care discussed: Positive buprenorphine amphetamine methamphetamine and THC    UDS Confirmation (Most recent/Resulted): Positive buprenorphine/nor-buprenorphine, no concerns for urine tampering otherwise positive methamphetamine clonazepam and THC    Most recent pertinent laboratory studies reviewed: 4/11/23-hepatic function within normal limits, Cr WNL, HIV negative,  hepatitis C not detected         ROSY (PDMP) Reviewed for Current/Active Medications: buprenorphine/naloxone as reviewed today    Past Surgical History:  No past surgical history on file.    Problem List:  There is no problem list on file for this patient.      Allergy:   No Known Allergies     Current Medications:   Current Outpatient Medications   Medication Sig Dispense Refill   • buprenorphine-naloxone (SUBOXONE) 8-2 MG per SL tablet Place 2 tablets under the tongue Daily. 14 tablet 0   • buPROPion XL (WELLBUTRIN XL) 150 MG 24 hr tablet Take 1 tablet by mouth 2 (Two) Times a Day. BID dosing 60 tablet 0   • famotidine (PEPCID) 20 MG tablet Take 1 tablet by mouth 2 (Two) Times a Day. 60 tablet 0   • folic acid (FOLVITE) 1 MG tablet Take 1 tablet by mouth Daily.     • GNP PAIN RELIEF EX-STRENGTH 500 MG tablet TAKE ONE TABLET BY MOUTH EVERY 6 HOURS AS NEEDED FOR 30 DAYS **DO NOT EXCEED 6 TABLETS IN 24 HOURS**     • hydrOXYzine pamoate (Vistaril) 25 MG capsule Take 1 capsule by mouth 4 (Four) Times a Day As Needed for Anxiety. 120 capsule 0   • methotrexate 2.5 MG tablet Take 1 tablet by mouth 3 (Three) Doses Each Week. Take Doses 12 (Twelve) Hours Apart.     • ondansetron ODT (ZOFRAN-ODT) 8 MG disintegrating tablet Place 1 tablet on the tongue Every 8 (Eight) Hours As Needed for Nausea or Vomiting. 45 tablet 0   • naloxone (NARCAN) 4 MG/0.1ML nasal spray 1 spray into the nostril(s) as directed by provider As Needed (opiate over sedation). 1 spray in 1 nostril every 2 to 3 minutes, call 911 (Patient not taking: Reported on 4/17/2025) 2 each 2     No current facility-administered medications for this visit.       Past Medical History:  Past Medical History:   Diagnosis Date   • History of hepatitis B    • Pancreatitis          Social History     Socioeconomic History   • Marital status: Single   Tobacco Use   • Smoking status: Every Day     Current packs/day: 1.00     Average packs/day: 1 pack/day for 15.0 years  (15.0 ttl pk-yrs)     Types: Cigarettes   • Smokeless tobacco: Never   Vaping Use   • Vaping status: Never Used   Substance and Sexual Activity   • Alcohol use: Not Currently   • Drug use: Yes     Types: Marijuana, Hydrocodone, Oxycodone, Methamphetamines   • Sexual activity: Defer         Family History   Problem Relation Age of Onset   • No Known Problems Mother    • No Known Problems Father    • No Known Problems Sister    • No Known Problems Brother    • No Known Problems Maternal Grandmother    • No Known Problems Paternal Grandmother    • No Known Problems Maternal Aunt    • No Known Problems Paternal Aunt    • No Known Problems Maternal Grandfather    • No Known Problems Paternal Grandfather    • No Known Problems Maternal Uncle    • No Known Problems Paternal Uncle    • No Known Problems Cousin    • No Known Problems Other    • ADD / ADHD Neg Hx    • Alcohol abuse Neg Hx    • Anxiety disorder Neg Hx    • Bipolar disorder Neg Hx    • Dementia Neg Hx    • Depression Neg Hx    • Drug abuse Neg Hx    • OCD Neg Hx    • Paranoid behavior Neg Hx    • Schizophrenia Neg Hx    • Seizures Neg Hx    • Self-Injurious Behavior  Neg Hx    • Suicide Attempts Neg Hx    • Breast cancer Neg Hx          Mental Status Exam:   Hygiene:   good  Cooperation:  Cooperative  Eye Contact:  Good  Psychomotor Behavior:  Appropriate  Affect:  Appropriate  Mood: anxious  Speech:  Normal  Thought Process:  Goal directed  Thought Content:  Normal  Suicidal:  None  Homicidal:  None  Hallucinations:  None  Delusion:  None  Memory:  Intact  Orientation:  Grossly intact  Reliability:  good  Insight:  Fair  Judgement:  Poor  Impulse Control:  Poor         Review of Systems:  Review of Systems   Constitutional:  Negative for activity change, chills, diaphoresis and fatigue.   Respiratory:  Negative for apnea, cough and shortness of breath.    Cardiovascular:  Negative for chest pain, palpitations and leg swelling.   Gastrointestinal:  Negative for  "abdominal pain, constipation, diarrhea, nausea and vomiting.   Genitourinary:  Negative for difficulty urinating.   Musculoskeletal:  Negative for arthralgias.   Skin:  Negative for rash.   Neurological:  Negative for dizziness, weakness and headaches.   Psychiatric/Behavioral:  Negative for agitation, self-injury, sleep disturbance and suicidal ideas. The patient is nervous/anxious.        Physical Exam:  Physical Exam  Vitals reviewed.   Constitutional:       General: She is not in acute distress.     Appearance: Normal appearance. She is not ill-appearing or toxic-appearing.   Pulmonary:      Effort: Pulmonary effort is normal.   Musculoskeletal:         General: Normal range of motion.   Neurological:      General: No focal deficit present.      Mental Status: She is alert and oriented to person, place, and time.   Psychiatric:         Attention and Perception: Attention and perception normal.         Mood and Affect: Mood is anxious. Mood is not depressed.         Speech: Speech normal.         Behavior: Behavior normal. Behavior is cooperative.         Thought Content: Thought content normal.         Cognition and Memory: Cognition and memory normal.         Judgment: Judgment normal.     Vital Signs:   /76   Pulse 88   Ht 156.2 cm (61.5\")   Wt 78.7 kg (173 lb 9.6 oz)   BMI 32.27 kg/m²      Lab Results:   Telemedicine on 04/17/2025   Component Date Value Ref Range Status   • Amphetamine Screen, Urine 04/17/2025 Positive (A)  Negative Final   • Barbiturates Screen, Urine 04/17/2025 Negative  Negative Final   • Buprenorphine, Screen, Urine 04/17/2025 Positive (A)  Negative Final   • Benzodiazepine Screen, Urine 04/17/2025 Negative  Negative Final   • Cocaine Screen, Urine 04/17/2025 Negative  Negative Final   • MDMA (ECSTASY) 04/17/2025 Positive (A)  Negative Final   • Methamphetamine, Ur 04/17/2025 Positive (A)  Negative Final   • Methadone Screen, Urine 04/17/2025 Negative  Negative Final   • " Morphine/Opiates Screen, Urine 04/17/2025 Negative  Negative Final   • Oxycodone Screen, Urine 04/17/2025 Negative  Negative Final   • Phencyclidine (PCP), Urine 04/17/2025 Negative  Negative Final   • Propoxyphene Scree, Urine 04/17/2025 Negative  Negative Final   • Tricyclic Antidepressants Screen 04/17/2025 Negative  Negative Final   • THC, Screen, Urine 04/17/2025 Positive (A)  Negative Final   Telemedicine on 03/27/2025   Component Date Value Ref Range Status   • Amphetamine Screen, Urine 03/27/2025 Positive (A)  Negative Final   • Barbiturates Screen, Urine 03/27/2025 Negative  Negative Final   • Buprenorphine, Screen, Urine 03/27/2025 Positive (A)  Negative Final   • Benzodiazepine Screen, Urine 03/27/2025 Negative  Negative Final   • Cocaine Screen, Urine 03/27/2025 Negative  Negative Final   • MDMA (ECSTASY) 03/27/2025 Negative  Negative Final   • Methamphetamine, Ur 03/27/2025 Positive (A)  Negative Final   • Methadone Screen, Urine 03/27/2025 Negative  Negative Final   • Morphine/Opiates Screen, Urine 03/27/2025 Negative  Negative Final   • Oxycodone Screen, Urine 03/27/2025 Negative  Negative Final   • Phencyclidine (PCP), Urine 03/27/2025 Negative  Negative Final   • Propoxyphene Scree, Urine 03/27/2025 Negative  Negative Final   • Tricyclic Antidepressants Screen 03/27/2025 Negative  Negative Final   • THC, Screen, Urine 03/27/2025 Positive (A)  Negative Final   • 6-ACETYLMORPHINE 03/27/2025 Not Detected  10 ng/mL ng/mL Final   • AMPHETAMINE 03/27/2025 Detected  500 ng/mL ng/mL Final   • Barbiturates 03/27/2025 Not Detected  200 ng/mL ng/mL Final   • Benzodiazepines 03/27/2025 Not Detected  200 ng/mL ng/mL Final   • BUPRENORPHINE 03/27/2025 Detected  5 ng/mL ng/mL Final   • Cocaine Metabolite 03/27/2025 Not Detected  150 ng/mL ng/mL Final   • EDDP 03/27/2025 Not Detected  100 ng/mL ng/mL Final   • ALCOHOL, ETHYL 03/27/2025 Not Detected  <100.0000 ng/mL Final   • FENTANYL 03/27/2025 Not Detected  1  ng/mL ng/mL Final   • METHAMPHETAMINE 03/27/2025 Detected  500 ng/mL ng/mL Final   • Opiates 03/27/2025 Not Detected  300 ng/mL ng/mL Final   • OXYCODONE 03/27/2025 Not Detected  100 ng/mL ng/mL Final   • PCP 03/27/2025 Not Detected  25 ng/mL ng/mL Final   • THC 03/27/2025 Detected  50 ng/mL ng/mL Final   • TRICYCLIC ANTIDEPRESSANTS 03/27/2025 Not Detected  300 ng/mL ng/mL Final   • Reference Lab Report 03/27/2025    Final    See Full Screen for results.   • AMPHETAMINE 03/27/2025 974^DETECTED  250 ng/mL ng/mL Final    Detected   • PHENTERMINE 03/27/2025 Not Detected  100 ng/mL ng/mL Final   • METHAMPHETAMINE 03/27/2025 3452^DETECTED  100 ng/mL ng/mL Final    Detected   Clinical Support on 03/20/2025   Component Date Value Ref Range Status   • Amphetamine Screen, Urine 03/20/2025 Negative  Negative Final   • Barbiturates Screen, Urine 03/20/2025 Negative  Negative Final   • Buprenorphine, Screen, Urine 03/20/2025 Positive (A)  Negative Final   • Benzodiazepine Screen, Urine 03/20/2025 Negative  Negative Final   • Cocaine Screen, Urine 03/20/2025 Negative  Negative Final   • MDMA (ECSTASY) 03/20/2025 Negative  Negative Final   • Methamphetamine, Ur 03/20/2025 Negative  Negative Final   • Methadone Screen, Urine 03/20/2025 Negative  Negative Final   • Morphine/Opiates Screen, Urine 03/20/2025 Negative  Negative Final   • Oxycodone Screen, Urine 03/20/2025 Negative  Negative Final   • Phencyclidine (PCP), Urine 03/20/2025 Negative  Negative Final   • Propoxyphene Scree, Urine 03/20/2025 Negative  Negative Final   • Tricyclic Antidepressants Screen 03/20/2025 Negative  Negative Final   • THC, Screen, Urine 03/20/2025 Negative  Negative Final   • AMPHETAMINE 03/20/2025 Not Detected  500 ng/mL ng/mL Final   • Barbiturates 03/20/2025 Not Detected  200 ng/mL ng/mL Final   • Benzodiazepines 03/20/2025 Not Detected  200 ng/mL ng/mL Final   • TRICYCLIC ANTIDEPRESSANTS 03/20/2025 Not Detected  300 ng/mL ng/mL Final   •  6-ACETYLMORPHINE 03/20/2025 Not Detected  10 ng/mL ng/mL Final   • BUPRENORPHINE 03/20/2025 Detected  5 ng/mL ng/mL Final   • Cocaine Metabolite 03/20/2025 Not Detected  150 ng/mL ng/mL Final   • EDDP 03/20/2025 Not Detected  100 ng/mL ng/mL Final   • ALCOHOL, ETHYL 03/20/2025 Not Detected  100 ng/mL ng/mL Final   • FENTANYL 03/20/2025 Not Detected  1 ng/mL ng/mL Final   • METHAMPHETAMINE 03/20/2025 Not Detected  500 ng/mL ng/mL Final   • Opiates 03/20/2025 Not Detected  300 ng/mL ng/mL Final   • OXYCODONE 03/20/2025 Not Detected  100 ng/mL ng/mL Final   • PCP 03/20/2025 Not Detected  25 ng/mL ng/mL Final   • THC 03/20/2025 Detected  50 ng/mL ng/mL Final   • Reference Lab Report 03/20/2025    Final    See Full Screen for results.   • BUPRENORPHINE 03/20/2025 18^DETECTED  10 ng/mL ng/mL Final    Detected   • NORBUPRENORPHINE 03/20/2025 46^DETECTED  10 ng/mL ng/mL Final    Detected   • GABAPENTIN 03/20/2025 Not Detected  500 ng/mL ng/mL Final   • 9-DELTA-THC-COOH 03/20/2025 26^NOT DETECTED  100 ng/mL ng/mL Final    Not Detected   Telemedicine on 02/25/2025   Component Date Value Ref Range Status   • Amphetamine Screen, Urine 02/25/2025 Positive (A)  Negative Final   • Barbiturates Screen, Urine 02/25/2025 Negative  Negative Final   • Buprenorphine, Screen, Urine 02/25/2025 Positive (A)  Negative Final   • Benzodiazepine Screen, Urine 02/25/2025 Negative  Negative Final   • Cocaine Screen, Urine 02/25/2025 Negative  Negative Final   • MDMA (ECSTASY) 02/25/2025 Negative  Negative Final   • Methamphetamine, Ur 02/25/2025 Positive (A)  Negative Final   • Methadone Screen, Urine 02/25/2025 Negative  Negative Final   • Morphine/Opiates Screen, Urine 02/25/2025 Negative  Negative Final   • Oxycodone Screen, Urine 02/25/2025 Negative  Negative Final   • Phencyclidine (PCP), Urine 02/25/2025 Negative  Negative Final   • Propoxyphene Scree, Urine 02/25/2025 Negative  Negative Final   • Tricyclic Antidepressants Screen  02/25/2025 Negative  Negative Final   • THC, Screen, Urine 02/25/2025 Positive (A)  Negative Final   Telemedicine on 01/27/2025   Component Date Value Ref Range Status   • Amphetamine Screen, Urine 01/27/2025 Positive (A)  Negative Final   • Barbiturates Screen, Urine 01/27/2025 Negative  Negative Final   • Buprenorphine, Screen, Urine 01/27/2025 Positive (A)  Negative Final   • Benzodiazepine Screen, Urine 01/27/2025 Negative  Negative Final   • Cocaine Screen, Urine 01/27/2025 Negative  Negative Final   • MDMA (ECSTASY) 01/27/2025 Positive (A)  Negative Final   • Methamphetamine, Ur 01/27/2025 Negative  Negative Final   • Methadone Screen, Urine 01/27/2025 Negative  Negative Final   • Morphine/Opiates Screen, Urine 01/27/2025 Negative  Negative Final   • Oxycodone Screen, Urine 01/27/2025 Negative  Negative Final   • Phencyclidine (PCP), Urine 01/27/2025 Negative  Negative Final   • Propoxyphene Scree, Urine 01/27/2025 Negative  Negative Final   • Tricyclic Antidepressants Screen 01/27/2025 Negative  Negative Final   • THC, Screen, Urine 01/27/2025 Negative  Negative Final   • Gabapentin 01/27/2025 NEGATIVE   Final   • Ethanol, Urine 01/27/2025 NEGATIVE   Final   • Fentanyl, Urine 01/27/2025 Negative  Negative Final   Telemedicine on 01/20/2025   Component Date Value Ref Range Status   • Amphetamine Screen, Urine 01/20/2025 Positive (A)  Negative Final    PRELIMINARY RESULTS. REFER TO [facility] LAB CONFIRMATION FOR FINAL RESULTS.   • Barbiturates Screen, Urine 01/20/2025 Negative  Negative Final   • Buprenorphine, Screen, Urine 01/20/2025 Positive (A)  Negative Final   • Benzodiazepine Screen, Urine 01/20/2025 Negative  Negative Final   • Cocaine Screen, Urine 01/20/2025 Negative  Negative Final   • MDMA (ECSTASY) 01/20/2025 Negative  Negative Final   • Methamphetamine, Ur 01/20/2025 Positive (A)  Negative Final   • Methadone Screen, Urine 01/20/2025 Negative  Negative Final   • Morphine/Opiates Screen, Urine  01/20/2025 Negative  Negative Final   • Oxycodone Screen, Urine 01/20/2025 Negative  Negative Final   • Phencyclidine (PCP), Urine 01/20/2025 Negative  Negative Final   • Propoxyphene Scree, Urine 01/20/2025 Negative  Negative Final   • Tricyclic Antidepressants Screen 01/20/2025 Negative  Negative Final   • THC, Screen, Urine 01/20/2025 Positive (A)  Negative Final   • Gabapentin 01/20/2025 negative   Final   • ETHANOL URINE SCREEN 01/20/2025 Negative   Final   • Fentanyl, Urine 01/20/2025 Negative  Negative Final   Telemedicine on 12/31/2024   Component Date Value Ref Range Status   • Amphetamine Screen, Urine 12/31/2024 Positive (A)  Negative Final   • Barbiturates Screen, Urine 12/31/2024 Negative  Negative Final   • Buprenorphine, Screen, Urine 12/31/2024 Positive (A)  Negative Final   • Benzodiazepine Screen, Urine 12/31/2024 Negative  Negative Final   • Cocaine Screen, Urine 12/31/2024 Negative  Negative Final   • MDMA (ECSTASY) 12/31/2024 Positive (A)  Negative Final   • Methamphetamine, Ur 12/31/2024 Positive (A)  Negative Final   • Methadone Screen, Urine 12/31/2024 Negative  Negative Final   • Morphine/Opiates Screen, Urine 12/31/2024 Negative  Negative Final   • Oxycodone Screen, Urine 12/31/2024 Negative  Negative Final   • Phencyclidine (PCP), Urine 12/31/2024 Negative  Negative Final   • Propoxyphene Scree, Urine 12/31/2024 Negative  Negative Final   • Tricyclic Antidepressants Screen 12/31/2024 Negative  Negative Final   • THC, Screen, Urine 12/31/2024 Positive (A)  Negative Final   • Gabapentin 12/31/2024 negative   Final   • ETHANOL URINE SCREEN 12/31/2024 Negative   Final   • Fentanyl, Urine 12/31/2024 Negative  Negative Final   Telemedicine on 12/23/2024   Component Date Value Ref Range Status   • Amphetamine Screen, Urine 12/23/2024 Positive (A)  Negative Final   • Barbiturates Screen, Urine 12/23/2024 Negative  Negative Final   • Buprenorphine, Screen, Urine 12/23/2024 Positive (A)  Negative  Final   • Benzodiazepine Screen, Urine 12/23/2024 Negative  Negative Final   • Cocaine Screen, Urine 12/23/2024 Negative  Negative Final   • MDMA (ECSTASY) 12/23/2024 Positive (A)  Negative Final   • Methamphetamine, Ur 12/23/2024 Positive (A)  Negative Final   • Methadone Screen, Urine 12/23/2024 Negative  Negative Final   • Morphine/Opiates Screen, Urine 12/23/2024 Negative  Negative Final   • Oxycodone Screen, Urine 12/23/2024 Negative  Negative Final   • Phencyclidine (PCP), Urine 12/23/2024 Negative  Negative Final   • Propoxyphene Scree, Urine 12/23/2024 Negative  Negative Final   • Tricyclic Antidepressants Screen 12/23/2024 Negative  Negative Final   • THC, Screen, Urine 12/23/2024 Positive (A)  Negative Final   • GABAPENTIN 12/23/2024 negative   Final   • ETHANOL URINE SCREEN 12/23/2024 Negative   Final   • Fentanyl, Urine 12/23/2024 Negative  Negative Final   Telemedicine on 12/18/2024   Component Date Value Ref Range Status   • Amphetamine Screen, Urine 12/18/2024 Positive (A)  Negative Final   • Barbiturates Screen, Urine 12/18/2024 Negative  Negative Final   • Buprenorphine, Screen, Urine 12/18/2024 Positive (A)  Negative Final   • Benzodiazepine Screen, Urine 12/18/2024 Negative  Negative Final   • Cocaine Screen, Urine 12/18/2024 Negative  Negative Final   • MDMA (ECSTASY) 12/18/2024 Negative  Negative Final   • Methamphetamine, Ur 12/18/2024 Positive (A)  Negative Final   • Methadone Screen, Urine 12/18/2024 Negative  Negative Final   • Morphine/Opiates Screen, Urine 12/18/2024 Negative  Negative Final   • Oxycodone Screen, Urine 12/18/2024 Negative  Negative Final   • Phencyclidine (PCP), Urine 12/18/2024 Negative  Negative Final   • Propoxyphene Scree, Urine 12/18/2024 Negative  Negative Final   • Tricyclic Antidepressants Screen 12/18/2024 Negative  Negative Final   • THC, Screen, Urine 12/18/2024 Negative  Negative Final   • Gabapentin 12/18/2024 negative   Final   • ETHANOL URINE SCREEN  12/18/2024 Negative   Final   • Fentanyl, Urine 12/18/2024 Negative  Negative Final   Telemedicine on 11/12/2024   Component Date Value Ref Range Status   • Amphetamine Screen, Urine 11/12/2024 Negative  Negative Final    PRELIMINARY RESULTS. PLEASE REFER TO [facility] LAB CONFIRMATION FOR FINAL RESULTS.   • Barbiturates Screen, Urine 11/12/2024 Negative  Negative Final   • Buprenorphine, Screen, Urine 11/12/2024 Positive (A)  Negative Final   • Benzodiazepine Screen, Urine 11/12/2024 Negative  Negative Final   • Cocaine Screen, Urine 11/12/2024 Negative  Negative Final   • MDMA (ECSTASY) 11/12/2024 Negative  Negative Final   • Methamphetamine, Ur 11/12/2024 Negative  Negative Final   • Methadone Screen, Urine 11/12/2024 Negative  Negative Final   • Morphine/Opiates Screen, Urine 11/12/2024 Negative  Negative Final   • Oxycodone Screen, Urine 11/12/2024 Negative  Negative Final   • Phencyclidine (PCP), Urine 11/12/2024 Negative  Negative Final   • Propoxyphene Scree, Urine 11/12/2024 Negative  Negative Final   • Tricyclic Antidepressants Screen 11/12/2024 Negative  Negative Final   • THC, Screen, Urine 11/12/2024 Positive (A)  Negative Final   • Gabapentin 11/12/2024 NEGATIVE   Final   • ETHANOL URINE SCREEN 11/12/2024 Negative   Final   • Fentanyl, Urine 11/12/2024 Negative  Negative Final   There may be more visits with results that are not included.         Assessment & Plan   Diagnoses and all orders for this visit:    1. Opioid type dependence, continuous (Primary)  -     buprenorphine-naloxone (SUBOXONE) 8-2 MG per SL tablet; Place 2 tablets under the tongue Daily.  Dispense: 14 tablet; Refill: 0    2. Medication management  -     POC Medline 14 Panel Urine Drug Screen  -     Behavioral Health Full Screen and Definitive Testing (MARTIN) -; Future  -     Buprenorphine/Norbuprenorphine level, QUANT (MARTIN) - Urine, Clean Catch; Future  -     Ethyl Glucuronide/Sulfate Definitive (MARTIN) -; Future  -     Behavioral Health  Full Screen and Definitive Testing (MARTIN) -  -     Buprenorphine/Norbuprenorphine level, QUANT (MARTIN) - Urine, Clean Catch  -     Ethyl Glucuronide/Sulfate Definitive (MARTIN) -    3. Methamphetamine abuse        Visit Diagnoses:    ICD-10-CM ICD-9-CM   1. Opioid type dependence, continuous  F11.20 304.01   2. Medication management  Z79.899 V58.69   3. Methamphetamine abuse  F15.10 305.70       PLAN:  Safety: No acute safety concerns  Risk Assessment: Risk of self-harm acutely is low. Risk of self-harm chronically is also low, but could be further elevated in the event of treatment noncompliance and/or AODA.    TREATMENT PLAN/GOALS: Continue supportive psychotherapy efforts and medications as indicated. Treatment and medication options discussed during today's visit. Patient acknowledged and verbally consented to continue with current treatment plan and was educated on the importance of compliance with treatment and follow-up appointments.    MEDICATION ISSUES:  ROSY reviewed as expected.  Discussed medication options and treatment plan of prescribed medication as well as the risks, benefits, and side effects including potential falls, possible impaired driving and metabolic adversities among others. Patient is agreeable to call the office with any worsening of symptoms or onset of side effects. Patient is agreeable to call 911 or go to the nearest ER should he/she begin having SI/HI. No medication side effects or related complaints today.     MEDS ORDERED DURING VISIT:  New Medications Ordered This Visit   Medications   • buprenorphine-naloxone (SUBOXONE) 8-2 MG per SL tablet     Sig: Place 2 tablets under the tongue Daily.     Dispense:  14 tablet     Refill:  0     NA - NADEAN:[id number]       No follow-ups on file.           This document has been electronically signed by WALKER Romero  April 17, 2025 14:55 EDT      Part of this note may be an electronic transcription/translation of spoken language to  printed text using the Dragon Dictation System.

## 2025-04-19 LAB — REF LAB TEST METHOD: NORMAL

## 2025-04-21 LAB
6-ACETYLMORPHINE: NOT DETECTED NG/ML
ALCOHOL, ETHYL: NOT DETECTED NG/ML
AMPHET SAL QL CFM: NORMAL NG/ML
AMPHETAMINE: DETECTED NG/ML
BENZODIAZ BLD QL: NOT DETECTED NG/ML
BUPRENORPHINE SAL CFM-MCNC: DETECTED NG/ML
BUPRENORPHINE: NORMAL NG/ML
COCAINE METABOLITE: NOT DETECTED NG/ML
EDDP SERPL QL: NOT DETECTED NG/ML
ETHYL GLUCURONIDE UR CFM-MCNC: NOT DETECTED NG/ML
ETHYL SULFATE UR CFM-MCNC: 55.4 NG/ML
FENTANYL-EIA: NOT DETECTED NG/ML
METHAMPHET/CREAT UR: NORMAL NG/ML
METHAMPHETAMINE: DETECTED NG/ML
NORBUPRENORPHINE: NORMAL NG/ML
OPIATES: NOT DETECTED NG/ML
OXYCODONE: NOT DETECTED NG/ML
PCP: NOT DETECTED NG/ML
PHENTERMINE: NOT DETECTED NG/ML
THC: DETECTED NG/ML
TRICYCLIC ANTIDEPRESSANTS: NOT DETECTED NG/ML

## 2025-04-24 ENCOUNTER — TELEMEDICINE (OUTPATIENT)
Dept: PSYCHIATRY | Facility: CLINIC | Age: 55
End: 2025-04-24
Payer: MEDICAID

## 2025-04-24 VITALS
BODY MASS INDEX: 32.97 KG/M2 | SYSTOLIC BLOOD PRESSURE: 130 MMHG | DIASTOLIC BLOOD PRESSURE: 75 MMHG | WEIGHT: 174.6 LBS | HEART RATE: 83 BPM | HEIGHT: 61 IN

## 2025-04-24 DIAGNOSIS — F11.20 OPIOID TYPE DEPENDENCE, CONTINUOUS: Primary | ICD-10-CM

## 2025-04-24 DIAGNOSIS — F15.10 METHAMPHETAMINE ABUSE: ICD-10-CM

## 2025-04-24 DIAGNOSIS — Z79.899 MEDICATION MANAGEMENT: ICD-10-CM

## 2025-04-24 RX ORDER — BUPROPION HYDROCHLORIDE 150 MG/1
150 TABLET ORAL 2 TIMES DAILY
Qty: 60 TABLET | Refills: 0 | Status: SHIPPED | OUTPATIENT
Start: 2025-04-24

## 2025-04-24 RX ORDER — BUPRENORPHINE HYDROCHLORIDE AND NALOXONE HYDROCHLORIDE DIHYDRATE 8; 2 MG/1; MG/1
2 TABLET SUBLINGUAL DAILY
Qty: 14 TABLET | Refills: 0 | Status: SHIPPED | OUTPATIENT
Start: 2025-04-24

## 2025-04-24 NOTE — PROGRESS NOTES
This provider is located at [facility]. The Patient is seen remotely located at the [facility] ([facility]) using Video. Patient is being seen via telehealth and confirm that they are in a secure environment for this session. The patient's condition being diagnosed/treated is appropriate for telemedicine. Provider identified as Williams Brody as well as credentials APRN MSN FNP-C SRINIVASAN-YOUNG.   The client/patient gave consent to be seen remotely, and when consent is given they understand that the consent allows for patient identifiable information to be sent to a third party as needed.   They may refuse to be seen remotely at any time. The electronic data is encrypted and password protected, and the patient has been advised of the potential risks to privacy not withstanding such measures.    Chief Complaint/History of Present Illness: Follow Up buprenorphine/naloxone Medicated Assisted Treatment for Opiate Use Disorder     Patient/Client Concerns/Updates: Continue Wellbutrin for stimulant cravings   - Patient reports continued biweekly use of methamphetamine as acquired when she earns money; continue recommendation to make small goals applauding recovery with the philosophy of harm reduction; advised patient to consider a goal to only use once a week    Triggers (Persons/Places/Things/Events/Thought/Emotions): Life stress and mother with fluctuating health    Cravings/Substance Use: Cravings and continued use of methamphetamine    Relapse Prevention: Counseling continue weekly evaluations for support and accountability    Urine Drug Screen (today's visit)/Presumptive Point of Care discussed: Positive buprenorphine positive amphetamine and methamphetamine and THC    UDS Confirmation (Most recent/Resulted): Positive buprenorphine/nor-buprenorphine, no concerns for urine tampering otherwise positive methamphetamine and THC and clonazepam    Most recent pertinent laboratory studies reviewed:   4/11/23-hepatic function within normal limits, Cr WNL, HIV negative, hepatitis C not detected         ROSY (PDMP) Reviewed for Current/Active Medications: buprenorphine/naloxone as reviewed today    Past Surgical History:  No past surgical history on file.    Problem List:  There is no problem list on file for this patient.      Allergy:   No Known Allergies     Current Medications:   Current Outpatient Medications   Medication Sig Dispense Refill   • buprenorphine-naloxone (SUBOXONE) 8-2 MG per SL tablet Place 2 tablets under the tongue Daily. 14 tablet 0   • buPROPion XL (WELLBUTRIN XL) 150 MG 24 hr tablet Take 1 tablet by mouth 2 (Two) Times a Day. BID dosing 60 tablet 0   • famotidine (PEPCID) 20 MG tablet Take 1 tablet by mouth 2 (Two) Times a Day. 60 tablet 0   • folic acid (FOLVITE) 1 MG tablet Take 1 tablet by mouth Daily.     • GNP PAIN RELIEF EX-STRENGTH 500 MG tablet TAKE ONE TABLET BY MOUTH EVERY 6 HOURS AS NEEDED FOR 30 DAYS **DO NOT EXCEED 6 TABLETS IN 24 HOURS**     • hydrOXYzine pamoate (Vistaril) 25 MG capsule Take 1 capsule by mouth 4 (Four) Times a Day As Needed for Anxiety. 120 capsule 0   • methotrexate 2.5 MG tablet Take 1 tablet by mouth 3 (Three) Doses Each Week. Take Doses 12 (Twelve) Hours Apart.     • ondansetron ODT (ZOFRAN-ODT) 8 MG disintegrating tablet Place 1 tablet on the tongue Every 8 (Eight) Hours As Needed for Nausea or Vomiting. 45 tablet 0   • naloxone (NARCAN) 4 MG/0.1ML nasal spray 1 spray into the nostril(s) as directed by provider As Needed (opiate over sedation). 1 spray in 1 nostril every 2 to 3 minutes, call 911 (Patient not taking: Reported on 4/24/2025) 2 each 2     No current facility-administered medications for this visit.       Past Medical History:  Past Medical History:   Diagnosis Date   • History of hepatitis B    • Pancreatitis          Social History     Socioeconomic History   • Marital status: Single   Tobacco Use   • Smoking status: Every Day     Current  packs/day: 1.00     Average packs/day: 1 pack/day for 15.0 years (15.0 ttl pk-yrs)     Types: Cigarettes   • Smokeless tobacco: Never   Vaping Use   • Vaping status: Never Used   Substance and Sexual Activity   • Alcohol use: Not Currently   • Drug use: Yes     Types: Marijuana, Hydrocodone, Oxycodone, Methamphetamines   • Sexual activity: Defer         Family History   Problem Relation Age of Onset   • No Known Problems Mother    • No Known Problems Father    • No Known Problems Sister    • No Known Problems Brother    • No Known Problems Maternal Grandmother    • No Known Problems Paternal Grandmother    • No Known Problems Maternal Aunt    • No Known Problems Paternal Aunt    • No Known Problems Maternal Grandfather    • No Known Problems Paternal Grandfather    • No Known Problems Maternal Uncle    • No Known Problems Paternal Uncle    • No Known Problems Cousin    • No Known Problems Other    • ADD / ADHD Neg Hx    • Alcohol abuse Neg Hx    • Anxiety disorder Neg Hx    • Bipolar disorder Neg Hx    • Dementia Neg Hx    • Depression Neg Hx    • Drug abuse Neg Hx    • OCD Neg Hx    • Paranoid behavior Neg Hx    • Schizophrenia Neg Hx    • Seizures Neg Hx    • Self-Injurious Behavior  Neg Hx    • Suicide Attempts Neg Hx    • Breast cancer Neg Hx          Mental Status Exam:   Hygiene:   good  Cooperation:  Cooperative  Eye Contact:  Good  Psychomotor Behavior:  Appropriate  Affect:  Appropriate  Mood: anxious  Speech:  Normal  Thought Process:  Goal directed  Thought Content:  Normal  Suicidal:  None  Homicidal:  None  Hallucinations:  None  Delusion:  None  Memory:  Intact  Orientation:  Grossly intact  Reliability:  good  Insight:  Fair  Judgement:  Poor  Impulse Control:  Poor         Review of Systems:  Review of Systems   Constitutional:  Negative for activity change, chills, diaphoresis and fatigue.   Respiratory:  Negative for apnea, cough and shortness of breath.    Cardiovascular:  Negative for chest pain,  "palpitations and leg swelling.   Gastrointestinal:  Negative for abdominal pain, constipation, diarrhea, nausea and vomiting.   Genitourinary:  Negative for difficulty urinating.   Musculoskeletal:  Negative for arthralgias.   Skin:  Negative for rash.   Neurological:  Negative for dizziness, weakness and headaches.   Psychiatric/Behavioral:  Positive for dysphoric mood. Negative for agitation, self-injury, sleep disturbance and suicidal ideas. The patient is nervous/anxious.        Physical Exam:  Physical Exam  Vitals reviewed.   Constitutional:       General: She is not in acute distress.     Appearance: Normal appearance. She is not ill-appearing or toxic-appearing.   Pulmonary:      Effort: Pulmonary effort is normal.   Musculoskeletal:         General: Normal range of motion.   Neurological:      General: No focal deficit present.      Mental Status: She is alert and oriented to person, place, and time.   Psychiatric:         Attention and Perception: Attention and perception normal.         Mood and Affect: Mood normal. Mood is not anxious or depressed.         Speech: Speech normal.         Behavior: Behavior normal. Behavior is cooperative.         Thought Content: Thought content normal.         Cognition and Memory: Cognition and memory normal.         Judgment: Judgment normal.     Vital Signs:   /75   Pulse 83   Ht 156.2 cm (61.5\")   Wt 79.2 kg (174 lb 9.6 oz)   BMI 32.46 kg/m²      Lab Results:   Telemedicine on 04/24/2025   Component Date Value Ref Range Status   • Amphetamine Screen, Urine 04/24/2025 Positive (A)  Negative Final   • Barbiturates Screen, Urine 04/24/2025 Negative  Negative Final   • Buprenorphine, Screen, Urine 04/24/2025 Positive (A)  Negative Final   • Benzodiazepine Screen, Urine 04/24/2025 Negative  Negative Final   • Cocaine Screen, Urine 04/24/2025 Negative  Negative Final   • MDMA (ECSTASY) 04/24/2025 Positive (A)  Negative Final   • Methamphetamine, Ur 04/24/2025 " Positive (A)  Negative Final   • Methadone Screen, Urine 04/24/2025 Negative  Negative Final   • Morphine/Opiates Screen, Urine 04/24/2025 Negative  Negative Final   • Oxycodone Screen, Urine 04/24/2025 Negative  Negative Final   • Phencyclidine (PCP), Urine 04/24/2025 Negative  Negative Final   • Propoxyphene Scree, Urine 04/24/2025 Negative  Negative Final   • Tricyclic Antidepressants Screen 04/24/2025 Negative  Negative Final   • THC, Screen, Urine 04/24/2025 Positive (A)  Negative Final   Telemedicine on 04/17/2025   Component Date Value Ref Range Status   • Amphetamine Screen, Urine 04/17/2025 Positive (A)  Negative Final   • Barbiturates Screen, Urine 04/17/2025 Negative  Negative Final   • Buprenorphine, Screen, Urine 04/17/2025 Positive (A)  Negative Final   • Benzodiazepine Screen, Urine 04/17/2025 Negative  Negative Final   • Cocaine Screen, Urine 04/17/2025 Negative  Negative Final   • MDMA (ECSTASY) 04/17/2025 Positive (A)  Negative Final   • Methamphetamine, Ur 04/17/2025 Positive (A)  Negative Final   • Methadone Screen, Urine 04/17/2025 Negative  Negative Final   • Morphine/Opiates Screen, Urine 04/17/2025 Negative  Negative Final   • Oxycodone Screen, Urine 04/17/2025 Negative  Negative Final   • Phencyclidine (PCP), Urine 04/17/2025 Negative  Negative Final   • Propoxyphene Scree, Urine 04/17/2025 Negative  Negative Final   • Tricyclic Antidepressants Screen 04/17/2025 Negative  Negative Final   • THC, Screen, Urine 04/17/2025 Positive (A)  Negative Final   • BUPRENORPHINE 04/17/2025 149^DETECTED  10 ng/mL ng/mL Final    Detected   • NORBUPRENORPHINE 04/17/2025 528^DETECTED  10 ng/mL ng/mL Final    Detected   • 6-ACETYLMORPHINE 04/17/2025 Not Detected  10 ng/mL ng/mL Final   • AMPHETAMINE 04/17/2025 Detected  500 ng/mL ng/mL Final   • Barbiturates 04/17/2025 Not Detected  200 ng/mL ng/mL Final   • Benzodiazepines 04/17/2025 Not Detected  200 ng/mL ng/mL Final   • BUPRENORPHINE 04/17/2025 Detected   5 ng/mL ng/mL Final   • Cocaine Metabolite 04/17/2025 Not Detected  150 ng/mL ng/mL Final   • EDDP 04/17/2025 Not Detected  100 ng/mL ng/mL Final   • ALCOHOL, ETHYL 04/17/2025 Not Detected  <100.0000 ng/mL Final   • FENTANYL 04/17/2025 Not Detected  1 ng/mL ng/mL Final   • METHAMPHETAMINE 04/17/2025 Detected  500 ng/mL ng/mL Final   • Opiates 04/17/2025 Not Detected  300 ng/mL ng/mL Final   • OXYCODONE 04/17/2025 Not Detected  100 ng/mL ng/mL Final   • PCP 04/17/2025 Not Detected  25 ng/mL ng/mL Final   • THC 04/17/2025 Detected  50 ng/mL ng/mL Final   • TRICYCLIC ANTIDEPRESSANTS 04/17/2025 Not Detected  300 ng/mL ng/mL Final   • Reference Lab Report 04/17/2025    Final    See Full Screen for results.   • Ethyl Glucuronide 04/17/2025 Not Detected  200 ng/mL ng/mL Final   • Ethyl Sulfate 04/17/2025 55.4  50 ng/mL ng/mL Final    Detected   • AMPHETAMINE 04/17/2025 78975^DETECTED  250 ng/mL ng/mL Final    Detected   • PHENTERMINE 04/17/2025 Not Detected  100 ng/mL ng/mL Final   • METHAMPHETAMINE 04/17/2025 84213^DETECTED  100 ng/mL ng/mL Final    Detected   Telemedicine on 03/27/2025   Component Date Value Ref Range Status   • Amphetamine Screen, Urine 03/27/2025 Positive (A)  Negative Final   • Barbiturates Screen, Urine 03/27/2025 Negative  Negative Final   • Buprenorphine, Screen, Urine 03/27/2025 Positive (A)  Negative Final   • Benzodiazepine Screen, Urine 03/27/2025 Negative  Negative Final   • Cocaine Screen, Urine 03/27/2025 Negative  Negative Final   • MDMA (ECSTASY) 03/27/2025 Negative  Negative Final   • Methamphetamine, Ur 03/27/2025 Positive (A)  Negative Final   • Methadone Screen, Urine 03/27/2025 Negative  Negative Final   • Morphine/Opiates Screen, Urine 03/27/2025 Negative  Negative Final   • Oxycodone Screen, Urine 03/27/2025 Negative  Negative Final   • Phencyclidine (PCP), Urine 03/27/2025 Negative  Negative Final   • Propoxyphene Scree, Urine 03/27/2025 Negative  Negative Final   • Tricyclic  Antidepressants Screen 03/27/2025 Negative  Negative Final   • THC, Screen, Urine 03/27/2025 Positive (A)  Negative Final   • 6-ACETYLMORPHINE 03/27/2025 Not Detected  10 ng/mL ng/mL Final   • AMPHETAMINE 03/27/2025 Detected  500 ng/mL ng/mL Final   • Barbiturates 03/27/2025 Not Detected  200 ng/mL ng/mL Final   • Benzodiazepines 03/27/2025 Not Detected  200 ng/mL ng/mL Final   • BUPRENORPHINE 03/27/2025 Detected  5 ng/mL ng/mL Final   • Cocaine Metabolite 03/27/2025 Not Detected  150 ng/mL ng/mL Final   • EDDP 03/27/2025 Not Detected  100 ng/mL ng/mL Final   • ALCOHOL, ETHYL 03/27/2025 Not Detected  <100.0000 ng/mL Final   • FENTANYL 03/27/2025 Not Detected  1 ng/mL ng/mL Final   • METHAMPHETAMINE 03/27/2025 Detected  500 ng/mL ng/mL Final   • Opiates 03/27/2025 Not Detected  300 ng/mL ng/mL Final   • OXYCODONE 03/27/2025 Not Detected  100 ng/mL ng/mL Final   • PCP 03/27/2025 Not Detected  25 ng/mL ng/mL Final   • THC 03/27/2025 Detected  50 ng/mL ng/mL Final   • TRICYCLIC ANTIDEPRESSANTS 03/27/2025 Not Detected  300 ng/mL ng/mL Final   • Reference Lab Report 03/27/2025    Final    See Full Screen for results.   • AMPHETAMINE 03/27/2025 974^DETECTED  250 ng/mL ng/mL Final    Detected   • PHENTERMINE 03/27/2025 Not Detected  100 ng/mL ng/mL Final   • METHAMPHETAMINE 03/27/2025 3452^DETECTED  100 ng/mL ng/mL Final    Detected   Clinical Support on 03/20/2025   Component Date Value Ref Range Status   • Amphetamine Screen, Urine 03/20/2025 Negative  Negative Final   • Barbiturates Screen, Urine 03/20/2025 Negative  Negative Final   • Buprenorphine, Screen, Urine 03/20/2025 Positive (A)  Negative Final   • Benzodiazepine Screen, Urine 03/20/2025 Negative  Negative Final   • Cocaine Screen, Urine 03/20/2025 Negative  Negative Final   • MDMA (ECSTASY) 03/20/2025 Negative  Negative Final   • Methamphetamine, Ur 03/20/2025 Negative  Negative Final   • Methadone Screen, Urine 03/20/2025 Negative  Negative Final   •  Morphine/Opiates Screen, Urine 03/20/2025 Negative  Negative Final   • Oxycodone Screen, Urine 03/20/2025 Negative  Negative Final   • Phencyclidine (PCP), Urine 03/20/2025 Negative  Negative Final   • Propoxyphene Scree, Urine 03/20/2025 Negative  Negative Final   • Tricyclic Antidepressants Screen 03/20/2025 Negative  Negative Final   • THC, Screen, Urine 03/20/2025 Negative  Negative Final   • AMPHETAMINE 03/20/2025 Not Detected  500 ng/mL ng/mL Final   • Barbiturates 03/20/2025 Not Detected  200 ng/mL ng/mL Final   • Benzodiazepines 03/20/2025 Not Detected  200 ng/mL ng/mL Final   • TRICYCLIC ANTIDEPRESSANTS 03/20/2025 Not Detected  300 ng/mL ng/mL Final   • 6-ACETYLMORPHINE 03/20/2025 Not Detected  10 ng/mL ng/mL Final   • BUPRENORPHINE 03/20/2025 Detected  5 ng/mL ng/mL Final   • Cocaine Metabolite 03/20/2025 Not Detected  150 ng/mL ng/mL Final   • EDDP 03/20/2025 Not Detected  100 ng/mL ng/mL Final   • ALCOHOL, ETHYL 03/20/2025 Not Detected  100 ng/mL ng/mL Final   • FENTANYL 03/20/2025 Not Detected  1 ng/mL ng/mL Final   • METHAMPHETAMINE 03/20/2025 Not Detected  500 ng/mL ng/mL Final   • Opiates 03/20/2025 Not Detected  300 ng/mL ng/mL Final   • OXYCODONE 03/20/2025 Not Detected  100 ng/mL ng/mL Final   • PCP 03/20/2025 Not Detected  25 ng/mL ng/mL Final   • THC 03/20/2025 Detected  50 ng/mL ng/mL Final   • Reference Lab Report 03/20/2025    Final    See Full Screen for results.   • BUPRENORPHINE 03/20/2025 18^DETECTED  10 ng/mL ng/mL Final    Detected   • NORBUPRENORPHINE 03/20/2025 46^DETECTED  10 ng/mL ng/mL Final    Detected   • GABAPENTIN 03/20/2025 Not Detected  500 ng/mL ng/mL Final   • 9-DELTA-THC-COOH 03/20/2025 26^NOT DETECTED  100 ng/mL ng/mL Final    Not Detected   Telemedicine on 02/25/2025   Component Date Value Ref Range Status   • Amphetamine Screen, Urine 02/25/2025 Positive (A)  Negative Final   • Barbiturates Screen, Urine 02/25/2025 Negative  Negative Final   • Buprenorphine, Screen,  Urine 02/25/2025 Positive (A)  Negative Final   • Benzodiazepine Screen, Urine 02/25/2025 Negative  Negative Final   • Cocaine Screen, Urine 02/25/2025 Negative  Negative Final   • MDMA (ECSTASY) 02/25/2025 Negative  Negative Final   • Methamphetamine, Ur 02/25/2025 Positive (A)  Negative Final   • Methadone Screen, Urine 02/25/2025 Negative  Negative Final   • Morphine/Opiates Screen, Urine 02/25/2025 Negative  Negative Final   • Oxycodone Screen, Urine 02/25/2025 Negative  Negative Final   • Phencyclidine (PCP), Urine 02/25/2025 Negative  Negative Final   • Propoxyphene Scree, Urine 02/25/2025 Negative  Negative Final   • Tricyclic Antidepressants Screen 02/25/2025 Negative  Negative Final   • THC, Screen, Urine 02/25/2025 Positive (A)  Negative Final   Telemedicine on 01/27/2025   Component Date Value Ref Range Status   • Amphetamine Screen, Urine 01/27/2025 Positive (A)  Negative Final   • Barbiturates Screen, Urine 01/27/2025 Negative  Negative Final   • Buprenorphine, Screen, Urine 01/27/2025 Positive (A)  Negative Final   • Benzodiazepine Screen, Urine 01/27/2025 Negative  Negative Final   • Cocaine Screen, Urine 01/27/2025 Negative  Negative Final   • MDMA (ECSTASY) 01/27/2025 Positive (A)  Negative Final   • Methamphetamine, Ur 01/27/2025 Negative  Negative Final   • Methadone Screen, Urine 01/27/2025 Negative  Negative Final   • Morphine/Opiates Screen, Urine 01/27/2025 Negative  Negative Final   • Oxycodone Screen, Urine 01/27/2025 Negative  Negative Final   • Phencyclidine (PCP), Urine 01/27/2025 Negative  Negative Final   • Propoxyphene Scree, Urine 01/27/2025 Negative  Negative Final   • Tricyclic Antidepressants Screen 01/27/2025 Negative  Negative Final   • THC, Screen, Urine 01/27/2025 Negative  Negative Final   • Gabapentin 01/27/2025 NEGATIVE   Final   • Ethanol, Urine 01/27/2025 NEGATIVE   Final   • Fentanyl, Urine 01/27/2025 Negative  Negative Final   Telemedicine on 01/20/2025   Component Date  Value Ref Range Status   • Amphetamine Screen, Urine 01/20/2025 Positive (A)  Negative Final    PRELIMINARY RESULTS. REFER TO [facility] LAB CONFIRMATION FOR FINAL RESULTS.   • Barbiturates Screen, Urine 01/20/2025 Negative  Negative Final   • Buprenorphine, Screen, Urine 01/20/2025 Positive (A)  Negative Final   • Benzodiazepine Screen, Urine 01/20/2025 Negative  Negative Final   • Cocaine Screen, Urine 01/20/2025 Negative  Negative Final   • MDMA (ECSTASY) 01/20/2025 Negative  Negative Final   • Methamphetamine, Ur 01/20/2025 Positive (A)  Negative Final   • Methadone Screen, Urine 01/20/2025 Negative  Negative Final   • Morphine/Opiates Screen, Urine 01/20/2025 Negative  Negative Final   • Oxycodone Screen, Urine 01/20/2025 Negative  Negative Final   • Phencyclidine (PCP), Urine 01/20/2025 Negative  Negative Final   • Propoxyphene Scree, Urine 01/20/2025 Negative  Negative Final   • Tricyclic Antidepressants Screen 01/20/2025 Negative  Negative Final   • THC, Screen, Urine 01/20/2025 Positive (A)  Negative Final   • Gabapentin 01/20/2025 negative   Final   • ETHANOL URINE SCREEN 01/20/2025 Negative   Final   • Fentanyl, Urine 01/20/2025 Negative  Negative Final   Telemedicine on 12/31/2024   Component Date Value Ref Range Status   • Amphetamine Screen, Urine 12/31/2024 Positive (A)  Negative Final   • Barbiturates Screen, Urine 12/31/2024 Negative  Negative Final   • Buprenorphine, Screen, Urine 12/31/2024 Positive (A)  Negative Final   • Benzodiazepine Screen, Urine 12/31/2024 Negative  Negative Final   • Cocaine Screen, Urine 12/31/2024 Negative  Negative Final   • MDMA (ECSTASY) 12/31/2024 Positive (A)  Negative Final   • Methamphetamine, Ur 12/31/2024 Positive (A)  Negative Final   • Methadone Screen, Urine 12/31/2024 Negative  Negative Final   • Morphine/Opiates Screen, Urine 12/31/2024 Negative  Negative Final   • Oxycodone Screen, Urine 12/31/2024 Negative  Negative Final   • Phencyclidine (PCP), Urine  12/31/2024 Negative  Negative Final   • Propoxyphene Scree, Urine 12/31/2024 Negative  Negative Final   • Tricyclic Antidepressants Screen 12/31/2024 Negative  Negative Final   • THC, Screen, Urine 12/31/2024 Positive (A)  Negative Final   • Gabapentin 12/31/2024 negative   Final   • ETHANOL URINE SCREEN 12/31/2024 Negative   Final   • Fentanyl, Urine 12/31/2024 Negative  Negative Final   Telemedicine on 12/23/2024   Component Date Value Ref Range Status   • Amphetamine Screen, Urine 12/23/2024 Positive (A)  Negative Final   • Barbiturates Screen, Urine 12/23/2024 Negative  Negative Final   • Buprenorphine, Screen, Urine 12/23/2024 Positive (A)  Negative Final   • Benzodiazepine Screen, Urine 12/23/2024 Negative  Negative Final   • Cocaine Screen, Urine 12/23/2024 Negative  Negative Final   • MDMA (ECSTASY) 12/23/2024 Positive (A)  Negative Final   • Methamphetamine, Ur 12/23/2024 Positive (A)  Negative Final   • Methadone Screen, Urine 12/23/2024 Negative  Negative Final   • Morphine/Opiates Screen, Urine 12/23/2024 Negative  Negative Final   • Oxycodone Screen, Urine 12/23/2024 Negative  Negative Final   • Phencyclidine (PCP), Urine 12/23/2024 Negative  Negative Final   • Propoxyphene Scree, Urine 12/23/2024 Negative  Negative Final   • Tricyclic Antidepressants Screen 12/23/2024 Negative  Negative Final   • THC, Screen, Urine 12/23/2024 Positive (A)  Negative Final   • GABAPENTIN 12/23/2024 negative   Final   • ETHANOL URINE SCREEN 12/23/2024 Negative   Final   • Fentanyl, Urine 12/23/2024 Negative  Negative Final   Telemedicine on 12/18/2024   Component Date Value Ref Range Status   • Amphetamine Screen, Urine 12/18/2024 Positive (A)  Negative Final   • Barbiturates Screen, Urine 12/18/2024 Negative  Negative Final   • Buprenorphine, Screen, Urine 12/18/2024 Positive (A)  Negative Final   • Benzodiazepine Screen, Urine 12/18/2024 Negative  Negative Final   • Cocaine Screen, Urine 12/18/2024 Negative  Negative  Final   • MDMA (ECSTASY) 12/18/2024 Negative  Negative Final   • Methamphetamine, Ur 12/18/2024 Positive (A)  Negative Final   • Methadone Screen, Urine 12/18/2024 Negative  Negative Final   • Morphine/Opiates Screen, Urine 12/18/2024 Negative  Negative Final   • Oxycodone Screen, Urine 12/18/2024 Negative  Negative Final   • Phencyclidine (PCP), Urine 12/18/2024 Negative  Negative Final   • Propoxyphene Scree, Urine 12/18/2024 Negative  Negative Final   • Tricyclic Antidepressants Screen 12/18/2024 Negative  Negative Final   • THC, Screen, Urine 12/18/2024 Negative  Negative Final   • Gabapentin 12/18/2024 negative   Final   • ETHANOL URINE SCREEN 12/18/2024 Negative   Final   • Fentanyl, Urine 12/18/2024 Negative  Negative Final   There may be more visits with results that are not included.         Assessment & Plan   Diagnoses and all orders for this visit:    1. Opioid type dependence, continuous (Primary)  -     buprenorphine-naloxone (SUBOXONE) 8-2 MG per SL tablet; Place 2 tablets under the tongue Daily.  Dispense: 14 tablet; Refill: 0    2. Medication management  -     POC Medline 14 Panel Urine Drug Screen  -     Behavioral Health Full Screen and Definitive Testing (MARTIN) -; Future  -     Buprenorphine/Norbuprenorphine level, QUANT (MARTIN) - Urine, Clean Catch; Future  -     Behavioral Health Full Screen and Definitive Testing (MARTIN) -  -     Buprenorphine/Norbuprenorphine level, QUANT (MARTIN) - Urine, Clean Catch    3. Methamphetamine abuse  -     buPROPion XL (WELLBUTRIN XL) 150 MG 24 hr tablet; Take 1 tablet by mouth 2 (Two) Times a Day. BID dosing  Dispense: 60 tablet; Refill: 0        Visit Diagnoses:    ICD-10-CM ICD-9-CM   1. Opioid type dependence, continuous  F11.20 304.01   2. Medication management  Z79.899 V58.69   3. Methamphetamine abuse  F15.10 305.70       PLAN:  Safety: No acute safety concerns  Risk Assessment: Risk of self-harm acutely is low. Risk of self-harm chronically is also low, but  could be further elevated in the event of treatment noncompliance and/or AODA.    TREATMENT PLAN/GOALS: Continue supportive psychotherapy efforts and medications as indicated. Treatment and medication options discussed during today's visit. Patient acknowledged and verbally consented to continue with current treatment plan and was educated on the importance of compliance with treatment and follow-up appointments.    MEDICATION ISSUES:  ROSY reviewed as expected.  Discussed medication options and treatment plan of prescribed medication as well as the risks, benefits, and side effects including potential falls, possible impaired driving and metabolic adversities among others. Patient is agreeable to call the office with any worsening of symptoms or onset of side effects. Patient is agreeable to call 911 or go to the nearest ER should he/she begin having SI/HI. No medication side effects or related complaints today.     MEDS ORDERED DURING VISIT:  New Medications Ordered This Visit   Medications   • buPROPion XL (WELLBUTRIN XL) 150 MG 24 hr tablet     Sig: Take 1 tablet by mouth 2 (Two) Times a Day. BID dosing     Dispense:  60 tablet     Refill:  0   • buprenorphine-naloxone (SUBOXONE) 8-2 MG per SL tablet     Sig: Place 2 tablets under the tongue Daily.     Dispense:  14 tablet     Refill:  0     BALA HERRERA:[id number]       No follow-ups on file.           This document has been electronically signed by WALKER Romero  April 24, 2025 14:44 EDT      Part of this note may be an electronic transcription/translation of spoken language to printed text using the Dragon Dictation System.

## 2025-04-26 LAB — REF LAB TEST METHOD: NORMAL

## 2025-04-28 LAB
6-ACETYLMORPHINE: NOT DETECTED NG/ML
ALCOHOL, ETHYL: NOT DETECTED NG/ML
AMPHET SAL QL CFM: NORMAL NG/ML
AMPHETAMINE: DETECTED NG/ML
BENZODIAZ BLD QL: NOT DETECTED NG/ML
BUPRENORPHINE SAL CFM-MCNC: DETECTED NG/ML
BUPRENORPHINE: NORMAL NG/ML
COCAINE METABOLITE: NOT DETECTED NG/ML
EDDP SERPL QL: NOT DETECTED NG/ML
ETHYL GLUCURONIDE UR CFM-MCNC: NOT DETECTED NG/ML
ETHYL SULFATE UR CFM-MCNC: NOT DETECTED NG/ML
FENTANYL-EIA: NOT DETECTED NG/ML
METHAMPHET/CREAT UR: NORMAL NG/ML
METHAMPHETAMINE: DETECTED NG/ML
NORBUPRENORPHINE: NORMAL NG/ML
OPIATES: NOT DETECTED NG/ML
OXYCODONE: NOT DETECTED NG/ML
PCP: NOT DETECTED NG/ML
PHENTERMINE: NOT DETECTED NG/ML
THC: DETECTED NG/ML
TRICYCLIC ANTIDEPRESSANTS: NOT DETECTED NG/ML

## 2025-05-01 ENCOUNTER — TELEMEDICINE (OUTPATIENT)
Dept: PSYCHIATRY | Facility: CLINIC | Age: 55
End: 2025-05-01
Payer: MEDICAID

## 2025-05-01 VITALS
BODY MASS INDEX: 32.89 KG/M2 | HEART RATE: 81 BPM | SYSTOLIC BLOOD PRESSURE: 139 MMHG | HEIGHT: 61 IN | WEIGHT: 174.2 LBS | DIASTOLIC BLOOD PRESSURE: 76 MMHG

## 2025-05-01 DIAGNOSIS — Z79.899 MEDICATION MANAGEMENT: ICD-10-CM

## 2025-05-01 DIAGNOSIS — F11.20 OPIOID TYPE DEPENDENCE, CONTINUOUS: Primary | ICD-10-CM

## 2025-05-01 RX ORDER — BUPRENORPHINE HYDROCHLORIDE AND NALOXONE HYDROCHLORIDE DIHYDRATE 8; 2 MG/1; MG/1
2 TABLET SUBLINGUAL DAILY
Qty: 28 TABLET | Refills: 0 | Status: SHIPPED | OUTPATIENT
Start: 2025-05-01

## 2025-05-01 NOTE — PROGRESS NOTES
This provider is located at [facility]. The Patient is seen remotely located at the [facility] ([facility]) using Video. Patient is being seen via telehealth and confirm that they are in a secure environment for this session. The patient's condition being diagnosed/treated is appropriate for telemedicine. Provider identified as Williams Brody as well as credentials APRN MSN FNP-C SRINIVASAN-YOUNG.   The client/patient gave consent to be seen remotely, and when consent is given they understand that the consent allows for patient identifiable information to be sent to a third party as needed.   They may refuse to be seen remotely at any time. The electronic data is encrypted and password protected, and the patient has been advised of the potential risks to privacy not withstanding such measures.    Chief Complaint/History of Present Illness: Follow Up buprenorphine/naloxone Medicated Assisted Treatment for Opiate Use Disorder     Patient/Client Concerns/Updates: Continue Wellbutrin for stimulant cravings   - Patient continues to struggle with methamphetamine triggered by life stress most notably as it relates to the fluctuating health of her mother  - Continued requirement to provide accountability to a   - Reports mood is overall stable without concerning exacerbations of depression or anxiety denies suicidal or homicidal thoughts    Triggers (Persons/Places/Things/Events/Thought/Emotions): Ill mother    Cravings/Substance Use: Cravings and continued struggle with methamphetamine    Relapse Prevention: Counseling    Urine Drug Screen (today's visit)/Presumptive Point of Care discussed: Positive buprenorphine positive amphetamine methamphetamine and THC    UDS Confirmation (Most recent/Resulted): Positive buprenorphine/nor-buprenorphine, no concerns for urine tampering otherwise positive methamphetamine THC and clonazepam    Most recent pertinent laboratory studies reviewed: 4/11/23-hepatic  function within normal limits, Cr WNL, HIV negative, hepatitis C not detected         ROSY (PDMP) Reviewed for Current/Active Medications: buprenorphine/naloxone as reviewed today    Past Surgical History:  No past surgical history on file.    Problem List:  There is no problem list on file for this patient.      Allergy:   No Known Allergies     Current Medications:   Current Outpatient Medications   Medication Sig Dispense Refill   • buPROPion XL (WELLBUTRIN XL) 150 MG 24 hr tablet Take 1 tablet by mouth 2 (Two) Times a Day. BID dosing 60 tablet 0   • famotidine (PEPCID) 20 MG tablet Take 1 tablet by mouth 2 (Two) Times a Day. 60 tablet 0   • folic acid (FOLVITE) 1 MG tablet Take 1 tablet by mouth Daily.     • GNP PAIN RELIEF EX-STRENGTH 500 MG tablet TAKE ONE TABLET BY MOUTH EVERY 6 HOURS AS NEEDED FOR 30 DAYS **DO NOT EXCEED 6 TABLETS IN 24 HOURS**     • hydrOXYzine pamoate (Vistaril) 25 MG capsule Take 1 capsule by mouth 4 (Four) Times a Day As Needed for Anxiety. 120 capsule 0   • methotrexate 2.5 MG tablet Take 1 tablet by mouth 3 (Three) Doses Each Week. Take Doses 12 (Twelve) Hours Apart.     • ondansetron ODT (ZOFRAN-ODT) 8 MG disintegrating tablet Place 1 tablet on the tongue Every 8 (Eight) Hours As Needed for Nausea or Vomiting. 45 tablet 0   • buprenorphine-naloxone (SUBOXONE) 8-2 MG per SL tablet Place 2 tablets under the tongue Daily. 28 tablet 0   • naloxone (NARCAN) 4 MG/0.1ML nasal spray 1 spray into the nostril(s) as directed by provider As Needed (opiate over sedation). 1 spray in 1 nostril every 2 to 3 minutes, call 911 (Patient not taking: Reported on 5/1/2025) 2 each 2     No current facility-administered medications for this visit.       Past Medical History:  Past Medical History:   Diagnosis Date   • History of hepatitis B    • Pancreatitis          Social History     Socioeconomic History   • Marital status: Single   Tobacco Use   • Smoking status: Every Day     Current packs/day: 1.00      Average packs/day: 1 pack/day for 15.0 years (15.0 ttl pk-yrs)     Types: Cigarettes   • Smokeless tobacco: Never   Vaping Use   • Vaping status: Never Used   Substance and Sexual Activity   • Alcohol use: Not Currently   • Drug use: Yes     Types: Marijuana, Hydrocodone, Oxycodone, Methamphetamines   • Sexual activity: Defer         Family History   Problem Relation Age of Onset   • No Known Problems Mother    • No Known Problems Father    • No Known Problems Sister    • No Known Problems Brother    • No Known Problems Maternal Grandmother    • No Known Problems Paternal Grandmother    • No Known Problems Maternal Aunt    • No Known Problems Paternal Aunt    • No Known Problems Maternal Grandfather    • No Known Problems Paternal Grandfather    • No Known Problems Maternal Uncle    • No Known Problems Paternal Uncle    • No Known Problems Cousin    • No Known Problems Other    • ADD / ADHD Neg Hx    • Alcohol abuse Neg Hx    • Anxiety disorder Neg Hx    • Bipolar disorder Neg Hx    • Dementia Neg Hx    • Depression Neg Hx    • Drug abuse Neg Hx    • OCD Neg Hx    • Paranoid behavior Neg Hx    • Schizophrenia Neg Hx    • Seizures Neg Hx    • Self-Injurious Behavior  Neg Hx    • Suicide Attempts Neg Hx    • Breast cancer Neg Hx          Mental Status Exam:   Hygiene:   good  Cooperation:  Cooperative  Eye Contact:  Good  Psychomotor Behavior:  Appropriate  Affect:  Appropriate  Mood: anxious  Speech:  Normal  Thought Process:  Goal directed  Thought Content:  Normal  Suicidal:  None  Homicidal:  None  Hallucinations:  None  Delusion:  None  Memory:  Intact  Orientation:  Grossly intact  Reliability:  good  Insight:  Fair  Judgement:  Fair  Impulse Control:  Fair         Review of Systems:  Review of Systems   Constitutional:  Negative for activity change, chills, diaphoresis and fatigue.   Respiratory:  Negative for apnea, cough and shortness of breath.    Cardiovascular:  Negative for chest pain, palpitations  "and leg swelling.   Gastrointestinal:  Negative for abdominal pain, constipation, diarrhea, nausea and vomiting.   Genitourinary:  Negative for difficulty urinating.   Musculoskeletal:  Negative for arthralgias.   Skin:  Negative for rash.   Neurological:  Negative for dizziness, weakness and headaches.   Psychiatric/Behavioral:  Negative for agitation, self-injury, sleep disturbance and suicidal ideas. The patient is nervous/anxious.        Physical Exam:  Physical Exam  Vitals reviewed.   Constitutional:       General: She is not in acute distress.     Appearance: Normal appearance. She is not ill-appearing or toxic-appearing.   Pulmonary:      Effort: Pulmonary effort is normal.   Musculoskeletal:         General: Normal range of motion.   Neurological:      General: No focal deficit present.      Mental Status: She is alert and oriented to person, place, and time.   Psychiatric:         Attention and Perception: Attention and perception normal.         Mood and Affect: Mood normal. Mood is not anxious or depressed.         Speech: Speech normal.         Behavior: Behavior normal. Behavior is cooperative.         Thought Content: Thought content normal.         Cognition and Memory: Cognition and memory normal.         Judgment: Judgment normal.     Vital Signs:   /76   Pulse 81   Ht 156.2 cm (61.5\")   Wt 79 kg (174 lb 3.2 oz)   BMI 32.39 kg/m²      Lab Results:   Telemedicine on 05/01/2025   Component Date Value Ref Range Status   • Amphetamine Screen, Urine 05/01/2025 Positive (A)  Negative Final   • Barbiturates Screen, Urine 05/01/2025 Negative  Negative Final   • Buprenorphine, Screen, Urine 05/01/2025 Positive (A)  Negative Final   • Benzodiazepine Screen, Urine 05/01/2025 Negative  Negative Final   • Cocaine Screen, Urine 05/01/2025 Negative  Negative Final   • MDMA (ECSTASY) 05/01/2025 Positive (A)  Negative Final   • Methamphetamine, Ur 05/01/2025 Positive (A)  Negative Final   • Methadone " Screen, Urine 05/01/2025 Negative  Negative Final   • Morphine/Opiates Screen, Urine 05/01/2025 Negative  Negative Final   • Oxycodone Screen, Urine 05/01/2025 Negative  Negative Final   • Phencyclidine (PCP), Urine 05/01/2025 Negative  Negative Final   • Propoxyphene Scree, Urine 05/01/2025 Negative  Negative Final   • Tricyclic Antidepressants Screen 05/01/2025 Negative  Negative Final   • THC, Screen, Urine 05/01/2025 Positive (A)  Negative Final   Telemedicine on 04/24/2025   Component Date Value Ref Range Status   • Amphetamine Screen, Urine 04/24/2025 Positive (A)  Negative Final   • Barbiturates Screen, Urine 04/24/2025 Negative  Negative Final   • Buprenorphine, Screen, Urine 04/24/2025 Positive (A)  Negative Final   • Benzodiazepine Screen, Urine 04/24/2025 Negative  Negative Final   • Cocaine Screen, Urine 04/24/2025 Negative  Negative Final   • MDMA (ECSTASY) 04/24/2025 Positive (A)  Negative Final   • Methamphetamine, Ur 04/24/2025 Positive (A)  Negative Final   • Methadone Screen, Urine 04/24/2025 Negative  Negative Final   • Morphine/Opiates Screen, Urine 04/24/2025 Negative  Negative Final   • Oxycodone Screen, Urine 04/24/2025 Negative  Negative Final   • Phencyclidine (PCP), Urine 04/24/2025 Negative  Negative Final   • Propoxyphene Scree, Urine 04/24/2025 Negative  Negative Final   • Tricyclic Antidepressants Screen 04/24/2025 Negative  Negative Final   • THC, Screen, Urine 04/24/2025 Positive (A)  Negative Final   • BUPRENORPHINE 04/24/2025 264^DETECTED  10 ng/mL ng/mL Final    Detected   • NORBUPRENORPHINE 04/24/2025 638^DETECTED  10 ng/mL ng/mL Final    Detected   • 6-ACETYLMORPHINE 04/24/2025 Not Detected  10 ng/mL ng/mL Final   • AMPHETAMINE 04/24/2025 Detected  500 ng/mL ng/mL Final   • Barbiturates 04/24/2025 Not Detected  200 ng/mL ng/mL Final   • Benzodiazepines 04/24/2025 Not Detected  200 ng/mL ng/mL Final   • BUPRENORPHINE 04/24/2025 Detected  5 ng/mL ng/mL Final   • Cocaine Metabolite  04/24/2025 Not Detected  150 ng/mL ng/mL Final   • EDDP 04/24/2025 Not Detected  100 ng/mL ng/mL Final   • ALCOHOL, ETHYL 04/24/2025 Not Detected  <100.0000 ng/mL Final   • FENTANYL 04/24/2025 Not Detected  1 ng/mL ng/mL Final   • METHAMPHETAMINE 04/24/2025 Detected  500 ng/mL ng/mL Final   • Opiates 04/24/2025 Not Detected  300 ng/mL ng/mL Final   • OXYCODONE 04/24/2025 Not Detected  100 ng/mL ng/mL Final   • PCP 04/24/2025 Not Detected  25 ng/mL ng/mL Final   • THC 04/24/2025 Detected  50 ng/mL ng/mL Final   • TRICYCLIC ANTIDEPRESSANTS 04/24/2025 Not Detected  300 ng/mL ng/mL Final   • Reference Lab Report 04/24/2025    Final    See Full Screen for results.   • Ethyl Glucuronide 04/24/2025 Not Detected  200 ng/mL ng/mL Final   • Ethyl Sulfate 04/24/2025 Not Detected  50 ng/mL ng/mL Final   • AMPHETAMINE 04/24/2025 10373^DETECTED  250 ng/mL ng/mL Final    Detected   • PHENTERMINE 04/24/2025 Not Detected  100 ng/mL ng/mL Final   • METHAMPHETAMINE 04/24/2025 50943^DETECTED  100 ng/mL ng/mL Final    Detected   Telemedicine on 04/17/2025   Component Date Value Ref Range Status   • Amphetamine Screen, Urine 04/17/2025 Positive (A)  Negative Final   • Barbiturates Screen, Urine 04/17/2025 Negative  Negative Final   • Buprenorphine, Screen, Urine 04/17/2025 Positive (A)  Negative Final   • Benzodiazepine Screen, Urine 04/17/2025 Negative  Negative Final   • Cocaine Screen, Urine 04/17/2025 Negative  Negative Final   • MDMA (ECSTASY) 04/17/2025 Positive (A)  Negative Final   • Methamphetamine, Ur 04/17/2025 Positive (A)  Negative Final   • Methadone Screen, Urine 04/17/2025 Negative  Negative Final   • Morphine/Opiates Screen, Urine 04/17/2025 Negative  Negative Final   • Oxycodone Screen, Urine 04/17/2025 Negative  Negative Final   • Phencyclidine (PCP), Urine 04/17/2025 Negative  Negative Final   • Propoxyphene Scree, Urine 04/17/2025 Negative  Negative Final   • Tricyclic Antidepressants Screen 04/17/2025 Negative   Negative Final   • THC, Screen, Urine 04/17/2025 Positive (A)  Negative Final   • BUPRENORPHINE 04/17/2025 149^DETECTED  10 ng/mL ng/mL Final    Detected   • NORBUPRENORPHINE 04/17/2025 528^DETECTED  10 ng/mL ng/mL Final    Detected   • 6-ACETYLMORPHINE 04/17/2025 Not Detected  10 ng/mL ng/mL Final   • AMPHETAMINE 04/17/2025 Detected  500 ng/mL ng/mL Final   • Barbiturates 04/17/2025 Not Detected  200 ng/mL ng/mL Final   • Benzodiazepines 04/17/2025 Not Detected  200 ng/mL ng/mL Final   • BUPRENORPHINE 04/17/2025 Detected  5 ng/mL ng/mL Final   • Cocaine Metabolite 04/17/2025 Not Detected  150 ng/mL ng/mL Final   • EDDP 04/17/2025 Not Detected  100 ng/mL ng/mL Final   • ALCOHOL, ETHYL 04/17/2025 Not Detected  <100.0000 ng/mL Final   • FENTANYL 04/17/2025 Not Detected  1 ng/mL ng/mL Final   • METHAMPHETAMINE 04/17/2025 Detected  500 ng/mL ng/mL Final   • Opiates 04/17/2025 Not Detected  300 ng/mL ng/mL Final   • OXYCODONE 04/17/2025 Not Detected  100 ng/mL ng/mL Final   • PCP 04/17/2025 Not Detected  25 ng/mL ng/mL Final   • THC 04/17/2025 Detected  50 ng/mL ng/mL Final   • TRICYCLIC ANTIDEPRESSANTS 04/17/2025 Not Detected  300 ng/mL ng/mL Final   • Reference Lab Report 04/17/2025    Final    See Full Screen for results.   • Ethyl Glucuronide 04/17/2025 Not Detected  200 ng/mL ng/mL Final   • Ethyl Sulfate 04/17/2025 55.4  50 ng/mL ng/mL Final    Detected   • AMPHETAMINE 04/17/2025 09820^DETECTED  250 ng/mL ng/mL Final    Detected   • PHENTERMINE 04/17/2025 Not Detected  100 ng/mL ng/mL Final   • METHAMPHETAMINE 04/17/2025 57866^DETECTED  100 ng/mL ng/mL Final    Detected   Telemedicine on 03/27/2025   Component Date Value Ref Range Status   • Amphetamine Screen, Urine 03/27/2025 Positive (A)  Negative Final   • Barbiturates Screen, Urine 03/27/2025 Negative  Negative Final   • Buprenorphine, Screen, Urine 03/27/2025 Positive (A)  Negative Final   • Benzodiazepine Screen, Urine 03/27/2025 Negative  Negative Final   •  Cocaine Screen, Urine 03/27/2025 Negative  Negative Final   • MDMA (ECSTASY) 03/27/2025 Negative  Negative Final   • Methamphetamine, Ur 03/27/2025 Positive (A)  Negative Final   • Methadone Screen, Urine 03/27/2025 Negative  Negative Final   • Morphine/Opiates Screen, Urine 03/27/2025 Negative  Negative Final   • Oxycodone Screen, Urine 03/27/2025 Negative  Negative Final   • Phencyclidine (PCP), Urine 03/27/2025 Negative  Negative Final   • Propoxyphene Scree, Urine 03/27/2025 Negative  Negative Final   • Tricyclic Antidepressants Screen 03/27/2025 Negative  Negative Final   • THC, Screen, Urine 03/27/2025 Positive (A)  Negative Final   • 6-ACETYLMORPHINE 03/27/2025 Not Detected  10 ng/mL ng/mL Final   • AMPHETAMINE 03/27/2025 Detected  500 ng/mL ng/mL Final   • Barbiturates 03/27/2025 Not Detected  200 ng/mL ng/mL Final   • Benzodiazepines 03/27/2025 Not Detected  200 ng/mL ng/mL Final   • BUPRENORPHINE 03/27/2025 Detected  5 ng/mL ng/mL Final   • Cocaine Metabolite 03/27/2025 Not Detected  150 ng/mL ng/mL Final   • EDDP 03/27/2025 Not Detected  100 ng/mL ng/mL Final   • ALCOHOL, ETHYL 03/27/2025 Not Detected  <100.0000 ng/mL Final   • FENTANYL 03/27/2025 Not Detected  1 ng/mL ng/mL Final   • METHAMPHETAMINE 03/27/2025 Detected  500 ng/mL ng/mL Final   • Opiates 03/27/2025 Not Detected  300 ng/mL ng/mL Final   • OXYCODONE 03/27/2025 Not Detected  100 ng/mL ng/mL Final   • PCP 03/27/2025 Not Detected  25 ng/mL ng/mL Final   • THC 03/27/2025 Detected  50 ng/mL ng/mL Final   • TRICYCLIC ANTIDEPRESSANTS 03/27/2025 Not Detected  300 ng/mL ng/mL Final   • Reference Lab Report 03/27/2025    Final    See Full Screen for results.   • AMPHETAMINE 03/27/2025 974^DETECTED  250 ng/mL ng/mL Final    Detected   • PHENTERMINE 03/27/2025 Not Detected  100 ng/mL ng/mL Final   • METHAMPHETAMINE 03/27/2025 1532^DETECTED  100 ng/mL ng/mL Final    Detected   Clinical Support on 03/20/2025   Component Date Value Ref Range Status   •  Amphetamine Screen, Urine 03/20/2025 Negative  Negative Final   • Barbiturates Screen, Urine 03/20/2025 Negative  Negative Final   • Buprenorphine, Screen, Urine 03/20/2025 Positive (A)  Negative Final   • Benzodiazepine Screen, Urine 03/20/2025 Negative  Negative Final   • Cocaine Screen, Urine 03/20/2025 Negative  Negative Final   • MDMA (ECSTASY) 03/20/2025 Negative  Negative Final   • Methamphetamine, Ur 03/20/2025 Negative  Negative Final   • Methadone Screen, Urine 03/20/2025 Negative  Negative Final   • Morphine/Opiates Screen, Urine 03/20/2025 Negative  Negative Final   • Oxycodone Screen, Urine 03/20/2025 Negative  Negative Final   • Phencyclidine (PCP), Urine 03/20/2025 Negative  Negative Final   • Propoxyphene Scree, Urine 03/20/2025 Negative  Negative Final   • Tricyclic Antidepressants Screen 03/20/2025 Negative  Negative Final   • THC, Screen, Urine 03/20/2025 Negative  Negative Final   • AMPHETAMINE 03/20/2025 Not Detected  500 ng/mL ng/mL Final   • Barbiturates 03/20/2025 Not Detected  200 ng/mL ng/mL Final   • Benzodiazepines 03/20/2025 Not Detected  200 ng/mL ng/mL Final   • TRICYCLIC ANTIDEPRESSANTS 03/20/2025 Not Detected  300 ng/mL ng/mL Final   • 6-ACETYLMORPHINE 03/20/2025 Not Detected  10 ng/mL ng/mL Final   • BUPRENORPHINE 03/20/2025 Detected  5 ng/mL ng/mL Final   • Cocaine Metabolite 03/20/2025 Not Detected  150 ng/mL ng/mL Final   • EDDP 03/20/2025 Not Detected  100 ng/mL ng/mL Final   • ALCOHOL, ETHYL 03/20/2025 Not Detected  100 ng/mL ng/mL Final   • FENTANYL 03/20/2025 Not Detected  1 ng/mL ng/mL Final   • METHAMPHETAMINE 03/20/2025 Not Detected  500 ng/mL ng/mL Final   • Opiates 03/20/2025 Not Detected  300 ng/mL ng/mL Final   • OXYCODONE 03/20/2025 Not Detected  100 ng/mL ng/mL Final   • PCP 03/20/2025 Not Detected  25 ng/mL ng/mL Final   • THC 03/20/2025 Detected  50 ng/mL ng/mL Final   • Reference Lab Report 03/20/2025    Final    See Full Screen for results.   • BUPRENORPHINE  03/20/2025 18^DETECTED  10 ng/mL ng/mL Final    Detected   • NORBUPRENORPHINE 03/20/2025 46^DETECTED  10 ng/mL ng/mL Final    Detected   • GABAPENTIN 03/20/2025 Not Detected  500 ng/mL ng/mL Final   • 9-DELTA-THC-COOH 03/20/2025 26^NOT DETECTED  100 ng/mL ng/mL Final    Not Detected   Telemedicine on 02/25/2025   Component Date Value Ref Range Status   • Amphetamine Screen, Urine 02/25/2025 Positive (A)  Negative Final   • Barbiturates Screen, Urine 02/25/2025 Negative  Negative Final   • Buprenorphine, Screen, Urine 02/25/2025 Positive (A)  Negative Final   • Benzodiazepine Screen, Urine 02/25/2025 Negative  Negative Final   • Cocaine Screen, Urine 02/25/2025 Negative  Negative Final   • MDMA (ECSTASY) 02/25/2025 Negative  Negative Final   • Methamphetamine, Ur 02/25/2025 Positive (A)  Negative Final   • Methadone Screen, Urine 02/25/2025 Negative  Negative Final   • Morphine/Opiates Screen, Urine 02/25/2025 Negative  Negative Final   • Oxycodone Screen, Urine 02/25/2025 Negative  Negative Final   • Phencyclidine (PCP), Urine 02/25/2025 Negative  Negative Final   • Propoxyphene Scree, Urine 02/25/2025 Negative  Negative Final   • Tricyclic Antidepressants Screen 02/25/2025 Negative  Negative Final   • THC, Screen, Urine 02/25/2025 Positive (A)  Negative Final   Telemedicine on 01/27/2025   Component Date Value Ref Range Status   • Amphetamine Screen, Urine 01/27/2025 Positive (A)  Negative Final   • Barbiturates Screen, Urine 01/27/2025 Negative  Negative Final   • Buprenorphine, Screen, Urine 01/27/2025 Positive (A)  Negative Final   • Benzodiazepine Screen, Urine 01/27/2025 Negative  Negative Final   • Cocaine Screen, Urine 01/27/2025 Negative  Negative Final   • MDMA (ECSTASY) 01/27/2025 Positive (A)  Negative Final   • Methamphetamine, Ur 01/27/2025 Negative  Negative Final   • Methadone Screen, Urine 01/27/2025 Negative  Negative Final   • Morphine/Opiates Screen, Urine 01/27/2025 Negative  Negative Final   •  Oxycodone Screen, Urine 01/27/2025 Negative  Negative Final   • Phencyclidine (PCP), Urine 01/27/2025 Negative  Negative Final   • Propoxyphene Scree, Urine 01/27/2025 Negative  Negative Final   • Tricyclic Antidepressants Screen 01/27/2025 Negative  Negative Final   • THC, Screen, Urine 01/27/2025 Negative  Negative Final   • Gabapentin 01/27/2025 NEGATIVE   Final   • Ethanol, Urine 01/27/2025 NEGATIVE   Final   • Fentanyl, Urine 01/27/2025 Negative  Negative Final   Telemedicine on 01/20/2025   Component Date Value Ref Range Status   • Amphetamine Screen, Urine 01/20/2025 Positive (A)  Negative Final    PRELIMINARY RESULTS. REFER TO [facility] LAB CONFIRMATION FOR FINAL RESULTS.   • Barbiturates Screen, Urine 01/20/2025 Negative  Negative Final   • Buprenorphine, Screen, Urine 01/20/2025 Positive (A)  Negative Final   • Benzodiazepine Screen, Urine 01/20/2025 Negative  Negative Final   • Cocaine Screen, Urine 01/20/2025 Negative  Negative Final   • MDMA (ECSTASY) 01/20/2025 Negative  Negative Final   • Methamphetamine, Ur 01/20/2025 Positive (A)  Negative Final   • Methadone Screen, Urine 01/20/2025 Negative  Negative Final   • Morphine/Opiates Screen, Urine 01/20/2025 Negative  Negative Final   • Oxycodone Screen, Urine 01/20/2025 Negative  Negative Final   • Phencyclidine (PCP), Urine 01/20/2025 Negative  Negative Final   • Propoxyphene Scree, Urine 01/20/2025 Negative  Negative Final   • Tricyclic Antidepressants Screen 01/20/2025 Negative  Negative Final   • THC, Screen, Urine 01/20/2025 Positive (A)  Negative Final   • Gabapentin 01/20/2025 negative   Final   • ETHANOL URINE SCREEN 01/20/2025 Negative   Final   • Fentanyl, Urine 01/20/2025 Negative  Negative Final   Telemedicine on 12/31/2024   Component Date Value Ref Range Status   • Amphetamine Screen, Urine 12/31/2024 Positive (A)  Negative Final   • Barbiturates Screen, Urine 12/31/2024 Negative  Negative Final   • Buprenorphine, Screen, Urine 12/31/2024  Positive (A)  Negative Final   • Benzodiazepine Screen, Urine 12/31/2024 Negative  Negative Final   • Cocaine Screen, Urine 12/31/2024 Negative  Negative Final   • MDMA (ECSTASY) 12/31/2024 Positive (A)  Negative Final   • Methamphetamine, Ur 12/31/2024 Positive (A)  Negative Final   • Methadone Screen, Urine 12/31/2024 Negative  Negative Final   • Morphine/Opiates Screen, Urine 12/31/2024 Negative  Negative Final   • Oxycodone Screen, Urine 12/31/2024 Negative  Negative Final   • Phencyclidine (PCP), Urine 12/31/2024 Negative  Negative Final   • Propoxyphene Scree, Urine 12/31/2024 Negative  Negative Final   • Tricyclic Antidepressants Screen 12/31/2024 Negative  Negative Final   • THC, Screen, Urine 12/31/2024 Positive (A)  Negative Final   • Gabapentin 12/31/2024 negative   Final   • ETHANOL URINE SCREEN 12/31/2024 Negative   Final   • Fentanyl, Urine 12/31/2024 Negative  Negative Final   Telemedicine on 12/23/2024   Component Date Value Ref Range Status   • Amphetamine Screen, Urine 12/23/2024 Positive (A)  Negative Final   • Barbiturates Screen, Urine 12/23/2024 Negative  Negative Final   • Buprenorphine, Screen, Urine 12/23/2024 Positive (A)  Negative Final   • Benzodiazepine Screen, Urine 12/23/2024 Negative  Negative Final   • Cocaine Screen, Urine 12/23/2024 Negative  Negative Final   • MDMA (ECSTASY) 12/23/2024 Positive (A)  Negative Final   • Methamphetamine, Ur 12/23/2024 Positive (A)  Negative Final   • Methadone Screen, Urine 12/23/2024 Negative  Negative Final   • Morphine/Opiates Screen, Urine 12/23/2024 Negative  Negative Final   • Oxycodone Screen, Urine 12/23/2024 Negative  Negative Final   • Phencyclidine (PCP), Urine 12/23/2024 Negative  Negative Final   • Propoxyphene Scree, Urine 12/23/2024 Negative  Negative Final   • Tricyclic Antidepressants Screen 12/23/2024 Negative  Negative Final   • THC, Screen, Urine 12/23/2024 Positive (A)  Negative Final   • GABAPENTIN 12/23/2024 negative   Final    • ETHANOL URINE SCREEN 12/23/2024 Negative   Final   • Fentanyl, Urine 12/23/2024 Negative  Negative Final   There may be more visits with results that are not included.         Assessment & Plan   Diagnoses and all orders for this visit:    1. Opioid type dependence, continuous (Primary)  -     buprenorphine-naloxone (SUBOXONE) 8-2 MG per SL tablet; Place 2 tablets under the tongue Daily.  Dispense: 28 tablet; Refill: 0    2. Medication management  -     POC Medline 14 Panel Urine Drug Screen  -     Behavioral Health Full Screen and Definitive Testing (MARTIN) -; Future  -     Buprenorphine/Norbuprenorphine level, QUANT (MARTIN) - Urine, Clean Catch; Future  -     Behavioral Health Full Screen and Definitive Testing ([facility]IN) -  -     Buprenorphine/Norbuprenorphine level, QUANT (MARTIN) - Urine, Clean Catch        Visit Diagnoses:    ICD-10-CM ICD-9-CM   1. Opioid type dependence, continuous  F11.20 304.01   2. Medication management  Z79.899 V58.69       PLAN:  Safety: No acute safety concerns  Risk Assessment: Risk of self-harm acutely is low. Risk of self-harm chronically is also low, but could be further elevated in the event of treatment noncompliance and/or AODA.    TREATMENT PLAN/GOALS: Continue supportive psychotherapy efforts and medications as indicated. Treatment and medication options discussed during today's visit. Patient acknowledged and verbally consented to continue with current treatment plan and was educated on the importance of compliance with treatment and follow-up appointments.    MEDICATION ISSUES:  ROSY reviewed as expected.  Discussed medication options and treatment plan of prescribed medication as well as the risks, benefits, and side effects including potential falls, possible impaired driving and metabolic adversities among others. Patient is agreeable to call the office with any worsening of symptoms or onset of side effects. Patient is agreeable to call 911 or go to the nearest ER should  he/she begin having SI/HI. No medication side effects or related complaints today.     MEDS ORDERED DURING VISIT:  New Medications Ordered This Visit   Medications   • buprenorphine-naloxone (SUBOXONE) 8-2 MG per SL tablet     Sig: Place 2 tablets under the tongue Daily.     Dispense:  28 tablet     Refill:  0     NA - NADEAN:[id number]       No follow-ups on file.           This document has been electronically signed by WALKER Romero  May 1, 2025 14:35 EDT      Part of this note may be an electronic transcription/translation of spoken language to printed text using the Dragon Dictation System.

## 2025-05-03 LAB — REF LAB TEST METHOD: NORMAL

## 2025-05-05 LAB
6-ACETYLMORPHINE: NOT DETECTED NG/ML
ALCOHOL, ETHYL: NOT DETECTED NG/ML
AMPHET SAL QL CFM: NORMAL NG/ML
AMPHETAMINE: DETECTED NG/ML
BENZODIAZ BLD QL: NOT DETECTED NG/ML
BUPRENORPHINE SAL CFM-MCNC: DETECTED NG/ML
BUPRENORPHINE: NORMAL NG/ML
COCAINE METABOLITE: NOT DETECTED NG/ML
EDDP SERPL QL: NOT DETECTED NG/ML
FENTANYL-EIA: NOT DETECTED NG/ML
METHAMPHET/CREAT UR: NORMAL NG/ML
METHAMPHETAMINE: DETECTED NG/ML
NORBUPRENORPHINE: NORMAL NG/ML
OPIATES: NOT DETECTED NG/ML
OXYCODONE: NOT DETECTED NG/ML
PCP: NOT DETECTED NG/ML
PHENTERMINE: NOT DETECTED NG/ML
THC: DETECTED NG/ML
TRICYCLIC ANTIDEPRESSANTS: NOT DETECTED NG/ML

## 2025-05-20 ENCOUNTER — TELEMEDICINE (OUTPATIENT)
Dept: PSYCHIATRY | Facility: CLINIC | Age: 55
End: 2025-05-20
Payer: MEDICAID

## 2025-05-20 VITALS
HEIGHT: 61 IN | DIASTOLIC BLOOD PRESSURE: 81 MMHG | HEART RATE: 91 BPM | BODY MASS INDEX: 32.85 KG/M2 | WEIGHT: 174 LBS | SYSTOLIC BLOOD PRESSURE: 139 MMHG

## 2025-05-20 DIAGNOSIS — Z79.899 MEDICATION MANAGEMENT: ICD-10-CM

## 2025-05-20 DIAGNOSIS — F11.20 OPIOID TYPE DEPENDENCE, CONTINUOUS: Primary | ICD-10-CM

## 2025-05-20 DIAGNOSIS — F15.10 METHAMPHETAMINE ABUSE: ICD-10-CM

## 2025-05-20 RX ORDER — BUPROPION HYDROCHLORIDE 150 MG/1
150 TABLET ORAL 2 TIMES DAILY
Qty: 60 TABLET | Refills: 0 | Status: SHIPPED | OUTPATIENT
Start: 2025-05-20

## 2025-05-20 RX ORDER — BUPRENORPHINE HYDROCHLORIDE AND NALOXONE HYDROCHLORIDE DIHYDRATE 8; 2 MG/1; MG/1
2 TABLET SUBLINGUAL DAILY
Qty: 14 TABLET | Refills: 0 | Status: SHIPPED | OUTPATIENT
Start: 2025-05-20

## 2025-05-20 NOTE — PROGRESS NOTES
This provider is located at [facility]. The Patient is seen remotely located at the [facility] ([facility]) using Video. Patient is being seen via telehealth and confirm that they are in a secure environment for this session. The patient's condition being diagnosed/treated is appropriate for telemedicine. Provider identified as Williams Brody as well as credentials APRN MSN FNP-C SRINIVASAN-YOUNG.   The client/patient gave consent to be seen remotely, and when consent is given they understand that the consent allows for patient identifiable information to be sent to a third party as needed.   They may refuse to be seen remotely at any time. The electronic data is encrypted and password protected, and the patient has been advised of the potential risks to privacy not withstanding such measures.    Chief Complaint/History of Present Illness: Follow Up buprenorphine/naloxone Medicated Assisted Treatment for Opiate Use Disorder     Patient/Client Concerns/Updates: Continue Wellbutrin for stimulant cravings   - Patient reports continued methamphetamine use 2 times a week; continued encouragement to make further reductions in overall use  - Patient taking part in training to start new employment which she is looking forward.  - Reports mood is overall stable denying acute depressive or anxious concerns or episodes, no suicidal or homicidal thoughts no perceptual disturbance or otherwise symptoms of psychosis    Triggers (Persons/Places/Things/Events/Thought/Emotions): Life stress    Cravings/Substance Use: Cravings and use of methamphetamine twice a week    Relapse Prevention: Counseling and continue weekly evaluations for support accountability and patient safety    Urine Drug Screen (today's visit)/Presumptive Point of Care discussed: Positive buprenorphine positive amphetamine methamphetamine and THC    UDS Confirmation (Most recent/Resulted): Positive buprenorphine/nor-buprenorphine, no concerns for urine  tampering otherwise positive amphetamine and methamphetamine    Most recent pertinent laboratory studies reviewed: 4/11/23-hepatic function within normal limits, Cr WNL, HIV negative, hepatitis C not detected         ROSY (PDMP) Reviewed for Current/Active Medications: buprenorphine/naloxone as reviewed today    Past Surgical History:  No past surgical history on file.    Problem List:  There is no problem list on file for this patient.      Allergy:   No Known Allergies     Current Medications:   Current Outpatient Medications   Medication Sig Dispense Refill   • buprenorphine-naloxone (SUBOXONE) 8-2 MG per SL tablet Place 2 tablets under the tongue Daily. 14 tablet 0   • buPROPion XL (WELLBUTRIN XL) 150 MG 24 hr tablet Take 1 tablet by mouth 2 (Two) Times a Day. BID dosing 60 tablet 0   • famotidine (PEPCID) 20 MG tablet Take 1 tablet by mouth 2 (Two) Times a Day. 60 tablet 0   • folic acid (FOLVITE) 1 MG tablet Take 1 tablet by mouth Daily.     • GNP PAIN RELIEF EX-STRENGTH 500 MG tablet TAKE ONE TABLET BY MOUTH EVERY 6 HOURS AS NEEDED FOR 30 DAYS **DO NOT EXCEED 6 TABLETS IN 24 HOURS**     • hydrOXYzine pamoate (Vistaril) 25 MG capsule Take 1 capsule by mouth 4 (Four) Times a Day As Needed for Anxiety. 120 capsule 0   • methotrexate 2.5 MG tablet Take 1 tablet by mouth 3 (Three) Doses Each Week. Take Doses 12 (Twelve) Hours Apart.     • naloxone (NARCAN) 4 MG/0.1ML nasal spray 1 spray into the nostril(s) as directed by provider As Needed (opiate over sedation). 1 spray in 1 nostril every 2 to 3 minutes, call 911 (Patient not taking: Reported on 5/1/2025) 2 each 2   • ondansetron ODT (ZOFRAN-ODT) 8 MG disintegrating tablet Place 1 tablet on the tongue Every 8 (Eight) Hours As Needed for Nausea or Vomiting. 45 tablet 0     No current facility-administered medications for this visit.       Past Medical History:  Past Medical History:   Diagnosis Date   • History of hepatitis B    • Pancreatitis          Social  History     Socioeconomic History   • Marital status: Single   Tobacco Use   • Smoking status: Every Day     Current packs/day: 1.00     Average packs/day: 1 pack/day for 15.0 years (15.0 ttl pk-yrs)     Types: Cigarettes   • Smokeless tobacco: Never   Vaping Use   • Vaping status: Never Used   Substance and Sexual Activity   • Alcohol use: Not Currently   • Drug use: Yes     Types: Marijuana, Hydrocodone, Oxycodone, Methamphetamines   • Sexual activity: Defer         Family History   Problem Relation Age of Onset   • No Known Problems Mother    • No Known Problems Father    • No Known Problems Sister    • No Known Problems Brother    • No Known Problems Maternal Grandmother    • No Known Problems Paternal Grandmother    • No Known Problems Maternal Aunt    • No Known Problems Paternal Aunt    • No Known Problems Maternal Grandfather    • No Known Problems Paternal Grandfather    • No Known Problems Maternal Uncle    • No Known Problems Paternal Uncle    • No Known Problems Cousin    • No Known Problems Other    • ADD / ADHD Neg Hx    • Alcohol abuse Neg Hx    • Anxiety disorder Neg Hx    • Bipolar disorder Neg Hx    • Dementia Neg Hx    • Depression Neg Hx    • Drug abuse Neg Hx    • OCD Neg Hx    • Paranoid behavior Neg Hx    • Schizophrenia Neg Hx    • Seizures Neg Hx    • Self-Injurious Behavior  Neg Hx    • Suicide Attempts Neg Hx    • Breast cancer Neg Hx          Mental Status Exam:   Hygiene:   good  Cooperation:  Cooperative  Eye Contact:  Good  Psychomotor Behavior:  Appropriate  Affect:  Appropriate  Mood: normal  Speech:  Normal  Thought Process:  Goal directed  Thought Content:  Normal  Suicidal:  None  Homicidal:  None  Hallucinations:  None  Delusion:  None  Memory:  Intact  Orientation:  Grossly intact  Reliability:  good  Insight:  Good  Judgement:  Good  Impulse Control:  Fair         Review of Systems:  Review of Systems   Constitutional:  Negative for activity change, chills, diaphoresis and  "fatigue.   Respiratory:  Negative for apnea, cough and shortness of breath.    Cardiovascular:  Negative for chest pain, palpitations and leg swelling.   Gastrointestinal:  Negative for abdominal pain, constipation, diarrhea, nausea and vomiting.   Genitourinary:  Negative for difficulty urinating.   Musculoskeletal:  Negative for arthralgias.   Skin:  Negative for rash.   Neurological:  Negative for dizziness, weakness and headaches.   Psychiatric/Behavioral:  Negative for agitation, self-injury, sleep disturbance and suicidal ideas. The patient is not nervous/anxious.        Physical Exam:  Physical Exam  Vitals reviewed.   Constitutional:       General: She is not in acute distress.     Appearance: Normal appearance. She is not ill-appearing or toxic-appearing.   Pulmonary:      Effort: Pulmonary effort is normal.   Musculoskeletal:         General: Normal range of motion.   Neurological:      General: No focal deficit present.      Mental Status: She is alert and oriented to person, place, and time.   Psychiatric:         Attention and Perception: Attention and perception normal.         Mood and Affect: Mood normal. Mood is not anxious or depressed.         Speech: Speech normal.         Behavior: Behavior normal. Behavior is cooperative.         Thought Content: Thought content normal.         Cognition and Memory: Cognition and memory normal.         Judgment: Judgment normal.     Vital Signs:   /81   Pulse 91   Ht 156.2 cm (61.5\")   Wt 78.9 kg (174 lb)   BMI 32.35 kg/m²      Lab Results:   Telemedicine on 05/20/2025   Component Date Value Ref Range Status   • Amphetamine Screen, Urine 05/20/2025 Positive (A)  Negative Final   • Barbiturates Screen, Urine 05/20/2025 Negative  Negative Final   • Buprenorphine, Screen, Urine 05/20/2025 Positive (A)  Negative Final   • Benzodiazepine Screen, Urine 05/20/2025 Negative  Negative Final   • Cocaine Screen, Urine 05/20/2025 Negative  Negative Final   • MDMA " (ECSTASY) 05/20/2025 Negative  Negative Final   • Methamphetamine, Ur 05/20/2025 Positive (A)  Negative Final   • Methadone Screen, Urine 05/20/2025 Negative  Negative Final   • Morphine/Opiates Screen, Urine 05/20/2025 Negative  Negative Final   • Oxycodone Screen, Urine 05/20/2025 Negative  Negative Final   • Phencyclidine (PCP), Urine 05/20/2025 Negative  Negative Final   • Propoxyphene Scree, Urine 05/20/2025 Negative  Negative Final   • Tricyclic Antidepressants Screen 05/20/2025 Negative  Negative Final   • THC, Screen, Urine 05/20/2025 Positive (A)  Negative Final   Telemedicine on 05/01/2025   Component Date Value Ref Range Status   • Amphetamine Screen, Urine 05/01/2025 Positive (A)  Negative Final   • Barbiturates Screen, Urine 05/01/2025 Negative  Negative Final   • Buprenorphine, Screen, Urine 05/01/2025 Positive (A)  Negative Final   • Benzodiazepine Screen, Urine 05/01/2025 Negative  Negative Final   • Cocaine Screen, Urine 05/01/2025 Negative  Negative Final   • MDMA (ECSTASY) 05/01/2025 Positive (A)  Negative Final   • Methamphetamine, Ur 05/01/2025 Positive (A)  Negative Final   • Methadone Screen, Urine 05/01/2025 Negative  Negative Final   • Morphine/Opiates Screen, Urine 05/01/2025 Negative  Negative Final   • Oxycodone Screen, Urine 05/01/2025 Negative  Negative Final   • Phencyclidine (PCP), Urine 05/01/2025 Negative  Negative Final   • Propoxyphene Scree, Urine 05/01/2025 Negative  Negative Final   • Tricyclic Antidepressants Screen 05/01/2025 Negative  Negative Final   • THC, Screen, Urine 05/01/2025 Positive (A)  Negative Final   • BUPRENORPHINE 05/01/2025 329^DETECTED  10 ng/mL ng/mL Final   • NORBUPRENORPHINE 05/01/2025 782^DETECTED  10 ng/mL ng/mL Final   • 6-ACETYLMORPHINE 05/01/2025 Not Detected  10 ng/mL ng/mL Final   • AMPHETAMINE 05/01/2025 Detected  500 ng/mL ng/mL Final   • Barbiturates 05/01/2025 Not Detected  200 ng/mL ng/mL Final   • Benzodiazepines 05/01/2025 Not Detected  200  ng/mL ng/mL Final   • BUPRENORPHINE 05/01/2025 Detected  5 ng/mL ng/mL Final   • Cocaine Metabolite 05/01/2025 Not Detected  150 ng/mL ng/mL Final   • EDDP 05/01/2025 Not Detected  100 ng/mL ng/mL Final   • ALCOHOL, ETHYL 05/01/2025 Not Detected  <100.0000 ng/mL Final   • FENTANYL 05/01/2025 Not Detected  1 ng/mL ng/mL Final   • METHAMPHETAMINE 05/01/2025 Detected  500 ng/mL ng/mL Final   • Opiates 05/01/2025 Not Detected  300 ng/mL ng/mL Final   • OXYCODONE 05/01/2025 Not Detected  100 ng/mL ng/mL Final   • PCP 05/01/2025 Not Detected  25 ng/mL ng/mL Final   • THC 05/01/2025 Detected  50 ng/mL ng/mL Final   • TRICYCLIC ANTIDEPRESSANTS 05/01/2025 Not Detected  300 ng/mL ng/mL Final   • Reference Lab Report 05/01/2025    Final    See Full Screen for results.   • AMPHETAMINE 05/01/2025 58581^DETECTED  250 ng/mL ng/mL Final   • PHENTERMINE 05/01/2025 Not Detected  100 ng/mL ng/mL Final   • METHAMPHETAMINE 05/01/2025 45276^DETECTED  100 ng/mL ng/mL Final   Telemedicine on 04/24/2025   Component Date Value Ref Range Status   • Amphetamine Screen, Urine 04/24/2025 Positive (A)  Negative Final   • Barbiturates Screen, Urine 04/24/2025 Negative  Negative Final   • Buprenorphine, Screen, Urine 04/24/2025 Positive (A)  Negative Final   • Benzodiazepine Screen, Urine 04/24/2025 Negative  Negative Final   • Cocaine Screen, Urine 04/24/2025 Negative  Negative Final   • MDMA (ECSTASY) 04/24/2025 Positive (A)  Negative Final   • Methamphetamine, Ur 04/24/2025 Positive (A)  Negative Final   • Methadone Screen, Urine 04/24/2025 Negative  Negative Final   • Morphine/Opiates Screen, Urine 04/24/2025 Negative  Negative Final   • Oxycodone Screen, Urine 04/24/2025 Negative  Negative Final   • Phencyclidine (PCP), Urine 04/24/2025 Negative  Negative Final   • Propoxyphene Scree, Urine 04/24/2025 Negative  Negative Final   • Tricyclic Antidepressants Screen 04/24/2025 Negative  Negative Final   • THC, Screen, Urine 04/24/2025 Positive (A)   Negative Final   • BUPRENORPHINE 04/24/2025 264^DETECTED  10 ng/mL ng/mL Final    Detected   • NORBUPRENORPHINE 04/24/2025 638^DETECTED  10 ng/mL ng/mL Final    Detected   • 6-ACETYLMORPHINE 04/24/2025 Not Detected  10 ng/mL ng/mL Final   • AMPHETAMINE 04/24/2025 Detected  500 ng/mL ng/mL Final   • Barbiturates 04/24/2025 Not Detected  200 ng/mL ng/mL Final   • Benzodiazepines 04/24/2025 Not Detected  200 ng/mL ng/mL Final   • BUPRENORPHINE 04/24/2025 Detected  5 ng/mL ng/mL Final   • Cocaine Metabolite 04/24/2025 Not Detected  150 ng/mL ng/mL Final   • EDDP 04/24/2025 Not Detected  100 ng/mL ng/mL Final   • ALCOHOL, ETHYL 04/24/2025 Not Detected  <100.0000 ng/mL Final   • FENTANYL 04/24/2025 Not Detected  1 ng/mL ng/mL Final   • METHAMPHETAMINE 04/24/2025 Detected  500 ng/mL ng/mL Final   • Opiates 04/24/2025 Not Detected  300 ng/mL ng/mL Final   • OXYCODONE 04/24/2025 Not Detected  100 ng/mL ng/mL Final   • PCP 04/24/2025 Not Detected  25 ng/mL ng/mL Final   • THC 04/24/2025 Detected  50 ng/mL ng/mL Final   • TRICYCLIC ANTIDEPRESSANTS 04/24/2025 Not Detected  300 ng/mL ng/mL Final   • Reference Lab Report 04/24/2025    Final    See Full Screen for results.   • Ethyl Glucuronide 04/24/2025 Not Detected  200 ng/mL ng/mL Final   • Ethyl Sulfate 04/24/2025 Not Detected  50 ng/mL ng/mL Final   • AMPHETAMINE 04/24/2025 99998^DETECTED  250 ng/mL ng/mL Final    Detected   • PHENTERMINE 04/24/2025 Not Detected  100 ng/mL ng/mL Final   • METHAMPHETAMINE 04/24/2025 67357^DETECTED  100 ng/mL ng/mL Final    Detected   Telemedicine on 04/17/2025   Component Date Value Ref Range Status   • Amphetamine Screen, Urine 04/17/2025 Positive (A)  Negative Final   • Barbiturates Screen, Urine 04/17/2025 Negative  Negative Final   • Buprenorphine, Screen, Urine 04/17/2025 Positive (A)  Negative Final   • Benzodiazepine Screen, Urine 04/17/2025 Negative  Negative Final   • Cocaine Screen, Urine 04/17/2025 Negative  Negative Final   •  MDMA (ECSTASY) 04/17/2025 Positive (A)  Negative Final   • Methamphetamine, Ur 04/17/2025 Positive (A)  Negative Final   • Methadone Screen, Urine 04/17/2025 Negative  Negative Final   • Morphine/Opiates Screen, Urine 04/17/2025 Negative  Negative Final   • Oxycodone Screen, Urine 04/17/2025 Negative  Negative Final   • Phencyclidine (PCP), Urine 04/17/2025 Negative  Negative Final   • Propoxyphene Scree, Urine 04/17/2025 Negative  Negative Final   • Tricyclic Antidepressants Screen 04/17/2025 Negative  Negative Final   • THC, Screen, Urine 04/17/2025 Positive (A)  Negative Final   • BUPRENORPHINE 04/17/2025 149^DETECTED  10 ng/mL ng/mL Final    Detected   • NORBUPRENORPHINE 04/17/2025 528^DETECTED  10 ng/mL ng/mL Final    Detected   • 6-ACETYLMORPHINE 04/17/2025 Not Detected  10 ng/mL ng/mL Final   • AMPHETAMINE 04/17/2025 Detected  500 ng/mL ng/mL Final   • Barbiturates 04/17/2025 Not Detected  200 ng/mL ng/mL Final   • Benzodiazepines 04/17/2025 Not Detected  200 ng/mL ng/mL Final   • BUPRENORPHINE 04/17/2025 Detected  5 ng/mL ng/mL Final   • Cocaine Metabolite 04/17/2025 Not Detected  150 ng/mL ng/mL Final   • EDDP 04/17/2025 Not Detected  100 ng/mL ng/mL Final   • ALCOHOL, ETHYL 04/17/2025 Not Detected  <100.0000 ng/mL Final   • FENTANYL 04/17/2025 Not Detected  1 ng/mL ng/mL Final   • METHAMPHETAMINE 04/17/2025 Detected  500 ng/mL ng/mL Final   • Opiates 04/17/2025 Not Detected  300 ng/mL ng/mL Final   • OXYCODONE 04/17/2025 Not Detected  100 ng/mL ng/mL Final   • PCP 04/17/2025 Not Detected  25 ng/mL ng/mL Final   • THC 04/17/2025 Detected  50 ng/mL ng/mL Final   • TRICYCLIC ANTIDEPRESSANTS 04/17/2025 Not Detected  300 ng/mL ng/mL Final   • Reference Lab Report 04/17/2025    Final    See Full Screen for results.   • Ethyl Glucuronide 04/17/2025 Not Detected  200 ng/mL ng/mL Final   • Ethyl Sulfate 04/17/2025 55.4  50 ng/mL ng/mL Final    Detected   • AMPHETAMINE 04/17/2025 71396^DETECTED  250 ng/mL ng/mL  Final    Detected   • PHENTERMINE 04/17/2025 Not Detected  100 ng/mL ng/mL Final   • METHAMPHETAMINE 04/17/2025 09459^DETECTED  100 ng/mL ng/mL Final    Detected   Telemedicine on 03/27/2025   Component Date Value Ref Range Status   • Amphetamine Screen, Urine 03/27/2025 Positive (A)  Negative Final   • Barbiturates Screen, Urine 03/27/2025 Negative  Negative Final   • Buprenorphine, Screen, Urine 03/27/2025 Positive (A)  Negative Final   • Benzodiazepine Screen, Urine 03/27/2025 Negative  Negative Final   • Cocaine Screen, Urine 03/27/2025 Negative  Negative Final   • MDMA (ECSTASY) 03/27/2025 Negative  Negative Final   • Methamphetamine, Ur 03/27/2025 Positive (A)  Negative Final   • Methadone Screen, Urine 03/27/2025 Negative  Negative Final   • Morphine/Opiates Screen, Urine 03/27/2025 Negative  Negative Final   • Oxycodone Screen, Urine 03/27/2025 Negative  Negative Final   • Phencyclidine (PCP), Urine 03/27/2025 Negative  Negative Final   • Propoxyphene Scree, Urine 03/27/2025 Negative  Negative Final   • Tricyclic Antidepressants Screen 03/27/2025 Negative  Negative Final   • THC, Screen, Urine 03/27/2025 Positive (A)  Negative Final   • 6-ACETYLMORPHINE 03/27/2025 Not Detected  10 ng/mL ng/mL Final   • AMPHETAMINE 03/27/2025 Detected  500 ng/mL ng/mL Final   • Barbiturates 03/27/2025 Not Detected  200 ng/mL ng/mL Final   • Benzodiazepines 03/27/2025 Not Detected  200 ng/mL ng/mL Final   • BUPRENORPHINE 03/27/2025 Detected  5 ng/mL ng/mL Final   • Cocaine Metabolite 03/27/2025 Not Detected  150 ng/mL ng/mL Final   • EDDP 03/27/2025 Not Detected  100 ng/mL ng/mL Final   • ALCOHOL, ETHYL 03/27/2025 Not Detected  <100.0000 ng/mL Final   • FENTANYL 03/27/2025 Not Detected  1 ng/mL ng/mL Final   • METHAMPHETAMINE 03/27/2025 Detected  500 ng/mL ng/mL Final   • Opiates 03/27/2025 Not Detected  300 ng/mL ng/mL Final   • OXYCODONE 03/27/2025 Not Detected  100 ng/mL ng/mL Final   • PCP 03/27/2025 Not Detected  25 ng/mL  ng/mL Final   • THC 03/27/2025 Detected  50 ng/mL ng/mL Final   • TRICYCLIC ANTIDEPRESSANTS 03/27/2025 Not Detected  300 ng/mL ng/mL Final   • Reference Lab Report 03/27/2025    Final    See Full Screen for results.   • AMPHETAMINE 03/27/2025 974^DETECTED  250 ng/mL ng/mL Final    Detected   • PHENTERMINE 03/27/2025 Not Detected  100 ng/mL ng/mL Final   • METHAMPHETAMINE 03/27/2025 3452^DETECTED  100 ng/mL ng/mL Final    Detected   Clinical Support on 03/20/2025   Component Date Value Ref Range Status   • Amphetamine Screen, Urine 03/20/2025 Negative  Negative Final   • Barbiturates Screen, Urine 03/20/2025 Negative  Negative Final   • Buprenorphine, Screen, Urine 03/20/2025 Positive (A)  Negative Final   • Benzodiazepine Screen, Urine 03/20/2025 Negative  Negative Final   • Cocaine Screen, Urine 03/20/2025 Negative  Negative Final   • MDMA (ECSTASY) 03/20/2025 Negative  Negative Final   • Methamphetamine, Ur 03/20/2025 Negative  Negative Final   • Methadone Screen, Urine 03/20/2025 Negative  Negative Final   • Morphine/Opiates Screen, Urine 03/20/2025 Negative  Negative Final   • Oxycodone Screen, Urine 03/20/2025 Negative  Negative Final   • Phencyclidine (PCP), Urine 03/20/2025 Negative  Negative Final   • Propoxyphene Scree, Urine 03/20/2025 Negative  Negative Final   • Tricyclic Antidepressants Screen 03/20/2025 Negative  Negative Final   • THC, Screen, Urine 03/20/2025 Negative  Negative Final   • AMPHETAMINE 03/20/2025 Not Detected  500 ng/mL ng/mL Final   • Barbiturates 03/20/2025 Not Detected  200 ng/mL ng/mL Final   • Benzodiazepines 03/20/2025 Not Detected  200 ng/mL ng/mL Final   • TRICYCLIC ANTIDEPRESSANTS 03/20/2025 Not Detected  300 ng/mL ng/mL Final   • 6-ACETYLMORPHINE 03/20/2025 Not Detected  10 ng/mL ng/mL Final   • BUPRENORPHINE 03/20/2025 Detected  5 ng/mL ng/mL Final   • Cocaine Metabolite 03/20/2025 Not Detected  150 ng/mL ng/mL Final   • EDDP 03/20/2025 Not Detected  100 ng/mL ng/mL Final   •  ALCOHOL, ETHYL 03/20/2025 Not Detected  100 ng/mL ng/mL Final   • FENTANYL 03/20/2025 Not Detected  1 ng/mL ng/mL Final   • METHAMPHETAMINE 03/20/2025 Not Detected  500 ng/mL ng/mL Final   • Opiates 03/20/2025 Not Detected  300 ng/mL ng/mL Final   • OXYCODONE 03/20/2025 Not Detected  100 ng/mL ng/mL Final   • PCP 03/20/2025 Not Detected  25 ng/mL ng/mL Final   • THC 03/20/2025 Detected  50 ng/mL ng/mL Final   • Reference Lab Report 03/20/2025    Final    See Full Screen for results.   • BUPRENORPHINE 03/20/2025 18^DETECTED  10 ng/mL ng/mL Final    Detected   • NORBUPRENORPHINE 03/20/2025 46^DETECTED  10 ng/mL ng/mL Final    Detected   • GABAPENTIN 03/20/2025 Not Detected  500 ng/mL ng/mL Final   • 9-DELTA-THC-COOH 03/20/2025 26^NOT DETECTED  100 ng/mL ng/mL Final    Not Detected   Telemedicine on 02/25/2025   Component Date Value Ref Range Status   • Amphetamine Screen, Urine 02/25/2025 Positive (A)  Negative Final   • Barbiturates Screen, Urine 02/25/2025 Negative  Negative Final   • Buprenorphine, Screen, Urine 02/25/2025 Positive (A)  Negative Final   • Benzodiazepine Screen, Urine 02/25/2025 Negative  Negative Final   • Cocaine Screen, Urine 02/25/2025 Negative  Negative Final   • MDMA (ECSTASY) 02/25/2025 Negative  Negative Final   • Methamphetamine, Ur 02/25/2025 Positive (A)  Negative Final   • Methadone Screen, Urine 02/25/2025 Negative  Negative Final   • Morphine/Opiates Screen, Urine 02/25/2025 Negative  Negative Final   • Oxycodone Screen, Urine 02/25/2025 Negative  Negative Final   • Phencyclidine (PCP), Urine 02/25/2025 Negative  Negative Final   • Propoxyphene Scree, Urine 02/25/2025 Negative  Negative Final   • Tricyclic Antidepressants Screen 02/25/2025 Negative  Negative Final   • THC, Screen, Urine 02/25/2025 Positive (A)  Negative Final   Telemedicine on 01/27/2025   Component Date Value Ref Range Status   • Amphetamine Screen, Urine 01/27/2025 Positive (A)  Negative Final   • Barbiturates  Screen, Urine 01/27/2025 Negative  Negative Final   • Buprenorphine, Screen, Urine 01/27/2025 Positive (A)  Negative Final   • Benzodiazepine Screen, Urine 01/27/2025 Negative  Negative Final   • Cocaine Screen, Urine 01/27/2025 Negative  Negative Final   • MDMA (ECSTASY) 01/27/2025 Positive (A)  Negative Final   • Methamphetamine, Ur 01/27/2025 Negative  Negative Final   • Methadone Screen, Urine 01/27/2025 Negative  Negative Final   • Morphine/Opiates Screen, Urine 01/27/2025 Negative  Negative Final   • Oxycodone Screen, Urine 01/27/2025 Negative  Negative Final   • Phencyclidine (PCP), Urine 01/27/2025 Negative  Negative Final   • Propoxyphene Scree, Urine 01/27/2025 Negative  Negative Final   • Tricyclic Antidepressants Screen 01/27/2025 Negative  Negative Final   • THC, Screen, Urine 01/27/2025 Negative  Negative Final   • Gabapentin 01/27/2025 NEGATIVE   Final   • Ethanol, Urine 01/27/2025 NEGATIVE   Final   • Fentanyl, Urine 01/27/2025 Negative  Negative Final   Telemedicine on 01/20/2025   Component Date Value Ref Range Status   • Amphetamine Screen, Urine 01/20/2025 Positive (A)  Negative Final    PRELIMINARY RESULTS. REFER TO [facility] LAB CONFIRMATION FOR FINAL RESULTS.   • Barbiturates Screen, Urine 01/20/2025 Negative  Negative Final   • Buprenorphine, Screen, Urine 01/20/2025 Positive (A)  Negative Final   • Benzodiazepine Screen, Urine 01/20/2025 Negative  Negative Final   • Cocaine Screen, Urine 01/20/2025 Negative  Negative Final   • MDMA (ECSTASY) 01/20/2025 Negative  Negative Final   • Methamphetamine, Ur 01/20/2025 Positive (A)  Negative Final   • Methadone Screen, Urine 01/20/2025 Negative  Negative Final   • Morphine/Opiates Screen, Urine 01/20/2025 Negative  Negative Final   • Oxycodone Screen, Urine 01/20/2025 Negative  Negative Final   • Phencyclidine (PCP), Urine 01/20/2025 Negative  Negative Final   • Propoxyphene Scree, Urine 01/20/2025 Negative  Negative Final   • Tricyclic Antidepressants  Screen 01/20/2025 Negative  Negative Final   • THC, Screen, Urine 01/20/2025 Positive (A)  Negative Final   • Gabapentin 01/20/2025 negative   Final   • ETHANOL URINE SCREEN 01/20/2025 Negative   Final   • Fentanyl, Urine 01/20/2025 Negative  Negative Final   Telemedicine on 12/31/2024   Component Date Value Ref Range Status   • Amphetamine Screen, Urine 12/31/2024 Positive (A)  Negative Final   • Barbiturates Screen, Urine 12/31/2024 Negative  Negative Final   • Buprenorphine, Screen, Urine 12/31/2024 Positive (A)  Negative Final   • Benzodiazepine Screen, Urine 12/31/2024 Negative  Negative Final   • Cocaine Screen, Urine 12/31/2024 Negative  Negative Final   • MDMA (ECSTASY) 12/31/2024 Positive (A)  Negative Final   • Methamphetamine, Ur 12/31/2024 Positive (A)  Negative Final   • Methadone Screen, Urine 12/31/2024 Negative  Negative Final   • Morphine/Opiates Screen, Urine 12/31/2024 Negative  Negative Final   • Oxycodone Screen, Urine 12/31/2024 Negative  Negative Final   • Phencyclidine (PCP), Urine 12/31/2024 Negative  Negative Final   • Propoxyphene Scree, Urine 12/31/2024 Negative  Negative Final   • Tricyclic Antidepressants Screen 12/31/2024 Negative  Negative Final   • THC, Screen, Urine 12/31/2024 Positive (A)  Negative Final   • Gabapentin 12/31/2024 negative   Final   • ETHANOL URINE SCREEN 12/31/2024 Negative   Final   • Fentanyl, Urine 12/31/2024 Negative  Negative Final   There may be more visits with results that are not included.         Assessment & Plan   Diagnoses and all orders for this visit:    1. Opioid type dependence, continuous (Primary)  -     buprenorphine-naloxone (SUBOXONE) 8-2 MG per SL tablet; Place 2 tablets under the tongue Daily.  Dispense: 14 tablet; Refill: 0    2. Medication management  -     POC Medline 14 Panel Urine Drug Screen  -     Behavioral Health Full Screen and Definitive Testing (MARTIN) -; Future  -     Buprenorphine/Norbuprenorphine level, QUANT (MARTIN) - Urine,  Clean Catch; Future  -     Behavioral Health Full Screen and Definitive Testing (MARTIN) -  -     Buprenorphine/Norbuprenorphine level, QUANT (MARTIN) - Urine, Clean Catch    3. Methamphetamine abuse  -     buPROPion XL (WELLBUTRIN XL) 150 MG 24 hr tablet; Take 1 tablet by mouth 2 (Two) Times a Day. BID dosing  Dispense: 60 tablet; Refill: 0        Visit Diagnoses:    ICD-10-CM ICD-9-CM   1. Opioid type dependence, continuous  F11.20 304.01   2. Medication management  Z79.899 V58.69   3. Methamphetamine abuse  F15.10 305.70       PLAN:  Safety: No acute safety concerns  Risk Assessment: Risk of self-harm acutely is low. Risk of self-harm chronically is also low, but could be further elevated in the event of treatment noncompliance and/or AODA.    TREATMENT PLAN/GOALS: Continue supportive psychotherapy efforts and medications as indicated. Treatment and medication options discussed during today's visit. Patient acknowledged and verbally consented to continue with current treatment plan and was educated on the importance of compliance with treatment and follow-up appointments.    MEDICATION ISSUES:  ROSY reviewed as expected.  Discussed medication options and treatment plan of prescribed medication as well as the risks, benefits, and side effects including potential falls, possible impaired driving and metabolic adversities among others. Patient is agreeable to call the office with any worsening of symptoms or onset of side effects. Patient is agreeable to call 911 or go to the nearest ER should he/she begin having SI/HI. No medication side effects or related complaints today.     MEDS ORDERED DURING VISIT:  New Medications Ordered This Visit   Medications   • buPROPion XL (WELLBUTRIN XL) 150 MG 24 hr tablet     Sig: Take 1 tablet by mouth 2 (Two) Times a Day. BID dosing     Dispense:  60 tablet     Refill:  0   • buprenorphine-naloxone (SUBOXONE) 8-2 MG per SL tablet     Sig: Place 2 tablets under the tongue Daily.      Dispense:  14 tablet     Refill:  0     NA - NADEAN:[id number]       No follow-ups on file.           This document has been electronically signed by WALKER Romero  May 20, 2025 16:37 EDT      Part of this note may be an electronic transcription/translation of spoken language to printed text using the Dragon Dictation System.

## 2025-05-22 LAB
6-ACETYLMORPHINE: NOT DETECTED NG/ML
9-DELTA-THC-COOH: 104 NG/ML
ALCOHOL, ETHYL: NOT DETECTED MG/DL
AMPHET SAL QL CFM: 1277 NG/ML
AMPHETAMINE: DETECTED NG/ML
BENZODIAZ BLD QL: NOT DETECTED NG/ML
BUPRENORPHINE SAL CFM-MCNC: DETECTED NG/ML
BUPRENORPHINE: 27 NG/ML
COCAINE METABOLITE: NOT DETECTED NG/ML
CREATININE: 36.2 MG/DL
EDDP SERPL QL: NOT DETECTED NG/ML
FENTANYL-EIA: NOT DETECTED NG/ML
METHAMPHET/CREAT UR: >2500 NG/ML
METHAMPHETAMINE: DETECTED NG/ML
NORBUPRENORPHINE: 169 NG/ML
OPIATES: NOT DETECTED NG/ML
OXIDANTS: NOT DETECTED UG/ML
OXYCODONE: NOT DETECTED NG/ML
PCP: NOT DETECTED NG/ML
PH: 5.2 (ref 4.5–9)
PHENTERMINE: NOT DETECTED NG/ML
REF LAB TEST METHOD: NORMAL
SPECIFIC GRAVITY, QUAN: 1 (ref 1–1.03)
THC: DETECTED NG/ML

## 2025-05-28 ENCOUNTER — TELEMEDICINE (OUTPATIENT)
Dept: PSYCHIATRY | Facility: CLINIC | Age: 55
End: 2025-05-28
Payer: MEDICAID

## 2025-05-28 VITALS
HEIGHT: 61 IN | HEART RATE: 81 BPM | WEIGHT: 174.6 LBS | BODY MASS INDEX: 32.97 KG/M2 | DIASTOLIC BLOOD PRESSURE: 71 MMHG | SYSTOLIC BLOOD PRESSURE: 125 MMHG

## 2025-05-28 DIAGNOSIS — Z79.899 MEDICATION MANAGEMENT: ICD-10-CM

## 2025-05-28 DIAGNOSIS — F15.20 METHAMPHETAMINE DEPENDENCE: ICD-10-CM

## 2025-05-28 DIAGNOSIS — F12.20 DELTA-9-TETRAHYDROCANNABINOL (THC) DEPENDENCE: ICD-10-CM

## 2025-05-28 DIAGNOSIS — F11.20 OPIOID TYPE DEPENDENCE, CONTINUOUS: Primary | ICD-10-CM

## 2025-05-28 RX ORDER — BUPRENORPHINE HYDROCHLORIDE AND NALOXONE HYDROCHLORIDE DIHYDRATE 8; 2 MG/1; MG/1
2 TABLET SUBLINGUAL DAILY
Qty: 28 TABLET | Refills: 0 | Status: SHIPPED | OUTPATIENT
Start: 2025-05-28

## 2025-05-29 NOTE — PROGRESS NOTES
This provider is located at [facility]. The Patient is seen remotely located at the [facility] ([facility]) using Video. Patient is being seen via telehealth and confirm that they are in a secure environment for this session. The patient's condition being diagnosed/treated is appropriate for telemedicine. Provider identified as Williams Brody as well as credentials APRN MSN FNP-C SRINIVASAN-YOUNG.   The client/patient gave consent to be seen remotely, and when consent is given they understand that the consent allows for patient identifiable information to be sent to a third party as needed.   They may refuse to be seen remotely at any time. The electronic data is encrypted and password protected, and the patient has been advised of the potential risks to privacy not withstanding such measures.    Chief Complaint/History of Present Illness: Follow Up buprenorphine/naloxone Medicated Assisted Treatment for Opiate Use Disorder     Patient/Client Concerns/Updates: Continue Wellbutrin for stimulant cravings   -Patient reports continued struggle with methamphetamine continued encouragement to work on goals that support reductions and use and eventual cessation; encouraged further support such as chemical dependency IOP which is available anytime patient desires to commit.  -Will accommodate a 2-week prescription today as patient has several appointments with her  next week  -Mood is stable; patient denies acute anxious episodes, exacerbations thereof or concerning panic attacks  -Reports no acute depressive episodes or concerns, no suicidal or homicidal thoughts  -Reports no perceptual disturbances or otherwise symptoms of psychosis  -Patient reports no concerns with sleep quality or disturbance thereof    Triggers (Persons/Places/Things/Events/Thought/Emotions): Life stress, mother with fluctuating health    Cravings/Substance Use: Cravings and continued use of methamphetamine and THC    Relapse  Prevention: Counseling    Urine Drug Screen (today's visit)/Presumptive Point of Care discussed: Positive buprenorphine positive amphetamine methamphetamine and THC    UDS Confirmation (Most recent/Resulted): Positive buprenorphine/nor-buprenorphine, no concerns for urine tampering otherwise positive amphetamine and methamphetamine    Most recent pertinent laboratory studies reviewed:  4/11/23-hepatic function within normal limits, Cr WNL, HIV negative, hepatitis C not detected         ROSY (PDMP) Reviewed for Current/Active Medications: buprenorphine/naloxone as reviewed today    Past Surgical History:  No past surgical history on file.    Problem List:  There is no problem list on file for this patient.      Allergy:   No Known Allergies     Current Medications:   Current Outpatient Medications   Medication Sig Dispense Refill   • buprenorphine-naloxone (SUBOXONE) 8-2 MG per SL tablet Place 2 tablets under the tongue Daily. 28 tablet 0   • buPROPion XL (WELLBUTRIN XL) 150 MG 24 hr tablet Take 1 tablet by mouth 2 (Two) Times a Day. BID dosing 60 tablet 0   • famotidine (PEPCID) 20 MG tablet Take 1 tablet by mouth 2 (Two) Times a Day. 60 tablet 0   • folic acid (FOLVITE) 1 MG tablet Take 1 tablet by mouth Daily.     • GNP PAIN RELIEF EX-STRENGTH 500 MG tablet TAKE ONE TABLET BY MOUTH EVERY 6 HOURS AS NEEDED FOR 30 DAYS **DO NOT EXCEED 6 TABLETS IN 24 HOURS**     • hydrOXYzine pamoate (Vistaril) 25 MG capsule Take 1 capsule by mouth 4 (Four) Times a Day As Needed for Anxiety. 120 capsule 0   • methotrexate 2.5 MG tablet Take 1 tablet by mouth 3 (Three) Doses Each Week. Take Doses 12 (Twelve) Hours Apart.     • ondansetron ODT (ZOFRAN-ODT) 8 MG disintegrating tablet Place 1 tablet on the tongue Every 8 (Eight) Hours As Needed for Nausea or Vomiting. 45 tablet 0   • naloxone (NARCAN) 4 MG/0.1ML nasal spray 1 spray into the nostril(s) as directed by provider As Needed (opiate over sedation). 1 spray in 1 nostril every  2 to 3 minutes, call 911 (Patient not taking: Reported on 5/28/2025) 2 each 2     No current facility-administered medications for this visit.       Past Medical History:  Past Medical History:   Diagnosis Date   • History of hepatitis B    • Pancreatitis          Social History     Socioeconomic History   • Marital status: Single   Tobacco Use   • Smoking status: Every Day     Current packs/day: 1.00     Average packs/day: 1 pack/day for 15.0 years (15.0 ttl pk-yrs)     Types: Cigarettes   • Smokeless tobacco: Never   Vaping Use   • Vaping status: Never Used   Substance and Sexual Activity   • Alcohol use: Not Currently   • Drug use: Yes     Types: Marijuana, Hydrocodone, Oxycodone, Methamphetamines   • Sexual activity: Defer         Family History   Problem Relation Age of Onset   • No Known Problems Mother    • No Known Problems Father    • No Known Problems Sister    • No Known Problems Brother    • No Known Problems Maternal Grandmother    • No Known Problems Paternal Grandmother    • No Known Problems Maternal Aunt    • No Known Problems Paternal Aunt    • No Known Problems Maternal Grandfather    • No Known Problems Paternal Grandfather    • No Known Problems Maternal Uncle    • No Known Problems Paternal Uncle    • No Known Problems Cousin    • No Known Problems Other    • ADD / ADHD Neg Hx    • Alcohol abuse Neg Hx    • Anxiety disorder Neg Hx    • Bipolar disorder Neg Hx    • Dementia Neg Hx    • Depression Neg Hx    • Drug abuse Neg Hx    • OCD Neg Hx    • Paranoid behavior Neg Hx    • Schizophrenia Neg Hx    • Seizures Neg Hx    • Self-Injurious Behavior  Neg Hx    • Suicide Attempts Neg Hx    • Breast cancer Neg Hx          Mental Status Exam:   Hygiene:   good  Cooperation:  Cooperative  Eye Contact:  Good  Psychomotor Behavior:  Appropriate  Affect:  Appropriate  Mood: normal  Speech:  Normal  Thought Process:  Goal directed  Thought Content:  Normal  Suicidal:  None  Homicidal:  None  Hallucinations:  " None  Delusion:  None  Memory:  Intact  Orientation:  Grossly intact  Reliability:  good  Insight:  Fair  Judgement:  Poor  Impulse Control:  Poor         Review of Systems:  Review of Systems   Constitutional:  Negative for activity change, chills, diaphoresis and fatigue.   Respiratory:  Negative for apnea, cough and shortness of breath.    Cardiovascular:  Negative for chest pain, palpitations and leg swelling.   Gastrointestinal:  Negative for abdominal pain, constipation, diarrhea, nausea and vomiting.   Genitourinary:  Negative for difficulty urinating.   Musculoskeletal:  Negative for arthralgias.   Skin:  Negative for rash.   Neurological:  Negative for dizziness, weakness and headaches.   Psychiatric/Behavioral:  Positive for dysphoric mood. Negative for agitation, self-injury, sleep disturbance and suicidal ideas. The patient is nervous/anxious.        Physical Exam:  Physical Exam  Vitals reviewed.   Constitutional:       General: She is not in acute distress.     Appearance: Normal appearance. She is not ill-appearing or toxic-appearing.   Pulmonary:      Effort: Pulmonary effort is normal.   Musculoskeletal:         General: Normal range of motion.   Neurological:      General: No focal deficit present.      Mental Status: She is alert and oriented to person, place, and time.   Psychiatric:         Attention and Perception: Attention and perception normal.         Mood and Affect: Mood normal. Mood is not anxious or depressed.         Speech: Speech normal.         Behavior: Behavior normal. Behavior is cooperative.         Thought Content: Thought content normal.         Cognition and Memory: Cognition and memory normal.         Judgment: Judgment normal.     Vital Signs:   /71   Pulse 81   Ht 156.2 cm (61.5\")   Wt 79.2 kg (174 lb 9.6 oz)   BMI 32.46 kg/m²      Lab Results:   Telemedicine on 05/28/2025   Component Date Value Ref Range Status   • Amphetamine Screen, Urine 05/28/2025 Positive " (A)  Negative Final   • Barbiturates Screen, Urine 05/28/2025 Negative  Negative Final   • Buprenorphine, Screen, Urine 05/28/2025 Positive (A)  Negative Final   • Benzodiazepine Screen, Urine 05/28/2025 Negative  Negative Final   • Cocaine Screen, Urine 05/28/2025 Negative  Negative Final   • MDMA (ECSTASY) 05/28/2025 Positive (A)  Negative Final   • Methamphetamine, Ur 05/28/2025 Positive (A)  Negative Final   • Methadone Screen, Urine 05/28/2025 Negative  Negative Final   • Morphine/Opiates Screen, Urine 05/28/2025 Negative  Negative Final   • Oxycodone Screen, Urine 05/28/2025 Negative  Negative Final   • Phencyclidine (PCP), Urine 05/28/2025 Negative  Negative Final   • Propoxyphene Scree, Urine 05/28/2025 Negative  Negative Final   • Tricyclic Antidepressants Screen 05/28/2025 Negative  Negative Final   • THC, Screen, Urine 05/28/2025 Positive (A)  Negative Final   Telemedicine on 05/20/2025   Component Date Value Ref Range Status   • Amphetamine Screen, Urine 05/20/2025 Positive (A)  Negative Final   • Barbiturates Screen, Urine 05/20/2025 Negative  Negative Final   • Buprenorphine, Screen, Urine 05/20/2025 Positive (A)  Negative Final   • Benzodiazepine Screen, Urine 05/20/2025 Negative  Negative Final   • Cocaine Screen, Urine 05/20/2025 Negative  Negative Final   • MDMA (ECSTASY) 05/20/2025 Negative  Negative Final   • Methamphetamine, Ur 05/20/2025 Positive (A)  Negative Final   • Methadone Screen, Urine 05/20/2025 Negative  Negative Final   • Morphine/Opiates Screen, Urine 05/20/2025 Negative  Negative Final   • Oxycodone Screen, Urine 05/20/2025 Negative  Negative Final   • Phencyclidine (PCP), Urine 05/20/2025 Negative  Negative Final   • Propoxyphene Scree, Urine 05/20/2025 Negative  Negative Final   • Tricyclic Antidepressants Screen 05/20/2025 Negative  Negative Final   • THC, Screen, Urine 05/20/2025 Positive (A)  Negative Final   • BUPRENORPHINE 05/20/2025 27  2.5 ng/mL ng/mL Final   •  NORBUPRENORPHINE 05/20/2025 169  10 ng/mL ng/mL Final   • AMPHETAMINE 05/20/2025 Detected  500 ng/mL ng/mL Final   • METHAMPHETAMINE 05/20/2025 Detected  500 ng/mL ng/mL Final   • FENTANYL 05/20/2025 Not Detected  1 ng/mL ng/mL Final   • Barbiturates 05/20/2025 Not Detected  200 ng/mL ng/mL Final   • Benzodiazepines 05/20/2025 Not Detected  200 ng/mL ng/mL Final   • BUPRENORPHINE 05/20/2025 Detected  5.0 ng/mL ng/mL Final   • ALCOHOL, ETHYL 05/20/2025 Not Detected  10 mg/dL mg/dL Final   • EDDP 05/20/2025 Not Detected  100 ng/mL ng/mL Final   • Opiates 05/20/2025 Not Detected  300 ng/mL ng/mL Final   • 6-ACETYLMORPHINE 05/20/2025 Not Detected  10 ng/mL ng/mL Final   • OXYCODONE 05/20/2025 Not Detected  100 ng/mL ng/mL Final   • PCP 05/20/2025 Not Detected  25 ng/mL ng/mL Final   • THC 05/20/2025 Detected  50 ng/mL ng/mL Final   • Cocaine Metabolite 05/20/2025 Not Detected  150 ng/mL ng/mL Final   • CREATININE 05/20/2025 36.2  >20.0 mg/dL mg/dL Final   • PH 05/20/2025 5.2  4.5 - 9.0 Final   • OXIDANTS 05/20/2025 Not Detected  0-200 ug/mL ug/mL Final   • Specific Gravity, Germain 05/20/2025 1.005  1.003 - 1.035 Final   • Reference Lab Report 05/20/2025    Final    See Full Screen for results.   • AMPHETAMINE 05/20/2025 1277  100 ng/mL ng/mL Final   • PHENTERMINE 05/20/2025 Not Detected  100 ng/mL ng/mL Final   • METHAMPHETAMINE 05/20/2025 >2500  100 ng/mL ng/mL Final   • 9-DELTA-THC-COOH 05/20/2025 104  25 ng/mL ng/mL Final   Telemedicine on 05/01/2025   Component Date Value Ref Range Status   • Amphetamine Screen, Urine 05/01/2025 Positive (A)  Negative Final   • Barbiturates Screen, Urine 05/01/2025 Negative  Negative Final   • Buprenorphine, Screen, Urine 05/01/2025 Positive (A)  Negative Final   • Benzodiazepine Screen, Urine 05/01/2025 Negative  Negative Final   • Cocaine Screen, Urine 05/01/2025 Negative  Negative Final   • MDMA (ECSTASY) 05/01/2025 Positive (A)  Negative Final   • Methamphetamine, Ur 05/01/2025  Positive (A)  Negative Final   • Methadone Screen, Urine 05/01/2025 Negative  Negative Final   • Morphine/Opiates Screen, Urine 05/01/2025 Negative  Negative Final   • Oxycodone Screen, Urine 05/01/2025 Negative  Negative Final   • Phencyclidine (PCP), Urine 05/01/2025 Negative  Negative Final   • Propoxyphene Scree, Urine 05/01/2025 Negative  Negative Final   • Tricyclic Antidepressants Screen 05/01/2025 Negative  Negative Final   • THC, Screen, Urine 05/01/2025 Positive (A)  Negative Final   • BUPRENORPHINE 05/01/2025 329^DETECTED  10 ng/mL ng/mL Final   • NORBUPRENORPHINE 05/01/2025 782^DETECTED  10 ng/mL ng/mL Final   • 6-ACETYLMORPHINE 05/01/2025 Not Detected  10 ng/mL ng/mL Final   • AMPHETAMINE 05/01/2025 Detected  500 ng/mL ng/mL Final   • Barbiturates 05/01/2025 Not Detected  200 ng/mL ng/mL Final   • Benzodiazepines 05/01/2025 Not Detected  200 ng/mL ng/mL Final   • BUPRENORPHINE 05/01/2025 Detected  5 ng/mL ng/mL Final   • Cocaine Metabolite 05/01/2025 Not Detected  150 ng/mL ng/mL Final   • EDDP 05/01/2025 Not Detected  100 ng/mL ng/mL Final   • ALCOHOL, ETHYL 05/01/2025 Not Detected  <100.0000 ng/mL Final   • FENTANYL 05/01/2025 Not Detected  1 ng/mL ng/mL Final   • METHAMPHETAMINE 05/01/2025 Detected  500 ng/mL ng/mL Final   • Opiates 05/01/2025 Not Detected  300 ng/mL ng/mL Final   • OXYCODONE 05/01/2025 Not Detected  100 ng/mL ng/mL Final   • PCP 05/01/2025 Not Detected  25 ng/mL ng/mL Final   • THC 05/01/2025 Detected  50 ng/mL ng/mL Final   • TRICYCLIC ANTIDEPRESSANTS 05/01/2025 Not Detected  300 ng/mL ng/mL Final   • Reference Lab Report 05/01/2025    Final    See Full Screen for results.   • AMPHETAMINE 05/01/2025 18550^DETECTED  250 ng/mL ng/mL Final   • PHENTERMINE 05/01/2025 Not Detected  100 ng/mL ng/mL Final   • METHAMPHETAMINE 05/01/2025 61875^DETECTED  100 ng/mL ng/mL Final   Telemedicine on 04/24/2025   Component Date Value Ref Range Status   • Amphetamine Screen, Urine 04/24/2025  Positive (A)  Negative Final   • Barbiturates Screen, Urine 04/24/2025 Negative  Negative Final   • Buprenorphine, Screen, Urine 04/24/2025 Positive (A)  Negative Final   • Benzodiazepine Screen, Urine 04/24/2025 Negative  Negative Final   • Cocaine Screen, Urine 04/24/2025 Negative  Negative Final   • MDMA (ECSTASY) 04/24/2025 Positive (A)  Negative Final   • Methamphetamine, Ur 04/24/2025 Positive (A)  Negative Final   • Methadone Screen, Urine 04/24/2025 Negative  Negative Final   • Morphine/Opiates Screen, Urine 04/24/2025 Negative  Negative Final   • Oxycodone Screen, Urine 04/24/2025 Negative  Negative Final   • Phencyclidine (PCP), Urine 04/24/2025 Negative  Negative Final   • Propoxyphene Scree, Urine 04/24/2025 Negative  Negative Final   • Tricyclic Antidepressants Screen 04/24/2025 Negative  Negative Final   • THC, Screen, Urine 04/24/2025 Positive (A)  Negative Final   • BUPRENORPHINE 04/24/2025 264^DETECTED  10 ng/mL ng/mL Final    Detected   • NORBUPRENORPHINE 04/24/2025 638^DETECTED  10 ng/mL ng/mL Final    Detected   • 6-ACETYLMORPHINE 04/24/2025 Not Detected  10 ng/mL ng/mL Final   • AMPHETAMINE 04/24/2025 Detected  500 ng/mL ng/mL Final   • Barbiturates 04/24/2025 Not Detected  200 ng/mL ng/mL Final   • Benzodiazepines 04/24/2025 Not Detected  200 ng/mL ng/mL Final   • BUPRENORPHINE 04/24/2025 Detected  5 ng/mL ng/mL Final   • Cocaine Metabolite 04/24/2025 Not Detected  150 ng/mL ng/mL Final   • EDDP 04/24/2025 Not Detected  100 ng/mL ng/mL Final   • ALCOHOL, ETHYL 04/24/2025 Not Detected  <100.0000 ng/mL Final   • FENTANYL 04/24/2025 Not Detected  1 ng/mL ng/mL Final   • METHAMPHETAMINE 04/24/2025 Detected  500 ng/mL ng/mL Final   • Opiates 04/24/2025 Not Detected  300 ng/mL ng/mL Final   • OXYCODONE 04/24/2025 Not Detected  100 ng/mL ng/mL Final   • PCP 04/24/2025 Not Detected  25 ng/mL ng/mL Final   • THC 04/24/2025 Detected  50 ng/mL ng/mL Final   • TRICYCLIC ANTIDEPRESSANTS 04/24/2025 Not  Detected  300 ng/mL ng/mL Final   • Reference Lab Report 04/24/2025    Final    See Full Screen for results.   • Ethyl Glucuronide 04/24/2025 Not Detected  200 ng/mL ng/mL Final   • Ethyl Sulfate 04/24/2025 Not Detected  50 ng/mL ng/mL Final   • AMPHETAMINE 04/24/2025 76131^DETECTED  250 ng/mL ng/mL Final    Detected   • PHENTERMINE 04/24/2025 Not Detected  100 ng/mL ng/mL Final   • METHAMPHETAMINE 04/24/2025 12081^DETECTED  100 ng/mL ng/mL Final    Detected   Telemedicine on 04/17/2025   Component Date Value Ref Range Status   • Amphetamine Screen, Urine 04/17/2025 Positive (A)  Negative Final   • Barbiturates Screen, Urine 04/17/2025 Negative  Negative Final   • Buprenorphine, Screen, Urine 04/17/2025 Positive (A)  Negative Final   • Benzodiazepine Screen, Urine 04/17/2025 Negative  Negative Final   • Cocaine Screen, Urine 04/17/2025 Negative  Negative Final   • MDMA (ECSTASY) 04/17/2025 Positive (A)  Negative Final   • Methamphetamine, Ur 04/17/2025 Positive (A)  Negative Final   • Methadone Screen, Urine 04/17/2025 Negative  Negative Final   • Morphine/Opiates Screen, Urine 04/17/2025 Negative  Negative Final   • Oxycodone Screen, Urine 04/17/2025 Negative  Negative Final   • Phencyclidine (PCP), Urine 04/17/2025 Negative  Negative Final   • Propoxyphene Scree, Urine 04/17/2025 Negative  Negative Final   • Tricyclic Antidepressants Screen 04/17/2025 Negative  Negative Final   • THC, Screen, Urine 04/17/2025 Positive (A)  Negative Final   • BUPRENORPHINE 04/17/2025 149^DETECTED  10 ng/mL ng/mL Final    Detected   • NORBUPRENORPHINE 04/17/2025 528^DETECTED  10 ng/mL ng/mL Final    Detected   • 6-ACETYLMORPHINE 04/17/2025 Not Detected  10 ng/mL ng/mL Final   • AMPHETAMINE 04/17/2025 Detected  500 ng/mL ng/mL Final   • Barbiturates 04/17/2025 Not Detected  200 ng/mL ng/mL Final   • Benzodiazepines 04/17/2025 Not Detected  200 ng/mL ng/mL Final   • BUPRENORPHINE 04/17/2025 Detected  5 ng/mL ng/mL Final   • Cocaine  Metabolite 04/17/2025 Not Detected  150 ng/mL ng/mL Final   • EDDP 04/17/2025 Not Detected  100 ng/mL ng/mL Final   • ALCOHOL, ETHYL 04/17/2025 Not Detected  <100.0000 ng/mL Final   • FENTANYL 04/17/2025 Not Detected  1 ng/mL ng/mL Final   • METHAMPHETAMINE 04/17/2025 Detected  500 ng/mL ng/mL Final   • Opiates 04/17/2025 Not Detected  300 ng/mL ng/mL Final   • OXYCODONE 04/17/2025 Not Detected  100 ng/mL ng/mL Final   • PCP 04/17/2025 Not Detected  25 ng/mL ng/mL Final   • THC 04/17/2025 Detected  50 ng/mL ng/mL Final   • TRICYCLIC ANTIDEPRESSANTS 04/17/2025 Not Detected  300 ng/mL ng/mL Final   • Reference Lab Report 04/17/2025    Final    See Full Screen for results.   • Ethyl Glucuronide 04/17/2025 Not Detected  200 ng/mL ng/mL Final   • Ethyl Sulfate 04/17/2025 55.4  50 ng/mL ng/mL Final    Detected   • AMPHETAMINE 04/17/2025 79528^DETECTED  250 ng/mL ng/mL Final    Detected   • PHENTERMINE 04/17/2025 Not Detected  100 ng/mL ng/mL Final   • METHAMPHETAMINE 04/17/2025 24210^DETECTED  100 ng/mL ng/mL Final    Detected   Telemedicine on 03/27/2025   Component Date Value Ref Range Status   • Amphetamine Screen, Urine 03/27/2025 Positive (A)  Negative Final   • Barbiturates Screen, Urine 03/27/2025 Negative  Negative Final   • Buprenorphine, Screen, Urine 03/27/2025 Positive (A)  Negative Final   • Benzodiazepine Screen, Urine 03/27/2025 Negative  Negative Final   • Cocaine Screen, Urine 03/27/2025 Negative  Negative Final   • MDMA (ECSTASY) 03/27/2025 Negative  Negative Final   • Methamphetamine, Ur 03/27/2025 Positive (A)  Negative Final   • Methadone Screen, Urine 03/27/2025 Negative  Negative Final   • Morphine/Opiates Screen, Urine 03/27/2025 Negative  Negative Final   • Oxycodone Screen, Urine 03/27/2025 Negative  Negative Final   • Phencyclidine (PCP), Urine 03/27/2025 Negative  Negative Final   • Propoxyphene Scree, Urine 03/27/2025 Negative  Negative Final   • Tricyclic Antidepressants Screen 03/27/2025  Negative  Negative Final   • THC, Screen, Urine 03/27/2025 Positive (A)  Negative Final   • 6-ACETYLMORPHINE 03/27/2025 Not Detected  10 ng/mL ng/mL Final   • AMPHETAMINE 03/27/2025 Detected  500 ng/mL ng/mL Final   • Barbiturates 03/27/2025 Not Detected  200 ng/mL ng/mL Final   • Benzodiazepines 03/27/2025 Not Detected  200 ng/mL ng/mL Final   • BUPRENORPHINE 03/27/2025 Detected  5 ng/mL ng/mL Final   • Cocaine Metabolite 03/27/2025 Not Detected  150 ng/mL ng/mL Final   • EDDP 03/27/2025 Not Detected  100 ng/mL ng/mL Final   • ALCOHOL, ETHYL 03/27/2025 Not Detected  <100.0000 ng/mL Final   • FENTANYL 03/27/2025 Not Detected  1 ng/mL ng/mL Final   • METHAMPHETAMINE 03/27/2025 Detected  500 ng/mL ng/mL Final   • Opiates 03/27/2025 Not Detected  300 ng/mL ng/mL Final   • OXYCODONE 03/27/2025 Not Detected  100 ng/mL ng/mL Final   • PCP 03/27/2025 Not Detected  25 ng/mL ng/mL Final   • THC 03/27/2025 Detected  50 ng/mL ng/mL Final   • TRICYCLIC ANTIDEPRESSANTS 03/27/2025 Not Detected  300 ng/mL ng/mL Final   • Reference Lab Report 03/27/2025    Final    See Full Screen for results.   • AMPHETAMINE 03/27/2025 974^DETECTED  250 ng/mL ng/mL Final    Detected   • PHENTERMINE 03/27/2025 Not Detected  100 ng/mL ng/mL Final   • METHAMPHETAMINE 03/27/2025 3452^DETECTED  100 ng/mL ng/mL Final    Detected   Clinical Support on 03/20/2025   Component Date Value Ref Range Status   • Amphetamine Screen, Urine 03/20/2025 Negative  Negative Final   • Barbiturates Screen, Urine 03/20/2025 Negative  Negative Final   • Buprenorphine, Screen, Urine 03/20/2025 Positive (A)  Negative Final   • Benzodiazepine Screen, Urine 03/20/2025 Negative  Negative Final   • Cocaine Screen, Urine 03/20/2025 Negative  Negative Final   • MDMA (ECSTASY) 03/20/2025 Negative  Negative Final   • Methamphetamine, Ur 03/20/2025 Negative  Negative Final   • Methadone Screen, Urine 03/20/2025 Negative  Negative Final   • Morphine/Opiates Screen, Urine 03/20/2025  Negative  Negative Final   • Oxycodone Screen, Urine 03/20/2025 Negative  Negative Final   • Phencyclidine (PCP), Urine 03/20/2025 Negative  Negative Final   • Propoxyphene Scree, Urine 03/20/2025 Negative  Negative Final   • Tricyclic Antidepressants Screen 03/20/2025 Negative  Negative Final   • THC, Screen, Urine 03/20/2025 Negative  Negative Final   • AMPHETAMINE 03/20/2025 Not Detected  500 ng/mL ng/mL Final   • Barbiturates 03/20/2025 Not Detected  200 ng/mL ng/mL Final   • Benzodiazepines 03/20/2025 Not Detected  200 ng/mL ng/mL Final   • TRICYCLIC ANTIDEPRESSANTS 03/20/2025 Not Detected  300 ng/mL ng/mL Final   • 6-ACETYLMORPHINE 03/20/2025 Not Detected  10 ng/mL ng/mL Final   • BUPRENORPHINE 03/20/2025 Detected  5 ng/mL ng/mL Final   • Cocaine Metabolite 03/20/2025 Not Detected  150 ng/mL ng/mL Final   • EDDP 03/20/2025 Not Detected  100 ng/mL ng/mL Final   • ALCOHOL, ETHYL 03/20/2025 Not Detected  100 ng/mL ng/mL Final   • FENTANYL 03/20/2025 Not Detected  1 ng/mL ng/mL Final   • METHAMPHETAMINE 03/20/2025 Not Detected  500 ng/mL ng/mL Final   • Opiates 03/20/2025 Not Detected  300 ng/mL ng/mL Final   • OXYCODONE 03/20/2025 Not Detected  100 ng/mL ng/mL Final   • PCP 03/20/2025 Not Detected  25 ng/mL ng/mL Final   • THC 03/20/2025 Detected  50 ng/mL ng/mL Final   • Reference Lab Report 03/20/2025    Final    See Full Screen for results.   • BUPRENORPHINE 03/20/2025 18^DETECTED  10 ng/mL ng/mL Final    Detected   • NORBUPRENORPHINE 03/20/2025 46^DETECTED  10 ng/mL ng/mL Final    Detected   • GABAPENTIN 03/20/2025 Not Detected  500 ng/mL ng/mL Final   • 9-DELTA-THC-COOH 03/20/2025 26^NOT DETECTED  100 ng/mL ng/mL Final    Not Detected   Telemedicine on 02/25/2025   Component Date Value Ref Range Status   • Amphetamine Screen, Urine 02/25/2025 Positive (A)  Negative Final   • Barbiturates Screen, Urine 02/25/2025 Negative  Negative Final   • Buprenorphine, Screen, Urine 02/25/2025 Positive (A)  Negative Final    • Benzodiazepine Screen, Urine 02/25/2025 Negative  Negative Final   • Cocaine Screen, Urine 02/25/2025 Negative  Negative Final   • MDMA (ECSTASY) 02/25/2025 Negative  Negative Final   • Methamphetamine, Ur 02/25/2025 Positive (A)  Negative Final   • Methadone Screen, Urine 02/25/2025 Negative  Negative Final   • Morphine/Opiates Screen, Urine 02/25/2025 Negative  Negative Final   • Oxycodone Screen, Urine 02/25/2025 Negative  Negative Final   • Phencyclidine (PCP), Urine 02/25/2025 Negative  Negative Final   • Propoxyphene Scree, Urine 02/25/2025 Negative  Negative Final   • Tricyclic Antidepressants Screen 02/25/2025 Negative  Negative Final   • THC, Screen, Urine 02/25/2025 Positive (A)  Negative Final   Telemedicine on 01/27/2025   Component Date Value Ref Range Status   • Amphetamine Screen, Urine 01/27/2025 Positive (A)  Negative Final   • Barbiturates Screen, Urine 01/27/2025 Negative  Negative Final   • Buprenorphine, Screen, Urine 01/27/2025 Positive (A)  Negative Final   • Benzodiazepine Screen, Urine 01/27/2025 Negative  Negative Final   • Cocaine Screen, Urine 01/27/2025 Negative  Negative Final   • MDMA (ECSTASY) 01/27/2025 Positive (A)  Negative Final   • Methamphetamine, Ur 01/27/2025 Negative  Negative Final   • Methadone Screen, Urine 01/27/2025 Negative  Negative Final   • Morphine/Opiates Screen, Urine 01/27/2025 Negative  Negative Final   • Oxycodone Screen, Urine 01/27/2025 Negative  Negative Final   • Phencyclidine (PCP), Urine 01/27/2025 Negative  Negative Final   • Propoxyphene Scree, Urine 01/27/2025 Negative  Negative Final   • Tricyclic Antidepressants Screen 01/27/2025 Negative  Negative Final   • THC, Screen, Urine 01/27/2025 Negative  Negative Final   • Gabapentin 01/27/2025 NEGATIVE   Final   • Ethanol, Urine 01/27/2025 NEGATIVE   Final   • Fentanyl, Urine 01/27/2025 Negative  Negative Final   Telemedicine on 01/20/2025   Component Date Value Ref Range Status   • Amphetamine Screen,  Urine 01/20/2025 Positive (A)  Negative Final    PRELIMINARY RESULTS. REFER TO [facility] LAB CONFIRMATION FOR FINAL RESULTS.   • Barbiturates Screen, Urine 01/20/2025 Negative  Negative Final   • Buprenorphine, Screen, Urine 01/20/2025 Positive (A)  Negative Final   • Benzodiazepine Screen, Urine 01/20/2025 Negative  Negative Final   • Cocaine Screen, Urine 01/20/2025 Negative  Negative Final   • MDMA (ECSTASY) 01/20/2025 Negative  Negative Final   • Methamphetamine, Ur 01/20/2025 Positive (A)  Negative Final   • Methadone Screen, Urine 01/20/2025 Negative  Negative Final   • Morphine/Opiates Screen, Urine 01/20/2025 Negative  Negative Final   • Oxycodone Screen, Urine 01/20/2025 Negative  Negative Final   • Phencyclidine (PCP), Urine 01/20/2025 Negative  Negative Final   • Propoxyphene Scree, Urine 01/20/2025 Negative  Negative Final   • Tricyclic Antidepressants Screen 01/20/2025 Negative  Negative Final   • THC, Screen, Urine 01/20/2025 Positive (A)  Negative Final   • Gabapentin 01/20/2025 negative   Final   • ETHANOL URINE SCREEN 01/20/2025 Negative   Final   • Fentanyl, Urine 01/20/2025 Negative  Negative Final   There may be more visits with results that are not included.         Assessment & Plan   Diagnoses and all orders for this visit:    1. Opioid type dependence, continuous (Primary)  -     buprenorphine-naloxone (SUBOXONE) 8-2 MG per SL tablet; Place 2 tablets under the tongue Daily.  Dispense: 28 tablet; Refill: 0    2. Medication management  -     POC Medline 14 Panel Urine Drug Screen  -     Behavioral Health Full Screen and Definitive Testing ([facility]) -; Future  -     Buprenorphine/Norbuprenorphine level, QUANT (MARTIN) - Urine, Clean Catch; Future  -     Behavioral Health Full Screen and Definitive Testing ([facility]) -  -     Buprenorphine/Norbuprenorphine level, QUANT (MARTIN) - Urine, Clean Catch    3. Methamphetamine dependence    4. Delta-9-tetrahydrocannabinol (THC) dependence        Visit Diagnoses:     ICD-10-CM ICD-9-CM   1. Opioid type dependence, continuous  F11.20 304.01   2. Medication management  Z79.899 V58.69   3. Methamphetamine dependence  F15.20 304.40   4. Delta-9-tetrahydrocannabinol (THC) dependence  F12.20 304.30       PLAN:  Safety: No acute safety concerns  Risk Assessment: Risk of self-harm acutely is low. Risk of self-harm chronically is also low, but could be further elevated in the event of treatment noncompliance and/or AODA.    TREATMENT PLAN/GOALS: Continue supportive psychotherapy efforts and medications as indicated. Treatment and medication options discussed during today's visit. Patient acknowledged and verbally consented to continue with current treatment plan and was educated on the importance of compliance with treatment and follow-up appointments.    MEDICATION ISSUES:  ROSY reviewed as expected.  Discussed medication options and treatment plan of prescribed medication as well as the risks, benefits, and side effects including potential falls, possible impaired driving and metabolic adversities among others. Patient is agreeable to call the office with any worsening of symptoms or onset of side effects. Patient is agreeable to call 911 or go to the nearest ER should he/she begin having SI/HI. No medication side effects or related complaints today.     MEDS ORDERED DURING VISIT:  New Medications Ordered This Visit   Medications   • buprenorphine-naloxone (SUBOXONE) 8-2 MG per SL tablet     Sig: Place 2 tablets under the tongue Daily.     Dispense:  28 tablet     Refill:  0     NA - NADEAN:[id number]       No follow-ups on file.           This document has been electronically signed by WALKER Romero  May 29, 2025 08:53 EDT      Part of this note may be an electronic transcription/translation of spoken language to printed text using the Dragon Dictation System.

## 2025-05-30 LAB
6-ACETYLMORPHINE: NOT DETECTED NG/ML
9-DELTA-THC-COOH: 380 NG/ML
ALCOHOL, ETHYL: NOT DETECTED MG/DL
AMPHET SAL QL CFM: >2500 NG/ML
AMPHETAMINE: DETECTED NG/ML
BENZODIAZ BLD QL: NOT DETECTED NG/ML
BUPRENORPHINE SAL CFM-MCNC: DETECTED NG/ML
BUPRENORPHINE: 319 NG/ML
COCAINE METABOLITE: NOT DETECTED NG/ML
CREATININE: 198.4 MG/DL
EDDP SERPL QL: NOT DETECTED NG/ML
FENTANYL-EIA: NOT DETECTED NG/ML
METHAMPHET/CREAT UR: >2500 NG/ML
METHAMPHETAMINE: DETECTED NG/ML
NORBUPRENORPHINE: 1267 NG/ML
OPIATES: NOT DETECTED NG/ML
OXIDANTS: NOT DETECTED UG/ML
OXYCODONE: NOT DETECTED NG/ML
PCP: NOT DETECTED NG/ML
PH: 4.9 (ref 4.5–9)
PHENTERMINE: NOT DETECTED NG/ML
REF LAB TEST METHOD: NORMAL
SPECIFIC GRAVITY, QUAN: 1.02 (ref 1–1.03)
THC: DETECTED NG/ML

## 2025-06-12 ENCOUNTER — CLINICAL SUPPORT (OUTPATIENT)
Dept: PSYCHIATRY | Facility: CLINIC | Age: 55
End: 2025-06-12
Payer: MEDICAID

## 2025-06-12 DIAGNOSIS — F15.10 METHAMPHETAMINE ABUSE: ICD-10-CM

## 2025-06-12 DIAGNOSIS — F11.20 OPIOID TYPE DEPENDENCE, CONTINUOUS: ICD-10-CM

## 2025-06-12 DIAGNOSIS — K09.1 MEDIAN MANDIBULAR CYST: ICD-10-CM

## 2025-06-12 DIAGNOSIS — Z79.899 MEDICATION MANAGEMENT: Primary | ICD-10-CM

## 2025-06-12 RX ORDER — BUPRENORPHINE HYDROCHLORIDE AND NALOXONE HYDROCHLORIDE DIHYDRATE 8; 2 MG/1; MG/1
2 TABLET SUBLINGUAL DAILY
Qty: 28 TABLET | Refills: 0 | Status: SHIPPED | OUTPATIENT
Start: 2025-06-12

## 2025-06-12 RX ORDER — BUPROPION HYDROCHLORIDE 150 MG/1
150 TABLET ORAL 2 TIMES DAILY
Qty: 60 TABLET | Refills: 0 | Status: SHIPPED | OUTPATIENT
Start: 2025-06-12

## 2025-06-14 LAB
6-ACETYLMORPHINE: NOT DETECTED NG/ML
9-DELTA-THC-COOH: 183 NG/ML
ALCOHOL, ETHYL: NOT DETECTED MG/DL
AMPHET SAL QL CFM: >2500 NG/ML
AMPHETAMINE: DETECTED NG/ML
BENZODIAZ BLD QL: NOT DETECTED NG/ML
BUPRENORPHINE SAL CFM-MCNC: DETECTED NG/ML
BUPRENORPHINE: 193 NG/ML
COCAINE METABOLITE: NOT DETECTED NG/ML
CREATININE: 176.3 MG/DL
EDDP SERPL QL: NOT DETECTED NG/ML
FENTANYL-EIA: NOT DETECTED NG/ML
METHAMPHET/CREAT UR: >2500 NG/ML
METHAMPHETAMINE: DETECTED NG/ML
NORBUPRENORPHINE: 715 NG/ML
OPIATES: NOT DETECTED NG/ML
OXIDANTS: NOT DETECTED UG/ML
OXYCODONE: NOT DETECTED NG/ML
PCP: NOT DETECTED NG/ML
PH: 6.3 (ref 4.5–9)
PHENTERMINE: NOT DETECTED NG/ML
REF LAB TEST METHOD: NORMAL
SPECIFIC GRAVITY, QUAN: 1.02 (ref 1–1.03)
THC: DETECTED NG/ML

## 2025-07-03 ENCOUNTER — TELEMEDICINE (OUTPATIENT)
Dept: PSYCHIATRY | Facility: CLINIC | Age: 55
End: 2025-07-03
Payer: MEDICAID

## 2025-07-03 VITALS
BODY MASS INDEX: 31.32 KG/M2 | HEIGHT: 62 IN | DIASTOLIC BLOOD PRESSURE: 76 MMHG | SYSTOLIC BLOOD PRESSURE: 133 MMHG | HEART RATE: 83 BPM | WEIGHT: 170.2 LBS

## 2025-07-03 DIAGNOSIS — F12.20 DELTA-9-TETRAHYDROCANNABINOL (THC) DEPENDENCE: ICD-10-CM

## 2025-07-03 DIAGNOSIS — F11.20 OPIOID TYPE DEPENDENCE, CONTINUOUS: Primary | ICD-10-CM

## 2025-07-03 DIAGNOSIS — F15.20 METHAMPHETAMINE DEPENDENCE: ICD-10-CM

## 2025-07-03 DIAGNOSIS — Z79.899 MEDICATION MANAGEMENT: ICD-10-CM

## 2025-07-03 RX ORDER — BUPRENORPHINE HYDROCHLORIDE AND NALOXONE HYDROCHLORIDE DIHYDRATE 8; 2 MG/1; MG/1
2 TABLET SUBLINGUAL DAILY
Qty: 14 TABLET | Refills: 0 | Status: SHIPPED | OUTPATIENT
Start: 2025-07-03

## 2025-07-03 NOTE — PROGRESS NOTES
This provider is located at [facility]. The Patient is seen remotely located at the [facility] ([facility]) using Video. Patient is being seen via telehealth and confirm that they are in a secure environment for this session. The patient's condition being diagnosed/treated is appropriate for telemedicine. Provider identified as Williams Brody as well as credentials APRN MSN FNP-C SRINIVASAN-YOUNG.   The client/patient gave consent to be seen remotely, and when consent is given they understand that the consent allows for patient identifiable information to be sent to a third party as needed.   They may refuse to be seen remotely at any time. The electronic data is encrypted and password protected, and the patient has been advised of the potential risks to privacy not withstanding such measures.    Chief Complaint/History of Present Illness: Follow Up buprenorphine/naloxone Medicated Assisted Treatment for Opiate Use Disorder     Patient/Client Concerns/Updates: Continue Wellbutrin for stimulant cravings   -Patient reports no significant or concerning life events or changes since last evaluation; reports no acute life stressors  -Reports continued therapeutic benefit and satisfaction with current care plan; reports no concerning side effects with current medication prescribed  -Mood is stable; patient denies acute anxious episodes, exacerbations thereof or concerning panic attacks  -Reports no acute depressive episodes or concerns, no suicidal or homicidal thoughts  -Reports no perceptual disturbances or otherwise symptoms of psychosis  -Patient reports no concerns with sleep quality or disturbance thereof    Triggers (Persons/Places/Things/Events/Thought/Emotions): Life stress    Cravings/Substance Use: Denies cravings use of methamphetamine in over 2 weeks    Relapse Prevention: Counseling    Urine Drug Screen (today's visit)/Presumptive Point of Care discussed: Positive buprenorphine and positive  THC    UDS Confirmation (Most recent/Resulted): Positive buprenorphine/nor-buprenorphine, no concerns for urine tampering otherwise positive methamphetamine and THC    Most recent pertinent laboratory studies reviewed:  4/11/23-hepatic function within normal limits, Cr WNL, HIV negative, hepatitis C not detected         ROSY (PDMP) Reviewed for Current/Active Medications: buprenorphine/naloxone as reviewed today    Past Surgical History:  No past surgical history on file.    Problem List:  There is no problem list on file for this patient.      Allergy:   No Known Allergies     Current Medications:   Current Outpatient Medications   Medication Sig Dispense Refill   • buprenorphine-naloxone (SUBOXONE) 8-2 MG per SL tablet Place 2 tablets under the tongue Daily. 14 tablet 0   • buPROPion XL (WELLBUTRIN XL) 150 MG 24 hr tablet Take 1 tablet by mouth 2 (Two) Times a Day. BID dosing 60 tablet 0   • famotidine (PEPCID) 20 MG tablet Take 1 tablet by mouth 2 (Two) Times a Day. 60 tablet 0   • folic acid (FOLVITE) 1 MG tablet Take 1 tablet by mouth Daily.     • GNP PAIN RELIEF EX-STRENGTH 500 MG tablet TAKE ONE TABLET BY MOUTH EVERY 6 HOURS AS NEEDED FOR 30 DAYS **DO NOT EXCEED 6 TABLETS IN 24 HOURS**     • hydrOXYzine pamoate (Vistaril) 25 MG capsule Take 1 capsule by mouth 4 (Four) Times a Day As Needed for Anxiety. 120 capsule 0   • methotrexate 2.5 MG tablet Take 1 tablet by mouth 3 (Three) Doses Each Week. Take Doses 12 (Twelve) Hours Apart.     • naloxone (NARCAN) 4 MG/0.1ML nasal spray 1 spray into the nostril(s) as directed by provider As Needed (opiate over sedation). 1 spray in 1 nostril every 2 to 3 minutes, call 911 (Patient not taking: Reported on 5/28/2025) 2 each 2   • ondansetron ODT (ZOFRAN-ODT) 8 MG disintegrating tablet Place 1 tablet on the tongue Every 8 (Eight) Hours As Needed for Nausea or Vomiting. 45 tablet 0     No current facility-administered medications for this visit.       Past Medical  History:  Past Medical History:   Diagnosis Date   • History of hepatitis B    • Pancreatitis          Social History     Socioeconomic History   • Marital status: Single   Tobacco Use   • Smoking status: Every Day     Current packs/day: 1.00     Average packs/day: 1 pack/day for 15.0 years (15.0 ttl pk-yrs)     Types: Cigarettes   • Smokeless tobacco: Never   Vaping Use   • Vaping status: Never Used   Substance and Sexual Activity   • Alcohol use: Not Currently   • Drug use: Yes     Types: Marijuana, Hydrocodone, Oxycodone, Methamphetamines   • Sexual activity: Defer         Family History   Problem Relation Age of Onset   • No Known Problems Mother    • No Known Problems Father    • No Known Problems Sister    • No Known Problems Brother    • No Known Problems Maternal Grandmother    • No Known Problems Paternal Grandmother    • No Known Problems Maternal Aunt    • No Known Problems Paternal Aunt    • No Known Problems Maternal Grandfather    • No Known Problems Paternal Grandfather    • No Known Problems Maternal Uncle    • No Known Problems Paternal Uncle    • No Known Problems Cousin    • No Known Problems Other    • ADD / ADHD Neg Hx    • Alcohol abuse Neg Hx    • Anxiety disorder Neg Hx    • Bipolar disorder Neg Hx    • Dementia Neg Hx    • Depression Neg Hx    • Drug abuse Neg Hx    • OCD Neg Hx    • Paranoid behavior Neg Hx    • Schizophrenia Neg Hx    • Seizures Neg Hx    • Self-Injurious Behavior  Neg Hx    • Suicide Attempts Neg Hx    • Breast cancer Neg Hx          Mental Status Exam:   Hygiene:   good  Cooperation:  Cooperative  Eye Contact:  Good  Psychomotor Behavior:  Appropriate  Affect:  Appropriate  Mood: normal  Speech:  Normal  Thought Process:  Goal directed  Thought Content:  Normal  Suicidal:  None  Homicidal:  None  Hallucinations:  None  Delusion:  None  Memory:  Intact  Orientation:  Grossly intact  Reliability:  good  Insight:  Good  Judgement:  Good  Impulse Control:  Good    "      Review of Systems:  Review of Systems   Constitutional:  Negative for activity change, chills, diaphoresis and fatigue.   Respiratory:  Negative for apnea, cough and shortness of breath.    Cardiovascular:  Negative for chest pain, palpitations and leg swelling.   Gastrointestinal:  Negative for abdominal pain, constipation, diarrhea, nausea and vomiting.   Genitourinary:  Negative for difficulty urinating.   Musculoskeletal:  Negative for arthralgias.   Skin:  Negative for rash.   Neurological:  Negative for dizziness, weakness and headaches.   Psychiatric/Behavioral:  Negative for agitation, self-injury, sleep disturbance and suicidal ideas. The patient is not nervous/anxious.        Physical Exam:  Physical Exam  Vitals reviewed.   Constitutional:       General: She is not in acute distress.     Appearance: Normal appearance. She is not ill-appearing or toxic-appearing.   Pulmonary:      Effort: Pulmonary effort is normal.   Musculoskeletal:         General: Normal range of motion.   Neurological:      General: No focal deficit present.      Mental Status: She is alert and oriented to person, place, and time.   Psychiatric:         Attention and Perception: Attention and perception normal.         Mood and Affect: Mood normal. Mood is not anxious or depressed.         Speech: Speech normal.         Behavior: Behavior normal. Behavior is cooperative.         Thought Content: Thought content normal.         Cognition and Memory: Cognition and memory normal.         Judgment: Judgment normal.     Vital Signs:   /76   Pulse 83   Ht 156.2 cm (61.5\")   Wt 77.2 kg (170 lb 3.2 oz)   BMI 31.64 kg/m²      Lab Results:   Telemedicine on 07/03/2025   Component Date Value Ref Range Status   • Amphetamine Screen, Urine 07/03/2025 Negative  Negative Final    Refer to confirmation by [facility] in scanned documents   • Barbiturates Screen, Urine 07/03/2025 Negative  Negative Final   • Buprenorphine, Screen, Urine " 07/03/2025 Positive (A)  Negative Final   • Benzodiazepine Screen, Urine 07/03/2025 Negative  Negative Final   • Cocaine Screen, Urine 07/03/2025 Negative  Negative Final   • MDMA (ECSTASY) 07/03/2025 Negative  Negative Final   • Methamphetamine, Ur 07/03/2025 Negative  Negative Final   • Methadone Screen, Urine 07/03/2025 Negative  Negative Final   • Morphine/Opiates Screen, Urine 07/03/2025 Negative  Negative Final   • Oxycodone Screen, Urine 07/03/2025 Negative  Negative Final   • Phencyclidine (PCP), Urine 07/03/2025 Negative  Negative Final   • Propoxyphene Scree, Urine 07/03/2025 Negative  Negative Final   • Tricyclic Antidepressants Screen 07/03/2025 Negative  Negative Final   • THC, Screen, Urine 07/03/2025 Positive (A)  Negative Final   Clinical Support on 06/12/2025   Component Date Value Ref Range Status   • Amphetamine Screen, Urine 06/12/2025 Positive (A)  Negative Final    Refer to confirmation by [facility] in scanned documents   • Barbiturates Screen, Urine 06/12/2025 Negative  Negative Final   • Buprenorphine, Screen, Urine 06/12/2025 Positive (A)  Negative Final   • Benzodiazepine Screen, Urine 06/12/2025 Negative  Negative Final   • Cocaine Screen, Urine 06/12/2025 Negative  Negative Final   • MDMA (ECSTASY) 06/12/2025 Negative  Negative Final   • Methamphetamine, Ur 06/12/2025 Positive (A)  Negative Final   • Methadone Screen, Urine 06/12/2025 Negative  Negative Final   • Morphine/Opiates Screen, Urine 06/12/2025 Negative  Negative Final   • Oxycodone Screen, Urine 06/12/2025 Negative  Negative Final   • Phencyclidine (PCP), Urine 06/12/2025 Negative  Negative Final   • Propoxyphene Scree, Urine 06/12/2025 Negative  Negative Final   • Tricyclic Antidepressants Screen 06/12/2025 Negative  Negative Final   • THC, Screen, Urine 06/12/2025 Positive (A)  Negative Final   • BUPRENORPHINE 06/12/2025 193  2.5 ng/mL ng/mL Final   • NORBUPRENORPHINE 06/12/2025 715  10 ng/mL ng/mL Final   • AMPHETAMINE  06/12/2025 Detected  500 ng/mL ng/mL Final   • METHAMPHETAMINE 06/12/2025 Detected  500 ng/mL ng/mL Final   • FENTANYL 06/12/2025 Not Detected  1 ng/mL ng/mL Final   • Barbiturates 06/12/2025 Not Detected  200 ng/mL ng/mL Final   • Benzodiazepines 06/12/2025 Not Detected  200 ng/mL ng/mL Final   • BUPRENORPHINE 06/12/2025 Detected  5.0 ng/mL ng/mL Final   • ALCOHOL, ETHYL 06/12/2025 Not Detected  10 mg/dL mg/dL Final   • EDDP 06/12/2025 Not Detected  100 ng/mL ng/mL Final   • Opiates 06/12/2025 Not Detected  300 ng/mL ng/mL Final   • 6-ACETYLMORPHINE 06/12/2025 Not Detected  10 ng/mL ng/mL Final   • OXYCODONE 06/12/2025 Not Detected  100 ng/mL ng/mL Final   • PCP 06/12/2025 Not Detected  25 ng/mL ng/mL Final   • THC 06/12/2025 Detected  50 ng/mL ng/mL Final   • Cocaine Metabolite 06/12/2025 Not Detected  150 ng/mL ng/mL Final   • CREATININE 06/12/2025 176.3  >20.0 mg/dL mg/dL Final   • PH 06/12/2025 6.3  4.5 - 9.0 Final   • OXIDANTS 06/12/2025 Not Detected  0-200 ug/mL ug/mL Final   • Specific Gravity, Germain 06/12/2025 1.017  1.003 - 1.035 Final   • Reference Lab Report 06/12/2025    Final    See Full Screen for results.   • AMPHETAMINE 06/12/2025 >2500  100 ng/mL ng/mL Final   • PHENTERMINE 06/12/2025 Not Detected  100 ng/mL ng/mL Final   • METHAMPHETAMINE 06/12/2025 >2500  100 ng/mL ng/mL Final   • 9-DELTA-THC-COOH 06/12/2025 183  25 ng/mL ng/mL Final   Telemedicine on 05/28/2025   Component Date Value Ref Range Status   • Amphetamine Screen, Urine 05/28/2025 Positive (A)  Negative Final   • Barbiturates Screen, Urine 05/28/2025 Negative  Negative Final   • Buprenorphine, Screen, Urine 05/28/2025 Positive (A)  Negative Final   • Benzodiazepine Screen, Urine 05/28/2025 Negative  Negative Final   • Cocaine Screen, Urine 05/28/2025 Negative  Negative Final   • MDMA (ECSTASY) 05/28/2025 Positive (A)  Negative Final   • Methamphetamine, Ur 05/28/2025 Positive (A)  Negative Final   • Methadone Screen, Urine 05/28/2025  Negative  Negative Final   • Morphine/Opiates Screen, Urine 05/28/2025 Negative  Negative Final   • Oxycodone Screen, Urine 05/28/2025 Negative  Negative Final   • Phencyclidine (PCP), Urine 05/28/2025 Negative  Negative Final   • Propoxyphene Scree, Urine 05/28/2025 Negative  Negative Final   • Tricyclic Antidepressants Screen 05/28/2025 Negative  Negative Final   • THC, Screen, Urine 05/28/2025 Positive (A)  Negative Final   • BUPRENORPHINE 05/28/2025 319  2.5 ng/mL ng/mL Final   • NORBUPRENORPHINE 05/28/2025 1267  10 ng/mL ng/mL Final   • AMPHETAMINE 05/28/2025 Detected  500 ng/mL ng/mL Final   • METHAMPHETAMINE 05/28/2025 Detected  500 ng/mL ng/mL Final   • FENTANYL 05/28/2025 Not Detected  1 ng/mL ng/mL Final   • Barbiturates 05/28/2025 Not Detected  200 ng/mL ng/mL Final   • Benzodiazepines 05/28/2025 Not Detected  200 ng/mL ng/mL Final   • BUPRENORPHINE 05/28/2025 Detected  5.0 ng/mL ng/mL Final   • ALCOHOL, ETHYL 05/28/2025 Not Detected  10 mg/dL mg/dL Final   • EDDP 05/28/2025 Not Detected  100 ng/mL ng/mL Final   • Opiates 05/28/2025 Not Detected  300 ng/mL ng/mL Final   • 6-ACETYLMORPHINE 05/28/2025 Not Detected  10 ng/mL ng/mL Final   • OXYCODONE 05/28/2025 Not Detected  100 ng/mL ng/mL Final   • PCP 05/28/2025 Not Detected  25 ng/mL ng/mL Final   • THC 05/28/2025 Detected  50 ng/mL ng/mL Final   • Cocaine Metabolite 05/28/2025 Not Detected  150 ng/mL ng/mL Final   • CREATININE 05/28/2025 198.4  >20.0 mg/dL mg/dL Final   • PH 05/28/2025 4.9  4.5 - 9.0 Final   • OXIDANTS 05/28/2025 Not Detected  0-200 ug/mL ug/mL Final   • Specific Gravity, Germain 05/28/2025 1.024  1.003 - 1.035 Final   • Reference Lab Report 05/28/2025    Final    See Full Screen for results.   • AMPHETAMINE 05/28/2025 >2500  100 ng/mL ng/mL Final   • PHENTERMINE 05/28/2025 Not Detected  100 ng/mL ng/mL Final   • METHAMPHETAMINE 05/28/2025 >2500  100 ng/mL ng/mL Final   • 9-DELTA-THC-COOH 05/28/2025 380  25 ng/mL ng/mL Final   Telemedicine  on 05/20/2025   Component Date Value Ref Range Status   • Amphetamine Screen, Urine 05/20/2025 Positive (A)  Negative Final   • Barbiturates Screen, Urine 05/20/2025 Negative  Negative Final   • Buprenorphine, Screen, Urine 05/20/2025 Positive (A)  Negative Final   • Benzodiazepine Screen, Urine 05/20/2025 Negative  Negative Final   • Cocaine Screen, Urine 05/20/2025 Negative  Negative Final   • MDMA (ECSTASY) 05/20/2025 Negative  Negative Final   • Methamphetamine, Ur 05/20/2025 Positive (A)  Negative Final   • Methadone Screen, Urine 05/20/2025 Negative  Negative Final   • Morphine/Opiates Screen, Urine 05/20/2025 Negative  Negative Final   • Oxycodone Screen, Urine 05/20/2025 Negative  Negative Final   • Phencyclidine (PCP), Urine 05/20/2025 Negative  Negative Final   • Propoxyphene Scree, Urine 05/20/2025 Negative  Negative Final   • Tricyclic Antidepressants Screen 05/20/2025 Negative  Negative Final   • THC, Screen, Urine 05/20/2025 Positive (A)  Negative Final   • BUPRENORPHINE 05/20/2025 27  2.5 ng/mL ng/mL Final   • NORBUPRENORPHINE 05/20/2025 169  10 ng/mL ng/mL Final   • AMPHETAMINE 05/20/2025 Detected  500 ng/mL ng/mL Final   • METHAMPHETAMINE 05/20/2025 Detected  500 ng/mL ng/mL Final   • FENTANYL 05/20/2025 Not Detected  1 ng/mL ng/mL Final   • Barbiturates 05/20/2025 Not Detected  200 ng/mL ng/mL Final   • Benzodiazepines 05/20/2025 Not Detected  200 ng/mL ng/mL Final   • BUPRENORPHINE 05/20/2025 Detected  5.0 ng/mL ng/mL Final   • ALCOHOL, ETHYL 05/20/2025 Not Detected  10 mg/dL mg/dL Final   • EDDP 05/20/2025 Not Detected  100 ng/mL ng/mL Final   • Opiates 05/20/2025 Not Detected  300 ng/mL ng/mL Final   • 6-ACETYLMORPHINE 05/20/2025 Not Detected  10 ng/mL ng/mL Final   • OXYCODONE 05/20/2025 Not Detected  100 ng/mL ng/mL Final   • PCP 05/20/2025 Not Detected  25 ng/mL ng/mL Final   • THC 05/20/2025 Detected  50 ng/mL ng/mL Final   • Cocaine Metabolite 05/20/2025 Not Detected  150 ng/mL ng/mL Final    • CREATININE 05/20/2025 36.2  >20.0 mg/dL mg/dL Final   • PH 05/20/2025 5.2  4.5 - 9.0 Final   • OXIDANTS 05/20/2025 Not Detected  0-200 ug/mL ug/mL Final   • Specific Gravity, Germain 05/20/2025 1.005  1.003 - 1.035 Final   • Reference Lab Report 05/20/2025    Final    See Full Screen for results.   • AMPHETAMINE 05/20/2025 1277  100 ng/mL ng/mL Final   • PHENTERMINE 05/20/2025 Not Detected  100 ng/mL ng/mL Final   • METHAMPHETAMINE 05/20/2025 >2500  100 ng/mL ng/mL Final   • 9-DELTA-THC-COOH 05/20/2025 104  25 ng/mL ng/mL Final   Telemedicine on 05/01/2025   Component Date Value Ref Range Status   • Amphetamine Screen, Urine 05/01/2025 Positive (A)  Negative Final   • Barbiturates Screen, Urine 05/01/2025 Negative  Negative Final   • Buprenorphine, Screen, Urine 05/01/2025 Positive (A)  Negative Final   • Benzodiazepine Screen, Urine 05/01/2025 Negative  Negative Final   • Cocaine Screen, Urine 05/01/2025 Negative  Negative Final   • MDMA (ECSTASY) 05/01/2025 Positive (A)  Negative Final   • Methamphetamine, Ur 05/01/2025 Positive (A)  Negative Final   • Methadone Screen, Urine 05/01/2025 Negative  Negative Final   • Morphine/Opiates Screen, Urine 05/01/2025 Negative  Negative Final   • Oxycodone Screen, Urine 05/01/2025 Negative  Negative Final   • Phencyclidine (PCP), Urine 05/01/2025 Negative  Negative Final   • Propoxyphene Scree, Urine 05/01/2025 Negative  Negative Final   • Tricyclic Antidepressants Screen 05/01/2025 Negative  Negative Final   • THC, Screen, Urine 05/01/2025 Positive (A)  Negative Final   • BUPRENORPHINE 05/01/2025 329^DETECTED  10 ng/mL ng/mL Final   • NORBUPRENORPHINE 05/01/2025 782^DETECTED  10 ng/mL ng/mL Final   • 6-ACETYLMORPHINE 05/01/2025 Not Detected  10 ng/mL ng/mL Final   • AMPHETAMINE 05/01/2025 Detected  500 ng/mL ng/mL Final   • Barbiturates 05/01/2025 Not Detected  200 ng/mL ng/mL Final   • Benzodiazepines 05/01/2025 Not Detected  200 ng/mL ng/mL Final   • BUPRENORPHINE  05/01/2025 Detected  5 ng/mL ng/mL Final   • Cocaine Metabolite 05/01/2025 Not Detected  150 ng/mL ng/mL Final   • EDDP 05/01/2025 Not Detected  100 ng/mL ng/mL Final   • ALCOHOL, ETHYL 05/01/2025 Not Detected  <100.0000 ng/mL Final   • FENTANYL 05/01/2025 Not Detected  1 ng/mL ng/mL Final   • METHAMPHETAMINE 05/01/2025 Detected  500 ng/mL ng/mL Final   • Opiates 05/01/2025 Not Detected  300 ng/mL ng/mL Final   • OXYCODONE 05/01/2025 Not Detected  100 ng/mL ng/mL Final   • PCP 05/01/2025 Not Detected  25 ng/mL ng/mL Final   • THC 05/01/2025 Detected  50 ng/mL ng/mL Final   • TRICYCLIC ANTIDEPRESSANTS 05/01/2025 Not Detected  300 ng/mL ng/mL Final   • Reference Lab Report 05/01/2025    Final    See Full Screen for results.   • AMPHETAMINE 05/01/2025 99235^DETECTED  250 ng/mL ng/mL Final   • PHENTERMINE 05/01/2025 Not Detected  100 ng/mL ng/mL Final   • METHAMPHETAMINE 05/01/2025 29867^DETECTED  100 ng/mL ng/mL Final   Telemedicine on 04/24/2025   Component Date Value Ref Range Status   • Amphetamine Screen, Urine 04/24/2025 Positive (A)  Negative Final   • Barbiturates Screen, Urine 04/24/2025 Negative  Negative Final   • Buprenorphine, Screen, Urine 04/24/2025 Positive (A)  Negative Final   • Benzodiazepine Screen, Urine 04/24/2025 Negative  Negative Final   • Cocaine Screen, Urine 04/24/2025 Negative  Negative Final   • MDMA (ECSTASY) 04/24/2025 Positive (A)  Negative Final   • Methamphetamine, Ur 04/24/2025 Positive (A)  Negative Final   • Methadone Screen, Urine 04/24/2025 Negative  Negative Final   • Morphine/Opiates Screen, Urine 04/24/2025 Negative  Negative Final   • Oxycodone Screen, Urine 04/24/2025 Negative  Negative Final   • Phencyclidine (PCP), Urine 04/24/2025 Negative  Negative Final   • Propoxyphene Scree, Urine 04/24/2025 Negative  Negative Final   • Tricyclic Antidepressants Screen 04/24/2025 Negative  Negative Final   • THC, Screen, Urine 04/24/2025 Positive (A)  Negative Final   • BUPRENORPHINE  04/24/2025 264^DETECTED  10 ng/mL ng/mL Final    Detected   • NORBUPRENORPHINE 04/24/2025 638^DETECTED  10 ng/mL ng/mL Final    Detected   • 6-ACETYLMORPHINE 04/24/2025 Not Detected  10 ng/mL ng/mL Final   • AMPHETAMINE 04/24/2025 Detected  500 ng/mL ng/mL Final   • Barbiturates 04/24/2025 Not Detected  200 ng/mL ng/mL Final   • Benzodiazepines 04/24/2025 Not Detected  200 ng/mL ng/mL Final   • BUPRENORPHINE 04/24/2025 Detected  5 ng/mL ng/mL Final   • Cocaine Metabolite 04/24/2025 Not Detected  150 ng/mL ng/mL Final   • EDDP 04/24/2025 Not Detected  100 ng/mL ng/mL Final   • ALCOHOL, ETHYL 04/24/2025 Not Detected  <100.0000 ng/mL Final   • FENTANYL 04/24/2025 Not Detected  1 ng/mL ng/mL Final   • METHAMPHETAMINE 04/24/2025 Detected  500 ng/mL ng/mL Final   • Opiates 04/24/2025 Not Detected  300 ng/mL ng/mL Final   • OXYCODONE 04/24/2025 Not Detected  100 ng/mL ng/mL Final   • PCP 04/24/2025 Not Detected  25 ng/mL ng/mL Final   • THC 04/24/2025 Detected  50 ng/mL ng/mL Final   • TRICYCLIC ANTIDEPRESSANTS 04/24/2025 Not Detected  300 ng/mL ng/mL Final   • Reference Lab Report 04/24/2025    Final    See Full Screen for results.   • Ethyl Glucuronide 04/24/2025 Not Detected  200 ng/mL ng/mL Final   • Ethyl Sulfate 04/24/2025 Not Detected  50 ng/mL ng/mL Final   • AMPHETAMINE 04/24/2025 23210^DETECTED  250 ng/mL ng/mL Final    Detected   • PHENTERMINE 04/24/2025 Not Detected  100 ng/mL ng/mL Final   • METHAMPHETAMINE 04/24/2025 21457^DETECTED  100 ng/mL ng/mL Final    Detected   Telemedicine on 04/17/2025   Component Date Value Ref Range Status   • Amphetamine Screen, Urine 04/17/2025 Positive (A)  Negative Final   • Barbiturates Screen, Urine 04/17/2025 Negative  Negative Final   • Buprenorphine, Screen, Urine 04/17/2025 Positive (A)  Negative Final   • Benzodiazepine Screen, Urine 04/17/2025 Negative  Negative Final   • Cocaine Screen, Urine 04/17/2025 Negative  Negative Final   • MDMA (ECSTASY) 04/17/2025 Positive  (A)  Negative Final   • Methamphetamine, Ur 04/17/2025 Positive (A)  Negative Final   • Methadone Screen, Urine 04/17/2025 Negative  Negative Final   • Morphine/Opiates Screen, Urine 04/17/2025 Negative  Negative Final   • Oxycodone Screen, Urine 04/17/2025 Negative  Negative Final   • Phencyclidine (PCP), Urine 04/17/2025 Negative  Negative Final   • Propoxyphene Scree, Urine 04/17/2025 Negative  Negative Final   • Tricyclic Antidepressants Screen 04/17/2025 Negative  Negative Final   • THC, Screen, Urine 04/17/2025 Positive (A)  Negative Final   • BUPRENORPHINE 04/17/2025 149^DETECTED  10 ng/mL ng/mL Final    Detected   • NORBUPRENORPHINE 04/17/2025 528^DETECTED  10 ng/mL ng/mL Final    Detected   • 6-ACETYLMORPHINE 04/17/2025 Not Detected  10 ng/mL ng/mL Final   • AMPHETAMINE 04/17/2025 Detected  500 ng/mL ng/mL Final   • Barbiturates 04/17/2025 Not Detected  200 ng/mL ng/mL Final   • Benzodiazepines 04/17/2025 Not Detected  200 ng/mL ng/mL Final   • BUPRENORPHINE 04/17/2025 Detected  5 ng/mL ng/mL Final   • Cocaine Metabolite 04/17/2025 Not Detected  150 ng/mL ng/mL Final   • EDDP 04/17/2025 Not Detected  100 ng/mL ng/mL Final   • ALCOHOL, ETHYL 04/17/2025 Not Detected  <100.0000 ng/mL Final   • FENTANYL 04/17/2025 Not Detected  1 ng/mL ng/mL Final   • METHAMPHETAMINE 04/17/2025 Detected  500 ng/mL ng/mL Final   • Opiates 04/17/2025 Not Detected  300 ng/mL ng/mL Final   • OXYCODONE 04/17/2025 Not Detected  100 ng/mL ng/mL Final   • PCP 04/17/2025 Not Detected  25 ng/mL ng/mL Final   • THC 04/17/2025 Detected  50 ng/mL ng/mL Final   • TRICYCLIC ANTIDEPRESSANTS 04/17/2025 Not Detected  300 ng/mL ng/mL Final   • Reference Lab Report 04/17/2025    Final    See Full Screen for results.   • Ethyl Glucuronide 04/17/2025 Not Detected  200 ng/mL ng/mL Final   • Ethyl Sulfate 04/17/2025 55.4  50 ng/mL ng/mL Final    Detected   • AMPHETAMINE 04/17/2025 38541^DETECTED  250 ng/mL ng/mL Final    Detected   • PHENTERMINE  04/17/2025 Not Detected  100 ng/mL ng/mL Final   • METHAMPHETAMINE 04/17/2025 40415^DETECTED  100 ng/mL ng/mL Final    Detected   Telemedicine on 03/27/2025   Component Date Value Ref Range Status   • Amphetamine Screen, Urine 03/27/2025 Positive (A)  Negative Final   • Barbiturates Screen, Urine 03/27/2025 Negative  Negative Final   • Buprenorphine, Screen, Urine 03/27/2025 Positive (A)  Negative Final   • Benzodiazepine Screen, Urine 03/27/2025 Negative  Negative Final   • Cocaine Screen, Urine 03/27/2025 Negative  Negative Final   • MDMA (ECSTASY) 03/27/2025 Negative  Negative Final   • Methamphetamine, Ur 03/27/2025 Positive (A)  Negative Final   • Methadone Screen, Urine 03/27/2025 Negative  Negative Final   • Morphine/Opiates Screen, Urine 03/27/2025 Negative  Negative Final   • Oxycodone Screen, Urine 03/27/2025 Negative  Negative Final   • Phencyclidine (PCP), Urine 03/27/2025 Negative  Negative Final   • Propoxyphene Scree, Urine 03/27/2025 Negative  Negative Final   • Tricyclic Antidepressants Screen 03/27/2025 Negative  Negative Final   • THC, Screen, Urine 03/27/2025 Positive (A)  Negative Final   • 6-ACETYLMORPHINE 03/27/2025 Not Detected  10 ng/mL ng/mL Final   • AMPHETAMINE 03/27/2025 Detected  500 ng/mL ng/mL Final   • Barbiturates 03/27/2025 Not Detected  200 ng/mL ng/mL Final   • Benzodiazepines 03/27/2025 Not Detected  200 ng/mL ng/mL Final   • BUPRENORPHINE 03/27/2025 Detected  5 ng/mL ng/mL Final   • Cocaine Metabolite 03/27/2025 Not Detected  150 ng/mL ng/mL Final   • EDDP 03/27/2025 Not Detected  100 ng/mL ng/mL Final   • ALCOHOL, ETHYL 03/27/2025 Not Detected  <100.0000 ng/mL Final   • FENTANYL 03/27/2025 Not Detected  1 ng/mL ng/mL Final   • METHAMPHETAMINE 03/27/2025 Detected  500 ng/mL ng/mL Final   • Opiates 03/27/2025 Not Detected  300 ng/mL ng/mL Final   • OXYCODONE 03/27/2025 Not Detected  100 ng/mL ng/mL Final   • PCP 03/27/2025 Not Detected  25 ng/mL ng/mL Final   • THC 03/27/2025  Detected  50 ng/mL ng/mL Final   • TRICYCLIC ANTIDEPRESSANTS 03/27/2025 Not Detected  300 ng/mL ng/mL Final   • Reference Lab Report 03/27/2025    Final    See Full Screen for results.   • AMPHETAMINE 03/27/2025 974^DETECTED  250 ng/mL ng/mL Final    Detected   • PHENTERMINE 03/27/2025 Not Detected  100 ng/mL ng/mL Final   • METHAMPHETAMINE 03/27/2025 3452^DETECTED  100 ng/mL ng/mL Final    Detected   Clinical Support on 03/20/2025   Component Date Value Ref Range Status   • Amphetamine Screen, Urine 03/20/2025 Negative  Negative Final   • Barbiturates Screen, Urine 03/20/2025 Negative  Negative Final   • Buprenorphine, Screen, Urine 03/20/2025 Positive (A)  Negative Final   • Benzodiazepine Screen, Urine 03/20/2025 Negative  Negative Final   • Cocaine Screen, Urine 03/20/2025 Negative  Negative Final   • MDMA (ECSTASY) 03/20/2025 Negative  Negative Final   • Methamphetamine, Ur 03/20/2025 Negative  Negative Final   • Methadone Screen, Urine 03/20/2025 Negative  Negative Final   • Morphine/Opiates Screen, Urine 03/20/2025 Negative  Negative Final   • Oxycodone Screen, Urine 03/20/2025 Negative  Negative Final   • Phencyclidine (PCP), Urine 03/20/2025 Negative  Negative Final   • Propoxyphene Scree, Urine 03/20/2025 Negative  Negative Final   • Tricyclic Antidepressants Screen 03/20/2025 Negative  Negative Final   • THC, Screen, Urine 03/20/2025 Negative  Negative Final   • AMPHETAMINE 03/20/2025 Not Detected  500 ng/mL ng/mL Final   • Barbiturates 03/20/2025 Not Detected  200 ng/mL ng/mL Final   • Benzodiazepines 03/20/2025 Not Detected  200 ng/mL ng/mL Final   • TRICYCLIC ANTIDEPRESSANTS 03/20/2025 Not Detected  300 ng/mL ng/mL Final   • 6-ACETYLMORPHINE 03/20/2025 Not Detected  10 ng/mL ng/mL Final   • BUPRENORPHINE 03/20/2025 Detected  5 ng/mL ng/mL Final   • Cocaine Metabolite 03/20/2025 Not Detected  150 ng/mL ng/mL Final   • EDDP 03/20/2025 Not Detected  100 ng/mL ng/mL Final   • ALCOHOL, ETHYL 03/20/2025 Not  Detected  100 ng/mL ng/mL Final   • FENTANYL 03/20/2025 Not Detected  1 ng/mL ng/mL Final   • METHAMPHETAMINE 03/20/2025 Not Detected  500 ng/mL ng/mL Final   • Opiates 03/20/2025 Not Detected  300 ng/mL ng/mL Final   • OXYCODONE 03/20/2025 Not Detected  100 ng/mL ng/mL Final   • PCP 03/20/2025 Not Detected  25 ng/mL ng/mL Final   • THC 03/20/2025 Detected  50 ng/mL ng/mL Final   • Reference Lab Report 03/20/2025    Final    See Full Screen for results.   • BUPRENORPHINE 03/20/2025 18^DETECTED  10 ng/mL ng/mL Final    Detected   • NORBUPRENORPHINE 03/20/2025 46^DETECTED  10 ng/mL ng/mL Final    Detected   • GABAPENTIN 03/20/2025 Not Detected  500 ng/mL ng/mL Final   • 9-DELTA-THC-COOH 03/20/2025 26^NOT DETECTED  100 ng/mL ng/mL Final    Not Detected   Telemedicine on 02/25/2025   Component Date Value Ref Range Status   • Amphetamine Screen, Urine 02/25/2025 Positive (A)  Negative Final   • Barbiturates Screen, Urine 02/25/2025 Negative  Negative Final   • Buprenorphine, Screen, Urine 02/25/2025 Positive (A)  Negative Final   • Benzodiazepine Screen, Urine 02/25/2025 Negative  Negative Final   • Cocaine Screen, Urine 02/25/2025 Negative  Negative Final   • MDMA (ECSTASY) 02/25/2025 Negative  Negative Final   • Methamphetamine, Ur 02/25/2025 Positive (A)  Negative Final   • Methadone Screen, Urine 02/25/2025 Negative  Negative Final   • Morphine/Opiates Screen, Urine 02/25/2025 Negative  Negative Final   • Oxycodone Screen, Urine 02/25/2025 Negative  Negative Final   • Phencyclidine (PCP), Urine 02/25/2025 Negative  Negative Final   • Propoxyphene Scree, Urine 02/25/2025 Negative  Negative Final   • Tricyclic Antidepressants Screen 02/25/2025 Negative  Negative Final   • THC, Screen, Urine 02/25/2025 Positive (A)  Negative Final   There may be more visits with results that are not included.         Assessment & Plan   Diagnoses and all orders for this visit:    1. Opioid type dependence, continuous (Primary)  -      buprenorphine-naloxone (SUBOXONE) 8-2 MG per SL tablet; Place 2 tablets under the tongue Daily.  Dispense: 14 tablet; Refill: 0    2. Medication management  -     POC Medline 14 Panel Urine Drug Screen  -     Behavioral Health Full Screen and Definitive Testing (MARTIN) -; Future  -     Buprenorphine/Norbuprenorphine level, QUANT (MARTIN) - Urine, Clean Catch; Future  -     Behavioral Health Full Screen and Definitive Testing (MARTIN) -  -     Buprenorphine/Norbuprenorphine level, QUANT (MARTIN) - Urine, Clean Catch    3. Methamphetamine dependence    4. Delta-9-tetrahydrocannabinol (THC) dependence        Visit Diagnoses:    ICD-10-CM ICD-9-CM   1. Opioid type dependence, continuous  F11.20 304.01   2. Medication management  Z79.899 V58.69   3. Methamphetamine dependence  F15.20 304.40   4. Delta-9-tetrahydrocannabinol (THC) dependence  F12.20 304.30       PLAN:  Safety: No acute safety concerns  Risk Assessment: Risk of self-harm acutely is low. Risk of self-harm chronically is also low, but could be further elevated in the event of treatment noncompliance and/or AODA.    TREATMENT PLAN/GOALS: Continue supportive psychotherapy efforts and medications as indicated. Treatment and medication options discussed during today's visit. Patient acknowledged and verbally consented to continue with current treatment plan and was educated on the importance of compliance with treatment and follow-up appointments.    MEDICATION ISSUES:  ROSY reviewed as expected.  Discussed medication options and treatment plan of prescribed medication as well as the risks, benefits, and side effects including potential falls, possible impaired driving and metabolic adversities among others. Patient is agreeable to call the office with any worsening of symptoms or onset of side effects. Patient is agreeable to call 911 or go to the nearest ER should he/she begin having SI/HI. No medication side effects or related complaints today.     MEDS ORDERED  DURING VISIT:  New Medications Ordered This Visit   Medications   • buprenorphine-naloxone (SUBOXONE) 8-2 MG per SL tablet     Sig: Place 2 tablets under the tongue Daily.     Dispense:  14 tablet     Refill:  0     BALA - TENISHAN:[id number]       No follow-ups on file.           This document has been electronically signed by WALKER Romero  July 3, 2025 15:27 EDT      Part of this note may be an electronic transcription/translation of spoken language to printed text using the Dragon Dictation System.

## 2025-07-05 LAB — REF LAB TEST METHOD: NORMAL

## 2025-07-06 LAB
6-ACETYLMORPHINE: NOT DETECTED NG/ML
9-DELTA-THC-COOH: 192 NG/ML
ALCOHOL, ETHYL: NOT DETECTED MG/DL
AMPHET SAL QL CFM: 788 NG/ML
AMPHETAMINE: DETECTED NG/ML
BENZODIAZ BLD QL: NOT DETECTED NG/ML
BUPRENORPHINE SAL CFM-MCNC: DETECTED NG/ML
BUPRENORPHINE: 265 NG/ML
COCAINE METABOLITE: NOT DETECTED NG/ML
CREATININE: 220.6 MG/DL
EDDP SERPL QL: NOT DETECTED NG/ML
FENTANYL-EIA: NOT DETECTED NG/ML
METHAMPHET/CREAT UR: 988 NG/ML
METHAMPHETAMINE: DETECTED NG/ML
NORBUPRENORPHINE: 1005 NG/ML
OPIATES: NOT DETECTED NG/ML
OXIDANTS: NOT DETECTED UG/ML
OXYCODONE: NOT DETECTED NG/ML
PCP: NOT DETECTED NG/ML
PH: 5.1 (ref 4.5–9)
PHENTERMINE: NOT DETECTED NG/ML
SPECIFIC GRAVITY, QUAN: 1.03 (ref 1–1.03)
THC: DETECTED NG/ML

## 2025-07-15 ENCOUNTER — TELEMEDICINE (OUTPATIENT)
Dept: PSYCHIATRY | Facility: CLINIC | Age: 55
End: 2025-07-15
Payer: MEDICAID

## 2025-07-15 VITALS
SYSTOLIC BLOOD PRESSURE: 140 MMHG | HEART RATE: 98 BPM | OXYGEN SATURATION: 97 % | DIASTOLIC BLOOD PRESSURE: 78 MMHG | HEIGHT: 62 IN | WEIGHT: 173.2 LBS | BODY MASS INDEX: 31.87 KG/M2

## 2025-07-15 DIAGNOSIS — F12.20 DELTA-9-TETRAHYDROCANNABINOL (THC) DEPENDENCE: ICD-10-CM

## 2025-07-15 DIAGNOSIS — F11.20 OPIOID TYPE DEPENDENCE, CONTINUOUS: Primary | ICD-10-CM

## 2025-07-15 DIAGNOSIS — Z79.899 MEDICATION MANAGEMENT: ICD-10-CM

## 2025-07-15 DIAGNOSIS — F15.10 METHAMPHETAMINE ABUSE: ICD-10-CM

## 2025-07-15 DIAGNOSIS — F15.20 METHAMPHETAMINE DEPENDENCE: ICD-10-CM

## 2025-07-15 RX ORDER — BUPROPION HYDROCHLORIDE 150 MG/1
150 TABLET ORAL 2 TIMES DAILY
Qty: 60 TABLET | Refills: 0 | Status: SHIPPED | OUTPATIENT
Start: 2025-07-15

## 2025-07-15 RX ORDER — BUPRENORPHINE HYDROCHLORIDE AND NALOXONE HYDROCHLORIDE DIHYDRATE 8; 2 MG/1; MG/1
2 TABLET SUBLINGUAL DAILY
Qty: 14 TABLET | Refills: 0 | Status: SHIPPED | OUTPATIENT
Start: 2025-07-15

## 2025-07-15 NOTE — PROGRESS NOTES
This provider is located at [facility]. The Patient is seen remotely located at the [facility] ([facility]) using Video. Patient is being seen via telehealth and confirm that they are in a secure environment for this session. The patient's condition being diagnosed/treated is appropriate for telemedicine. Provider identified as Williams Brody as well as credentials APRN MSN FNP-C SRINIVASAN-YOUNG.   The client/patient gave consent to be seen remotely, and when consent is given they understand that the consent allows for patient identifiable information to be sent to a third party as needed.   They may refuse to be seen remotely at any time. The electronic data is encrypted and password protected, and the patient has been advised of the potential risks to privacy not withstanding such measures.    Chief Complaint/History of Present Illness: Follow Up buprenorphine/naloxone Medicated Assisted Treatment for Opiate Use Disorder     Patient/Client Concerns/Updates: Continue Wellbutrin for stimulant cravings   Patient reports methamphetamine use quantity overall unchanged reporting continued use 2 days a week to coincide with her paydays; discussed formulating small manageable goals for patient to work on weekly that supports reductions methamphetamine use    -Patient reports no significant or concerning life events or changes since last evaluation; reports no acute life stressors  -Reports continued therapeutic benefit and satisfaction with current care plan; reports no concerning side effects with current medication prescribed  -Mood is stable; patient denies acute anxious episodes, exacerbations thereof or concerning panic attacks  -Reports no acute depressive episodes or concerns, no suicidal or homicidal thoughts  -Reports no perceptual disturbances or otherwise symptoms of psychosis  -Patient reports no concerns with sleep quality or disturbance thereof    Triggers  (Persons/Places/Things/Events/Thought/Emotions): Paydays when monetary funds are available to acquire methamphetamine    Cravings/Substance Use: Cravings and continued use of methamphetamine    Relapse Prevention: Counseling and continue weekly evaluations for support accountability and patient safety    Urine Drug Screen (today's visit)/Presumptive Point of Care discussed: Positive buprenorphine, positive amphetamine, methamphetamine and thc     UDS Confirmation (Most recent/Resulted): Positive buprenorphine/nor-buprenorphine, no concerns for urine tampering otherwise positive thc and methamphetamine     Most recent pertinent laboratory studies reviewed:  4/11/23-hepatic function within normal limits, Cr WNL, HIV negative, hepatitis C not detected         ROSY (PDMP) Reviewed for Current/Active Medications: buprenorphine/naloxone as reviewed today     Past Surgical History:  No past surgical history on file.    Problem List:  There is no problem list on file for this patient.      Allergy:   No Known Allergies     Current Medications:   Current Outpatient Medications   Medication Sig Dispense Refill   • buprenorphine-naloxone (SUBOXONE) 8-2 MG per SL tablet Place 2 tablets under the tongue Daily. 14 tablet 0   • buPROPion XL (WELLBUTRIN XL) 150 MG 24 hr tablet Take 1 tablet by mouth 2 (Two) Times a Day. BID dosing 60 tablet 0   • famotidine (PEPCID) 20 MG tablet Take 1 tablet by mouth 2 (Two) Times a Day. 60 tablet 0   • GNP PAIN RELIEF EX-STRENGTH 500 MG tablet TAKE ONE TABLET BY MOUTH EVERY 6 HOURS AS NEEDED FOR 30 DAYS **DO NOT EXCEED 6 TABLETS IN 24 HOURS**     • hydrOXYzine pamoate (Vistaril) 25 MG capsule Take 1 capsule by mouth 4 (Four) Times a Day As Needed for Anxiety. 120 capsule 0   • ondansetron ODT (ZOFRAN-ODT) 8 MG disintegrating tablet Place 1 tablet on the tongue Every 8 (Eight) Hours As Needed for Nausea or Vomiting. 45 tablet 0   • naloxone (NARCAN) 4 MG/0.1ML nasal spray 1 spray into the  nostril(s) as directed by provider As Needed (opiate over sedation). 1 spray in 1 nostril every 2 to 3 minutes, call 911 (Patient not taking: Reported on 7/15/2025) 2 each 2     No current facility-administered medications for this visit.       Past Medical History:  Past Medical History:   Diagnosis Date   • History of hepatitis B    • Pancreatitis          Social History     Socioeconomic History   • Marital status: Single   Tobacco Use   • Smoking status: Every Day     Current packs/day: 1.00     Average packs/day: 1 pack/day for 15.0 years (15.0 ttl pk-yrs)     Types: Cigarettes   • Smokeless tobacco: Never   Vaping Use   • Vaping status: Never Used   Substance and Sexual Activity   • Alcohol use: Not Currently   • Drug use: Yes     Types: Marijuana, Hydrocodone, Oxycodone, Methamphetamines   • Sexual activity: Defer         Family History   Problem Relation Age of Onset   • No Known Problems Mother    • No Known Problems Father    • No Known Problems Sister    • No Known Problems Brother    • No Known Problems Maternal Grandmother    • No Known Problems Paternal Grandmother    • No Known Problems Maternal Aunt    • No Known Problems Paternal Aunt    • No Known Problems Maternal Grandfather    • No Known Problems Paternal Grandfather    • No Known Problems Maternal Uncle    • No Known Problems Paternal Uncle    • No Known Problems Cousin    • No Known Problems Other    • ADD / ADHD Neg Hx    • Alcohol abuse Neg Hx    • Anxiety disorder Neg Hx    • Bipolar disorder Neg Hx    • Dementia Neg Hx    • Depression Neg Hx    • Drug abuse Neg Hx    • OCD Neg Hx    • Paranoid behavior Neg Hx    • Schizophrenia Neg Hx    • Seizures Neg Hx    • Self-Injurious Behavior  Neg Hx    • Suicide Attempts Neg Hx    • Breast cancer Neg Hx          Mental Status Exam:   Hygiene:   good  Cooperation:  Cooperative  Eye Contact:  Good  Psychomotor Behavior:  Appropriate  Affect:  Appropriate  Mood: normal  Speech:  Normal  Thought  "Process:  Goal directed  Thought Content:  Normal  Suicidal:  None  Homicidal:  None  Hallucinations:  None  Delusion:  None  Memory:  Intact  Orientation:  Grossly intact  Reliability:  fair  Insight:  Fair  Judgement:  Fair  Impulse Control:  Fair         Review of Systems:  Review of Systems   Constitutional:  Negative for activity change, chills, diaphoresis and fatigue.   Respiratory:  Negative for apnea, cough and shortness of breath.    Cardiovascular:  Negative for chest pain, palpitations and leg swelling.   Gastrointestinal:  Negative for abdominal pain, constipation, diarrhea, nausea and vomiting.   Genitourinary:  Negative for difficulty urinating.   Musculoskeletal:  Negative for arthralgias.   Skin:  Negative for rash.   Neurological:  Negative for dizziness, weakness and headaches.   Psychiatric/Behavioral:  Negative for agitation, self-injury, sleep disturbance and suicidal ideas. The patient is nervous/anxious.        Physical Exam:  Physical Exam  Vitals reviewed.   Constitutional:       General: She is not in acute distress.     Appearance: Normal appearance. She is not ill-appearing or toxic-appearing.   Pulmonary:      Effort: Pulmonary effort is normal.   Musculoskeletal:         General: Normal range of motion.   Neurological:      General: No focal deficit present.      Mental Status: She is alert and oriented to person, place, and time.   Psychiatric:         Attention and Perception: Attention and perception normal.         Mood and Affect: Mood normal. Mood is not anxious or depressed.         Speech: Speech normal.         Behavior: Behavior normal. Behavior is cooperative.         Thought Content: Thought content normal.         Cognition and Memory: Cognition and memory normal.         Judgment: Judgment normal.     Vital Signs:   /78   Pulse 98   Ht 156.2 cm (61.5\")   Wt 78.6 kg (173 lb 3.2 oz)   SpO2 97%   BMI 32.20 kg/m²      Lab Results:   Telemedicine on 07/15/2025 "   Component Date Value Ref Range Status   • Amphetamine Screen, Urine 07/15/2025 Positive (A)  Negative Final    Refer to confirmation by [facility] in scanned documents   • Barbiturates Screen, Urine 07/15/2025 Negative  Negative Final   • Buprenorphine, Screen, Urine 07/15/2025 Positive (A)  Negative Final   • Benzodiazepine Screen, Urine 07/15/2025 Negative  Negative Final   • Cocaine Screen, Urine 07/15/2025 Negative  Negative Final   • MDMA (ECSTASY) 07/15/2025 Positive (A)  Negative Final   • Methamphetamine, Ur 07/15/2025 Positive (A)  Negative Final   • Methadone Screen, Urine 07/15/2025 Negative  Negative Final   • Morphine/Opiates Screen, Urine 07/15/2025 Negative  Negative Final   • Oxycodone Screen, Urine 07/15/2025 Negative  Negative Final   • Phencyclidine (PCP), Urine 07/15/2025 Negative  Negative Final   • Propoxyphene Scree, Urine 07/15/2025 Negative  Negative Final   • Tricyclic Antidepressants Screen 07/15/2025 Negative  Negative Final   • THC, Screen, Urine 07/15/2025 Positive (A)  Negative Final   Telemedicine on 07/03/2025   Component Date Value Ref Range Status   • Amphetamine Screen, Urine 07/03/2025 Negative  Negative Final    Refer to confirmation by [facility] in scanned documents   • Barbiturates Screen, Urine 07/03/2025 Negative  Negative Final   • Buprenorphine, Screen, Urine 07/03/2025 Positive (A)  Negative Final   • Benzodiazepine Screen, Urine 07/03/2025 Negative  Negative Final   • Cocaine Screen, Urine 07/03/2025 Negative  Negative Final   • MDMA (ECSTASY) 07/03/2025 Negative  Negative Final   • Methamphetamine, Ur 07/03/2025 Negative  Negative Final   • Methadone Screen, Urine 07/03/2025 Negative  Negative Final   • Morphine/Opiates Screen, Urine 07/03/2025 Negative  Negative Final   • Oxycodone Screen, Urine 07/03/2025 Negative  Negative Final   • Phencyclidine (PCP), Urine 07/03/2025 Negative  Negative Final   • Propoxyphene Scree, Urine 07/03/2025 Negative  Negative Final   •  Tricyclic Antidepressants Screen 07/03/2025 Negative  Negative Final   • THC, Screen, Urine 07/03/2025 Positive (A)  Negative Final   • BUPRENORPHINE 07/03/2025 265  2.5 ng/mL ng/mL Final   • NORBUPRENORPHINE 07/03/2025 1005  10 ng/mL ng/mL Final   • AMPHETAMINE 07/03/2025 Detected  500 ng/mL ng/mL Final   • METHAMPHETAMINE 07/03/2025 Detected  500 ng/mL ng/mL Final   • FENTANYL 07/03/2025 Not Detected  1 ng/mL ng/mL Final   • Barbiturates 07/03/2025 Not Detected  200 ng/mL ng/mL Final   • Benzodiazepines 07/03/2025 Not Detected  200 ng/mL ng/mL Final   • BUPRENORPHINE 07/03/2025 Detected  5.0 ng/mL ng/mL Final   • ALCOHOL, ETHYL 07/03/2025 Not Detected  10 mg/dL mg/dL Final   • EDDP 07/03/2025 Not Detected  100 ng/mL ng/mL Final   • Opiates 07/03/2025 Not Detected  300 ng/mL ng/mL Final   • 6-ACETYLMORPHINE 07/03/2025 Not Detected  10 ng/mL ng/mL Final   • OXYCODONE 07/03/2025 Not Detected  100 ng/mL ng/mL Final   • PCP 07/03/2025 Not Detected  25 ng/mL ng/mL Final   • THC 07/03/2025 Detected  50 ng/mL ng/mL Final   • Cocaine Metabolite 07/03/2025 Not Detected  150 ng/mL ng/mL Final   • CREATININE 07/03/2025 220.6  >20.0 mg/dL mg/dL Final   • PH 07/03/2025 5.1  4.5 - 9.0 Final   • OXIDANTS 07/03/2025 Not Detected  0-200 ug/mL ug/mL Final   • Specific Gravity, Germain 07/03/2025 1.026  1.003 - 1.035 Final   • Reference Lab Report 07/03/2025    Final    See Full Screen for results.   • AMPHETAMINE 07/03/2025 788  100 ng/mL ng/mL Final   • PHENTERMINE 07/03/2025 Not Detected  100 ng/mL ng/mL Final   • METHAMPHETAMINE 07/03/2025 988  100 ng/mL ng/mL Final   • 9-DELTA-THC-COOH 07/03/2025 192  25 ng/mL ng/mL Final   Clinical Support on 06/12/2025   Component Date Value Ref Range Status   • Amphetamine Screen, Urine 06/12/2025 Positive (A)  Negative Final    Refer to confirmation by [facility] in scanned documents   • Barbiturates Screen, Urine 06/12/2025 Negative  Negative Final   • Buprenorphine, Screen, Urine 06/12/2025  Positive (A)  Negative Final   • Benzodiazepine Screen, Urine 06/12/2025 Negative  Negative Final   • Cocaine Screen, Urine 06/12/2025 Negative  Negative Final   • MDMA (ECSTASY) 06/12/2025 Negative  Negative Final   • Methamphetamine, Ur 06/12/2025 Positive (A)  Negative Final   • Methadone Screen, Urine 06/12/2025 Negative  Negative Final   • Morphine/Opiates Screen, Urine 06/12/2025 Negative  Negative Final   • Oxycodone Screen, Urine 06/12/2025 Negative  Negative Final   • Phencyclidine (PCP), Urine 06/12/2025 Negative  Negative Final   • Propoxyphene Scree, Urine 06/12/2025 Negative  Negative Final   • Tricyclic Antidepressants Screen 06/12/2025 Negative  Negative Final   • THC, Screen, Urine 06/12/2025 Positive (A)  Negative Final   • BUPRENORPHINE 06/12/2025 193  2.5 ng/mL ng/mL Final   • NORBUPRENORPHINE 06/12/2025 715  10 ng/mL ng/mL Final   • AMPHETAMINE 06/12/2025 Detected  500 ng/mL ng/mL Final   • METHAMPHETAMINE 06/12/2025 Detected  500 ng/mL ng/mL Final   • FENTANYL 06/12/2025 Not Detected  1 ng/mL ng/mL Final   • Barbiturates 06/12/2025 Not Detected  200 ng/mL ng/mL Final   • Benzodiazepines 06/12/2025 Not Detected  200 ng/mL ng/mL Final   • BUPRENORPHINE 06/12/2025 Detected  5.0 ng/mL ng/mL Final   • ALCOHOL, ETHYL 06/12/2025 Not Detected  10 mg/dL mg/dL Final   • EDDP 06/12/2025 Not Detected  100 ng/mL ng/mL Final   • Opiates 06/12/2025 Not Detected  300 ng/mL ng/mL Final   • 6-ACETYLMORPHINE 06/12/2025 Not Detected  10 ng/mL ng/mL Final   • OXYCODONE 06/12/2025 Not Detected  100 ng/mL ng/mL Final   • PCP 06/12/2025 Not Detected  25 ng/mL ng/mL Final   • THC 06/12/2025 Detected  50 ng/mL ng/mL Final   • Cocaine Metabolite 06/12/2025 Not Detected  150 ng/mL ng/mL Final   • CREATININE 06/12/2025 176.3  >20.0 mg/dL mg/dL Final   • PH 06/12/2025 6.3  4.5 - 9.0 Final   • OXIDANTS 06/12/2025 Not Detected  0-200 ug/mL ug/mL Final   • Specific Gravity, Germain 06/12/2025 1.017  1.003 - 1.035 Final   •  Reference Lab Report 06/12/2025    Final    See Full Screen for results.   • AMPHETAMINE 06/12/2025 >2500  100 ng/mL ng/mL Final   • PHENTERMINE 06/12/2025 Not Detected  100 ng/mL ng/mL Final   • METHAMPHETAMINE 06/12/2025 >2500  100 ng/mL ng/mL Final   • 9-DELTA-THC-COOH 06/12/2025 183  25 ng/mL ng/mL Final   Telemedicine on 05/28/2025   Component Date Value Ref Range Status   • Amphetamine Screen, Urine 05/28/2025 Positive (A)  Negative Final   • Barbiturates Screen, Urine 05/28/2025 Negative  Negative Final   • Buprenorphine, Screen, Urine 05/28/2025 Positive (A)  Negative Final   • Benzodiazepine Screen, Urine 05/28/2025 Negative  Negative Final   • Cocaine Screen, Urine 05/28/2025 Negative  Negative Final   • MDMA (ECSTASY) 05/28/2025 Positive (A)  Negative Final   • Methamphetamine, Ur 05/28/2025 Positive (A)  Negative Final   • Methadone Screen, Urine 05/28/2025 Negative  Negative Final   • Morphine/Opiates Screen, Urine 05/28/2025 Negative  Negative Final   • Oxycodone Screen, Urine 05/28/2025 Negative  Negative Final   • Phencyclidine (PCP), Urine 05/28/2025 Negative  Negative Final   • Propoxyphene Scree, Urine 05/28/2025 Negative  Negative Final   • Tricyclic Antidepressants Screen 05/28/2025 Negative  Negative Final   • THC, Screen, Urine 05/28/2025 Positive (A)  Negative Final   • BUPRENORPHINE 05/28/2025 319  2.5 ng/mL ng/mL Final   • NORBUPRENORPHINE 05/28/2025 1267  10 ng/mL ng/mL Final   • AMPHETAMINE 05/28/2025 Detected  500 ng/mL ng/mL Final   • METHAMPHETAMINE 05/28/2025 Detected  500 ng/mL ng/mL Final   • FENTANYL 05/28/2025 Not Detected  1 ng/mL ng/mL Final   • Barbiturates 05/28/2025 Not Detected  200 ng/mL ng/mL Final   • Benzodiazepines 05/28/2025 Not Detected  200 ng/mL ng/mL Final   • BUPRENORPHINE 05/28/2025 Detected  5.0 ng/mL ng/mL Final   • ALCOHOL, ETHYL 05/28/2025 Not Detected  10 mg/dL mg/dL Final   • EDDP 05/28/2025 Not Detected  100 ng/mL ng/mL Final   • Opiates 05/28/2025 Not  Detected  300 ng/mL ng/mL Final   • 6-ACETYLMORPHINE 05/28/2025 Not Detected  10 ng/mL ng/mL Final   • OXYCODONE 05/28/2025 Not Detected  100 ng/mL ng/mL Final   • PCP 05/28/2025 Not Detected  25 ng/mL ng/mL Final   • THC 05/28/2025 Detected  50 ng/mL ng/mL Final   • Cocaine Metabolite 05/28/2025 Not Detected  150 ng/mL ng/mL Final   • CREATININE 05/28/2025 198.4  >20.0 mg/dL mg/dL Final   • PH 05/28/2025 4.9  4.5 - 9.0 Final   • OXIDANTS 05/28/2025 Not Detected  0-200 ug/mL ug/mL Final   • Specific Gravity, Germain 05/28/2025 1.024  1.003 - 1.035 Final   • Reference Lab Report 05/28/2025    Final    See Full Screen for results.   • AMPHETAMINE 05/28/2025 >2500  100 ng/mL ng/mL Final   • PHENTERMINE 05/28/2025 Not Detected  100 ng/mL ng/mL Final   • METHAMPHETAMINE 05/28/2025 >2500  100 ng/mL ng/mL Final   • 9-DELTA-THC-COOH 05/28/2025 380  25 ng/mL ng/mL Final   Telemedicine on 05/20/2025   Component Date Value Ref Range Status   • Amphetamine Screen, Urine 05/20/2025 Positive (A)  Negative Final   • Barbiturates Screen, Urine 05/20/2025 Negative  Negative Final   • Buprenorphine, Screen, Urine 05/20/2025 Positive (A)  Negative Final   • Benzodiazepine Screen, Urine 05/20/2025 Negative  Negative Final   • Cocaine Screen, Urine 05/20/2025 Negative  Negative Final   • MDMA (ECSTASY) 05/20/2025 Negative  Negative Final   • Methamphetamine, Ur 05/20/2025 Positive (A)  Negative Final   • Methadone Screen, Urine 05/20/2025 Negative  Negative Final   • Morphine/Opiates Screen, Urine 05/20/2025 Negative  Negative Final   • Oxycodone Screen, Urine 05/20/2025 Negative  Negative Final   • Phencyclidine (PCP), Urine 05/20/2025 Negative  Negative Final   • Propoxyphene Scree, Urine 05/20/2025 Negative  Negative Final   • Tricyclic Antidepressants Screen 05/20/2025 Negative  Negative Final   • THC, Screen, Urine 05/20/2025 Positive (A)  Negative Final   • BUPRENORPHINE 05/20/2025 27  2.5 ng/mL ng/mL Final   • NORBUPRENORPHINE  05/20/2025 169  10 ng/mL ng/mL Final   • AMPHETAMINE 05/20/2025 Detected  500 ng/mL ng/mL Final   • METHAMPHETAMINE 05/20/2025 Detected  500 ng/mL ng/mL Final   • FENTANYL 05/20/2025 Not Detected  1 ng/mL ng/mL Final   • Barbiturates 05/20/2025 Not Detected  200 ng/mL ng/mL Final   • Benzodiazepines 05/20/2025 Not Detected  200 ng/mL ng/mL Final   • BUPRENORPHINE 05/20/2025 Detected  5.0 ng/mL ng/mL Final   • ALCOHOL, ETHYL 05/20/2025 Not Detected  10 mg/dL mg/dL Final   • EDDP 05/20/2025 Not Detected  100 ng/mL ng/mL Final   • Opiates 05/20/2025 Not Detected  300 ng/mL ng/mL Final   • 6-ACETYLMORPHINE 05/20/2025 Not Detected  10 ng/mL ng/mL Final   • OXYCODONE 05/20/2025 Not Detected  100 ng/mL ng/mL Final   • PCP 05/20/2025 Not Detected  25 ng/mL ng/mL Final   • THC 05/20/2025 Detected  50 ng/mL ng/mL Final   • Cocaine Metabolite 05/20/2025 Not Detected  150 ng/mL ng/mL Final   • CREATININE 05/20/2025 36.2  >20.0 mg/dL mg/dL Final   • PH 05/20/2025 5.2  4.5 - 9.0 Final   • OXIDANTS 05/20/2025 Not Detected  0-200 ug/mL ug/mL Final   • Specific Gravity, Germain 05/20/2025 1.005  1.003 - 1.035 Final   • Reference Lab Report 05/20/2025    Final    See Full Screen for results.   • AMPHETAMINE 05/20/2025 1277  100 ng/mL ng/mL Final   • PHENTERMINE 05/20/2025 Not Detected  100 ng/mL ng/mL Final   • METHAMPHETAMINE 05/20/2025 >2500  100 ng/mL ng/mL Final   • 9-DELTA-THC-COOH 05/20/2025 104  25 ng/mL ng/mL Final   Telemedicine on 05/01/2025   Component Date Value Ref Range Status   • Amphetamine Screen, Urine 05/01/2025 Positive (A)  Negative Final   • Barbiturates Screen, Urine 05/01/2025 Negative  Negative Final   • Buprenorphine, Screen, Urine 05/01/2025 Positive (A)  Negative Final   • Benzodiazepine Screen, Urine 05/01/2025 Negative  Negative Final   • Cocaine Screen, Urine 05/01/2025 Negative  Negative Final   • MDMA (ECSTASY) 05/01/2025 Positive (A)  Negative Final   • Methamphetamine, Ur 05/01/2025 Positive (A)   Negative Final   • Methadone Screen, Urine 05/01/2025 Negative  Negative Final   • Morphine/Opiates Screen, Urine 05/01/2025 Negative  Negative Final   • Oxycodone Screen, Urine 05/01/2025 Negative  Negative Final   • Phencyclidine (PCP), Urine 05/01/2025 Negative  Negative Final   • Propoxyphene Scree, Urine 05/01/2025 Negative  Negative Final   • Tricyclic Antidepressants Screen 05/01/2025 Negative  Negative Final   • THC, Screen, Urine 05/01/2025 Positive (A)  Negative Final   • BUPRENORPHINE 05/01/2025 329^DETECTED  10 ng/mL ng/mL Final   • NORBUPRENORPHINE 05/01/2025 782^DETECTED  10 ng/mL ng/mL Final   • 6-ACETYLMORPHINE 05/01/2025 Not Detected  10 ng/mL ng/mL Final   • AMPHETAMINE 05/01/2025 Detected  500 ng/mL ng/mL Final   • Barbiturates 05/01/2025 Not Detected  200 ng/mL ng/mL Final   • Benzodiazepines 05/01/2025 Not Detected  200 ng/mL ng/mL Final   • BUPRENORPHINE 05/01/2025 Detected  5 ng/mL ng/mL Final   • Cocaine Metabolite 05/01/2025 Not Detected  150 ng/mL ng/mL Final   • EDDP 05/01/2025 Not Detected  100 ng/mL ng/mL Final   • ALCOHOL, ETHYL 05/01/2025 Not Detected  <100.0000 ng/mL Final   • FENTANYL 05/01/2025 Not Detected  1 ng/mL ng/mL Final   • METHAMPHETAMINE 05/01/2025 Detected  500 ng/mL ng/mL Final   • Opiates 05/01/2025 Not Detected  300 ng/mL ng/mL Final   • OXYCODONE 05/01/2025 Not Detected  100 ng/mL ng/mL Final   • PCP 05/01/2025 Not Detected  25 ng/mL ng/mL Final   • THC 05/01/2025 Detected  50 ng/mL ng/mL Final   • TRICYCLIC ANTIDEPRESSANTS 05/01/2025 Not Detected  300 ng/mL ng/mL Final   • Reference Lab Report 05/01/2025    Final    See Full Screen for results.   • AMPHETAMINE 05/01/2025 20006^DETECTED  250 ng/mL ng/mL Final   • PHENTERMINE 05/01/2025 Not Detected  100 ng/mL ng/mL Final   • METHAMPHETAMINE 05/01/2025 48654^DETECTED  100 ng/mL ng/mL Final   Telemedicine on 04/24/2025   Component Date Value Ref Range Status   • Amphetamine Screen, Urine 04/24/2025 Positive (A)   Negative Final   • Barbiturates Screen, Urine 04/24/2025 Negative  Negative Final   • Buprenorphine, Screen, Urine 04/24/2025 Positive (A)  Negative Final   • Benzodiazepine Screen, Urine 04/24/2025 Negative  Negative Final   • Cocaine Screen, Urine 04/24/2025 Negative  Negative Final   • MDMA (ECSTASY) 04/24/2025 Positive (A)  Negative Final   • Methamphetamine, Ur 04/24/2025 Positive (A)  Negative Final   • Methadone Screen, Urine 04/24/2025 Negative  Negative Final   • Morphine/Opiates Screen, Urine 04/24/2025 Negative  Negative Final   • Oxycodone Screen, Urine 04/24/2025 Negative  Negative Final   • Phencyclidine (PCP), Urine 04/24/2025 Negative  Negative Final   • Propoxyphene Scree, Urine 04/24/2025 Negative  Negative Final   • Tricyclic Antidepressants Screen 04/24/2025 Negative  Negative Final   • THC, Screen, Urine 04/24/2025 Positive (A)  Negative Final   • BUPRENORPHINE 04/24/2025 264^DETECTED  10 ng/mL ng/mL Final    Detected   • NORBUPRENORPHINE 04/24/2025 638^DETECTED  10 ng/mL ng/mL Final    Detected   • 6-ACETYLMORPHINE 04/24/2025 Not Detected  10 ng/mL ng/mL Final   • AMPHETAMINE 04/24/2025 Detected  500 ng/mL ng/mL Final   • Barbiturates 04/24/2025 Not Detected  200 ng/mL ng/mL Final   • Benzodiazepines 04/24/2025 Not Detected  200 ng/mL ng/mL Final   • BUPRENORPHINE 04/24/2025 Detected  5 ng/mL ng/mL Final   • Cocaine Metabolite 04/24/2025 Not Detected  150 ng/mL ng/mL Final   • EDDP 04/24/2025 Not Detected  100 ng/mL ng/mL Final   • ALCOHOL, ETHYL 04/24/2025 Not Detected  <100.0000 ng/mL Final   • FENTANYL 04/24/2025 Not Detected  1 ng/mL ng/mL Final   • METHAMPHETAMINE 04/24/2025 Detected  500 ng/mL ng/mL Final   • Opiates 04/24/2025 Not Detected  300 ng/mL ng/mL Final   • OXYCODONE 04/24/2025 Not Detected  100 ng/mL ng/mL Final   • PCP 04/24/2025 Not Detected  25 ng/mL ng/mL Final   • THC 04/24/2025 Detected  50 ng/mL ng/mL Final   • TRICYCLIC ANTIDEPRESSANTS 04/24/2025 Not Detected  300 ng/mL  ng/mL Final   • Reference Lab Report 04/24/2025    Final    See Full Screen for results.   • Ethyl Glucuronide 04/24/2025 Not Detected  200 ng/mL ng/mL Final   • Ethyl Sulfate 04/24/2025 Not Detected  50 ng/mL ng/mL Final   • AMPHETAMINE 04/24/2025 20074^DETECTED  250 ng/mL ng/mL Final    Detected   • PHENTERMINE 04/24/2025 Not Detected  100 ng/mL ng/mL Final   • METHAMPHETAMINE 04/24/2025 47499^DETECTED  100 ng/mL ng/mL Final    Detected   Telemedicine on 04/17/2025   Component Date Value Ref Range Status   • Amphetamine Screen, Urine 04/17/2025 Positive (A)  Negative Final   • Barbiturates Screen, Urine 04/17/2025 Negative  Negative Final   • Buprenorphine, Screen, Urine 04/17/2025 Positive (A)  Negative Final   • Benzodiazepine Screen, Urine 04/17/2025 Negative  Negative Final   • Cocaine Screen, Urine 04/17/2025 Negative  Negative Final   • MDMA (ECSTASY) 04/17/2025 Positive (A)  Negative Final   • Methamphetamine, Ur 04/17/2025 Positive (A)  Negative Final   • Methadone Screen, Urine 04/17/2025 Negative  Negative Final   • Morphine/Opiates Screen, Urine 04/17/2025 Negative  Negative Final   • Oxycodone Screen, Urine 04/17/2025 Negative  Negative Final   • Phencyclidine (PCP), Urine 04/17/2025 Negative  Negative Final   • Propoxyphene Scree, Urine 04/17/2025 Negative  Negative Final   • Tricyclic Antidepressants Screen 04/17/2025 Negative  Negative Final   • THC, Screen, Urine 04/17/2025 Positive (A)  Negative Final   • BUPRENORPHINE 04/17/2025 149^DETECTED  10 ng/mL ng/mL Final    Detected   • NORBUPRENORPHINE 04/17/2025 528^DETECTED  10 ng/mL ng/mL Final    Detected   • 6-ACETYLMORPHINE 04/17/2025 Not Detected  10 ng/mL ng/mL Final   • AMPHETAMINE 04/17/2025 Detected  500 ng/mL ng/mL Final   • Barbiturates 04/17/2025 Not Detected  200 ng/mL ng/mL Final   • Benzodiazepines 04/17/2025 Not Detected  200 ng/mL ng/mL Final   • BUPRENORPHINE 04/17/2025 Detected  5 ng/mL ng/mL Final   • Cocaine Metabolite 04/17/2025  Not Detected  150 ng/mL ng/mL Final   • EDDP 04/17/2025 Not Detected  100 ng/mL ng/mL Final   • ALCOHOL, ETHYL 04/17/2025 Not Detected  <100.0000 ng/mL Final   • FENTANYL 04/17/2025 Not Detected  1 ng/mL ng/mL Final   • METHAMPHETAMINE 04/17/2025 Detected  500 ng/mL ng/mL Final   • Opiates 04/17/2025 Not Detected  300 ng/mL ng/mL Final   • OXYCODONE 04/17/2025 Not Detected  100 ng/mL ng/mL Final   • PCP 04/17/2025 Not Detected  25 ng/mL ng/mL Final   • THC 04/17/2025 Detected  50 ng/mL ng/mL Final   • TRICYCLIC ANTIDEPRESSANTS 04/17/2025 Not Detected  300 ng/mL ng/mL Final   • Reference Lab Report 04/17/2025    Final    See Full Screen for results.   • Ethyl Glucuronide 04/17/2025 Not Detected  200 ng/mL ng/mL Final   • Ethyl Sulfate 04/17/2025 55.4  50 ng/mL ng/mL Final    Detected   • AMPHETAMINE 04/17/2025 20119^DETECTED  250 ng/mL ng/mL Final    Detected   • PHENTERMINE 04/17/2025 Not Detected  100 ng/mL ng/mL Final   • METHAMPHETAMINE 04/17/2025 56163^DETECTED  100 ng/mL ng/mL Final    Detected   Telemedicine on 03/27/2025   Component Date Value Ref Range Status   • Amphetamine Screen, Urine 03/27/2025 Positive (A)  Negative Final   • Barbiturates Screen, Urine 03/27/2025 Negative  Negative Final   • Buprenorphine, Screen, Urine 03/27/2025 Positive (A)  Negative Final   • Benzodiazepine Screen, Urine 03/27/2025 Negative  Negative Final   • Cocaine Screen, Urine 03/27/2025 Negative  Negative Final   • MDMA (ECSTASY) 03/27/2025 Negative  Negative Final   • Methamphetamine, Ur 03/27/2025 Positive (A)  Negative Final   • Methadone Screen, Urine 03/27/2025 Negative  Negative Final   • Morphine/Opiates Screen, Urine 03/27/2025 Negative  Negative Final   • Oxycodone Screen, Urine 03/27/2025 Negative  Negative Final   • Phencyclidine (PCP), Urine 03/27/2025 Negative  Negative Final   • Propoxyphene Scree, Urine 03/27/2025 Negative  Negative Final   • Tricyclic Antidepressants Screen 03/27/2025 Negative  Negative Final    • THC, Screen, Urine 03/27/2025 Positive (A)  Negative Final   • 6-ACETYLMORPHINE 03/27/2025 Not Detected  10 ng/mL ng/mL Final   • AMPHETAMINE 03/27/2025 Detected  500 ng/mL ng/mL Final   • Barbiturates 03/27/2025 Not Detected  200 ng/mL ng/mL Final   • Benzodiazepines 03/27/2025 Not Detected  200 ng/mL ng/mL Final   • BUPRENORPHINE 03/27/2025 Detected  5 ng/mL ng/mL Final   • Cocaine Metabolite 03/27/2025 Not Detected  150 ng/mL ng/mL Final   • EDDP 03/27/2025 Not Detected  100 ng/mL ng/mL Final   • ALCOHOL, ETHYL 03/27/2025 Not Detected  <100.0000 ng/mL Final   • FENTANYL 03/27/2025 Not Detected  1 ng/mL ng/mL Final   • METHAMPHETAMINE 03/27/2025 Detected  500 ng/mL ng/mL Final   • Opiates 03/27/2025 Not Detected  300 ng/mL ng/mL Final   • OXYCODONE 03/27/2025 Not Detected  100 ng/mL ng/mL Final   • PCP 03/27/2025 Not Detected  25 ng/mL ng/mL Final   • THC 03/27/2025 Detected  50 ng/mL ng/mL Final   • TRICYCLIC ANTIDEPRESSANTS 03/27/2025 Not Detected  300 ng/mL ng/mL Final   • Reference Lab Report 03/27/2025    Final    See Full Screen for results.   • AMPHETAMINE 03/27/2025 974^DETECTED  250 ng/mL ng/mL Final    Detected   • PHENTERMINE 03/27/2025 Not Detected  100 ng/mL ng/mL Final   • METHAMPHETAMINE 03/27/2025 3452^DETECTED  100 ng/mL ng/mL Final    Detected   Clinical Support on 03/20/2025   Component Date Value Ref Range Status   • Amphetamine Screen, Urine 03/20/2025 Negative  Negative Final   • Barbiturates Screen, Urine 03/20/2025 Negative  Negative Final   • Buprenorphine, Screen, Urine 03/20/2025 Positive (A)  Negative Final   • Benzodiazepine Screen, Urine 03/20/2025 Negative  Negative Final   • Cocaine Screen, Urine 03/20/2025 Negative  Negative Final   • MDMA (ECSTASY) 03/20/2025 Negative  Negative Final   • Methamphetamine, Ur 03/20/2025 Negative  Negative Final   • Methadone Screen, Urine 03/20/2025 Negative  Negative Final   • Morphine/Opiates Screen, Urine 03/20/2025 Negative  Negative Final    • Oxycodone Screen, Urine 03/20/2025 Negative  Negative Final   • Phencyclidine (PCP), Urine 03/20/2025 Negative  Negative Final   • Propoxyphene Scree, Urine 03/20/2025 Negative  Negative Final   • Tricyclic Antidepressants Screen 03/20/2025 Negative  Negative Final   • THC, Screen, Urine 03/20/2025 Negative  Negative Final   • AMPHETAMINE 03/20/2025 Not Detected  500 ng/mL ng/mL Final   • Barbiturates 03/20/2025 Not Detected  200 ng/mL ng/mL Final   • Benzodiazepines 03/20/2025 Not Detected  200 ng/mL ng/mL Final   • TRICYCLIC ANTIDEPRESSANTS 03/20/2025 Not Detected  300 ng/mL ng/mL Final   • 6-ACETYLMORPHINE 03/20/2025 Not Detected  10 ng/mL ng/mL Final   • BUPRENORPHINE 03/20/2025 Detected  5 ng/mL ng/mL Final   • Cocaine Metabolite 03/20/2025 Not Detected  150 ng/mL ng/mL Final   • EDDP 03/20/2025 Not Detected  100 ng/mL ng/mL Final   • ALCOHOL, ETHYL 03/20/2025 Not Detected  100 ng/mL ng/mL Final   • FENTANYL 03/20/2025 Not Detected  1 ng/mL ng/mL Final   • METHAMPHETAMINE 03/20/2025 Not Detected  500 ng/mL ng/mL Final   • Opiates 03/20/2025 Not Detected  300 ng/mL ng/mL Final   • OXYCODONE 03/20/2025 Not Detected  100 ng/mL ng/mL Final   • PCP 03/20/2025 Not Detected  25 ng/mL ng/mL Final   • THC 03/20/2025 Detected  50 ng/mL ng/mL Final   • Reference Lab Report 03/20/2025    Final    See Full Screen for results.   • BUPRENORPHINE 03/20/2025 18^DETECTED  10 ng/mL ng/mL Final    Detected   • NORBUPRENORPHINE 03/20/2025 46^DETECTED  10 ng/mL ng/mL Final    Detected   • GABAPENTIN 03/20/2025 Not Detected  500 ng/mL ng/mL Final   • 9-DELTA-THC-COOH 03/20/2025 26^NOT DETECTED  100 ng/mL ng/mL Final    Not Detected   There may be more visits with results that are not included.         Assessment & Plan   Diagnoses and all orders for this visit:    1. Opioid type dependence, continuous (Primary)  -     buprenorphine-naloxone (SUBOXONE) 8-2 MG per SL tablet; Place 2 tablets under the tongue Daily.  Dispense: 14  tablet; Refill: 0    2. Medication management  -     POC Medline 14 Panel Urine Drug Screen  -     Behavioral Health Full Screen and Definitive Testing (MARTIN) -; Future  -     Buprenorphine/Norbuprenorphine level, QUANT (MARTIN) - Urine, Clean Catch; Future  -     Behavioral Health Full Screen and Definitive Testing (MARTIN) -  -     Buprenorphine/Norbuprenorphine level, QUANT (MARTIN) - Urine, Clean Catch    3. Methamphetamine dependence    4. Delta-9-tetrahydrocannabinol (THC) dependence    5. Methamphetamine abuse  -     buPROPion XL (WELLBUTRIN XL) 150 MG 24 hr tablet; Take 1 tablet by mouth 2 (Two) Times a Day. BID dosing  Dispense: 60 tablet; Refill: 0        Visit Diagnoses:    ICD-10-CM ICD-9-CM   1. Opioid type dependence, continuous  F11.20 304.01   2. Medication management  Z79.899 V58.69   3. Methamphetamine dependence  F15.20 304.40   4. Delta-9-tetrahydrocannabinol (THC) dependence  F12.20 304.30   5. Methamphetamine abuse  F15.10 305.70       PLAN:  Safety: No acute safety concerns  Risk Assessment: Risk of self-harm acutely is low. Risk of self-harm chronically is also low, but could be further elevated in the event of treatment noncompliance and/or AODA.    TREATMENT PLAN/GOALS: Continue supportive psychotherapy efforts and medications as indicated. Treatment and medication options discussed during today's visit. Patient acknowledged and verbally consented to continue with current treatment plan and was educated on the importance of compliance with treatment and follow-up appointments.    MEDICATION ISSUES:  ROSY reviewed as expected.  Discussed medication options and treatment plan of prescribed medication as well as the risks, benefits, and side effects including potential falls, possible impaired driving and metabolic adversities among others. Patient is agreeable to call the office with any worsening of symptoms or onset of side effects. Patient is agreeable to call 911 or go to the nearest ER  should he/she begin having SI/HI. No medication side effects or related complaints today.     MEDS ORDERED DURING VISIT:  New Medications Ordered This Visit   Medications   • buPROPion XL (WELLBUTRIN XL) 150 MG 24 hr tablet     Sig: Take 1 tablet by mouth 2 (Two) Times a Day. BID dosing     Dispense:  60 tablet     Refill:  0   • buprenorphine-naloxone (SUBOXONE) 8-2 MG per SL tablet     Sig: Place 2 tablets under the tongue Daily.     Dispense:  14 tablet     Refill:  0     BALA HERRERA:[id number]       No follow-ups on file.           This document has been electronically signed by WALKER Romero  July 15, 2025 16:21 EDT      Part of this note may be an electronic transcription/translation of spoken language to printed text using the Dragon Dictation System.

## 2025-07-17 LAB
6-ACETYLMORPHINE: NOT DETECTED NG/ML
9-DELTA-THC-COOH: 416 NG/ML
ALCOHOL, ETHYL: NOT DETECTED MG/DL
AMPHET SAL QL CFM: >2500 NG/ML
AMPHETAMINE: DETECTED NG/ML
BENZODIAZ BLD QL: NOT DETECTED NG/ML
BUPRENORPHINE SAL CFM-MCNC: DETECTED NG/ML
BUPRENORPHINE: 435 NG/ML
COCAINE METABOLITE: NOT DETECTED NG/ML
CREATININE: 242.8 MG/DL
EDDP SERPL QL: NOT DETECTED NG/ML
FENTANYL-EIA: NOT DETECTED NG/ML
METHAMPHET/CREAT UR: >2500 NG/ML
METHAMPHETAMINE: DETECTED NG/ML
NORBUPRENORPHINE: 1700 NG/ML
OPIATES: NOT DETECTED NG/ML
OXIDANTS: NOT DETECTED UG/ML
OXYCODONE: NOT DETECTED NG/ML
PCP: NOT DETECTED NG/ML
PH: 5.7 (ref 4.5–9)
PHENTERMINE: NOT DETECTED NG/ML
REF LAB TEST METHOD: NORMAL
SPECIFIC GRAVITY, QUAN: 1.03 (ref 1–1.03)
THC: DETECTED NG/ML

## 2025-07-22 ENCOUNTER — TELEMEDICINE (OUTPATIENT)
Dept: PSYCHIATRY | Facility: CLINIC | Age: 55
End: 2025-07-22
Payer: MEDICAID

## 2025-07-22 VITALS
OXYGEN SATURATION: 97 % | SYSTOLIC BLOOD PRESSURE: 134 MMHG | HEART RATE: 97 BPM | BODY MASS INDEX: 31.83 KG/M2 | DIASTOLIC BLOOD PRESSURE: 73 MMHG | WEIGHT: 173 LBS | HEIGHT: 62 IN

## 2025-07-22 DIAGNOSIS — F11.20 OPIOID TYPE DEPENDENCE, CONTINUOUS: Primary | ICD-10-CM

## 2025-07-22 DIAGNOSIS — F12.20 DELTA-9-TETRAHYDROCANNABINOL (THC) DEPENDENCE: ICD-10-CM

## 2025-07-22 DIAGNOSIS — F15.20 METHAMPHETAMINE DEPENDENCE: ICD-10-CM

## 2025-07-22 DIAGNOSIS — Z79.899 MEDICATION MANAGEMENT: ICD-10-CM

## 2025-07-22 PROCEDURE — 1160F RVW MEDS BY RX/DR IN RCRD: CPT | Performed by: NURSE PRACTITIONER

## 2025-07-22 PROCEDURE — 99213 OFFICE O/P EST LOW 20 MIN: CPT | Performed by: NURSE PRACTITIONER

## 2025-07-22 PROCEDURE — 1159F MED LIST DOCD IN RCRD: CPT | Performed by: NURSE PRACTITIONER

## 2025-07-22 RX ORDER — BUPRENORPHINE HYDROCHLORIDE AND NALOXONE HYDROCHLORIDE DIHYDRATE 8; 2 MG/1; MG/1
2 TABLET SUBLINGUAL DAILY
Qty: 14 TABLET | Refills: 0 | Status: SHIPPED | OUTPATIENT
Start: 2025-07-22 | End: 2025-07-28 | Stop reason: SDUPTHER

## 2025-07-22 NOTE — PROGRESS NOTES
This provider is located at [facility]. The Patient is seen remotely located at the [facility] ([facility]) using Video. Patient is being seen via telehealth and confirm that they are in a secure environment for this session. The patient's condition being diagnosed/treated is appropriate for telemedicine. Provider identified as Williams Brody as well as credentials APRN MSN FNP-C SRINIVASAN-YOUNG.   The client/patient gave consent to be seen remotely, and when consent is given they understand that the consent allows for patient identifiable information to be sent to a third party as needed.   They may refuse to be seen remotely at any time. The electronic data is encrypted and password protected, and the patient has been advised of the potential risks to privacy not withstanding such measures.    Chief Complaint/History of Present Illness: Follow Up buprenorphine/naloxone Medicated Assisted Treatment for Opiate Use Disorder     Patient/Client Concerns/Updates: Continue Wellbutrin for stimulant cravings   -Patient reports no significant or concerning life events or changes since last evaluation; reports no acute life stressors  -Reports continued therapeutic benefit and satisfaction with current care plan; reports no concerning side effects with current medication prescribed  -Mood is stable; patient denies acute anxious episodes, exacerbations thereof or concerning panic attacks  -Reports no acute depressive episodes or concerns, no suicidal or homicidal thoughts  -Reports no perceptual disturbances or otherwise symptoms of psychosis  -Patient reports no concerns with sleep quality or disturbance thereof    Triggers (Persons/Places/Things/Events/Thought/Emotions): Life stress and fluctuating health of mother    Cravings/Substance Use: Cravings and continued use of methamphetamine and THC reports overall use approximately unchanged    Relapse Prevention: Counseling and continue weekly evaluations for  support accountability and patient safety    Urine Drug Screen (today's visit)/Presumptive Point of Care discussed: Positive buprenorphine positive amphetamine methamphetamine and THC    UDS Confirmation (Most recent/Resulted): Positive buprenorphine/nor-buprenorphine, no concerns for urine tampering otherwise positive methamphetamine and THC    Most recent pertinent laboratory studies reviewed: 4/11/23-hepatic function within normal limits, Cr WNL, HIV negative, hepatitis C not detected         ROSY (PDMP) Reviewed for Current/Active Medications: buprenorphine/naloxone as reviewed today    Past Surgical History:  No past surgical history on file.    Problem List:  There is no problem list on file for this patient.      Allergy:   No Known Allergies     Current Medications:   Current Outpatient Medications   Medication Sig Dispense Refill   • buprenorphine-naloxone (SUBOXONE) 8-2 MG per SL tablet Place 2 tablets under the tongue Daily. 14 tablet 0   • buPROPion XL (WELLBUTRIN XL) 150 MG 24 hr tablet Take 1 tablet by mouth 2 (Two) Times a Day. BID dosing 60 tablet 0   • famotidine (PEPCID) 20 MG tablet Take 1 tablet by mouth 2 (Two) Times a Day. 60 tablet 0   • GNP PAIN RELIEF EX-STRENGTH 500 MG tablet TAKE ONE TABLET BY MOUTH EVERY 6 HOURS AS NEEDED FOR 30 DAYS **DO NOT EXCEED 6 TABLETS IN 24 HOURS**     • hydrOXYzine pamoate (Vistaril) 25 MG capsule Take 1 capsule by mouth 4 (Four) Times a Day As Needed for Anxiety. 120 capsule 0   • ondansetron ODT (ZOFRAN-ODT) 8 MG disintegrating tablet Place 1 tablet on the tongue Every 8 (Eight) Hours As Needed for Nausea or Vomiting. 45 tablet 0   • naloxone (NARCAN) 4 MG/0.1ML nasal spray 1 spray into the nostril(s) as directed by provider As Needed (opiate over sedation). 1 spray in 1 nostril every 2 to 3 minutes, call 911 (Patient not taking: Reported on 5/28/2025) 2 each 2     No current facility-administered medications for this visit.       Past Medical History:  Past  Medical History:   Diagnosis Date   • History of hepatitis B    • Pancreatitis          Social History     Socioeconomic History   • Marital status: Single   Tobacco Use   • Smoking status: Every Day     Current packs/day: 1.00     Average packs/day: 1 pack/day for 15.0 years (15.0 ttl pk-yrs)     Types: Cigarettes   • Smokeless tobacco: Never   Vaping Use   • Vaping status: Never Used   Substance and Sexual Activity   • Alcohol use: Not Currently   • Drug use: Yes     Types: Marijuana, Hydrocodone, Oxycodone, Methamphetamines   • Sexual activity: Defer         Family History   Problem Relation Age of Onset   • No Known Problems Mother    • No Known Problems Father    • No Known Problems Sister    • No Known Problems Brother    • No Known Problems Maternal Grandmother    • No Known Problems Paternal Grandmother    • No Known Problems Maternal Aunt    • No Known Problems Paternal Aunt    • No Known Problems Maternal Grandfather    • No Known Problems Paternal Grandfather    • No Known Problems Maternal Uncle    • No Known Problems Paternal Uncle    • No Known Problems Cousin    • No Known Problems Other    • ADD / ADHD Neg Hx    • Alcohol abuse Neg Hx    • Anxiety disorder Neg Hx    • Bipolar disorder Neg Hx    • Dementia Neg Hx    • Depression Neg Hx    • Drug abuse Neg Hx    • OCD Neg Hx    • Paranoid behavior Neg Hx    • Schizophrenia Neg Hx    • Seizures Neg Hx    • Self-Injurious Behavior  Neg Hx    • Suicide Attempts Neg Hx    • Breast cancer Neg Hx          Mental Status Exam:   Hygiene:   good  Cooperation:  Cooperative  Eye Contact:  Good  Psychomotor Behavior:  Appropriate  Affect:  Appropriate  Mood: normal  Speech:  Normal  Thought Process:  Goal directed  Thought Content:  Normal  Suicidal:  None  Homicidal:  None  Hallucinations:  None  Delusion:  None  Memory:  Intact  Orientation:  Grossly intact  Reliability:  good  Insight:  Good  Judgement:  Fair  Impulse Control:  Fair         Review of  "Systems:  Review of Systems   Constitutional:  Negative for activity change, chills, diaphoresis and fatigue.   Respiratory:  Negative for apnea, cough and shortness of breath.    Cardiovascular:  Negative for chest pain, palpitations and leg swelling.   Gastrointestinal:  Negative for abdominal pain, constipation, diarrhea, nausea and vomiting.   Genitourinary:  Negative for difficulty urinating.   Musculoskeletal:  Negative for arthralgias.   Skin:  Negative for rash.   Neurological:  Negative for dizziness, weakness and headaches.   Psychiatric/Behavioral:  Negative for agitation, self-injury, sleep disturbance and suicidal ideas. The patient is not nervous/anxious.        Physical Exam:  Physical Exam  Vitals reviewed.   Constitutional:       General: She is not in acute distress.     Appearance: Normal appearance. She is not ill-appearing or toxic-appearing.   Pulmonary:      Effort: Pulmonary effort is normal.   Musculoskeletal:         General: Normal range of motion.   Neurological:      General: No focal deficit present.      Mental Status: She is alert and oriented to person, place, and time.   Psychiatric:         Attention and Perception: Attention and perception normal.         Mood and Affect: Mood normal. Mood is not anxious or depressed.         Speech: Speech normal.         Behavior: Behavior normal. Behavior is cooperative.         Thought Content: Thought content normal.         Cognition and Memory: Cognition and memory normal.         Judgment: Judgment normal.     Vital Signs:   /73   Pulse 97   Ht 156.2 cm (61.5\")   Wt 78.5 kg (173 lb)   SpO2 97%   BMI 32.16 kg/m²      Lab Results:   Telemedicine on 07/22/2025   Component Date Value Ref Range Status   • Amphetamine Screen, Urine 07/22/2025 Positive (A)  Negative Final    Refer to confirmation by [facility] in scanned documents   • Barbiturates Screen, Urine 07/22/2025 Negative  Negative Final   • Buprenorphine, Screen, Urine " 07/22/2025 Positive (A)  Negative Final   • Benzodiazepine Screen, Urine 07/22/2025 Negative  Negative Final   • Cocaine Screen, Urine 07/22/2025 Negative  Negative Final   • MDMA (ECSTASY) 07/22/2025 Positive (A)  Negative Final   • Methamphetamine, Ur 07/22/2025 Positive (A)  Negative Final   • Methadone Screen, Urine 07/22/2025 Negative  Negative Final   • Morphine/Opiates Screen, Urine 07/22/2025 Negative  Negative Final   • Oxycodone Screen, Urine 07/22/2025 Negative  Negative Final   • Phencyclidine (PCP), Urine 07/22/2025 Negative  Negative Final   • Propoxyphene Scree, Urine 07/22/2025 Negative  Negative Final   • Tricyclic Antidepressants Screen 07/22/2025 Negative  Negative Final   • THC, Screen, Urine 07/22/2025 Positive (A)  Negative Final   Telemedicine on 07/15/2025   Component Date Value Ref Range Status   • Amphetamine Screen, Urine 07/15/2025 Positive (A)  Negative Final    Refer to confirmation by [facility] in scanned documents   • Barbiturates Screen, Urine 07/15/2025 Negative  Negative Final   • Buprenorphine, Screen, Urine 07/15/2025 Positive (A)  Negative Final   • Benzodiazepine Screen, Urine 07/15/2025 Negative  Negative Final   • Cocaine Screen, Urine 07/15/2025 Negative  Negative Final   • MDMA (ECSTASY) 07/15/2025 Positive (A)  Negative Final   • Methamphetamine, Ur 07/15/2025 Positive (A)  Negative Final   • Methadone Screen, Urine 07/15/2025 Negative  Negative Final   • Morphine/Opiates Screen, Urine 07/15/2025 Negative  Negative Final   • Oxycodone Screen, Urine 07/15/2025 Negative  Negative Final   • Phencyclidine (PCP), Urine 07/15/2025 Negative  Negative Final   • Propoxyphene Scree, Urine 07/15/2025 Negative  Negative Final   • Tricyclic Antidepressants Screen 07/15/2025 Negative  Negative Final   • THC, Screen, Urine 07/15/2025 Positive (A)  Negative Final   • BUPRENORPHINE 07/15/2025 435  2.5 ng/mL ng/mL Final   • NORBUPRENORPHINE 07/15/2025 1700  10 ng/mL ng/mL Final   •  AMPHETAMINE 07/15/2025 Detected  500 ng/mL ng/mL Final   • METHAMPHETAMINE 07/15/2025 Detected  500 ng/mL ng/mL Final   • FENTANYL 07/15/2025 Not Detected  1 ng/mL ng/mL Final   • Barbiturates 07/15/2025 Not Detected  200 ng/mL ng/mL Final   • Benzodiazepines 07/15/2025 Not Detected  200 ng/mL ng/mL Final   • BUPRENORPHINE 07/15/2025 Detected  5.0 ng/mL ng/mL Final   • ALCOHOL, ETHYL 07/15/2025 Not Detected  10 mg/dL mg/dL Final   • EDDP 07/15/2025 Not Detected  100 ng/mL ng/mL Final   • Opiates 07/15/2025 Not Detected  300 ng/mL ng/mL Final   • 6-ACETYLMORPHINE 07/15/2025 Not Detected  10 ng/mL ng/mL Final   • OXYCODONE 07/15/2025 Not Detected  100 ng/mL ng/mL Final   • PCP 07/15/2025 Not Detected  25 ng/mL ng/mL Final   • THC 07/15/2025 Detected  50 ng/mL ng/mL Final   • Cocaine Metabolite 07/15/2025 Not Detected  150 ng/mL ng/mL Final   • CREATININE 07/15/2025 242.8  >20.0 mg/dL mg/dL Final   • PH 07/15/2025 5.7  4.5 - 9.0 Final   • OXIDANTS 07/15/2025 Not Detected  0-200 ug/mL ug/mL Final   • Specific Gravity, Germain 07/15/2025 1.027  1.003 - 1.035 Final   • Reference Lab Report 07/15/2025    Final    See Full Screen for results.   • AMPHETAMINE 07/15/2025 >2500  100 ng/mL ng/mL Final   • PHENTERMINE 07/15/2025 Not Detected  100 ng/mL ng/mL Final   • METHAMPHETAMINE 07/15/2025 >2500  100 ng/mL ng/mL Final   • 9-DELTA-THC-COOH 07/15/2025 416  25 ng/mL ng/mL Final   Telemedicine on 07/03/2025   Component Date Value Ref Range Status   • Amphetamine Screen, Urine 07/03/2025 Negative  Negative Final    Refer to confirmation by [facility] in scanned documents   • Barbiturates Screen, Urine 07/03/2025 Negative  Negative Final   • Buprenorphine, Screen, Urine 07/03/2025 Positive (A)  Negative Final   • Benzodiazepine Screen, Urine 07/03/2025 Negative  Negative Final   • Cocaine Screen, Urine 07/03/2025 Negative  Negative Final   • MDMA (ECSTASY) 07/03/2025 Negative  Negative Final   • Methamphetamine, Ur 07/03/2025  Negative  Negative Final   • Methadone Screen, Urine 07/03/2025 Negative  Negative Final   • Morphine/Opiates Screen, Urine 07/03/2025 Negative  Negative Final   • Oxycodone Screen, Urine 07/03/2025 Negative  Negative Final   • Phencyclidine (PCP), Urine 07/03/2025 Negative  Negative Final   • Propoxyphene Scree, Urine 07/03/2025 Negative  Negative Final   • Tricyclic Antidepressants Screen 07/03/2025 Negative  Negative Final   • THC, Screen, Urine 07/03/2025 Positive (A)  Negative Final   • BUPRENORPHINE 07/03/2025 265  2.5 ng/mL ng/mL Final   • NORBUPRENORPHINE 07/03/2025 1005  10 ng/mL ng/mL Final   • AMPHETAMINE 07/03/2025 Detected  500 ng/mL ng/mL Final   • METHAMPHETAMINE 07/03/2025 Detected  500 ng/mL ng/mL Final   • FENTANYL 07/03/2025 Not Detected  1 ng/mL ng/mL Final   • Barbiturates 07/03/2025 Not Detected  200 ng/mL ng/mL Final   • Benzodiazepines 07/03/2025 Not Detected  200 ng/mL ng/mL Final   • BUPRENORPHINE 07/03/2025 Detected  5.0 ng/mL ng/mL Final   • ALCOHOL, ETHYL 07/03/2025 Not Detected  10 mg/dL mg/dL Final   • EDDP 07/03/2025 Not Detected  100 ng/mL ng/mL Final   • Opiates 07/03/2025 Not Detected  300 ng/mL ng/mL Final   • 6-ACETYLMORPHINE 07/03/2025 Not Detected  10 ng/mL ng/mL Final   • OXYCODONE 07/03/2025 Not Detected  100 ng/mL ng/mL Final   • PCP 07/03/2025 Not Detected  25 ng/mL ng/mL Final   • THC 07/03/2025 Detected  50 ng/mL ng/mL Final   • Cocaine Metabolite 07/03/2025 Not Detected  150 ng/mL ng/mL Final   • CREATININE 07/03/2025 220.6  >20.0 mg/dL mg/dL Final   • PH 07/03/2025 5.1  4.5 - 9.0 Final   • OXIDANTS 07/03/2025 Not Detected  0-200 ug/mL ug/mL Final   • Specific Gravity, Germain 07/03/2025 1.026  1.003 - 1.035 Final   • Reference Lab Report 07/03/2025    Final    See Full Screen for results.   • AMPHETAMINE 07/03/2025 788  100 ng/mL ng/mL Final   • PHENTERMINE 07/03/2025 Not Detected  100 ng/mL ng/mL Final   • METHAMPHETAMINE 07/03/2025 988  100 ng/mL ng/mL Final   •  [id number]-[id number] 07/03/2025 192  25 ng/mL ng/mL Final   Clinical Support on 06/12/2025   Component Date Value Ref Range Status   • Amphetamine Screen, Urine 06/12/2025 Positive (A)  Negative Final    Refer to confirmation by [facility] in scanned documents   • Barbiturates Screen, Urine 06/12/2025 Negative  Negative Final   • Buprenorphine, Screen, Urine 06/12/2025 Positive (A)  Negative Final   • Benzodiazepine Screen, Urine 06/12/2025 Negative  Negative Final   • Cocaine Screen, Urine 06/12/2025 Negative  Negative Final   • MDMA (ECSTASY) 06/12/2025 Negative  Negative Final   • Methamphetamine, Ur 06/12/2025 Positive (A)  Negative Final   • Methadone Screen, Urine 06/12/2025 Negative  Negative Final   • Morphine/Opiates Screen, Urine 06/12/2025 Negative  Negative Final   • Oxycodone Screen, Urine 06/12/2025 Negative  Negative Final   • Phencyclidine (PCP), Urine 06/12/2025 Negative  Negative Final   • Propoxyphene Scree, Urine 06/12/2025 Negative  Negative Final   • Tricyclic Antidepressants Screen 06/12/2025 Negative  Negative Final   • THC, Screen, Urine 06/12/2025 Positive (A)  Negative Final   • BUPRENORPHINE 06/12/2025 193  2.5 ng/mL ng/mL Final   • NORBUPRENORPHINE 06/12/2025 715  10 ng/mL ng/mL Final   • AMPHETAMINE 06/12/2025 Detected  500 ng/mL ng/mL Final   • METHAMPHETAMINE 06/12/2025 Detected  500 ng/mL ng/mL Final   • FENTANYL 06/12/2025 Not Detected  1 ng/mL ng/mL Final   • Barbiturates 06/12/2025 Not Detected  200 ng/mL ng/mL Final   • Benzodiazepines 06/12/2025 Not Detected  200 ng/mL ng/mL Final   • BUPRENORPHINE 06/12/2025 Detected  5.0 ng/mL ng/mL Final   • ALCOHOL, ETHYL 06/12/2025 Not Detected  10 mg/dL mg/dL Final   • EDDP 06/12/2025 Not Detected  100 ng/mL ng/mL Final   • Opiates 06/12/2025 Not Detected  300 ng/mL ng/mL Final   • 6-ACETYLMORPHINE 06/12/2025 Not Detected  10 ng/mL ng/mL Final   • OXYCODONE 06/12/2025 Not Detected  100 ng/mL ng/mL Final   • PCP 06/12/2025 Not Detected  25 ng/mL  ng/mL Final   • THC 06/12/2025 Detected  50 ng/mL ng/mL Final   • Cocaine Metabolite 06/12/2025 Not Detected  150 ng/mL ng/mL Final   • CREATININE 06/12/2025 176.3  >20.0 mg/dL mg/dL Final   • PH 06/12/2025 6.3  4.5 - 9.0 Final   • OXIDANTS 06/12/2025 Not Detected  0-200 ug/mL ug/mL Final   • Specific Gravity, Germain 06/12/2025 1.017  1.003 - 1.035 Final   • Reference Lab Report 06/12/2025    Final    See Full Screen for results.   • AMPHETAMINE 06/12/2025 >2500  100 ng/mL ng/mL Final   • PHENTERMINE 06/12/2025 Not Detected  100 ng/mL ng/mL Final   • METHAMPHETAMINE 06/12/2025 >2500  100 ng/mL ng/mL Final   • 9-DELTA-THC-COOH 06/12/2025 183  25 ng/mL ng/mL Final   Telemedicine on 05/28/2025   Component Date Value Ref Range Status   • Amphetamine Screen, Urine 05/28/2025 Positive (A)  Negative Final   • Barbiturates Screen, Urine 05/28/2025 Negative  Negative Final   • Buprenorphine, Screen, Urine 05/28/2025 Positive (A)  Negative Final   • Benzodiazepine Screen, Urine 05/28/2025 Negative  Negative Final   • Cocaine Screen, Urine 05/28/2025 Negative  Negative Final   • MDMA (ECSTASY) 05/28/2025 Positive (A)  Negative Final   • Methamphetamine, Ur 05/28/2025 Positive (A)  Negative Final   • Methadone Screen, Urine 05/28/2025 Negative  Negative Final   • Morphine/Opiates Screen, Urine 05/28/2025 Negative  Negative Final   • Oxycodone Screen, Urine 05/28/2025 Negative  Negative Final   • Phencyclidine (PCP), Urine 05/28/2025 Negative  Negative Final   • Propoxyphene Scree, Urine 05/28/2025 Negative  Negative Final   • Tricyclic Antidepressants Screen 05/28/2025 Negative  Negative Final   • THC, Screen, Urine 05/28/2025 Positive (A)  Negative Final   • BUPRENORPHINE 05/28/2025 319  2.5 ng/mL ng/mL Final   • NORBUPRENORPHINE 05/28/2025 1267  10 ng/mL ng/mL Final   • AMPHETAMINE 05/28/2025 Detected  500 ng/mL ng/mL Final   • METHAMPHETAMINE 05/28/2025 Detected  500 ng/mL ng/mL Final   • FENTANYL 05/28/2025 Not Detected  1  ng/mL ng/mL Final   • Barbiturates 05/28/2025 Not Detected  200 ng/mL ng/mL Final   • Benzodiazepines 05/28/2025 Not Detected  200 ng/mL ng/mL Final   • BUPRENORPHINE 05/28/2025 Detected  5.0 ng/mL ng/mL Final   • ALCOHOL, ETHYL 05/28/2025 Not Detected  10 mg/dL mg/dL Final   • EDDP 05/28/2025 Not Detected  100 ng/mL ng/mL Final   • Opiates 05/28/2025 Not Detected  300 ng/mL ng/mL Final   • 6-ACETYLMORPHINE 05/28/2025 Not Detected  10 ng/mL ng/mL Final   • OXYCODONE 05/28/2025 Not Detected  100 ng/mL ng/mL Final   • PCP 05/28/2025 Not Detected  25 ng/mL ng/mL Final   • THC 05/28/2025 Detected  50 ng/mL ng/mL Final   • Cocaine Metabolite 05/28/2025 Not Detected  150 ng/mL ng/mL Final   • CREATININE 05/28/2025 198.4  >20.0 mg/dL mg/dL Final   • PH 05/28/2025 4.9  4.5 - 9.0 Final   • OXIDANTS 05/28/2025 Not Detected  0-200 ug/mL ug/mL Final   • Specific Gravity, Germain 05/28/2025 1.024  1.003 - 1.035 Final   • Reference Lab Report 05/28/2025    Final    See Full Screen for results.   • AMPHETAMINE 05/28/2025 >2500  100 ng/mL ng/mL Final   • PHENTERMINE 05/28/2025 Not Detected  100 ng/mL ng/mL Final   • METHAMPHETAMINE 05/28/2025 >2500  100 ng/mL ng/mL Final   • 9-DELTA-THC-COOH 05/28/2025 380  25 ng/mL ng/mL Final   Telemedicine on 05/20/2025   Component Date Value Ref Range Status   • Amphetamine Screen, Urine 05/20/2025 Positive (A)  Negative Final   • Barbiturates Screen, Urine 05/20/2025 Negative  Negative Final   • Buprenorphine, Screen, Urine 05/20/2025 Positive (A)  Negative Final   • Benzodiazepine Screen, Urine 05/20/2025 Negative  Negative Final   • Cocaine Screen, Urine 05/20/2025 Negative  Negative Final   • MDMA (ECSTASY) 05/20/2025 Negative  Negative Final   • Methamphetamine, Ur 05/20/2025 Positive (A)  Negative Final   • Methadone Screen, Urine 05/20/2025 Negative  Negative Final   • Morphine/Opiates Screen, Urine 05/20/2025 Negative  Negative Final   • Oxycodone Screen, Urine 05/20/2025 Negative   Negative Final   • Phencyclidine (PCP), Urine 05/20/2025 Negative  Negative Final   • Propoxyphene Scree, Urine 05/20/2025 Negative  Negative Final   • Tricyclic Antidepressants Screen 05/20/2025 Negative  Negative Final   • THC, Screen, Urine 05/20/2025 Positive (A)  Negative Final   • BUPRENORPHINE 05/20/2025 27  2.5 ng/mL ng/mL Final   • NORBUPRENORPHINE 05/20/2025 169  10 ng/mL ng/mL Final   • AMPHETAMINE 05/20/2025 Detected  500 ng/mL ng/mL Final   • METHAMPHETAMINE 05/20/2025 Detected  500 ng/mL ng/mL Final   • FENTANYL 05/20/2025 Not Detected  1 ng/mL ng/mL Final   • Barbiturates 05/20/2025 Not Detected  200 ng/mL ng/mL Final   • Benzodiazepines 05/20/2025 Not Detected  200 ng/mL ng/mL Final   • BUPRENORPHINE 05/20/2025 Detected  5.0 ng/mL ng/mL Final   • ALCOHOL, ETHYL 05/20/2025 Not Detected  10 mg/dL mg/dL Final   • EDDP 05/20/2025 Not Detected  100 ng/mL ng/mL Final   • Opiates 05/20/2025 Not Detected  300 ng/mL ng/mL Final   • 6-ACETYLMORPHINE 05/20/2025 Not Detected  10 ng/mL ng/mL Final   • OXYCODONE 05/20/2025 Not Detected  100 ng/mL ng/mL Final   • PCP 05/20/2025 Not Detected  25 ng/mL ng/mL Final   • THC 05/20/2025 Detected  50 ng/mL ng/mL Final   • Cocaine Metabolite 05/20/2025 Not Detected  150 ng/mL ng/mL Final   • CREATININE 05/20/2025 36.2  >20.0 mg/dL mg/dL Final   • PH 05/20/2025 5.2  4.5 - 9.0 Final   • OXIDANTS 05/20/2025 Not Detected  0-200 ug/mL ug/mL Final   • Specific Gravity, Germain 05/20/2025 1.005  1.003 - 1.035 Final   • Reference Lab Report 05/20/2025    Final    See Full Screen for results.   • AMPHETAMINE 05/20/2025 1277  100 ng/mL ng/mL Final   • PHENTERMINE 05/20/2025 Not Detected  100 ng/mL ng/mL Final   • METHAMPHETAMINE 05/20/2025 >2500  100 ng/mL ng/mL Final   • 9-DELTA-THC-COOH 05/20/2025 104  25 ng/mL ng/mL Final   Telemedicine on 05/01/2025   Component Date Value Ref Range Status   • Amphetamine Screen, Urine 05/01/2025 Positive (A)  Negative Final   • Barbiturates Screen,  Urine 05/01/2025 Negative  Negative Final   • Buprenorphine, Screen, Urine 05/01/2025 Positive (A)  Negative Final   • Benzodiazepine Screen, Urine 05/01/2025 Negative  Negative Final   • Cocaine Screen, Urine 05/01/2025 Negative  Negative Final   • MDMA (ECSTASY) 05/01/2025 Positive (A)  Negative Final   • Methamphetamine, Ur 05/01/2025 Positive (A)  Negative Final   • Methadone Screen, Urine 05/01/2025 Negative  Negative Final   • Morphine/Opiates Screen, Urine 05/01/2025 Negative  Negative Final   • Oxycodone Screen, Urine 05/01/2025 Negative  Negative Final   • Phencyclidine (PCP), Urine 05/01/2025 Negative  Negative Final   • Propoxyphene Scree, Urine 05/01/2025 Negative  Negative Final   • Tricyclic Antidepressants Screen 05/01/2025 Negative  Negative Final   • THC, Screen, Urine 05/01/2025 Positive (A)  Negative Final   • BUPRENORPHINE 05/01/2025 329^DETECTED  10 ng/mL ng/mL Final   • NORBUPRENORPHINE 05/01/2025 782^DETECTED  10 ng/mL ng/mL Final   • 6-ACETYLMORPHINE 05/01/2025 Not Detected  10 ng/mL ng/mL Final   • AMPHETAMINE 05/01/2025 Detected  500 ng/mL ng/mL Final   • Barbiturates 05/01/2025 Not Detected  200 ng/mL ng/mL Final   • Benzodiazepines 05/01/2025 Not Detected  200 ng/mL ng/mL Final   • BUPRENORPHINE 05/01/2025 Detected  5 ng/mL ng/mL Final   • Cocaine Metabolite 05/01/2025 Not Detected  150 ng/mL ng/mL Final   • EDDP 05/01/2025 Not Detected  100 ng/mL ng/mL Final   • ALCOHOL, ETHYL 05/01/2025 Not Detected  <100.0000 ng/mL Final   • FENTANYL 05/01/2025 Not Detected  1 ng/mL ng/mL Final   • METHAMPHETAMINE 05/01/2025 Detected  500 ng/mL ng/mL Final   • Opiates 05/01/2025 Not Detected  300 ng/mL ng/mL Final   • OXYCODONE 05/01/2025 Not Detected  100 ng/mL ng/mL Final   • PCP 05/01/2025 Not Detected  25 ng/mL ng/mL Final   • THC 05/01/2025 Detected  50 ng/mL ng/mL Final   • TRICYCLIC ANTIDEPRESSANTS 05/01/2025 Not Detected  300 ng/mL ng/mL Final   • Reference Lab Report 05/01/2025    Final    See  Full Screen for results.   • AMPHETAMINE 05/01/2025 10499^DETECTED  250 ng/mL ng/mL Final   • PHENTERMINE 05/01/2025 Not Detected  100 ng/mL ng/mL Final   • METHAMPHETAMINE 05/01/2025 21241^DETECTED  100 ng/mL ng/mL Final   Telemedicine on 04/24/2025   Component Date Value Ref Range Status   • Amphetamine Screen, Urine 04/24/2025 Positive (A)  Negative Final   • Barbiturates Screen, Urine 04/24/2025 Negative  Negative Final   • Buprenorphine, Screen, Urine 04/24/2025 Positive (A)  Negative Final   • Benzodiazepine Screen, Urine 04/24/2025 Negative  Negative Final   • Cocaine Screen, Urine 04/24/2025 Negative  Negative Final   • MDMA (ECSTASY) 04/24/2025 Positive (A)  Negative Final   • Methamphetamine, Ur 04/24/2025 Positive (A)  Negative Final   • Methadone Screen, Urine 04/24/2025 Negative  Negative Final   • Morphine/Opiates Screen, Urine 04/24/2025 Negative  Negative Final   • Oxycodone Screen, Urine 04/24/2025 Negative  Negative Final   • Phencyclidine (PCP), Urine 04/24/2025 Negative  Negative Final   • Propoxyphene Scree, Urine 04/24/2025 Negative  Negative Final   • Tricyclic Antidepressants Screen 04/24/2025 Negative  Negative Final   • THC, Screen, Urine 04/24/2025 Positive (A)  Negative Final   • BUPRENORPHINE 04/24/2025 264^DETECTED  10 ng/mL ng/mL Final    Detected   • NORBUPRENORPHINE 04/24/2025 638^DETECTED  10 ng/mL ng/mL Final    Detected   • 6-ACETYLMORPHINE 04/24/2025 Not Detected  10 ng/mL ng/mL Final   • AMPHETAMINE 04/24/2025 Detected  500 ng/mL ng/mL Final   • Barbiturates 04/24/2025 Not Detected  200 ng/mL ng/mL Final   • Benzodiazepines 04/24/2025 Not Detected  200 ng/mL ng/mL Final   • BUPRENORPHINE 04/24/2025 Detected  5 ng/mL ng/mL Final   • Cocaine Metabolite 04/24/2025 Not Detected  150 ng/mL ng/mL Final   • EDDP 04/24/2025 Not Detected  100 ng/mL ng/mL Final   • ALCOHOL, ETHYL 04/24/2025 Not Detected  <100.0000 ng/mL Final   • FENTANYL 04/24/2025 Not Detected  1 ng/mL ng/mL Final   •  METHAMPHETAMINE 04/24/2025 Detected  500 ng/mL ng/mL Final   • Opiates 04/24/2025 Not Detected  300 ng/mL ng/mL Final   • OXYCODONE 04/24/2025 Not Detected  100 ng/mL ng/mL Final   • PCP 04/24/2025 Not Detected  25 ng/mL ng/mL Final   • THC 04/24/2025 Detected  50 ng/mL ng/mL Final   • TRICYCLIC ANTIDEPRESSANTS 04/24/2025 Not Detected  300 ng/mL ng/mL Final   • Reference Lab Report 04/24/2025    Final    See Full Screen for results.   • Ethyl Glucuronide 04/24/2025 Not Detected  200 ng/mL ng/mL Final   • Ethyl Sulfate 04/24/2025 Not Detected  50 ng/mL ng/mL Final   • AMPHETAMINE 04/24/2025 68968^DETECTED  250 ng/mL ng/mL Final    Detected   • PHENTERMINE 04/24/2025 Not Detected  100 ng/mL ng/mL Final   • METHAMPHETAMINE 04/24/2025 24830^DETECTED  100 ng/mL ng/mL Final    Detected   Telemedicine on 04/17/2025   Component Date Value Ref Range Status   • Amphetamine Screen, Urine 04/17/2025 Positive (A)  Negative Final   • Barbiturates Screen, Urine 04/17/2025 Negative  Negative Final   • Buprenorphine, Screen, Urine 04/17/2025 Positive (A)  Negative Final   • Benzodiazepine Screen, Urine 04/17/2025 Negative  Negative Final   • Cocaine Screen, Urine 04/17/2025 Negative  Negative Final   • MDMA (ECSTASY) 04/17/2025 Positive (A)  Negative Final   • Methamphetamine, Ur 04/17/2025 Positive (A)  Negative Final   • Methadone Screen, Urine 04/17/2025 Negative  Negative Final   • Morphine/Opiates Screen, Urine 04/17/2025 Negative  Negative Final   • Oxycodone Screen, Urine 04/17/2025 Negative  Negative Final   • Phencyclidine (PCP), Urine 04/17/2025 Negative  Negative Final   • Propoxyphene Scree, Urine 04/17/2025 Negative  Negative Final   • Tricyclic Antidepressants Screen 04/17/2025 Negative  Negative Final   • THC, Screen, Urine 04/17/2025 Positive (A)  Negative Final   • BUPRENORPHINE 04/17/2025 149^DETECTED  10 ng/mL ng/mL Final    Detected   • NORBUPRENORPHINE 04/17/2025 528^DETECTED  10 ng/mL ng/mL Final    Detected    • 6-ACETYLMORPHINE 04/17/2025 Not Detected  10 ng/mL ng/mL Final   • AMPHETAMINE 04/17/2025 Detected  500 ng/mL ng/mL Final   • Barbiturates 04/17/2025 Not Detected  200 ng/mL ng/mL Final   • Benzodiazepines 04/17/2025 Not Detected  200 ng/mL ng/mL Final   • BUPRENORPHINE 04/17/2025 Detected  5 ng/mL ng/mL Final   • Cocaine Metabolite 04/17/2025 Not Detected  150 ng/mL ng/mL Final   • EDDP 04/17/2025 Not Detected  100 ng/mL ng/mL Final   • ALCOHOL, ETHYL 04/17/2025 Not Detected  <100.0000 ng/mL Final   • FENTANYL 04/17/2025 Not Detected  1 ng/mL ng/mL Final   • METHAMPHETAMINE 04/17/2025 Detected  500 ng/mL ng/mL Final   • Opiates 04/17/2025 Not Detected  300 ng/mL ng/mL Final   • OXYCODONE 04/17/2025 Not Detected  100 ng/mL ng/mL Final   • PCP 04/17/2025 Not Detected  25 ng/mL ng/mL Final   • THC 04/17/2025 Detected  50 ng/mL ng/mL Final   • TRICYCLIC ANTIDEPRESSANTS 04/17/2025 Not Detected  300 ng/mL ng/mL Final   • Reference Lab Report 04/17/2025    Final    See Full Screen for results.   • Ethyl Glucuronide 04/17/2025 Not Detected  200 ng/mL ng/mL Final   • Ethyl Sulfate 04/17/2025 55.4  50 ng/mL ng/mL Final    Detected   • AMPHETAMINE 04/17/2025 37605^DETECTED  250 ng/mL ng/mL Final    Detected   • PHENTERMINE 04/17/2025 Not Detected  100 ng/mL ng/mL Final   • METHAMPHETAMINE 04/17/2025 60920^DETECTED  100 ng/mL ng/mL Final    Detected   Telemedicine on 03/27/2025   Component Date Value Ref Range Status   • Amphetamine Screen, Urine 03/27/2025 Positive (A)  Negative Final   • Barbiturates Screen, Urine 03/27/2025 Negative  Negative Final   • Buprenorphine, Screen, Urine 03/27/2025 Positive (A)  Negative Final   • Benzodiazepine Screen, Urine 03/27/2025 Negative  Negative Final   • Cocaine Screen, Urine 03/27/2025 Negative  Negative Final   • MDMA (ECSTASY) 03/27/2025 Negative  Negative Final   • Methamphetamine, Ur 03/27/2025 Positive (A)  Negative Final   • Methadone Screen, Urine 03/27/2025 Negative   Negative Final   • Morphine/Opiates Screen, Urine 03/27/2025 Negative  Negative Final   • Oxycodone Screen, Urine 03/27/2025 Negative  Negative Final   • Phencyclidine (PCP), Urine 03/27/2025 Negative  Negative Final   • Propoxyphene Scree, Urine 03/27/2025 Negative  Negative Final   • Tricyclic Antidepressants Screen 03/27/2025 Negative  Negative Final   • THC, Screen, Urine 03/27/2025 Positive (A)  Negative Final   • 6-ACETYLMORPHINE 03/27/2025 Not Detected  10 ng/mL ng/mL Final   • AMPHETAMINE 03/27/2025 Detected  500 ng/mL ng/mL Final   • Barbiturates 03/27/2025 Not Detected  200 ng/mL ng/mL Final   • Benzodiazepines 03/27/2025 Not Detected  200 ng/mL ng/mL Final   • BUPRENORPHINE 03/27/2025 Detected  5 ng/mL ng/mL Final   • Cocaine Metabolite 03/27/2025 Not Detected  150 ng/mL ng/mL Final   • EDDP 03/27/2025 Not Detected  100 ng/mL ng/mL Final   • ALCOHOL, ETHYL 03/27/2025 Not Detected  <100.0000 ng/mL Final   • FENTANYL 03/27/2025 Not Detected  1 ng/mL ng/mL Final   • METHAMPHETAMINE 03/27/2025 Detected  500 ng/mL ng/mL Final   • Opiates 03/27/2025 Not Detected  300 ng/mL ng/mL Final   • OXYCODONE 03/27/2025 Not Detected  100 ng/mL ng/mL Final   • PCP 03/27/2025 Not Detected  25 ng/mL ng/mL Final   • THC 03/27/2025 Detected  50 ng/mL ng/mL Final   • TRICYCLIC ANTIDEPRESSANTS 03/27/2025 Not Detected  300 ng/mL ng/mL Final   • Reference Lab Report 03/27/2025    Final    See Full Screen for results.   • AMPHETAMINE 03/27/2025 974^DETECTED  250 ng/mL ng/mL Final    Detected   • PHENTERMINE 03/27/2025 Not Detected  100 ng/mL ng/mL Final   • METHAMPHETAMINE 03/27/2025 3452^DETECTED  100 ng/mL ng/mL Final    Detected   There may be more visits with results that are not included.         Assessment & Plan   Diagnoses and all orders for this visit:    1. Opioid type dependence, continuous (Primary)  -     buprenorphine-naloxone (SUBOXONE) 8-2 MG per SL tablet; Place 2 tablets under the tongue Daily.  Dispense: 14  tablet; Refill: 0    2. Medication management  -     POC Medline 14 Panel Urine Drug Screen  -     Behavioral Health Full Screen and Definitive Testing (MARTIN) -; Future  -     Buprenorphine/Norbuprenorphine level, QUANT (MARTIN) - Urine, Clean Catch; Future  -     Behavioral Health Full Screen and Definitive Testing (MARTIN) -  -     Buprenorphine/Norbuprenorphine level, QUANT (MARTIN) - Urine, Clean Catch    3. Methamphetamine dependence    4. Delta-9-tetrahydrocannabinol (THC) dependence        Visit Diagnoses:    ICD-10-CM ICD-9-CM   1. Opioid type dependence, continuous  F11.20 304.01   2. Medication management  Z79.899 V58.69   3. Methamphetamine dependence  F15.20 304.40   4. Delta-9-tetrahydrocannabinol (THC) dependence  F12.20 304.30       PLAN:  Safety: No acute safety concerns  Risk Assessment: Risk of self-harm acutely is low. Risk of self-harm chronically is also low, but could be further elevated in the event of treatment noncompliance and/or AODA.    TREATMENT PLAN/GOALS: Continue supportive psychotherapy efforts and medications as indicated. Treatment and medication options discussed during today's visit. Patient acknowledged and verbally consented to continue with current treatment plan and was educated on the importance of compliance with treatment and follow-up appointments.    MEDICATION ISSUES:  ROSY reviewed as expected.  Discussed medication options and treatment plan of prescribed medication as well as the risks, benefits, and side effects including potential falls, possible impaired driving and metabolic adversities among others. Patient is agreeable to call the office with any worsening of symptoms or onset of side effects. Patient is agreeable to call 911 or go to the nearest ER should he/she begin having SI/HI. No medication side effects or related complaints today.     MEDS ORDERED DURING VISIT:  New Medications Ordered This Visit   Medications   • buprenorphine-naloxone (SUBOXONE) 8-2 MG per  SL tablet     Sig: Place 2 tablets under the tongue Daily.     Dispense:  14 tablet     Refill:  0     NA - NADEAN:[id number]       No follow-ups on file.           This document has been electronically signed by WALKER Romero  July 22, 2025 15:55 EDT      Part of this note may be an electronic transcription/translation of spoken language to printed text using the Dragon Dictation System.

## 2025-07-24 LAB
6-ACETYLMORPHINE: NOT DETECTED NG/ML
9-DELTA-THC-COOH: 208 NG/ML
ALCOHOL, ETHYL: NOT DETECTED MG/DL
AMPHET SAL QL CFM: >2500 NG/ML
AMPHETAMINE: DETECTED NG/ML
BENZODIAZ BLD QL: NOT DETECTED NG/ML
BUPRENORPHINE SAL CFM-MCNC: DETECTED NG/ML
BUPRENORPHINE: 135 NG/ML
COCAINE METABOLITE: NOT DETECTED NG/ML
CREATININE: 99.5 MG/DL
EDDP SERPL QL: NOT DETECTED NG/ML
FENTANYL-EIA: NOT DETECTED NG/ML
METHAMPHET/CREAT UR: >2500 NG/ML
METHAMPHETAMINE: DETECTED NG/ML
NORBUPRENORPHINE: 530 NG/ML
OPIATES: NOT DETECTED NG/ML
OXIDANTS: NOT DETECTED UG/ML
OXYCODONE: NOT DETECTED NG/ML
PCP: NOT DETECTED NG/ML
PH: 5 (ref 4.5–9)
PHENTERMINE: NOT DETECTED NG/ML
REF LAB TEST METHOD: NORMAL
SPECIFIC GRAVITY, QUAN: 1.01 (ref 1–1.03)
THC: DETECTED NG/ML

## 2025-07-28 ENCOUNTER — TELEMEDICINE (OUTPATIENT)
Dept: PSYCHIATRY | Facility: CLINIC | Age: 55
End: 2025-07-28
Payer: MEDICAID

## 2025-07-28 VITALS
OXYGEN SATURATION: 98 % | HEART RATE: 87 BPM | DIASTOLIC BLOOD PRESSURE: 80 MMHG | SYSTOLIC BLOOD PRESSURE: 148 MMHG | BODY MASS INDEX: 32.01 KG/M2 | WEIGHT: 172.2 LBS

## 2025-07-28 DIAGNOSIS — F12.20 DELTA-9-TETRAHYDROCANNABINOL (THC) DEPENDENCE: ICD-10-CM

## 2025-07-28 DIAGNOSIS — Z79.899 MEDICATION MANAGEMENT: ICD-10-CM

## 2025-07-28 DIAGNOSIS — F15.10 METHAMPHETAMINE ABUSE: ICD-10-CM

## 2025-07-28 DIAGNOSIS — F11.20 OPIOID TYPE DEPENDENCE, CONTINUOUS: Primary | ICD-10-CM

## 2025-07-28 DIAGNOSIS — F15.20 METHAMPHETAMINE DEPENDENCE: ICD-10-CM

## 2025-07-28 RX ORDER — BUPROPION HYDROCHLORIDE 150 MG/1
150 TABLET ORAL 2 TIMES DAILY
Qty: 60 TABLET | Refills: 0 | Status: SHIPPED | OUTPATIENT
Start: 2025-07-28

## 2025-07-28 RX ORDER — BUPRENORPHINE HYDROCHLORIDE AND NALOXONE HYDROCHLORIDE DIHYDRATE 8; 2 MG/1; MG/1
2 TABLET SUBLINGUAL DAILY
Qty: 28 TABLET | Refills: 0 | Status: SHIPPED | OUTPATIENT
Start: 2025-07-28

## 2025-07-28 NOTE — PROGRESS NOTES
This provider is located at [facility]. The Patient is seen remotely located at the [facility] ([facility]) using Video. Patient is being seen via telehealth and confirm that they are in a secure environment for this session. The patient's condition being diagnosed/treated is appropriate for telemedicine. Provider identified as Williams Brody as well as credentials APRN MSN FNP-C SRINIVASAN-YOUNG.   The client/patient gave consent to be seen remotely, and when consent is given they understand that the consent allows for patient identifiable information to be sent to a third party as needed.   They may refuse to be seen remotely at any time. The electronic data is encrypted and password protected, and the patient has been advised of the potential risks to privacy not withstanding such measures.    Chief Complaint/History of Present Illness: Follow Up buprenorphine/naloxone Medicated Assisted Treatment for Opiate Use Disorder     Patient/Client Concerns/Updates: Continue Wellbutrin for stimulant cravings   -Continued use of methamphetamine and THC reports overall use as stable and unchanged; continued encouragement to take part and further supportive services such as therapy with the goal to develop positive coping strategies    -Patient reports no significant or concerning life events or changes since last evaluation; reports no acute life stressors  -Reports continued therapeutic benefit and satisfaction with current care plan; reports no concerning side effects with current medication prescribed  -Mood is stable; patient denies acute anxious episodes, exacerbations thereof or concerning panic attacks  -Reports no acute depressive episodes or concerns, no suicidal or homicidal thoughts  -Reports no perceptual disturbances or otherwise symptoms of psychosis  -Patient reports no concerns with sleep quality or disturbance thereof    Triggers (Persons/Places/Things/Events/Thought/Emotions): Generalized  anxiety and pending evaluation with     Cravings/Substance Use: Cravings and continued use of methamphetamine and THC    Relapse Prevention: Counseling    Urine Drug Screen (today's visit)/Presumptive Point of Care discussed: Positive buprenorphine positive amphetamine methamphetamine and THC    UDS Confirmation (Most recent/Resulted): Positive buprenorphine/nor-buprenorphine, no concerns for urine tampering otherwise positive methamphetamine and THC    Most recent pertinent laboratory studies reviewed:  4/11/23-hepatic function within normal limits, Cr WNL, HIV negative, hepatitis C not detected         ROSY (PDMP) Reviewed for Current/Active Medications: buprenorphine/naloxone as reviewed today    Past Surgical History:  No past surgical history on file.    Problem List:  There is no problem list on file for this patient.      Allergy:   No Known Allergies     Current Medications:   Current Outpatient Medications   Medication Sig Dispense Refill   • buprenorphine-naloxone (SUBOXONE) 8-2 MG per SL tablet Place 2 tablets under the tongue Daily. 28 tablet 0   • buPROPion XL (WELLBUTRIN XL) 150 MG 24 hr tablet Take 1 tablet by mouth 2 (Two) Times a Day. BID dosing 60 tablet 0   • famotidine (PEPCID) 20 MG tablet Take 1 tablet by mouth 2 (Two) Times a Day. 60 tablet 0   • GNP PAIN RELIEF EX-STRENGTH 500 MG tablet TAKE ONE TABLET BY MOUTH EVERY 6 HOURS AS NEEDED FOR 30 DAYS **DO NOT EXCEED 6 TABLETS IN 24 HOURS**     • hydrOXYzine pamoate (Vistaril) 25 MG capsule Take 1 capsule by mouth 4 (Four) Times a Day As Needed for Anxiety. 120 capsule 0   • ondansetron ODT (ZOFRAN-ODT) 8 MG disintegrating tablet Place 1 tablet on the tongue Every 8 (Eight) Hours As Needed for Nausea or Vomiting. 45 tablet 0   • naloxone (NARCAN) 4 MG/0.1ML nasal spray 1 spray into the nostril(s) as directed by provider As Needed (opiate over sedation). 1 spray in 1 nostril every 2 to 3 minutes, call 911 (Patient not taking:  Reported on 7/28/2025) 2 each 2     No current facility-administered medications for this visit.       Past Medical History:  Past Medical History:   Diagnosis Date   • History of hepatitis B    • Pancreatitis          Social History     Socioeconomic History   • Marital status: Single   Tobacco Use   • Smoking status: Every Day     Current packs/day: 1.00     Average packs/day: 1 pack/day for 15.0 years (15.0 ttl pk-yrs)     Types: Cigarettes   • Smokeless tobacco: Never   Vaping Use   • Vaping status: Never Used   Substance and Sexual Activity   • Alcohol use: Not Currently   • Drug use: Yes     Types: Marijuana, Hydrocodone, Oxycodone, Methamphetamines   • Sexual activity: Defer         Family History   Problem Relation Age of Onset   • No Known Problems Mother    • No Known Problems Father    • No Known Problems Sister    • No Known Problems Brother    • No Known Problems Maternal Grandmother    • No Known Problems Paternal Grandmother    • No Known Problems Maternal Aunt    • No Known Problems Paternal Aunt    • No Known Problems Maternal Grandfather    • No Known Problems Paternal Grandfather    • No Known Problems Maternal Uncle    • No Known Problems Paternal Uncle    • No Known Problems Cousin    • No Known Problems Other    • ADD / ADHD Neg Hx    • Alcohol abuse Neg Hx    • Anxiety disorder Neg Hx    • Bipolar disorder Neg Hx    • Dementia Neg Hx    • Depression Neg Hx    • Drug abuse Neg Hx    • OCD Neg Hx    • Paranoid behavior Neg Hx    • Schizophrenia Neg Hx    • Seizures Neg Hx    • Self-Injurious Behavior  Neg Hx    • Suicide Attempts Neg Hx    • Breast cancer Neg Hx          Mental Status Exam:   Hygiene:   good  Cooperation:  Cooperative  Eye Contact:  Good  Psychomotor Behavior:  Appropriate  Affect:  Appropriate  Mood: normal  Speech:  Normal  Thought Process:  Goal directed  Thought Content:  Normal  Suicidal:  None  Homicidal:  None  Hallucinations:  None  Delusion:  None  Memory:   Intact  Orientation:  Grossly intact  Reliability:  fair  Insight:  Poor  Judgement:  Poor  Impulse Control:  Poor         Review of Systems:  Review of Systems   Constitutional:  Negative for activity change, chills, diaphoresis and fatigue.   Respiratory:  Negative for apnea, cough and shortness of breath.    Cardiovascular:  Negative for chest pain, palpitations and leg swelling.   Gastrointestinal:  Negative for abdominal pain, constipation, diarrhea, nausea and vomiting.   Genitourinary:  Negative for difficulty urinating.   Musculoskeletal:  Negative for arthralgias.   Skin:  Negative for rash.   Neurological:  Negative for dizziness, weakness and headaches.   Psychiatric/Behavioral:  Negative for agitation, self-injury, sleep disturbance and suicidal ideas. The patient is not nervous/anxious.        Physical Exam:  Physical Exam  Vitals reviewed.   Constitutional:       General: She is not in acute distress.     Appearance: Normal appearance. She is not ill-appearing or toxic-appearing.   Pulmonary:      Effort: Pulmonary effort is normal.   Musculoskeletal:         General: Normal range of motion.   Neurological:      General: No focal deficit present.      Mental Status: She is alert and oriented to person, place, and time.   Psychiatric:         Attention and Perception: Attention and perception normal.         Mood and Affect: Mood normal. Mood is not anxious or depressed.         Speech: Speech normal.         Behavior: Behavior normal. Behavior is cooperative.         Thought Content: Thought content normal.         Cognition and Memory: Cognition and memory normal.         Judgment: Judgment normal.     Vital Signs:   /80   Pulse 87   Wt 78.1 kg (172 lb 3.2 oz)   SpO2 98%   BMI 32.01 kg/m²      Lab Results:   Telemedicine on 07/28/2025   Component Date Value Ref Range Status   • Amphetamine Screen, Urine 07/28/2025 Positive (A)  Negative Final    Refer to confirmation by [employer] in scanned  documents   • Barbiturates Screen, Urine 07/28/2025 Negative  Negative Final   • Buprenorphine, Screen, Urine 07/28/2025 Positive (A)  Negative Final   • Benzodiazepine Screen, Urine 07/28/2025 Negative  Negative Final   • Cocaine Screen, Urine 07/28/2025 Negative  Negative Final   • MDMA (ECSTASY) 07/28/2025 Negative  Negative Final   • Methamphetamine, Ur 07/28/2025 Positive (A)  Negative Final   • Methadone Screen, Urine 07/28/2025 Negative  Negative Final   • Morphine/Opiates Screen, Urine 07/28/2025 Negative  Negative Final   • Oxycodone Screen, Urine 07/28/2025 Negative  Negative Final   • Phencyclidine (PCP), Urine 07/28/2025 Negative  Negative Final   • Propoxyphene Scree, Urine 07/28/2025 Negative  Negative Final   • Tricyclic Antidepressants Screen 07/28/2025 Negative  Negative Final   • THC, Screen, Urine 07/28/2025 Positive (A)  Negative Final   Telemedicine on 07/22/2025   Component Date Value Ref Range Status   • Amphetamine Screen, Urine 07/22/2025 Positive (A)  Negative Final    Refer to confirmation by [facility] in scanned documents   • Barbiturates Screen, Urine 07/22/2025 Negative  Negative Final   • Buprenorphine, Screen, Urine 07/22/2025 Positive (A)  Negative Final   • Benzodiazepine Screen, Urine 07/22/2025 Negative  Negative Final   • Cocaine Screen, Urine 07/22/2025 Negative  Negative Final   • MDMA (ECSTASY) 07/22/2025 Positive (A)  Negative Final   • Methamphetamine, Ur 07/22/2025 Positive (A)  Negative Final   • Methadone Screen, Urine 07/22/2025 Negative  Negative Final   • Morphine/Opiates Screen, Urine 07/22/2025 Negative  Negative Final   • Oxycodone Screen, Urine 07/22/2025 Negative  Negative Final   • Phencyclidine (PCP), Urine 07/22/2025 Negative  Negative Final   • Propoxyphene Scree, Urine 07/22/2025 Negative  Negative Final   • Tricyclic Antidepressants Screen 07/22/2025 Negative  Negative Final   • THC, Screen, Urine 07/22/2025 Positive (A)  Negative Final   • BUPRENORPHINE  07/22/2025 135  2.5 ng/mL ng/mL Final   • NORBUPRENORPHINE 07/22/2025 530  10 ng/mL ng/mL Final   • AMPHETAMINE 07/22/2025 Detected  500 ng/mL ng/mL Final   • METHAMPHETAMINE 07/22/2025 Detected  500 ng/mL ng/mL Final   • FENTANYL 07/22/2025 Not Detected  1 ng/mL ng/mL Final   • Barbiturates 07/22/2025 Not Detected  200 ng/mL ng/mL Final   • Benzodiazepines 07/22/2025 Not Detected  200 ng/mL ng/mL Final   • BUPRENORPHINE 07/22/2025 Detected  5.0 ng/mL ng/mL Final   • ALCOHOL, ETHYL 07/22/2025 Not Detected  10 mg/dL mg/dL Final   • EDDP 07/22/2025 Not Detected  100 ng/mL ng/mL Final   • Opiates 07/22/2025 Not Detected  300 ng/mL ng/mL Final   • 6-ACETYLMORPHINE 07/22/2025 Not Detected  10 ng/mL ng/mL Final   • OXYCODONE 07/22/2025 Not Detected  100 ng/mL ng/mL Final   • PCP 07/22/2025 Not Detected  25 ng/mL ng/mL Final   • THC 07/22/2025 Detected  50 ng/mL ng/mL Final   • Cocaine Metabolite 07/22/2025 Not Detected  150 ng/mL ng/mL Final   • CREATININE 07/22/2025 99.5  >20.0 mg/dL mg/dL Final   • PH 07/22/2025 5.0  4.5 - 9.0 Final   • OXIDANTS 07/22/2025 Not Detected  0-200 ug/mL ug/mL Final   • Specific Gravity, Germain 07/22/2025 1.012  1.003 - 1.035 Final   • Reference Lab Report 07/22/2025    Final    See Full Screen for results.   • AMPHETAMINE 07/22/2025 >2500  100 ng/mL ng/mL Final   • PHENTERMINE 07/22/2025 Not Detected  100 ng/mL ng/mL Final   • METHAMPHETAMINE 07/22/2025 >2500  100 ng/mL ng/mL Final   • 9-DELTA-THC-COOH 07/22/2025 208  25 ng/mL ng/mL Final   Telemedicine on 07/15/2025   Component Date Value Ref Range Status   • Amphetamine Screen, Urine 07/15/2025 Positive (A)  Negative Final    Refer to confirmation by [facility] in scanned documents   • Barbiturates Screen, Urine 07/15/2025 Negative  Negative Final   • Buprenorphine, Screen, Urine 07/15/2025 Positive (A)  Negative Final   • Benzodiazepine Screen, Urine 07/15/2025 Negative  Negative Final   • Cocaine Screen, Urine 07/15/2025 Negative  Negative  Final   • MDMA (ECSTASY) 07/15/2025 Positive (A)  Negative Final   • Methamphetamine, Ur 07/15/2025 Positive (A)  Negative Final   • Methadone Screen, Urine 07/15/2025 Negative  Negative Final   • Morphine/Opiates Screen, Urine 07/15/2025 Negative  Negative Final   • Oxycodone Screen, Urine 07/15/2025 Negative  Negative Final   • Phencyclidine (PCP), Urine 07/15/2025 Negative  Negative Final   • Propoxyphene Scree, Urine 07/15/2025 Negative  Negative Final   • Tricyclic Antidepressants Screen 07/15/2025 Negative  Negative Final   • THC, Screen, Urine 07/15/2025 Positive (A)  Negative Final   • BUPRENORPHINE 07/15/2025 435  2.5 ng/mL ng/mL Final   • NORBUPRENORPHINE 07/15/2025 1700  10 ng/mL ng/mL Final   • AMPHETAMINE 07/15/2025 Detected  500 ng/mL ng/mL Final   • METHAMPHETAMINE 07/15/2025 Detected  500 ng/mL ng/mL Final   • FENTANYL 07/15/2025 Not Detected  1 ng/mL ng/mL Final   • Barbiturates 07/15/2025 Not Detected  200 ng/mL ng/mL Final   • Benzodiazepines 07/15/2025 Not Detected  200 ng/mL ng/mL Final   • BUPRENORPHINE 07/15/2025 Detected  5.0 ng/mL ng/mL Final   • ALCOHOL, ETHYL 07/15/2025 Not Detected  10 mg/dL mg/dL Final   • EDDP 07/15/2025 Not Detected  100 ng/mL ng/mL Final   • Opiates 07/15/2025 Not Detected  300 ng/mL ng/mL Final   • 6-ACETYLMORPHINE 07/15/2025 Not Detected  10 ng/mL ng/mL Final   • OXYCODONE 07/15/2025 Not Detected  100 ng/mL ng/mL Final   • PCP 07/15/2025 Not Detected  25 ng/mL ng/mL Final   • THC 07/15/2025 Detected  50 ng/mL ng/mL Final   • Cocaine Metabolite 07/15/2025 Not Detected  150 ng/mL ng/mL Final   • CREATININE 07/15/2025 242.8  >20.0 mg/dL mg/dL Final   • PH 07/15/2025 5.7  4.5 - 9.0 Final   • OXIDANTS 07/15/2025 Not Detected  0-200 ug/mL ug/mL Final   • Specific Gravity, Germain 07/15/2025 1.027  1.003 - 1.035 Final   • Reference Lab Report 07/15/2025    Final    See Full Screen for results.   • AMPHETAMINE 07/15/2025 >2500  100 ng/mL ng/mL Final   • PHENTERMINE 07/15/2025  Not Detected  100 ng/mL ng/mL Final   • METHAMPHETAMINE 07/15/2025 >2500  100 ng/mL ng/mL Final   • 9-DELTA-THC-COOH 07/15/2025 416  25 ng/mL ng/mL Final   Telemedicine on 07/03/2025   Component Date Value Ref Range Status   • Amphetamine Screen, Urine 07/03/2025 Negative  Negative Final    Refer to confirmation by [facility] in scanned documents   • Barbiturates Screen, Urine 07/03/2025 Negative  Negative Final   • Buprenorphine, Screen, Urine 07/03/2025 Positive (A)  Negative Final   • Benzodiazepine Screen, Urine 07/03/2025 Negative  Negative Final   • Cocaine Screen, Urine 07/03/2025 Negative  Negative Final   • MDMA (ECSTASY) 07/03/2025 Negative  Negative Final   • Methamphetamine, Ur 07/03/2025 Negative  Negative Final   • Methadone Screen, Urine 07/03/2025 Negative  Negative Final   • Morphine/Opiates Screen, Urine 07/03/2025 Negative  Negative Final   • Oxycodone Screen, Urine 07/03/2025 Negative  Negative Final   • Phencyclidine (PCP), Urine 07/03/2025 Negative  Negative Final   • Propoxyphene Scree, Urine 07/03/2025 Negative  Negative Final   • Tricyclic Antidepressants Screen 07/03/2025 Negative  Negative Final   • THC, Screen, Urine 07/03/2025 Positive (A)  Negative Final   • BUPRENORPHINE 07/03/2025 265  2.5 ng/mL ng/mL Final   • NORBUPRENORPHINE 07/03/2025 1005  10 ng/mL ng/mL Final   • AMPHETAMINE 07/03/2025 Detected  500 ng/mL ng/mL Final   • METHAMPHETAMINE 07/03/2025 Detected  500 ng/mL ng/mL Final   • FENTANYL 07/03/2025 Not Detected  1 ng/mL ng/mL Final   • Barbiturates 07/03/2025 Not Detected  200 ng/mL ng/mL Final   • Benzodiazepines 07/03/2025 Not Detected  200 ng/mL ng/mL Final   • BUPRENORPHINE 07/03/2025 Detected  5.0 ng/mL ng/mL Final   • ALCOHOL, ETHYL 07/03/2025 Not Detected  10 mg/dL mg/dL Final   • EDDP 07/03/2025 Not Detected  100 ng/mL ng/mL Final   • Opiates 07/03/2025 Not Detected  300 ng/mL ng/mL Final   • 6-ACETYLMORPHINE 07/03/2025 Not Detected  10 ng/mL ng/mL Final   • OXYCODONE  07/03/2025 Not Detected  100 ng/mL ng/mL Final   • PCP 07/03/2025 Not Detected  25 ng/mL ng/mL Final   • THC 07/03/2025 Detected  50 ng/mL ng/mL Final   • Cocaine Metabolite 07/03/2025 Not Detected  150 ng/mL ng/mL Final   • CREATININE 07/03/2025 220.6  >20.0 mg/dL mg/dL Final   • PH 07/03/2025 5.1  4.5 - 9.0 Final   • OXIDANTS 07/03/2025 Not Detected  0-200 ug/mL ug/mL Final   • Specific Gravity, Germain 07/03/2025 1.026  1.003 - 1.035 Final   • Reference Lab Report 07/03/2025    Final    See Full Screen for results.   • AMPHETAMINE 07/03/2025 788  100 ng/mL ng/mL Final   • PHENTERMINE 07/03/2025 Not Detected  100 ng/mL ng/mL Final   • METHAMPHETAMINE 07/03/2025 988  100 ng/mL ng/mL Final   • 9-DELTA-THC-COOH 07/03/2025 192  25 ng/mL ng/mL Final   Clinical Support on 06/12/2025   Component Date Value Ref Range Status   • Amphetamine Screen, Urine 06/12/2025 Positive (A)  Negative Final    Refer to confirmation by [facility] in scanned documents   • Barbiturates Screen, Urine 06/12/2025 Negative  Negative Final   • Buprenorphine, Screen, Urine 06/12/2025 Positive (A)  Negative Final   • Benzodiazepine Screen, Urine 06/12/2025 Negative  Negative Final   • Cocaine Screen, Urine 06/12/2025 Negative  Negative Final   • MDMA (ECSTASY) 06/12/2025 Negative  Negative Final   • Methamphetamine, Ur 06/12/2025 Positive (A)  Negative Final   • Methadone Screen, Urine 06/12/2025 Negative  Negative Final   • Morphine/Opiates Screen, Urine 06/12/2025 Negative  Negative Final   • Oxycodone Screen, Urine 06/12/2025 Negative  Negative Final   • Phencyclidine (PCP), Urine 06/12/2025 Negative  Negative Final   • Propoxyphene Scree, Urine 06/12/2025 Negative  Negative Final   • Tricyclic Antidepressants Screen 06/12/2025 Negative  Negative Final   • THC, Screen, Urine 06/12/2025 Positive (A)  Negative Final   • BUPRENORPHINE 06/12/2025 193  2.5 ng/mL ng/mL Final   • NORBUPRENORPHINE 06/12/2025 715  10 ng/mL ng/mL Final   • AMPHETAMINE  06/12/2025 Detected  500 ng/mL ng/mL Final   • METHAMPHETAMINE 06/12/2025 Detected  500 ng/mL ng/mL Final   • FENTANYL 06/12/2025 Not Detected  1 ng/mL ng/mL Final   • Barbiturates 06/12/2025 Not Detected  200 ng/mL ng/mL Final   • Benzodiazepines 06/12/2025 Not Detected  200 ng/mL ng/mL Final   • BUPRENORPHINE 06/12/2025 Detected  5.0 ng/mL ng/mL Final   • ALCOHOL, ETHYL 06/12/2025 Not Detected  10 mg/dL mg/dL Final   • EDDP 06/12/2025 Not Detected  100 ng/mL ng/mL Final   • Opiates 06/12/2025 Not Detected  300 ng/mL ng/mL Final   • 6-ACETYLMORPHINE 06/12/2025 Not Detected  10 ng/mL ng/mL Final   • OXYCODONE 06/12/2025 Not Detected  100 ng/mL ng/mL Final   • PCP 06/12/2025 Not Detected  25 ng/mL ng/mL Final   • THC 06/12/2025 Detected  50 ng/mL ng/mL Final   • Cocaine Metabolite 06/12/2025 Not Detected  150 ng/mL ng/mL Final   • CREATININE 06/12/2025 176.3  >20.0 mg/dL mg/dL Final   • PH 06/12/2025 6.3  4.5 - 9.0 Final   • OXIDANTS 06/12/2025 Not Detected  0-200 ug/mL ug/mL Final   • Specific Gravity, Germain 06/12/2025 1.017  1.003 - 1.035 Final   • Reference Lab Report 06/12/2025    Final    See Full Screen for results.   • AMPHETAMINE 06/12/2025 >2500  100 ng/mL ng/mL Final   • PHENTERMINE 06/12/2025 Not Detected  100 ng/mL ng/mL Final   • METHAMPHETAMINE 06/12/2025 >2500  100 ng/mL ng/mL Final   • 9-DELTA-THC-COOH 06/12/2025 183  25 ng/mL ng/mL Final   Telemedicine on 05/28/2025   Component Date Value Ref Range Status   • Amphetamine Screen, Urine 05/28/2025 Positive (A)  Negative Final   • Barbiturates Screen, Urine 05/28/2025 Negative  Negative Final   • Buprenorphine, Screen, Urine 05/28/2025 Positive (A)  Negative Final   • Benzodiazepine Screen, Urine 05/28/2025 Negative  Negative Final   • Cocaine Screen, Urine 05/28/2025 Negative  Negative Final   • MDMA (ECSTASY) 05/28/2025 Positive (A)  Negative Final   • Methamphetamine, Ur 05/28/2025 Positive (A)  Negative Final   • Methadone Screen, Urine 05/28/2025  Negative  Negative Final   • Morphine/Opiates Screen, Urine 05/28/2025 Negative  Negative Final   • Oxycodone Screen, Urine 05/28/2025 Negative  Negative Final   • Phencyclidine (PCP), Urine 05/28/2025 Negative  Negative Final   • Propoxyphene Scree, Urine 05/28/2025 Negative  Negative Final   • Tricyclic Antidepressants Screen 05/28/2025 Negative  Negative Final   • THC, Screen, Urine 05/28/2025 Positive (A)  Negative Final   • BUPRENORPHINE 05/28/2025 319  2.5 ng/mL ng/mL Final   • NORBUPRENORPHINE 05/28/2025 1267  10 ng/mL ng/mL Final   • AMPHETAMINE 05/28/2025 Detected  500 ng/mL ng/mL Final   • METHAMPHETAMINE 05/28/2025 Detected  500 ng/mL ng/mL Final   • FENTANYL 05/28/2025 Not Detected  1 ng/mL ng/mL Final   • Barbiturates 05/28/2025 Not Detected  200 ng/mL ng/mL Final   • Benzodiazepines 05/28/2025 Not Detected  200 ng/mL ng/mL Final   • BUPRENORPHINE 05/28/2025 Detected  5.0 ng/mL ng/mL Final   • ALCOHOL, ETHYL 05/28/2025 Not Detected  10 mg/dL mg/dL Final   • EDDP 05/28/2025 Not Detected  100 ng/mL ng/mL Final   • Opiates 05/28/2025 Not Detected  300 ng/mL ng/mL Final   • 6-ACETYLMORPHINE 05/28/2025 Not Detected  10 ng/mL ng/mL Final   • OXYCODONE 05/28/2025 Not Detected  100 ng/mL ng/mL Final   • PCP 05/28/2025 Not Detected  25 ng/mL ng/mL Final   • THC 05/28/2025 Detected  50 ng/mL ng/mL Final   • Cocaine Metabolite 05/28/2025 Not Detected  150 ng/mL ng/mL Final   • CREATININE 05/28/2025 198.4  >20.0 mg/dL mg/dL Final   • PH 05/28/2025 4.9  4.5 - 9.0 Final   • OXIDANTS 05/28/2025 Not Detected  0-200 ug/mL ug/mL Final   • Specific Gravity, Germain 05/28/2025 1.024  1.003 - 1.035 Final   • Reference Lab Report 05/28/2025    Final    See Full Screen for results.   • AMPHETAMINE 05/28/2025 >2500  100 ng/mL ng/mL Final   • PHENTERMINE 05/28/2025 Not Detected  100 ng/mL ng/mL Final   • METHAMPHETAMINE 05/28/2025 >2500  100 ng/mL ng/mL Final   • 9-DELTA-THC-COOH 05/28/2025 380  25 ng/mL ng/mL Final   Telemedicine  on 05/20/2025   Component Date Value Ref Range Status   • Amphetamine Screen, Urine 05/20/2025 Positive (A)  Negative Final   • Barbiturates Screen, Urine 05/20/2025 Negative  Negative Final   • Buprenorphine, Screen, Urine 05/20/2025 Positive (A)  Negative Final   • Benzodiazepine Screen, Urine 05/20/2025 Negative  Negative Final   • Cocaine Screen, Urine 05/20/2025 Negative  Negative Final   • MDMA (ECSTASY) 05/20/2025 Negative  Negative Final   • Methamphetamine, Ur 05/20/2025 Positive (A)  Negative Final   • Methadone Screen, Urine 05/20/2025 Negative  Negative Final   • Morphine/Opiates Screen, Urine 05/20/2025 Negative  Negative Final   • Oxycodone Screen, Urine 05/20/2025 Negative  Negative Final   • Phencyclidine (PCP), Urine 05/20/2025 Negative  Negative Final   • Propoxyphene Scree, Urine 05/20/2025 Negative  Negative Final   • Tricyclic Antidepressants Screen 05/20/2025 Negative  Negative Final   • THC, Screen, Urine 05/20/2025 Positive (A)  Negative Final   • BUPRENORPHINE 05/20/2025 27  2.5 ng/mL ng/mL Final   • NORBUPRENORPHINE 05/20/2025 169  10 ng/mL ng/mL Final   • AMPHETAMINE 05/20/2025 Detected  500 ng/mL ng/mL Final   • METHAMPHETAMINE 05/20/2025 Detected  500 ng/mL ng/mL Final   • FENTANYL 05/20/2025 Not Detected  1 ng/mL ng/mL Final   • Barbiturates 05/20/2025 Not Detected  200 ng/mL ng/mL Final   • Benzodiazepines 05/20/2025 Not Detected  200 ng/mL ng/mL Final   • BUPRENORPHINE 05/20/2025 Detected  5.0 ng/mL ng/mL Final   • ALCOHOL, ETHYL 05/20/2025 Not Detected  10 mg/dL mg/dL Final   • EDDP 05/20/2025 Not Detected  100 ng/mL ng/mL Final   • Opiates 05/20/2025 Not Detected  300 ng/mL ng/mL Final   • 6-ACETYLMORPHINE 05/20/2025 Not Detected  10 ng/mL ng/mL Final   • OXYCODONE 05/20/2025 Not Detected  100 ng/mL ng/mL Final   • PCP 05/20/2025 Not Detected  25 ng/mL ng/mL Final   • THC 05/20/2025 Detected  50 ng/mL ng/mL Final   • Cocaine Metabolite 05/20/2025 Not Detected  150 ng/mL ng/mL Final    • CREATININE 05/20/2025 36.2  >20.0 mg/dL mg/dL Final   • PH 05/20/2025 5.2  4.5 - 9.0 Final   • OXIDANTS 05/20/2025 Not Detected  0-200 ug/mL ug/mL Final   • Specific Gravity, Germain 05/20/2025 1.005  1.003 - 1.035 Final   • Reference Lab Report 05/20/2025    Final    See Full Screen for results.   • AMPHETAMINE 05/20/2025 1277  100 ng/mL ng/mL Final   • PHENTERMINE 05/20/2025 Not Detected  100 ng/mL ng/mL Final   • METHAMPHETAMINE 05/20/2025 >2500  100 ng/mL ng/mL Final   • 9-DELTA-THC-COOH 05/20/2025 104  25 ng/mL ng/mL Final   Telemedicine on 05/01/2025   Component Date Value Ref Range Status   • Amphetamine Screen, Urine 05/01/2025 Positive (A)  Negative Final   • Barbiturates Screen, Urine 05/01/2025 Negative  Negative Final   • Buprenorphine, Screen, Urine 05/01/2025 Positive (A)  Negative Final   • Benzodiazepine Screen, Urine 05/01/2025 Negative  Negative Final   • Cocaine Screen, Urine 05/01/2025 Negative  Negative Final   • MDMA (ECSTASY) 05/01/2025 Positive (A)  Negative Final   • Methamphetamine, Ur 05/01/2025 Positive (A)  Negative Final   • Methadone Screen, Urine 05/01/2025 Negative  Negative Final   • Morphine/Opiates Screen, Urine 05/01/2025 Negative  Negative Final   • Oxycodone Screen, Urine 05/01/2025 Negative  Negative Final   • Phencyclidine (PCP), Urine 05/01/2025 Negative  Negative Final   • Propoxyphene Scree, Urine 05/01/2025 Negative  Negative Final   • Tricyclic Antidepressants Screen 05/01/2025 Negative  Negative Final   • THC, Screen, Urine 05/01/2025 Positive (A)  Negative Final   • BUPRENORPHINE 05/01/2025 329^DETECTED  10 ng/mL ng/mL Final   • NORBUPRENORPHINE 05/01/2025 782^DETECTED  10 ng/mL ng/mL Final   • 6-ACETYLMORPHINE 05/01/2025 Not Detected  10 ng/mL ng/mL Final   • AMPHETAMINE 05/01/2025 Detected  500 ng/mL ng/mL Final   • Barbiturates 05/01/2025 Not Detected  200 ng/mL ng/mL Final   • Benzodiazepines 05/01/2025 Not Detected  200 ng/mL ng/mL Final   • BUPRENORPHINE  05/01/2025 Detected  5 ng/mL ng/mL Final   • Cocaine Metabolite 05/01/2025 Not Detected  150 ng/mL ng/mL Final   • EDDP 05/01/2025 Not Detected  100 ng/mL ng/mL Final   • ALCOHOL, ETHYL 05/01/2025 Not Detected  <100.0000 ng/mL Final   • FENTANYL 05/01/2025 Not Detected  1 ng/mL ng/mL Final   • METHAMPHETAMINE 05/01/2025 Detected  500 ng/mL ng/mL Final   • Opiates 05/01/2025 Not Detected  300 ng/mL ng/mL Final   • OXYCODONE 05/01/2025 Not Detected  100 ng/mL ng/mL Final   • PCP 05/01/2025 Not Detected  25 ng/mL ng/mL Final   • THC 05/01/2025 Detected  50 ng/mL ng/mL Final   • TRICYCLIC ANTIDEPRESSANTS 05/01/2025 Not Detected  300 ng/mL ng/mL Final   • Reference Lab Report 05/01/2025    Final    See Full Screen for results.   • AMPHETAMINE 05/01/2025 30799^DETECTED  250 ng/mL ng/mL Final   • PHENTERMINE 05/01/2025 Not Detected  100 ng/mL ng/mL Final   • METHAMPHETAMINE 05/01/2025 40854^DETECTED  100 ng/mL ng/mL Final   Telemedicine on 04/24/2025   Component Date Value Ref Range Status   • Amphetamine Screen, Urine 04/24/2025 Positive (A)  Negative Final   • Barbiturates Screen, Urine 04/24/2025 Negative  Negative Final   • Buprenorphine, Screen, Urine 04/24/2025 Positive (A)  Negative Final   • Benzodiazepine Screen, Urine 04/24/2025 Negative  Negative Final   • Cocaine Screen, Urine 04/24/2025 Negative  Negative Final   • MDMA (ECSTASY) 04/24/2025 Positive (A)  Negative Final   • Methamphetamine, Ur 04/24/2025 Positive (A)  Negative Final   • Methadone Screen, Urine 04/24/2025 Negative  Negative Final   • Morphine/Opiates Screen, Urine 04/24/2025 Negative  Negative Final   • Oxycodone Screen, Urine 04/24/2025 Negative  Negative Final   • Phencyclidine (PCP), Urine 04/24/2025 Negative  Negative Final   • Propoxyphene Scree, Urine 04/24/2025 Negative  Negative Final   • Tricyclic Antidepressants Screen 04/24/2025 Negative  Negative Final   • THC, Screen, Urine 04/24/2025 Positive (A)  Negative Final   • BUPRENORPHINE  04/24/2025 264^DETECTED  10 ng/mL ng/mL Final    Detected   • NORBUPRENORPHINE 04/24/2025 638^DETECTED  10 ng/mL ng/mL Final    Detected   • 6-ACETYLMORPHINE 04/24/2025 Not Detected  10 ng/mL ng/mL Final   • AMPHETAMINE 04/24/2025 Detected  500 ng/mL ng/mL Final   • Barbiturates 04/24/2025 Not Detected  200 ng/mL ng/mL Final   • Benzodiazepines 04/24/2025 Not Detected  200 ng/mL ng/mL Final   • BUPRENORPHINE 04/24/2025 Detected  5 ng/mL ng/mL Final   • Cocaine Metabolite 04/24/2025 Not Detected  150 ng/mL ng/mL Final   • EDDP 04/24/2025 Not Detected  100 ng/mL ng/mL Final   • ALCOHOL, ETHYL 04/24/2025 Not Detected  <100.0000 ng/mL Final   • FENTANYL 04/24/2025 Not Detected  1 ng/mL ng/mL Final   • METHAMPHETAMINE 04/24/2025 Detected  500 ng/mL ng/mL Final   • Opiates 04/24/2025 Not Detected  300 ng/mL ng/mL Final   • OXYCODONE 04/24/2025 Not Detected  100 ng/mL ng/mL Final   • PCP 04/24/2025 Not Detected  25 ng/mL ng/mL Final   • THC 04/24/2025 Detected  50 ng/mL ng/mL Final   • TRICYCLIC ANTIDEPRESSANTS 04/24/2025 Not Detected  300 ng/mL ng/mL Final   • Reference Lab Report 04/24/2025    Final    See Full Screen for results.   • Ethyl Glucuronide 04/24/2025 Not Detected  200 ng/mL ng/mL Final   • Ethyl Sulfate 04/24/2025 Not Detected  50 ng/mL ng/mL Final   • AMPHETAMINE 04/24/2025 57031^DETECTED  250 ng/mL ng/mL Final    Detected   • PHENTERMINE 04/24/2025 Not Detected  100 ng/mL ng/mL Final   • METHAMPHETAMINE 04/24/2025 03764^DETECTED  100 ng/mL ng/mL Final    Detected   Telemedicine on 04/17/2025   Component Date Value Ref Range Status   • Amphetamine Screen, Urine 04/17/2025 Positive (A)  Negative Final   • Barbiturates Screen, Urine 04/17/2025 Negative  Negative Final   • Buprenorphine, Screen, Urine 04/17/2025 Positive (A)  Negative Final   • Benzodiazepine Screen, Urine 04/17/2025 Negative  Negative Final   • Cocaine Screen, Urine 04/17/2025 Negative  Negative Final   • MDMA (ECSTASY) 04/17/2025 Positive  (A)  Negative Final   • Methamphetamine, Ur 04/17/2025 Positive (A)  Negative Final   • Methadone Screen, Urine 04/17/2025 Negative  Negative Final   • Morphine/Opiates Screen, Urine 04/17/2025 Negative  Negative Final   • Oxycodone Screen, Urine 04/17/2025 Negative  Negative Final   • Phencyclidine (PCP), Urine 04/17/2025 Negative  Negative Final   • Propoxyphene Scree, Urine 04/17/2025 Negative  Negative Final   • Tricyclic Antidepressants Screen 04/17/2025 Negative  Negative Final   • THC, Screen, Urine 04/17/2025 Positive (A)  Negative Final   • BUPRENORPHINE 04/17/2025 149^DETECTED  10 ng/mL ng/mL Final    Detected   • NORBUPRENORPHINE 04/17/2025 528^DETECTED  10 ng/mL ng/mL Final    Detected   • 6-ACETYLMORPHINE 04/17/2025 Not Detected  10 ng/mL ng/mL Final   • AMPHETAMINE 04/17/2025 Detected  500 ng/mL ng/mL Final   • Barbiturates 04/17/2025 Not Detected  200 ng/mL ng/mL Final   • Benzodiazepines 04/17/2025 Not Detected  200 ng/mL ng/mL Final   • BUPRENORPHINE 04/17/2025 Detected  5 ng/mL ng/mL Final   • Cocaine Metabolite 04/17/2025 Not Detected  150 ng/mL ng/mL Final   • EDDP 04/17/2025 Not Detected  100 ng/mL ng/mL Final   • ALCOHOL, ETHYL 04/17/2025 Not Detected  <100.0000 ng/mL Final   • FENTANYL 04/17/2025 Not Detected  1 ng/mL ng/mL Final   • METHAMPHETAMINE 04/17/2025 Detected  500 ng/mL ng/mL Final   • Opiates 04/17/2025 Not Detected  300 ng/mL ng/mL Final   • OXYCODONE 04/17/2025 Not Detected  100 ng/mL ng/mL Final   • PCP 04/17/2025 Not Detected  25 ng/mL ng/mL Final   • THC 04/17/2025 Detected  50 ng/mL ng/mL Final   • TRICYCLIC ANTIDEPRESSANTS 04/17/2025 Not Detected  300 ng/mL ng/mL Final   • Reference Lab Report 04/17/2025    Final    See Full Screen for results.   • Ethyl Glucuronide 04/17/2025 Not Detected  200 ng/mL ng/mL Final   • Ethyl Sulfate 04/17/2025 55.4  50 ng/mL ng/mL Final    Detected   • AMPHETAMINE 04/17/2025 86915^DETECTED  250 ng/mL ng/mL Final    Detected   • PHENTERMINE  04/17/2025 Not Detected  100 ng/mL ng/mL Final   • METHAMPHETAMINE 04/17/2025 77785^DETECTED  100 ng/mL ng/mL Final    Detected   There may be more visits with results that are not included.         Assessment & Plan   Problems Addressed this Visit    None  Visit Diagnoses         Opioid type dependence, continuous    -  Primary    Relevant Medications    buprenorphine-naloxone (SUBOXONE) 8-2 MG per SL tablet    buPROPion XL (WELLBUTRIN XL) 150 MG 24 hr tablet      Methamphetamine dependence        Relevant Medications    buPROPion XL (WELLBUTRIN XL) 150 MG 24 hr tablet      Delta-9-tetrahydrocannabinol (THC) dependence        Relevant Medications    buPROPion XL (WELLBUTRIN XL) 150 MG 24 hr tablet      Medication management        Relevant Orders    POC Medline 14 Panel Urine Drug Screen (Completed)    Behavioral Health Full Screen and Definitive Testing ([facility]) -    Buprenorphine/Norbuprenorphine level, QUANT ([facility]) - Urine, Clean Catch      Methamphetamine abuse        Relevant Medications    buPROPion XL (WELLBUTRIN XL) 150 MG 24 hr tablet          Diagnoses         Codes Comments      Opioid type dependence, continuous    -  Primary ICD-10-CM: F11.20  ICD-9-CM: 304.01       Methamphetamine dependence     ICD-10-CM: F15.20  ICD-9-CM: 304.40       Delta-9-tetrahydrocannabinol (THC) dependence     ICD-10-CM: F12.20  ICD-9-CM: 304.30       Medication management     ICD-10-CM: Z79.899  ICD-9-CM: V58.69       Methamphetamine abuse     ICD-10-CM: F15.10  ICD-9-CM: 305.70             Visit Diagnoses:    ICD-10-CM ICD-9-CM   1. Opioid type dependence, continuous  F11.20 304.01   2. Methamphetamine dependence  F15.20 304.40   3. Delta-9-tetrahydrocannabinol (THC) dependence  F12.20 304.30   4. Medication management  Z79.899 V58.69   5. Methamphetamine abuse  F15.10 305.70       PLAN:  Safety: No acute safety concerns  Risk Assessment: Risk of self-harm acutely is low. Risk of self-harm chronically is also low, but could  be further elevated in the event of treatment noncompliance and/or AODA.    TREATMENT PLAN/GOALS: Continue supportive psychotherapy efforts and medications as indicated. Treatment and medication options discussed during today's visit. Patient acknowledged and verbally consented to continue with current treatment plan and was educated on the importance of compliance with treatment and follow-up appointments.    MEDICATION ISSUES:  ROSY reviewed as expected.  Discussed medication options and treatment plan of prescribed medication as well as the risks, benefits, and side effects including potential falls, possible impaired driving and metabolic adversities among others. Patient is agreeable to call the office with any worsening of symptoms or onset of side effects. Patient is agreeable to call 911 or go to the nearest ER should he/she begin having SI/HI. No medication side effects or related complaints today.     MEDS ORDERED DURING VISIT:  New Medications Ordered This Visit   Medications   • buprenorphine-naloxone (SUBOXONE) 8-2 MG per SL tablet     Sig: Place 2 tablets under the tongue Daily.     Dispense:  28 tablet     Refill:  0     NA - NADEAN:[id number]   • buPROPion XL (WELLBUTRIN XL) 150 MG 24 hr tablet     Sig: Take 1 tablet by mouth 2 (Two) Times a Day. BID dosing     Dispense:  60 tablet     Refill:  0       No follow-ups on file.           This document has been electronically signed by WALKER Romero  July 28, 2025 17:08 EDT      Part of this note may be an electronic transcription/translation of spoken language to printed text using the Dragon Dictation System.

## 2025-07-30 LAB
6-ACETYLMORPHINE: NOT DETECTED NG/ML
9-DELTA-THC-COOH: 195 NG/ML
ALCOHOL, ETHYL: NOT DETECTED MG/DL
AMPHET SAL QL CFM: 1801 NG/ML
AMPHETAMINE: DETECTED NG/ML
BENZODIAZ BLD QL: NOT DETECTED NG/ML
BUPRENORPHINE SAL CFM-MCNC: DETECTED NG/ML
BUPRENORPHINE: 51 NG/ML
COCAINE METABOLITE: NOT DETECTED NG/ML
CREATININE: 103.8 MG/DL
EDDP SERPL QL: NOT DETECTED NG/ML
FENTANYL-EIA: NOT DETECTED NG/ML
METHAMPHET/CREAT UR: >2500 NG/ML
METHAMPHETAMINE: DETECTED NG/ML
NORBUPRENORPHINE: 264 NG/ML
OPIATES: NOT DETECTED NG/ML
OXIDANTS: NOT DETECTED UG/ML
OXYCODONE: NOT DETECTED NG/ML
PCP: NOT DETECTED NG/ML
PH: 6.5 (ref 4.5–9)
PHENTERMINE: NOT DETECTED NG/ML
REF LAB TEST METHOD: NORMAL
SPECIFIC GRAVITY, QUAN: 1.01 (ref 1–1.03)
THC: DETECTED NG/ML

## 2025-08-18 ENCOUNTER — TELEMEDICINE (OUTPATIENT)
Dept: PSYCHIATRY | Facility: CLINIC | Age: 55
End: 2025-08-18
Payer: MEDICAID

## 2025-08-18 VITALS
HEIGHT: 61 IN | BODY MASS INDEX: 32.81 KG/M2 | SYSTOLIC BLOOD PRESSURE: 132 MMHG | HEART RATE: 75 BPM | WEIGHT: 173.8 LBS | DIASTOLIC BLOOD PRESSURE: 74 MMHG

## 2025-08-18 DIAGNOSIS — F15.10 METHAMPHETAMINE ABUSE: ICD-10-CM

## 2025-08-18 DIAGNOSIS — F11.20 OPIOID TYPE DEPENDENCE, CONTINUOUS: Primary | ICD-10-CM

## 2025-08-18 DIAGNOSIS — F12.20 DELTA-9-TETRAHYDROCANNABINOL (THC) DEPENDENCE: ICD-10-CM

## 2025-08-18 DIAGNOSIS — Z79.899 MEDICATION MANAGEMENT: ICD-10-CM

## 2025-08-18 PROCEDURE — 99214 OFFICE O/P EST MOD 30 MIN: CPT | Performed by: NURSE PRACTITIONER

## 2025-08-18 PROCEDURE — 1159F MED LIST DOCD IN RCRD: CPT | Performed by: NURSE PRACTITIONER

## 2025-08-18 PROCEDURE — 1160F RVW MEDS BY RX/DR IN RCRD: CPT | Performed by: NURSE PRACTITIONER

## 2025-08-18 RX ORDER — BUPRENORPHINE HYDROCHLORIDE AND NALOXONE HYDROCHLORIDE DIHYDRATE 8; 2 MG/1; MG/1
2 TABLET SUBLINGUAL DAILY
Qty: 28 TABLET | Refills: 0 | Status: SHIPPED | OUTPATIENT
Start: 2025-08-18

## 2025-08-18 RX ORDER — BUPROPION HYDROCHLORIDE 150 MG/1
150 TABLET ORAL 2 TIMES DAILY
Qty: 60 TABLET | Refills: 0 | Status: SHIPPED | OUTPATIENT
Start: 2025-08-18

## 2025-08-20 LAB
6-ACETYLMORPHINE: NOT DETECTED NG/ML
9-DELTA-THC-COOH: 261 NG/ML
ALCOHOL, ETHYL: NOT DETECTED MG/DL
AMPHET SAL QL CFM: >2500 NG/ML
AMPHETAMINE: DETECTED NG/ML
BENZODIAZ BLD QL: NOT DETECTED NG/ML
BUPRENORPHINE SAL CFM-MCNC: DETECTED NG/ML
BUPRENORPHINE: 281 NG/ML
COCAINE METABOLITE: NOT DETECTED NG/ML
CREATININE: 112.4 MG/DL
EDDP SERPL QL: NOT DETECTED NG/ML
FENTANYL-EIA: NOT DETECTED NG/ML
METHAMPHET/CREAT UR: >2500 NG/ML
METHAMPHETAMINE: DETECTED NG/ML
NORBUPRENORPHINE: 673 NG/ML
OPIATES: NOT DETECTED NG/ML
OXIDANTS: NOT DETECTED UG/ML
OXYCODONE: NOT DETECTED NG/ML
PCP: NOT DETECTED NG/ML
PH: 6 (ref 4.5–9)
PHENTERMINE: NOT DETECTED NG/ML
REF LAB TEST METHOD: NORMAL
SPECIFIC GRAVITY, QUAN: 1.02 (ref 1–1.03)
THC: DETECTED NG/ML